# Patient Record
Sex: FEMALE | Race: WHITE | NOT HISPANIC OR LATINO | Employment: UNEMPLOYED
[De-identification: names, ages, dates, MRNs, and addresses within clinical notes are randomized per-mention and may not be internally consistent; named-entity substitution may affect disease eponyms.]

---

## 2017-06-21 ENCOUNTER — HOSPITAL (OUTPATIENT)
Dept: OTHER | Age: 37
End: 2017-06-21
Attending: INTERNAL MEDICINE

## 2017-06-28 ENCOUNTER — HOSPITAL (OUTPATIENT)
Dept: OTHER | Age: 37
End: 2017-06-28
Attending: INTERNAL MEDICINE

## 2017-07-13 ENCOUNTER — HOSPITAL (OUTPATIENT)
Dept: OTHER | Age: 37
End: 2017-07-13
Attending: SURGERY

## 2017-07-17 ENCOUNTER — HOSPITAL (OUTPATIENT)
Dept: OTHER | Age: 37
End: 2017-07-17
Attending: SURGERY

## 2017-07-23 ENCOUNTER — HOSPITAL (OUTPATIENT)
Dept: OTHER | Age: 37
End: 2017-07-23
Attending: EMERGENCY MEDICINE

## 2017-07-23 ENCOUNTER — DIAGNOSTIC TRANS (OUTPATIENT)
Dept: OTHER | Age: 37
End: 2017-07-23

## 2017-07-23 LAB
ANALYZER ANC (IANC): ABNORMAL
ANION GAP SERPL CALC-SCNC: 15 MMOL/L (ref 10–20)
BASOPHILS # BLD: 0 THOUSAND/MCL (ref 0–0.3)
BASOPHILS NFR BLD: 0 %
BUN SERPL-MCNC: 6 MG/DL (ref 6–20)
BUN/CREAT SERPL: 12 (ref 7–25)
CALCIUM SERPL-MCNC: 8.7 MG/DL (ref 8.4–10.2)
CHLORIDE: 104 MMOL/L (ref 98–107)
CO2 SERPL-SCNC: 24 MMOL/L (ref 21–32)
CREAT SERPL-MCNC: 0.5 MG/DL (ref 0.51–0.95)
DIFFERENTIAL METHOD BLD: ABNORMAL
EOSINOPHIL # BLD: 0 THOUSAND/MCL (ref 0.1–0.5)
EOSINOPHIL NFR BLD: 1 %
ERYTHROCYTE [DISTWIDTH] IN BLOOD: 15 % (ref 11–15)
ETHANOL SERPL-MCNC: 66 MG/DL
GLUCOSE SERPL-MCNC: 109 MG/DL (ref 65–99)
HEMATOCRIT: 38.5 % (ref 36–46.5)
HGB BLD-MCNC: 12.5 GM/DL (ref 12–15.5)
LYMPHOCYTES # BLD: 1.2 THOUSAND/MCL (ref 1–4.8)
LYMPHOCYTES NFR BLD: 28 %
MAGNESIUM SERPL-MCNC: 1.6 MG/DL (ref 1.7–2.4)
MCH RBC QN AUTO: 28 PG (ref 26–34)
MCHC RBC AUTO-ENTMCNC: 32.5 GM/DL (ref 32–36.5)
MCV RBC AUTO: 86.3 FL (ref 78–100)
MONOCYTES # BLD: 0.4 THOUSAND/MCL (ref 0.3–0.9)
MONOCYTES NFR BLD: 11 %
NEUTROPHILS # BLD: 2.5 THOUSAND/MCL (ref 1.8–7.7)
NEUTROPHILS NFR BLD: 60 %
NEUTS SEG NFR BLD: ABNORMAL %
PERCENT NRBC: ABNORMAL
PLATELET # BLD: 146 THOUSAND/MCL (ref 140–450)
POTASSIUM SERPL-SCNC: 3.9 MMOL/L (ref 3.4–5.1)
RBC # BLD: 4.46 MILLION/MCL (ref 4–5.2)
SODIUM SERPL-SCNC: 139 MMOL/L (ref 135–145)
TROPONIN I SERPL HS-MCNC: <0.02 NG/ML
TSH SERPL-ACNC: 0.6 MCUNIT/ML (ref 0.35–5)
WBC # BLD: 4.1 THOUSAND/MCL (ref 4.2–11)

## 2017-07-29 ENCOUNTER — HOSPITAL (OUTPATIENT)
Dept: OTHER | Age: 37
End: 2017-07-29
Attending: INTERNAL MEDICINE

## 2017-07-29 LAB
ALBUMIN SERPL-MCNC: 3.1 GM/DL (ref 3.6–5.1)
ALBUMIN/GLOB SERPL: 0.7 {RATIO} (ref 1–2.4)
ALP SERPL-CCNC: 79 UNIT/L (ref 45–117)
ALT SERPL-CCNC: 19 UNIT/L
ANALYZER ANC (IANC): ABNORMAL
ANION GAP SERPL CALC-SCNC: 19 MMOL/L (ref 10–20)
APAP SERPL-MCNC: <2 MCG/ML (ref 10–30)
AST SERPL-CCNC: 16 UNIT/L
BASOPHILS # BLD: 0 THOUSAND/MCL (ref 0–0.3)
BASOPHILS NFR BLD: 0 %
BILIRUB SERPL-MCNC: 0.5 MG/DL (ref 0.2–1)
BUN SERPL-MCNC: <2 MG/DL (ref 6–20)
BUN/CREAT SERPL: ABNORMAL (ref 7–25)
CALCIUM SERPL-MCNC: 8.7 MG/DL (ref 8.4–10.2)
CHLORIDE: 102 MMOL/L (ref 98–107)
CO2 SERPL-SCNC: 21 MMOL/L (ref 21–32)
CREAT SERPL-MCNC: 0.5 MG/DL (ref 0.51–0.95)
DIFFERENTIAL METHOD BLD: ABNORMAL
EOSINOPHIL # BLD: 0 THOUSAND/MCL (ref 0.1–0.5)
EOSINOPHIL NFR BLD: 0 %
ERYTHROCYTE [DISTWIDTH] IN BLOOD: 14.9 % (ref 11–15)
ETHANOL SERPL-MCNC: 167 MG/DL
GLOBULIN SER-MCNC: 4.3 GM/DL (ref 2–4)
GLUCOSE SERPL-MCNC: 115 MG/DL (ref 65–99)
HEMATOCRIT: 32.6 % (ref 36–46.5)
HGB BLD-MCNC: 10.6 GM/DL (ref 12–15.5)
LIPASE SERPL-CCNC: 196 UNIT/L (ref 73–393)
LYMPHOCYTES # BLD: 1.1 THOUSAND/MCL (ref 1–4.8)
LYMPHOCYTES NFR BLD: 13 %
MAGNESIUM SERPL-MCNC: 1.8 MG/DL (ref 1.7–2.4)
MCH RBC QN AUTO: 28.3 PG (ref 26–34)
MCHC RBC AUTO-ENTMCNC: 32.5 GM/DL (ref 32–36.5)
MCV RBC AUTO: 86.9 FL (ref 78–100)
MONOCYTES # BLD: 1.3 THOUSAND/MCL (ref 0.3–0.9)
MONOCYTES NFR BLD: 16 %
NEUTROPHILS # BLD: 6 THOUSAND/MCL (ref 1.8–7.7)
NEUTROPHILS NFR BLD: 71 %
NEUTS SEG NFR BLD: ABNORMAL %
PERCENT NRBC: ABNORMAL
PLATELET # BLD: 122 THOUSAND/MCL (ref 140–450)
POTASSIUM SERPL-SCNC: 3 MMOL/L (ref 3.4–5.1)
PROT SERPL-MCNC: 7.4 GM/DL (ref 6.4–8.2)
RBC # BLD: 3.75 MILLION/MCL (ref 4–5.2)
SALICYLATES SERPL-MCNC: 2.8 MG/DL
SODIUM SERPL-SCNC: 139 MMOL/L (ref 135–145)
WBC # BLD: 8.5 THOUSAND/MCL (ref 4.2–11)

## 2017-07-30 ENCOUNTER — IMAGING SERVICES (OUTPATIENT)
Dept: OTHER | Age: 37
End: 2017-07-30

## 2017-07-30 ENCOUNTER — DIAGNOSTIC TRANS (OUTPATIENT)
Dept: OTHER | Age: 37
End: 2017-07-30

## 2017-07-30 LAB
AMPHETAMINES UR QL SCN>500 NG/ML: NEGATIVE
ANION GAP SERPL CALC-SCNC: 10 MMOL/L (ref 10–20)
B-HCG UR QL: NEGATIVE
BARBITURATES UR QL SCN>200 NG/ML: NEGATIVE
BENZODIAZ UR QL SCN>200 NG/ML: NEGATIVE
BUN SERPL-MCNC: 2 MG/DL (ref 6–20)
BUN/CREAT SERPL: 4 (ref 7–25)
BZE UR QL SCN>150 NG/ML: NEGATIVE
CALCIUM SERPL-MCNC: 8.4 MG/DL (ref 8.4–10.2)
CANNABINOIDS UR QL SCN>50 NG/ML: NEGATIVE
CHLORIDE: 107 MMOL/L (ref 98–107)
CO2 SERPL-SCNC: 26 MMOL/L (ref 21–32)
CREAT SERPL-MCNC: 0.49 MG/DL (ref 0.51–0.95)
GLUCOSE BLDC GLUCOMTR-MCNC: 106 MG/DL (ref 65–99)
GLUCOSE SERPL-MCNC: 100 MG/DL (ref 65–99)
MAGNESIUM SERPL-MCNC: 1.8 MG/DL (ref 1.7–2.4)
OPIATES UR QL SCN>300 NG/ML: POSITIVE
PCP UR QL SCN>25 NG/ML: NEGATIVE
POTASSIUM SERPL-SCNC: 3.4 MMOL/L (ref 3.4–5.1)
SODIUM SERPL-SCNC: 140 MMOL/L (ref 135–145)

## 2017-07-31 ENCOUNTER — HOSPITAL (OUTPATIENT)
Dept: OTHER | Age: 37
End: 2017-07-31
Attending: PSYCHIATRY & NEUROLOGY

## 2017-07-31 ENCOUNTER — PRIOR ORIGINAL RECORDS (OUTPATIENT)
Dept: OTHER | Age: 37
End: 2017-07-31

## 2017-07-31 LAB
ALBUMIN SERPL-MCNC: 2.6 GM/DL (ref 3.6–5.1)
ALBUMIN/GLOB SERPL: 0.6 {RATIO} (ref 1–2.4)
ALP SERPL-CCNC: 65 UNIT/L (ref 45–117)
ALT SERPL-CCNC: 18 UNIT/L
ANALYZER ANC (IANC): ABNORMAL
ANION GAP SERPL CALC-SCNC: 16 MMOL/L (ref 10–20)
AST SERPL-CCNC: 13 UNIT/L
BASOPHILS # BLD: 0 THOUSAND/MCL (ref 0–0.3)
BASOPHILS NFR BLD: 0 %
BILIRUB SERPL-MCNC: 0.6 MG/DL (ref 0.2–1)
BUN SERPL-MCNC: <2 MG/DL (ref 6–20)
BUN/CREAT SERPL: ABNORMAL (ref 7–25)
CALCIUM SERPL-MCNC: 8.6 MG/DL (ref 8.4–10.2)
CHLORIDE: 104 MMOL/L (ref 98–107)
CO2 SERPL-SCNC: 23 MMOL/L (ref 21–32)
CREAT SERPL-MCNC: 0.45 MG/DL (ref 0.51–0.95)
DIFFERENTIAL METHOD BLD: ABNORMAL
EOSINOPHIL # BLD: 0 THOUSAND/MCL (ref 0.1–0.5)
EOSINOPHIL NFR BLD: 1 %
ERYTHROCYTE [DISTWIDTH] IN BLOOD: 15.2 % (ref 11–15)
GLOBULIN SER-MCNC: 4.1 GM/DL (ref 2–4)
GLUCOSE BLDC GLUCOMTR-MCNC: 87 MG/DL (ref 65–99)
GLUCOSE SERPL-MCNC: 82 MG/DL (ref 65–99)
HEMATOCRIT: 31.4 % (ref 36–46.5)
HGB BLD-MCNC: 10 GM/DL (ref 12–15.5)
LYMPHOCYTES # BLD: 0.8 THOUSAND/MCL (ref 1–4.8)
LYMPHOCYTES NFR BLD: 16 %
MAGNESIUM SERPL-MCNC: 1.9 MG/DL (ref 1.7–2.4)
MCH RBC QN AUTO: 28 PG (ref 26–34)
MCHC RBC AUTO-ENTMCNC: 31.8 GM/DL (ref 32–36.5)
MCV RBC AUTO: 88 FL (ref 78–100)
MONOCYTES # BLD: 0.6 THOUSAND/MCL (ref 0.3–0.9)
MONOCYTES NFR BLD: 12 %
NEUTROPHILS # BLD: 3.6 THOUSAND/MCL (ref 1.8–7.7)
NEUTROPHILS NFR BLD: 71 %
NEUTS SEG NFR BLD: ABNORMAL %
PERCENT NRBC: ABNORMAL
PHOSPHATE SERPL-MCNC: 2.5 MG/DL (ref 2.4–4.7)
PLATELET # BLD: 115 THOUSAND/MCL (ref 140–450)
POTASSIUM SERPL-SCNC: 4.2 MMOL/L (ref 3.4–5.1)
PROT SERPL-MCNC: 6.7 GM/DL (ref 6.4–8.2)
RBC # BLD: 3.57 MILLION/MCL (ref 4–5.2)
SODIUM SERPL-SCNC: 139 MMOL/L (ref 135–145)
T3 SERPL-MCNC: 0.75 NG/ML (ref 0.6–1.81)
T4 SERPL-MCNC: 9.5 MCG/DL (ref 4.7–13.3)
WBC # BLD: 5.1 THOUSAND/MCL (ref 4.2–11)

## 2017-08-01 LAB
ALBUMIN: 2.6 G/DL
ALKALINE PHOSPHATATE(ALK PHOS): 65 IU/L
ALT (SGPT): 18 U/L
AST (SGOT): 13 U/L
CALCIUM: 8.6 MG/DL
CHLORIDE: 104 MEQ/L
CHOLEST SERPL-MCNC: 165 MG/DL
CHOLEST/HDLC SERPL: 4.3 {RATIO}
CHOLESTEROL, TOTAL: 165 MG/DL
CREATININE, SERUM: 0.45 MG/DL
GLOBULIN: 4.1 G/DL
GLUCOSE: 82 MG/DL
GLUCOSE: 82 MG/DL
GLYCOHEMOGLOBIN: 5.2 % (ref 4.5–5.6)
HDL CHOLESTEROL: 38 MG/DL
HDLC SERPL-MCNC: 38 MG/DL
HEMATOCRIT: 31.4 %
HEMOGLOBIN: 10 G/DL
LDL CHOLESTEROL: 113 MG/DL
LDLC SERPL CALC-MCNC: 113 MG/DL
MAGNESIUM: 1.9 MG/DL
NONHDLC SERPL-MCNC: 127 MG/DL
PLATELETS: 115 K/UL
POTASSIUM, SERUM: 4.2 MEQ/L
RED BLOOD COUNT: 3.57 X 10-6/U
SGOT (AST): 13 IU/L
SGPT (ALT): 18 IU/L
SODIUM: 139 MEQ/L
TRIGLYCERIDE (TRIGP): 71 MG/DL
TRIGLYCERIDES: 71 MG/DL
WHITE BLOOD COUNT: 5.1 X 10-3/U

## 2017-08-03 ENCOUNTER — IMAGING SERVICES (OUTPATIENT)
Dept: OTHER | Age: 37
End: 2017-08-03

## 2017-08-07 ENCOUNTER — HOSPITAL (OUTPATIENT)
Dept: OTHER | Age: 37
End: 2017-08-07
Attending: SURGERY

## 2017-08-08 LAB
ANALYZER ANC (IANC): ABNORMAL
ANION GAP SERPL CALC-SCNC: 9 MMOL/L (ref 10–20)
BUN SERPL-MCNC: 4 MG/DL (ref 6–20)
BUN/CREAT SERPL: 6 (ref 7–25)
CALCIUM SERPL-MCNC: 8.2 MG/DL (ref 8.4–10.2)
CHLORIDE: 105 MMOL/L (ref 98–107)
CO2 SERPL-SCNC: 31 MMOL/L (ref 21–32)
CREAT SERPL-MCNC: 0.65 MG/DL (ref 0.51–0.95)
ERYTHROCYTE [DISTWIDTH] IN BLOOD: 15.5 % (ref 11–15)
GLUCOSE SERPL-MCNC: 114 MG/DL (ref 65–99)
HEMATOCRIT: 33.4 % (ref 36–46.5)
HGB BLD-MCNC: 10.3 GM/DL (ref 12–15.5)
MCH RBC QN AUTO: 27.7 PG (ref 26–34)
MCHC RBC AUTO-ENTMCNC: 30.8 GM/DL (ref 32–36.5)
MCV RBC AUTO: 89.8 FL (ref 78–100)
PLATELET # BLD: 289 THOUSAND/MCL (ref 140–450)
POTASSIUM SERPL-SCNC: 4.1 MMOL/L (ref 3.4–5.1)
RBC # BLD: 3.72 MILLION/MCL (ref 4–5.2)
SODIUM SERPL-SCNC: 141 MMOL/L (ref 135–145)
WBC # BLD: 6.1 THOUSAND/MCL (ref 4.2–11)

## 2017-08-09 LAB
ALBUMIN SERPL-MCNC: 3.3 GM/DL (ref 3.6–5.1)
ALP SERPL-CCNC: 85 UNIT/L (ref 45–117)
ALT SERPL-CCNC: 16 UNIT/L
ANALYZER ANC (IANC): ABNORMAL
ANION GAP SERPL CALC-SCNC: 17 MMOL/L (ref 10–20)
APAP SERPL-MCNC: <2 MCG/ML (ref 10–30)
APTT PPP: 27 SECONDS (ref 22–30)
APTT PPP: NORMAL S
AST SERPL-CCNC: 24 UNIT/L
BASOPHILS # BLD: 0 THOUSAND/MCL (ref 0–0.3)
BASOPHILS NFR BLD: 0 %
BILIRUB CONJ SERPL-MCNC: 0.1 MG/DL (ref 0–0.2)
BILIRUB SERPL-MCNC: 0.3 MG/DL (ref 0.2–1)
BUN SERPL-MCNC: 3 MG/DL (ref 6–20)
BUN/CREAT SERPL: 5 (ref 7–25)
CALCIUM SERPL-MCNC: 9 MG/DL (ref 8.4–10.2)
CHLORIDE: 106 MMOL/L (ref 98–107)
CO2 SERPL-SCNC: 25 MMOL/L (ref 21–32)
CREAT SERPL-MCNC: 0.61 MG/DL (ref 0.51–0.95)
DIFFERENTIAL METHOD BLD: ABNORMAL
EOSINOPHIL # BLD: 0.2 THOUSAND/MCL (ref 0.1–0.5)
EOSINOPHIL NFR BLD: 2 %
ERYTHROCYTE [DISTWIDTH] IN BLOOD: 14.8 % (ref 11–15)
ETHANOL SERPL-MCNC: 188 MG/DL
ETHANOL SERPL-MCNC: 98 MG/DL
GLUCOSE SERPL-MCNC: 123 MG/DL (ref 65–99)
HEMATOCRIT: 36.9 % (ref 36–46.5)
HGB BLD-MCNC: 11.4 GM/DL (ref 12–15.5)
INR PPP: 1
LIPASE SERPL-CCNC: 253 UNIT/L (ref 73–393)
LYMPHOCYTES # BLD: 2.9 THOUSAND/MCL (ref 1–4.8)
LYMPHOCYTES NFR BLD: 23 %
MAGNESIUM SERPL-MCNC: 2 MG/DL (ref 1.7–2.4)
MCH RBC QN AUTO: 27.7 PG (ref 26–34)
MCHC RBC AUTO-ENTMCNC: 30.9 GM/DL (ref 32–36.5)
MCV RBC AUTO: 89.6 FL (ref 78–100)
MONOCYTES # BLD: 0.9 THOUSAND/MCL (ref 0.3–0.9)
MONOCYTES NFR BLD: 7 %
NEUTROPHILS # BLD: 8.2 THOUSAND/MCL (ref 1.8–7.7)
NEUTROPHILS NFR BLD: 68 %
NEUTS SEG NFR BLD: ABNORMAL %
PERCENT NRBC: ABNORMAL
PLATELET # BLD: 356 THOUSAND/MCL (ref 140–450)
POTASSIUM SERPL-SCNC: 3.8 MMOL/L (ref 3.4–5.1)
PROT SERPL-MCNC: 7.6 GM/DL (ref 6.4–8.2)
PROTHROMBIN TIME: 10.6 SECONDS (ref 9.7–11.8)
PROTHROMBIN TIME: NORMAL
RBC # BLD: 4.12 MILLION/MCL (ref 4–5.2)
SALICYLATES SERPL-MCNC: 3.1 MG/DL
SODIUM SERPL-SCNC: 144 MMOL/L (ref 135–145)
WBC # BLD: 12.2 THOUSAND/MCL (ref 4.2–11)

## 2017-08-10 ENCOUNTER — PRIOR ORIGINAL RECORDS (OUTPATIENT)
Dept: HEALTH INFORMATION MANAGEMENT | Age: 37
End: 2017-08-10

## 2017-08-10 ENCOUNTER — HOSPITAL (OUTPATIENT)
Dept: OTHER | Age: 37
End: 2017-08-10
Attending: PSYCHIATRY & NEUROLOGY

## 2017-08-10 LAB
ALBUMIN SERPL-MCNC: 2.4 GM/DL (ref 3.6–5.1)
ALBUMIN/GLOB SERPL: 0.7 {RATIO} (ref 1–2.4)
ALP SERPL-CCNC: 64 UNIT/L (ref 45–117)
ALT SERPL-CCNC: 12 UNIT/L
ANALYZER ANC (IANC): ABNORMAL
ANION GAP SERPL CALC-SCNC: 11 MMOL/L (ref 10–20)
AST SERPL-CCNC: 16 UNIT/L
BASOPHILS # BLD: 0 THOUSAND/MCL (ref 0–0.3)
BASOPHILS NFR BLD: 1 %
BILIRUB SERPL-MCNC: 0.3 MG/DL (ref 0.2–1)
BUN SERPL-MCNC: 3 MG/DL (ref 6–20)
BUN/CREAT SERPL: 5 (ref 7–25)
CALCIUM SERPL-MCNC: 8 MG/DL (ref 8.4–10.2)
CHLORIDE: 106 MMOL/L (ref 98–107)
CO2 SERPL-SCNC: 28 MMOL/L (ref 21–32)
CREAT SERPL-MCNC: 0.56 MG/DL (ref 0.51–0.95)
DIFFERENTIAL METHOD BLD: ABNORMAL
EOSINOPHIL # BLD: 0.1 THOUSAND/MCL (ref 0.1–0.5)
EOSINOPHIL NFR BLD: 2 %
ERYTHROCYTE [DISTWIDTH] IN BLOOD: 15.1 % (ref 11–15)
GLOBULIN SER-MCNC: 3.4 GM/DL (ref 2–4)
GLUCOSE SERPL-MCNC: 104 MG/DL (ref 65–99)
HCG SERPL QL: NEGATIVE
HEMATOCRIT: 29.4 % (ref 36–46.5)
HGB BLD-MCNC: 9.5 GM/DL (ref 12–15.5)
LYMPHOCYTES # BLD: 1.5 THOUSAND/MCL (ref 1–4.8)
LYMPHOCYTES NFR BLD: 25 %
MCH RBC QN AUTO: 28.3 PG (ref 26–34)
MCHC RBC AUTO-ENTMCNC: 32.3 GM/DL (ref 32–36.5)
MCV RBC AUTO: 87.5 FL (ref 78–100)
MONOCYTES # BLD: 0.6 THOUSAND/MCL (ref 0.3–0.9)
MONOCYTES NFR BLD: 9 %
NEUTROPHILS # BLD: 3.9 THOUSAND/MCL (ref 1.8–7.7)
NEUTROPHILS NFR BLD: 63 %
NEUTS SEG NFR BLD: ABNORMAL %
PERCENT NRBC: ABNORMAL
PLATELET # BLD: 231 THOUSAND/MCL (ref 140–450)
POTASSIUM SERPL-SCNC: 4.1 MMOL/L (ref 3.4–5.1)
PROT SERPL-MCNC: 5.8 GM/DL (ref 6.4–8.2)
RBC # BLD: 3.36 MILLION/MCL (ref 4–5.2)
SODIUM SERPL-SCNC: 141 MMOL/L (ref 135–145)
T3 SERPL-MCNC: 0.93 NG/ML (ref 0.6–1.81)
T4 SERPL-MCNC: 8 MCG/DL (ref 4.7–13.3)
WBC # BLD: 6.1 THOUSAND/MCL (ref 4.2–11)

## 2017-08-31 LAB — CREAT SERPL-MCNC: 0.51 MG/DL (ref 0.51–0.95)

## 2017-09-01 ENCOUNTER — HOSPITAL (OUTPATIENT)
Dept: OTHER | Age: 37
End: 2017-09-01
Attending: INTERNAL MEDICINE

## 2017-09-01 ENCOUNTER — CHARTING TRANS (OUTPATIENT)
Dept: OTHER | Age: 37
End: 2017-09-01

## 2017-09-01 LAB
ANALYZER ANC (IANC): ABNORMAL
ANION GAP SERPL CALC-SCNC: 11 MMOL/L (ref 10–20)
BASOPHILS # BLD: 0 THOUSAND/MCL (ref 0–0.3)
BASOPHILS NFR BLD: 0 %
BUN SERPL-MCNC: 8 MG/DL (ref 6–20)
BUN/CREAT SERPL: 15 (ref 7–25)
CALCIUM SERPL-MCNC: 8.5 MG/DL (ref 8.4–10.2)
CHLORIDE: 102 MMOL/L (ref 98–107)
CO2 SERPL-SCNC: 28 MMOL/L (ref 21–32)
CREAT SERPL-MCNC: 0.55 MG/DL (ref 0.51–0.95)
DIFFERENTIAL METHOD BLD: ABNORMAL
EOSINOPHIL # BLD: 0.4 THOUSAND/MCL (ref 0.1–0.5)
EOSINOPHIL NFR BLD: 7 %
ERYTHROCYTE [DISTWIDTH] IN BLOOD: 15.4 % (ref 11–15)
GLUCOSE SERPL-MCNC: 110 MG/DL (ref 65–99)
HEMATOCRIT: 28.7 % (ref 36–46.5)
HGB BLD-MCNC: 8.7 GM/DL (ref 12–15.5)
LYMPHOCYTES # BLD: 1 THOUSAND/MCL (ref 1–4.8)
LYMPHOCYTES NFR BLD: 18 %
MCH RBC QN AUTO: 26.4 PG (ref 26–34)
MCHC RBC AUTO-ENTMCNC: 30.3 GM/DL (ref 32–36.5)
MCV RBC AUTO: 87 FL (ref 78–100)
MONOCYTES # BLD: 0.7 THOUSAND/MCL (ref 0.3–0.9)
MONOCYTES NFR BLD: 12 %
NEUTROPHILS # BLD: 3.6 THOUSAND/MCL (ref 1.8–7.7)
NEUTROPHILS NFR BLD: 63 %
NEUTS SEG NFR BLD: ABNORMAL %
PERCENT NRBC: ABNORMAL
PLATELET # BLD: 167 THOUSAND/MCL (ref 140–450)
POTASSIUM SERPL-SCNC: 3.6 MMOL/L (ref 3.4–5.1)
RBC # BLD: 3.3 MILLION/MCL (ref 4–5.2)
SODIUM SERPL-SCNC: 137 MMOL/L (ref 135–145)
WBC # BLD: 5.8 THOUSAND/MCL (ref 4.2–11)

## 2017-09-02 LAB
ANALYZER ANC (IANC): ABNORMAL
BASOPHILS # BLD: 0 THOUSAND/MCL (ref 0–0.3)
BASOPHILS NFR BLD: 1 %
DIFFERENTIAL METHOD BLD: ABNORMAL
EOSINOPHIL # BLD: 0.6 THOUSAND/MCL (ref 0.1–0.5)
EOSINOPHIL NFR BLD: 12 %
ERYTHROCYTE [DISTWIDTH] IN BLOOD: 15.3 % (ref 11–15)
FOLATE SERPL-MCNC: 9.1 NG/ML
HEMATOCRIT: 29.2 % (ref 36–46.5)
HGB BLD-MCNC: 9 GM/DL (ref 12–15.5)
IRON SATN MFR SERPL: 4 % (ref 15–45)
IRON SERPL-MCNC: 14 MCG/DL (ref 50–170)
LYMPHOCYTES # BLD: 1.4 THOUSAND/MCL (ref 1–4.8)
LYMPHOCYTES NFR BLD: 30 %
MCH RBC QN AUTO: 26.9 PG (ref 26–34)
MCHC RBC AUTO-ENTMCNC: 30.8 GM/DL (ref 32–36.5)
MCV RBC AUTO: 87.4 FL (ref 78–100)
MONOCYTES # BLD: 0.6 THOUSAND/MCL (ref 0.3–0.9)
MONOCYTES NFR BLD: 13 %
NEUTROPHILS # BLD: 2 THOUSAND/MCL (ref 1.8–7.7)
NEUTROPHILS NFR BLD: 44 %
NEUTS SEG NFR BLD: ABNORMAL %
PERCENT NRBC: ABNORMAL
PLATELET # BLD: 148 THOUSAND/MCL (ref 140–450)
RBC # BLD: 3.34 MILLION/MCL (ref 4–5.2)
TIBC SERPL-MCNC: 324 MCG/DL (ref 250–450)
VIT B12 SERPL-MCNC: 248 PG/ML (ref 211–911)
WBC # BLD: 4.6 THOUSAND/MCL (ref 4.2–11)

## 2017-09-28 ENCOUNTER — HOSPITAL (OUTPATIENT)
Dept: OTHER | Age: 37
End: 2017-09-28
Attending: INTERNAL MEDICINE

## 2017-10-04 ENCOUNTER — HOSPITAL (OUTPATIENT)
Dept: OTHER | Age: 37
End: 2017-10-04
Attending: SURGERY

## 2018-05-01 ENCOUNTER — DIAGNOSTIC TRANS (OUTPATIENT)
Dept: OTHER | Age: 38
End: 2018-05-01

## 2018-05-03 ENCOUNTER — HOSPITAL (OUTPATIENT)
Dept: OTHER | Age: 38
End: 2018-05-03
Attending: SPECIALIST

## 2018-05-19 ENCOUNTER — HOSPITAL (OUTPATIENT)
Dept: OTHER | Age: 38
End: 2018-05-19
Attending: EMERGENCY MEDICINE

## 2018-05-19 ENCOUNTER — DIAGNOSTIC TRANS (OUTPATIENT)
Dept: OTHER | Age: 38
End: 2018-05-19

## 2018-05-19 LAB
ANALYZER ANC (IANC): ABNORMAL
ANION GAP SERPL CALC-SCNC: 20 MMOL/L (ref 10–20)
BASOPHILS # BLD: 0 THOUSAND/MCL (ref 0–0.3)
BASOPHILS NFR BLD: 0 %
BUN SERPL-MCNC: 9 MG/DL (ref 6–20)
BUN/CREAT SERPL: 14 (ref 7–25)
CALCIUM SERPL-MCNC: 10 MG/DL (ref 8.4–10.2)
CHLORIDE: 102 MMOL/L (ref 98–107)
CO2 SERPL-SCNC: 22 MMOL/L (ref 21–32)
CREAT SERPL-MCNC: 0.63 MG/DL (ref 0.51–0.95)
D DIMER PPP FEU-MCNC: 0.63 MG/L FEU
DIFFERENTIAL METHOD BLD: ABNORMAL
EOSINOPHIL # BLD: 0 THOUSAND/MCL (ref 0.1–0.5)
EOSINOPHIL NFR BLD: 1 %
ERYTHROCYTE [DISTWIDTH] IN BLOOD: 13.9 % (ref 11–15)
GLUCOSE SERPL-MCNC: 134 MG/DL (ref 65–99)
HEMATOCRIT: 47.2 % (ref 36–46.5)
HGB BLD-MCNC: 16.8 GM/DL (ref 12–15.5)
LYMPHOCYTES # BLD: 1.8 THOUSAND/MCL (ref 1–4.8)
LYMPHOCYTES NFR BLD: 42 %
MAGNESIUM SERPL-MCNC: 1.7 MG/DL (ref 1.7–2.4)
MCH RBC QN AUTO: 35.1 PG (ref 26–34)
MCHC RBC AUTO-ENTMCNC: 35.6 GM/DL (ref 32–36.5)
MCV RBC AUTO: 98.5 FL (ref 78–100)
MONOCYTES # BLD: 0.4 THOUSAND/MCL (ref 0.3–0.9)
MONOCYTES NFR BLD: 10 %
NEUTROPHILS # BLD: 2 THOUSAND/MCL (ref 1.8–7.7)
NEUTROPHILS NFR BLD: 47 %
NEUTS SEG NFR BLD: ABNORMAL %
NRBC (NRBCRE): ABNORMAL
PLATELET # BLD: 250 THOUSAND/MCL (ref 140–450)
POTASSIUM SERPL-SCNC: 3.7 MMOL/L (ref 3.4–5.1)
RBC # BLD: 4.79 MILLION/MCL (ref 4–5.2)
SODIUM SERPL-SCNC: 140 MMOL/L (ref 135–145)
TROPONIN I SERPL HS-MCNC: <0.02 NG/ML
WBC # BLD: 4.2 THOUSAND/MCL (ref 4.2–11)

## 2018-05-29 ENCOUNTER — PRIOR ORIGINAL RECORDS (OUTPATIENT)
Dept: OTHER | Age: 38
End: 2018-05-29

## 2018-06-14 ENCOUNTER — MYAURORA ACCOUNT LINK (OUTPATIENT)
Dept: OTHER | Age: 38
End: 2018-06-14

## 2018-07-07 ENCOUNTER — HOSPITAL (OUTPATIENT)
Dept: OTHER | Age: 38
End: 2018-07-07
Attending: INTERNAL MEDICINE

## 2018-07-07 LAB
ALBUMIN SERPL-MCNC: 3.9 GM/DL (ref 3.6–5.1)
ALP SERPL-CCNC: 101 UNIT/L (ref 45–117)
ALT SERPL-CCNC: 34 UNIT/L
ANALYZER ANC (IANC): ABNORMAL
ANION GAP SERPL CALC-SCNC: 18 MMOL/L (ref 10–20)
AST SERPL-CCNC: 24 UNIT/L
BASOPHILS # BLD: 0 THOUSAND/MCL (ref 0–0.3)
BASOPHILS NFR BLD: 0 %
BILIRUB CONJ SERPL-MCNC: 0.1 MG/DL (ref 0–0.2)
BILIRUB SERPL-MCNC: 0.5 MG/DL (ref 0.2–1)
BUN SERPL-MCNC: 10 MG/DL (ref 6–20)
BUN/CREAT SERPL: 15 (ref 7–25)
CALCIUM SERPL-MCNC: 9 MG/DL (ref 8.4–10.2)
CHLORIDE: 105 MMOL/L (ref 98–107)
CO2 SERPL-SCNC: 24 MMOL/L (ref 21–32)
CREAT SERPL-MCNC: 0.67 MG/DL (ref 0.51–0.95)
DIFFERENTIAL METHOD BLD: ABNORMAL
EOSINOPHIL # BLD: 0.1 THOUSAND/MCL (ref 0.1–0.5)
EOSINOPHIL NFR BLD: 2 %
ERYTHROCYTE [DISTWIDTH] IN BLOOD: 14.4 % (ref 11–15)
ETHANOL SERPL-MCNC: 230 MG/DL
GLUCOSE SERPL-MCNC: 139 MG/DL (ref 65–99)
HEMATOCRIT: 45.2 % (ref 36–46.5)
HGB BLD-MCNC: 16.4 GM/DL (ref 12–15.5)
LIPASE SERPL-CCNC: 153 UNIT/L (ref 73–393)
LYMPHOCYTES # BLD: 2.1 THOUSAND/MCL (ref 1–4.8)
LYMPHOCYTES NFR BLD: 43 %
MCH RBC QN AUTO: 36.1 PG (ref 26–34)
MCHC RBC AUTO-ENTMCNC: 36.3 GM/DL (ref 32–36.5)
MCV RBC AUTO: 99.6 FL (ref 78–100)
MONOCYTES # BLD: 0.3 THOUSAND/MCL (ref 0.3–0.9)
MONOCYTES NFR BLD: 6 %
NEUTROPHILS # BLD: 2.4 THOUSAND/MCL (ref 1.8–7.7)
NEUTROPHILS NFR BLD: 49 %
NEUTS SEG NFR BLD: ABNORMAL %
NRBC (NRBCRE): ABNORMAL
PLATELET # BLD: 215 THOUSAND/MCL (ref 140–450)
POTASSIUM SERPL-SCNC: 4 MMOL/L (ref 3.4–5.1)
PROT SERPL-MCNC: 7.9 GM/DL (ref 6.4–8.2)
RBC # BLD: 4.54 MILLION/MCL (ref 4–5.2)
SODIUM SERPL-SCNC: 143 MMOL/L (ref 135–145)
WBC # BLD: 4.9 THOUSAND/MCL (ref 4.2–11)

## 2018-07-08 ENCOUNTER — DIAGNOSTIC TRANS (OUTPATIENT)
Dept: OTHER | Age: 38
End: 2018-07-08

## 2018-07-08 LAB
AMPHETAMINES UR QL SCN>500 NG/ML: NEGATIVE
ANION GAP SERPL CALC-SCNC: 11 MMOL/L (ref 10–20)
BARBITURATES UR QL SCN>200 NG/ML: NEGATIVE
BENZODIAZ UR QL SCN>200 NG/ML: NEGATIVE
BUN SERPL-MCNC: 12 MG/DL (ref 6–20)
BUN/CREAT SERPL: 20 (ref 7–25)
BZE UR QL SCN>150 NG/ML: NEGATIVE
CALCIUM SERPL-MCNC: 9.2 MG/DL (ref 8.4–10.2)
CANNABINOIDS UR QL SCN>50 NG/ML: NEGATIVE
CHLORIDE: 103 MMOL/L (ref 98–107)
CO2 SERPL-SCNC: 28 MMOL/L (ref 21–32)
CREAT SERPL-MCNC: 0.59 MG/DL (ref 0.51–0.95)
GLUCOSE SERPL-MCNC: 99 MG/DL (ref 65–99)
HCG SERPL QL: NEGATIVE
MAGNESIUM SERPL-MCNC: 1.7 MG/DL (ref 1.7–2.4)
OPIATES UR QL SCN>300 NG/ML: NEGATIVE
PCP UR QL SCN>25 NG/ML: NEGATIVE
POTASSIUM SERPL-SCNC: 4.1 MMOL/L (ref 3.4–5.1)
SODIUM SERPL-SCNC: 138 MMOL/L (ref 135–145)

## 2018-07-09 ENCOUNTER — CHARTING TRANS (OUTPATIENT)
Dept: OTHER | Age: 38
End: 2018-07-09

## 2018-07-09 LAB
CREAT SERPL-MCNC: 0.59 MG/DL (ref 0.51–0.95)
MAGNESIUM SERPL-MCNC: 2 MG/DL (ref 1.7–2.4)

## 2018-07-12 ENCOUNTER — HOSPITAL (OUTPATIENT)
Dept: OTHER | Age: 38
End: 2018-07-12
Attending: SURGERY

## 2018-07-17 ENCOUNTER — HOSPITAL (OUTPATIENT)
Dept: OTHER | Age: 38
End: 2018-07-17
Attending: INTERNAL MEDICINE

## 2018-07-20 ENCOUNTER — PRIOR ORIGINAL RECORDS (OUTPATIENT)
Dept: OTHER | Age: 38
End: 2018-07-20

## 2018-07-28 ENCOUNTER — HOSPITAL (OUTPATIENT)
Dept: OTHER | Age: 38
End: 2018-07-28
Attending: INTERNAL MEDICINE

## 2018-07-28 LAB
ALBUMIN SERPL-MCNC: 3.9 GM/DL (ref 3.6–5.1)
ALBUMIN/GLOB SERPL: 0.9 {RATIO} (ref 1–2.4)
ALP SERPL-CCNC: 119 UNIT/L (ref 45–117)
ALT SERPL-CCNC: 31 UNIT/L
ANALYZER ANC (IANC): ABNORMAL
ANION GAP SERPL CALC-SCNC: 18 MMOL/L (ref 10–20)
AST SERPL-CCNC: 24 UNIT/L
BASOPHILS # BLD: 0 THOUSAND/MCL (ref 0–0.3)
BASOPHILS NFR BLD: 0 %
BILIRUB SERPL-MCNC: 0.6 MG/DL (ref 0.2–1)
BUN SERPL-MCNC: 6 MG/DL (ref 6–20)
BUN/CREAT SERPL: 10 (ref 7–25)
CALCIUM SERPL-MCNC: 9.5 MG/DL (ref 8.4–10.2)
CHLORIDE: 102 MMOL/L (ref 98–107)
CO2 SERPL-SCNC: 26 MMOL/L (ref 21–32)
CREAT SERPL-MCNC: 0.59 MG/DL (ref 0.51–0.95)
DIFFERENTIAL METHOD BLD: ABNORMAL
EOSINOPHIL # BLD: 0 THOUSAND/MCL (ref 0.1–0.5)
EOSINOPHIL NFR BLD: 1 %
ERYTHROCYTE [DISTWIDTH] IN BLOOD: 14.2 % (ref 11–15)
ETHANOL SERPL-MCNC: 273 MG/DL
GLOBULIN SER-MCNC: 4.3 GM/DL (ref 2–4)
GLUCOSE SERPL-MCNC: 103 MG/DL (ref 65–99)
HEMATOCRIT: 46 % (ref 36–46.5)
HGB BLD-MCNC: 16.9 GM/DL (ref 12–15.5)
LIPASE SERPL-CCNC: 126 UNIT/L (ref 73–393)
LYMPHOCYTES # BLD: 1.9 THOUSAND/MCL (ref 1–4.8)
LYMPHOCYTES NFR BLD: 25 %
MAGNESIUM SERPL-MCNC: 2.1 MG/DL (ref 1.7–2.4)
MCH RBC QN AUTO: 36.7 PG (ref 26–34)
MCHC RBC AUTO-ENTMCNC: 36.7 GM/DL (ref 32–36.5)
MCV RBC AUTO: 100 FL (ref 78–100)
MONOCYTES # BLD: 0.6 THOUSAND/MCL (ref 0.3–0.9)
MONOCYTES NFR BLD: 8 %
NEUTROPHILS # BLD: 5 THOUSAND/MCL (ref 1.8–7.7)
NEUTROPHILS NFR BLD: 66 %
NEUTS SEG NFR BLD: ABNORMAL %
NRBC (NRBCRE): ABNORMAL
PLATELET # BLD: 200 THOUSAND/MCL (ref 140–450)
POTASSIUM SERPL-SCNC: 3.8 MMOL/L (ref 3.4–5.1)
PROT SERPL-MCNC: 8.2 GM/DL (ref 6.4–8.2)
RBC # BLD: 4.6 MILLION/MCL (ref 4–5.2)
SODIUM SERPL-SCNC: 142 MMOL/L (ref 135–145)
WBC # BLD: 7.6 THOUSAND/MCL (ref 4.2–11)

## 2018-07-29 LAB
AMPHETAMINES UR QL SCN>500 NG/ML: NEGATIVE
BARBITURATES UR QL SCN>200 NG/ML: NEGATIVE
BENZODIAZ UR QL SCN>200 NG/ML: NEGATIVE
BZE UR QL SCN>150 NG/ML: NEGATIVE
CANNABINOIDS UR QL SCN>50 NG/ML: NEGATIVE
OPIATES UR QL SCN>300 NG/ML: NEGATIVE
PCP UR QL SCN>25 NG/ML: NEGATIVE

## 2018-07-30 ENCOUNTER — HOSPITAL (OUTPATIENT)
Dept: OTHER | Age: 38
End: 2018-07-30
Attending: PSYCHIATRY & NEUROLOGY

## 2018-07-30 LAB
ALBUMIN SERPL-MCNC: 2.9 GM/DL (ref 3.6–5.1)
ALBUMIN/GLOB SERPL: 0.9 {RATIO} (ref 1–2.4)
ALP SERPL-CCNC: 88 UNIT/L (ref 45–117)
ALT SERPL-CCNC: 23 UNIT/L
ANALYZER ANC (IANC): ABNORMAL
ANION GAP SERPL CALC-SCNC: 13 MMOL/L (ref 10–20)
AST SERPL-CCNC: 21 UNIT/L
BASOPHILS # BLD: 0 THOUSAND/MCL (ref 0–0.3)
BASOPHILS NFR BLD: 0 %
BILIRUB SERPL-MCNC: 0.5 MG/DL (ref 0.2–1)
BUN SERPL-MCNC: 7 MG/DL (ref 6–20)
BUN/CREAT SERPL: 12 (ref 7–25)
CALCIUM SERPL-MCNC: 8.4 MG/DL (ref 8.4–10.2)
CHLORIDE: 103 MMOL/L (ref 98–107)
CHOLEST SERPL-MCNC: 192 MG/DL
CHOLEST/HDLC SERPL: 4.5 {RATIO}
CO2 SERPL-SCNC: 24 MMOL/L (ref 21–32)
CREAT SERPL-MCNC: 0.59 MG/DL (ref 0.51–0.95)
DIFFERENTIAL METHOD BLD: ABNORMAL
EOSINOPHIL # BLD: 0.1 THOUSAND/MCL (ref 0.1–0.5)
EOSINOPHIL NFR BLD: 3 %
ERYTHROCYTE [DISTWIDTH] IN BLOOD: 13.9 % (ref 11–15)
GLOBULIN SER-MCNC: 3.3 GM/DL (ref 2–4)
GLUCOSE SERPL-MCNC: 113 MG/DL (ref 65–99)
GLYCOHEMOGLOBIN: 4.9 % (ref 4.5–5.6)
HCG SERPL QL: NEGATIVE
HDLC SERPL-MCNC: 43 MG/DL
HEMATOCRIT: 37.4 % (ref 36–46.5)
HGB BLD-MCNC: 13.4 GM/DL (ref 12–15.5)
LDLC SERPL CALC-MCNC: 93 MG/DL
LYMPHOCYTES # BLD: 0.9 THOUSAND/MCL (ref 1–4.8)
LYMPHOCYTES NFR BLD: 30 %
MCH RBC QN AUTO: 35.4 PG (ref 26–34)
MCHC RBC AUTO-ENTMCNC: 35.8 GM/DL (ref 32–36.5)
MCV RBC AUTO: 98.9 FL (ref 78–100)
MONOCYTES # BLD: 0.3 THOUSAND/MCL (ref 0.3–0.9)
MONOCYTES NFR BLD: 10 %
NEUTROPHILS # BLD: 1.8 THOUSAND/MCL (ref 1.8–7.7)
NEUTROPHILS NFR BLD: 57 %
NEUTS SEG NFR BLD: ABNORMAL %
NONHDLC SERPL-MCNC: 149 MG/DL
NRBC (NRBCRE): ABNORMAL
PLATELET # BLD: 111 THOUSAND/MCL (ref 140–450)
POTASSIUM SERPL-SCNC: 3.7 MMOL/L (ref 3.4–5.1)
PROT SERPL-MCNC: 6.2 GM/DL (ref 6.4–8.2)
RBC # BLD: 3.78 MILLION/MCL (ref 4–5.2)
SODIUM SERPL-SCNC: 136 MMOL/L (ref 135–145)
T3 SERPL-MCNC: 0.86 NG/ML (ref 0.6–1.81)
T4 SERPL-MCNC: 8.7 MCG/DL (ref 4.7–13.3)
TRIGLYCERIDE (TRIGP): 280 MG/DL
TSH SERPL-ACNC: 1.64 MCUNIT/ML (ref 0.35–5)
WBC # BLD: 3.1 THOUSAND/MCL (ref 4.2–11)

## 2018-08-01 LAB
AMORPH SED URNS QL MICRO: ABNORMAL
APPEARANCE UR: CLEAR
BILIRUB UR QL: NEGATIVE
CAOX CRY URNS QL MICRO: ABNORMAL
COLOR UR: ABNORMAL
EPITH CASTS #/AREA URNS LPF: ABNORMAL /[LPF]
FATTY CASTS #/AREA URNS LPF: ABNORMAL /[LPF]
GLUCOSE UR-MCNC: NEGATIVE MG/DL
GRAN CASTS #/AREA URNS LPF: ABNORMAL /[LPF]
HGB UR QL: NEGATIVE
HYALINE CASTS #/AREA URNS LPF: ABNORMAL /[LPF]
KETONES UR-MCNC: NEGATIVE MG/DL
LEUKOCYTE ESTERASE UR QL STRIP: NEGATIVE
MICROSCOPIC (MT): ABNORMAL
MIXED CELL CASTS #/AREA URNS LPF: ABNORMAL /[LPF]
MUCOUS THREADS URNS QL MICRO: ABNORMAL
NITRITE UR QL: NEGATIVE
PH UR: 5 UNIT (ref 5–7)
PROT UR QL: NEGATIVE MG/DL
RBC CASTS #/AREA URNS LPF: ABNORMAL /[LPF]
RENAL EPI CELLS #/AREA URNS HPF: ABNORMAL /[HPF]
SP GR UR: 1 (ref 1–1.03)
SPECIMEN SOURCE: ABNORMAL
SPERM URNS QL MICRO: ABNORMAL
T VAGINALIS URNS QL MICRO: ABNORMAL
TRI-PHOS CRY URNS QL MICRO: ABNORMAL
URATE CRY URNS QL MICRO: ABNORMAL
URNS CMNT MICRO: ABNORMAL
UROBILINOGEN UR QL: 0.2 MG/DL (ref 0–1)
WAXY CASTS #/AREA URNS LPF: ABNORMAL /[LPF]
WBC CASTS #/AREA URNS LPF: ABNORMAL /[LPF]
YEAST HYPHAE URNS QL MICRO: ABNORMAL
YEAST URNS QL MICRO: ABNORMAL

## 2018-10-20 ENCOUNTER — DIAGNOSTIC TRANS (OUTPATIENT)
Dept: OTHER | Age: 38
End: 2018-10-20

## 2018-10-20 ENCOUNTER — HOSPITAL (OUTPATIENT)
Dept: OTHER | Age: 38
End: 2018-10-20
Attending: EMERGENCY MEDICINE

## 2018-10-20 LAB
ANALYZER ANC (IANC): ABNORMAL
ANION GAP SERPL CALC-SCNC: 17 MMOL/L (ref 10–20)
BASOPHILS # BLD: 0 THOUSAND/MCL (ref 0–0.3)
BASOPHILS NFR BLD: 0 %
BUN SERPL-MCNC: 5 MG/DL (ref 6–20)
BUN/CREAT SERPL: 9 (ref 7–25)
CALCIUM SERPL-MCNC: 8.6 MG/DL (ref 8.4–10.2)
CHLORIDE: 102 MMOL/L (ref 98–107)
CO2 SERPL-SCNC: 24 MMOL/L (ref 21–32)
CREAT SERPL-MCNC: 0.53 MG/DL (ref 0.51–0.95)
DIFFERENTIAL METHOD BLD: ABNORMAL
EOSINOPHIL # BLD: 0.1 THOUSAND/MCL (ref 0.1–0.5)
EOSINOPHIL NFR BLD: 2 %
ERYTHROCYTE [DISTWIDTH] IN BLOOD: 12.6 % (ref 11–15)
GLUCOSE SERPL-MCNC: 108 MG/DL (ref 65–99)
HEMATOCRIT: 45.5 % (ref 36–46.5)
HGB BLD-MCNC: 15.8 GM/DL (ref 12–15.5)
LYMPHOCYTES # BLD: 1.1 THOUSAND/MCL (ref 1–4.8)
LYMPHOCYTES NFR BLD: 23 %
MAGNESIUM SERPL-MCNC: 1.9 MG/DL (ref 1.7–2.4)
MCH RBC QN AUTO: 35.3 PG (ref 26–34)
MCHC RBC AUTO-ENTMCNC: 34.7 GM/DL (ref 32–36.5)
MCV RBC AUTO: 101.6 FL (ref 78–100)
MONOCYTES # BLD: 0.6 THOUSAND/MCL (ref 0.3–0.9)
MONOCYTES NFR BLD: 12 %
NEUTROPHILS # BLD: 3 THOUSAND/MCL (ref 1.8–7.7)
NEUTROPHILS NFR BLD: 63 %
NEUTS SEG NFR BLD: ABNORMAL %
NRBC (NRBCRE): ABNORMAL
PLATELET # BLD: 194 THOUSAND/MCL (ref 140–450)
POTASSIUM SERPL-SCNC: 4 MMOL/L (ref 3.4–5.1)
RBC # BLD: 4.48 MILLION/MCL (ref 4–5.2)
SODIUM SERPL-SCNC: 139 MMOL/L (ref 135–145)
TROPONIN I SERPL HS-MCNC: <0.02 NG/ML
WBC # BLD: 4.7 THOUSAND/MCL (ref 4.2–11)

## 2018-12-01 ENCOUNTER — HOSPITAL (OUTPATIENT)
Dept: OTHER | Age: 38
End: 2018-12-01
Attending: INTERNAL MEDICINE

## 2018-12-01 LAB
ALBUMIN SERPL-MCNC: 3.4 GM/DL (ref 3.6–5.1)
ALBUMIN/GLOB SERPL: 0.9 {RATIO} (ref 1–2.4)
ALP SERPL-CCNC: 88 UNIT/L (ref 45–117)
ALT SERPL-CCNC: 24 UNIT/L
ANALYZER ANC (IANC): ABNORMAL
ANION GAP SERPL CALC-SCNC: 15 MMOL/L (ref 10–20)
AST SERPL-CCNC: 17 UNIT/L
BASOPHILS # BLD: 0 THOUSAND/MCL (ref 0–0.3)
BASOPHILS NFR BLD: 0 %
BILIRUB SERPL-MCNC: 0.4 MG/DL (ref 0.2–1)
BUN SERPL-MCNC: 12 MG/DL (ref 6–20)
BUN/CREAT SERPL: 23 (ref 7–25)
CALCIUM SERPL-MCNC: 9.4 MG/DL (ref 8.4–10.2)
CHLORIDE: 103 MMOL/L (ref 98–107)
CO2 SERPL-SCNC: 26 MMOL/L (ref 21–32)
CREAT SERPL-MCNC: 0.52 MG/DL (ref 0.51–0.95)
DIFFERENTIAL METHOD BLD: ABNORMAL
EOSINOPHIL # BLD: 0.1 THOUSAND/MCL (ref 0.1–0.5)
EOSINOPHIL NFR BLD: 1 %
ERYTHROCYTE [DISTWIDTH] IN BLOOD: 12.7 % (ref 11–15)
GLOBULIN SER-MCNC: 3.6 GM/DL (ref 2–4)
GLUCOSE SERPL-MCNC: 124 MG/DL (ref 65–99)
HEMATOCRIT: 42.4 % (ref 36–46.5)
HGB BLD-MCNC: 14.8 GM/DL (ref 12–15.5)
IMM GRANULOCYTES # BLD AUTO: 0 THOUSAND/MCL (ref 0–0.2)
IMM GRANULOCYTES NFR BLD: 0 %
LYMPHOCYTES # BLD: 1.3 THOUSAND/MCL (ref 1–4.8)
LYMPHOCYTES NFR BLD: 19 %
MCH RBC QN AUTO: 35.3 PG (ref 26–34)
MCHC RBC AUTO-ENTMCNC: 34.9 GM/DL (ref 32–36.5)
MCV RBC AUTO: 101.2 FL (ref 78–100)
MONOCYTES # BLD: 0.7 THOUSAND/MCL (ref 0.3–0.9)
MONOCYTES NFR BLD: 10 %
NEUTROPHILS # BLD: 4.7 THOUSAND/MCL (ref 1.8–7.7)
NEUTROPHILS NFR BLD: 70 %
NEUTS SEG NFR BLD: ABNORMAL %
NRBC (NRBCRE): 0 /100 WBC
PLATELET # BLD: 170 THOUSAND/MCL (ref 140–450)
POTASSIUM SERPL-SCNC: 3.9 MMOL/L (ref 3.4–5.1)
PROT SERPL-MCNC: 7 GM/DL (ref 6.4–8.2)
RBC # BLD: 4.19 MILLION/MCL (ref 4–5.2)
SODIUM SERPL-SCNC: 140 MMOL/L (ref 135–145)
TSH SERPL-ACNC: 0.42 MCUNIT/ML (ref 0.35–5)
WBC # BLD: 6.8 THOUSAND/MCL (ref 4.2–11)

## 2018-12-02 LAB
AMORPH SED URNS QL MICRO: ABNORMAL
APPEARANCE UR: ABNORMAL
BACTERIA #/AREA URNS HPF: ABNORMAL /HPF
BILIRUB UR QL: NEGATIVE
CAOX CRY URNS QL MICRO: PRESENT
COLOR UR: ABNORMAL
EPITH CASTS #/AREA URNS LPF: ABNORMAL /[LPF]
FATTY CASTS #/AREA URNS LPF: ABNORMAL /[LPF]
GLUCOSE UR-MCNC: NEGATIVE MG/DL
GRAN CASTS #/AREA URNS LPF: ABNORMAL /[LPF]
HGB UR QL: NEGATIVE
HYALINE CASTS #/AREA URNS LPF: ABNORMAL /LPF (ref 0–5)
KETONES UR-MCNC: NEGATIVE MG/DL
LEUKOCYTE ESTERASE UR QL STRIP: ABNORMAL
MAGNESIUM SERPL-MCNC: 1.6 MG/DL (ref 1.7–2.4)
MIXED CELL CASTS #/AREA URNS LPF: ABNORMAL /[LPF]
MUCOUS THREADS URNS QL MICRO: PRESENT
NITRITE UR QL: NEGATIVE
PH UR: 5 UNIT (ref 5–7)
PROT UR QL: NEGATIVE MG/DL
RBC #/AREA URNS HPF: ABNORMAL /HPF (ref 0–3)
RBC CASTS #/AREA URNS LPF: ABNORMAL /[LPF]
RENAL EPI CELLS #/AREA URNS HPF: ABNORMAL /[HPF]
SP GR UR: 1.03 (ref 1–1.03)
SPECIMEN SOURCE: ABNORMAL
SPERM URNS QL MICRO: ABNORMAL
SQUAMOUS #/AREA URNS HPF: ABNORMAL /HPF (ref 0–5)
T VAGINALIS URNS QL MICRO: ABNORMAL
TRI-PHOS CRY URNS QL MICRO: ABNORMAL
URATE CRY URNS QL MICRO: ABNORMAL
URINE REFLEX: ABNORMAL
URNS CMNT MICRO: ABNORMAL
UROBILINOGEN UR QL: 0.2 MG/DL (ref 0–1)
WAXY CASTS #/AREA URNS LPF: ABNORMAL /[LPF]
WBC #/AREA URNS HPF: ABNORMAL /HPF (ref 0–5)
WBC CASTS #/AREA URNS LPF: ABNORMAL /[LPF]
YEAST HYPHAE URNS QL MICRO: ABNORMAL
YEAST URNS QL MICRO: ABNORMAL

## 2018-12-10 ENCOUNTER — HOSPITAL (OUTPATIENT)
Dept: OTHER | Age: 38
End: 2018-12-10
Attending: INTERNAL MEDICINE

## 2018-12-10 LAB
ALBUMIN SERPL-MCNC: 3.2 GM/DL (ref 3.6–5.1)
ALP SERPL-CCNC: 91 UNIT/L (ref 45–117)
ALT SERPL-CCNC: 22 UNIT/L
AMPHETAMINES UR QL SCN>500 NG/ML: NEGATIVE
ANALYZER ANC (IANC): ABNORMAL
ANION GAP SERPL CALC-SCNC: 20 MMOL/L (ref 10–20)
APAP SERPL-MCNC: <2 MCG/ML (ref 10–30)
AST SERPL-CCNC: 28 UNIT/L
BARBITURATES UR QL SCN>200 NG/ML: NEGATIVE
BASOPHILS # BLD: 0 THOUSAND/MCL (ref 0–0.3)
BASOPHILS NFR BLD: 1 %
BENZODIAZ UR QL SCN>200 NG/ML: NEGATIVE
BILIRUB CONJ SERPL-MCNC: <0.1 MG/DL (ref 0–0.2)
BILIRUB SERPL-MCNC: 0.4 MG/DL (ref 0.2–1)
BUN SERPL-MCNC: <2 MG/DL (ref 6–20)
BUN/CREAT SERPL: ABNORMAL (ref 7–25)
BZE UR QL SCN>150 NG/ML: NEGATIVE
CALCIUM SERPL-MCNC: 9.2 MG/DL (ref 8.4–10.2)
CANNABINOIDS UR QL SCN>50 NG/ML: NEGATIVE
CHLORIDE: 102 MMOL/L (ref 98–107)
CO2 SERPL-SCNC: 23 MMOL/L (ref 21–32)
CREAT SERPL-MCNC: 0.41 MG/DL (ref 0.51–0.95)
DIFFERENTIAL METHOD BLD: ABNORMAL
EOSINOPHIL # BLD: 0.1 THOUSAND/MCL (ref 0.1–0.5)
EOSINOPHIL NFR BLD: 2 %
ERYTHROCYTE [DISTWIDTH] IN BLOOD: 12.1 % (ref 11–15)
ETHANOL SERPL-MCNC: 206 MG/DL
GLUCOSE SERPL-MCNC: 110 MG/DL (ref 65–99)
HEMATOCRIT: 42 % (ref 36–46.5)
HGB BLD-MCNC: 14.6 GM/DL (ref 12–15.5)
IMM GRANULOCYTES # BLD AUTO: 0 THOUSAND/MCL (ref 0–0.2)
IMM GRANULOCYTES NFR BLD: 0 %
LIPASE SERPL-CCNC: 267 UNIT/L (ref 73–393)
LYMPHOCYTES # BLD: 0.8 THOUSAND/MCL (ref 1–4.8)
LYMPHOCYTES NFR BLD: 13 %
MCH RBC QN AUTO: 34.8 PG (ref 26–34)
MCHC RBC AUTO-ENTMCNC: 34.8 GM/DL (ref 32–36.5)
MCV RBC AUTO: 100.2 FL (ref 78–100)
MONOCYTES # BLD: 0.4 THOUSAND/MCL (ref 0.3–0.9)
MONOCYTES NFR BLD: 7 %
NEUTROPHILS # BLD: 4.3 THOUSAND/MCL (ref 1.8–7.7)
NEUTROPHILS NFR BLD: 77 %
NEUTS SEG NFR BLD: ABNORMAL %
NRBC (NRBCRE): 0 /100 WBC
OPIATES UR QL SCN>300 NG/ML: POSITIVE
PCP UR QL SCN>25 NG/ML: NEGATIVE
PLATELET # BLD: 165 THOUSAND/MCL (ref 140–450)
POTASSIUM SERPL-SCNC: 3.7 MMOL/L (ref 3.4–5.1)
PROT SERPL-MCNC: 7.7 GM/DL (ref 6.4–8.2)
RBC # BLD: 4.19 MILLION/MCL (ref 4–5.2)
SALICYLATES SERPL-MCNC: <2.8 MG/DL
SODIUM SERPL-SCNC: 141 MMOL/L (ref 135–145)
WBC # BLD: 5.7 THOUSAND/MCL (ref 4.2–11)

## 2018-12-11 ENCOUNTER — HOSPITAL (OUTPATIENT)
Dept: OTHER | Age: 38
End: 2018-12-11
Attending: PSYCHIATRY & NEUROLOGY

## 2018-12-11 LAB
ANION GAP SERPL CALC-SCNC: 13 MMOL/L (ref 10–20)
BUN SERPL-MCNC: 2 MG/DL (ref 6–20)
BUN/CREAT SERPL: 4 (ref 7–25)
CALCIUM SERPL-MCNC: 8.8 MG/DL (ref 8.4–10.2)
CHLORIDE: 98 MMOL/L (ref 98–107)
CO2 SERPL-SCNC: 27 MMOL/L (ref 21–32)
CREAT SERPL-MCNC: 0.51 MG/DL (ref 0.51–0.95)
GLUCOSE SERPL-MCNC: 221 MG/DL (ref 65–99)
HCG SERPL QL: NEGATIVE
MAGNESIUM SERPL-MCNC: 1.6 MG/DL (ref 1.7–2.4)
PHOSPHATE SERPL-MCNC: 2.6 MG/DL (ref 2.4–4.7)
POTASSIUM SERPL-SCNC: 3.4 MMOL/L (ref 3.4–5.1)
SODIUM SERPL-SCNC: 135 MMOL/L (ref 135–145)

## 2018-12-12 LAB
ALBUMIN SERPL-MCNC: 3 GM/DL (ref 3.6–5.1)
ALBUMIN/GLOB SERPL: 0.8 {RATIO} (ref 1–2.4)
ALP SERPL-CCNC: 78 UNIT/L (ref 45–117)
ALT SERPL-CCNC: 21 UNIT/L
ANION GAP SERPL CALC-SCNC: 12 MMOL/L (ref 10–20)
AST SERPL-CCNC: 17 UNIT/L
BILIRUB SERPL-MCNC: 0.4 MG/DL (ref 0.2–1)
BUN SERPL-MCNC: 2 MG/DL (ref 6–20)
BUN/CREAT SERPL: 4 (ref 7–25)
CALCIUM SERPL-MCNC: 9.2 MG/DL (ref 8.4–10.2)
CHLORIDE: 97 MMOL/L (ref 98–107)
CHOLEST SERPL-MCNC: 178 MG/DL
CHOLEST/HDLC SERPL: 5.1 {RATIO}
CO2 SERPL-SCNC: 29 MMOL/L (ref 21–32)
CREAT SERPL-MCNC: 0.52 MG/DL (ref 0.51–0.95)
GLOBULIN SER-MCNC: 3.9 GM/DL (ref 2–4)
GLUCOSE BLDC GLUCOMTR-MCNC: 81 MG/DL (ref 65–99)
GLUCOSE BLDC GLUCOMTR-MCNC: 92 MG/DL (ref 65–99)
GLUCOSE SERPL-MCNC: 117 MG/DL (ref 65–99)
GLYCOHEMOGLOBIN: 5 % (ref 4.5–5.6)
HDLC SERPL-MCNC: 35 MG/DL
LDLC SERPL CALC-MCNC: 113 MG/DL
LIPASE SERPL-CCNC: 479 UNIT/L (ref 73–393)
NONHDLC SERPL-MCNC: 143 MG/DL
POTASSIUM SERPL-SCNC: 3.7 MMOL/L (ref 3.4–5.1)
PROT SERPL-MCNC: 6.9 GM/DL (ref 6.4–8.2)
SODIUM SERPL-SCNC: 134 MMOL/L (ref 135–145)
T3 SERPL-MCNC: 1.12 NG/ML (ref 0.6–1.81)
T4 SERPL-MCNC: 9.5 MCG/DL (ref 4.7–13.3)
TRIGLYCERIDE (TRIGP): 150 MG/DL

## 2018-12-13 ENCOUNTER — HOSPITAL (OUTPATIENT)
Dept: OTHER | Age: 38
End: 2018-12-13
Attending: INTERNAL MEDICINE

## 2018-12-13 LAB
LIPASE SERPL-CCNC: 419 UNIT/L (ref 73–393)
MAGNESIUM SERPL-MCNC: 1.9 MG/DL (ref 1.7–2.4)

## 2018-12-14 ENCOUNTER — HOSPITAL (OUTPATIENT)
Dept: OTHER | Age: 38
End: 2018-12-14
Attending: INTERNAL MEDICINE

## 2018-12-14 LAB
ALBUMIN SERPL-MCNC: 3.1 GM/DL (ref 3.6–5.1)
ALBUMIN/GLOB SERPL: 0.9 {RATIO} (ref 1–2.4)
ALP SERPL-CCNC: 75 UNIT/L (ref 45–117)
ALT SERPL-CCNC: 22 UNIT/L
ANALYZER ANC (IANC): ABNORMAL
ANION GAP SERPL CALC-SCNC: 10 MMOL/L (ref 10–20)
AST SERPL-CCNC: 15 UNIT/L
BASOPHILS # BLD: 0 THOUSAND/MCL (ref 0–0.3)
BASOPHILS NFR BLD: 1 %
BILIRUB SERPL-MCNC: 0.4 MG/DL (ref 0.2–1)
BUN SERPL-MCNC: 5 MG/DL (ref 6–20)
BUN/CREAT SERPL: 9 (ref 7–25)
CALCIUM SERPL-MCNC: 8.9 MG/DL (ref 8.4–10.2)
CHLORIDE: 102 MMOL/L (ref 98–107)
CO2 SERPL-SCNC: 30 MMOL/L (ref 21–32)
CREAT SERPL-MCNC: 0.55 MG/DL (ref 0.51–0.95)
DIFFERENTIAL METHOD BLD: ABNORMAL
EOSINOPHIL # BLD: 0.1 THOUSAND/MCL (ref 0.1–0.5)
EOSINOPHIL NFR BLD: 3 %
ERYTHROCYTE [DISTWIDTH] IN BLOOD: 12.3 % (ref 11–15)
GLOBULIN SER-MCNC: 3.6 GM/DL (ref 2–4)
GLUCOSE SERPL-MCNC: 106 MG/DL (ref 65–99)
HEMATOCRIT: 41.8 % (ref 36–46.5)
HGB BLD-MCNC: 13.8 GM/DL (ref 12–15.5)
IMM GRANULOCYTES # BLD AUTO: 0 THOUSAND/MCL (ref 0–0.2)
IMM GRANULOCYTES NFR BLD: 1 %
LIPASE SERPL-CCNC: 326 UNIT/L (ref 73–393)
LYMPHOCYTES # BLD: 1.3 THOUSAND/MCL (ref 1–4.8)
LYMPHOCYTES NFR BLD: 29 %
MAGNESIUM SERPL-MCNC: 2.1 MG/DL (ref 1.7–2.4)
MCH RBC QN AUTO: 34.2 PG (ref 26–34)
MCHC RBC AUTO-ENTMCNC: 33 GM/DL (ref 32–36.5)
MCV RBC AUTO: 103.7 FL (ref 78–100)
MONOCYTES # BLD: 0.7 THOUSAND/MCL (ref 0.3–0.9)
MONOCYTES NFR BLD: 15 %
NEUTROPHILS # BLD: 2.4 THOUSAND/MCL (ref 1.8–7.7)
NEUTROPHILS NFR BLD: 51 %
NEUTS SEG NFR BLD: ABNORMAL %
NRBC (NRBCRE): 0 /100 WBC
PLATELET # BLD: 160 THOUSAND/MCL (ref 140–450)
POTASSIUM SERPL-SCNC: 4.1 MMOL/L (ref 3.4–5.1)
PROT SERPL-MCNC: 6.7 GM/DL (ref 6.4–8.2)
RBC # BLD: 4.03 MILLION/MCL (ref 4–5.2)
SODIUM SERPL-SCNC: 138 MMOL/L (ref 135–145)
WBC # BLD: 4.6 THOUSAND/MCL (ref 4.2–11)

## 2018-12-21 ENCOUNTER — OFFICE VISIT (OUTPATIENT)
Dept: AUDIOLOGY | Age: 38
End: 2018-12-21

## 2018-12-21 ENCOUNTER — OFFICE VISIT (OUTPATIENT)
Dept: OTOLARYNGOLOGY | Age: 38
End: 2018-12-21

## 2018-12-21 DIAGNOSIS — H91.90 HEARING LOSS, UNSPECIFIED HEARING LOSS TYPE, UNSPECIFIED LATERALITY: ICD-10-CM

## 2018-12-21 DIAGNOSIS — H61.21 IMPACTED CERUMEN OF RIGHT EAR: Primary | ICD-10-CM

## 2018-12-21 DIAGNOSIS — H93.11 SUBJECTIVE TINNITUS OF RIGHT EAR: Primary | ICD-10-CM

## 2018-12-21 PROCEDURE — 69210 REMOVE IMPACTED EAR WAX UNI: CPT | Performed by: OTOLARYNGOLOGY

## 2018-12-21 PROCEDURE — 92557 COMPREHENSIVE HEARING TEST: CPT | Performed by: AUDIOLOGIST

## 2018-12-21 PROCEDURE — 92567 TYMPANOMETRY: CPT | Performed by: AUDIOLOGIST

## 2018-12-21 PROCEDURE — 99203 OFFICE O/P NEW LOW 30 MIN: CPT | Performed by: OTOLARYNGOLOGY

## 2018-12-21 RX ORDER — VENLAFAXINE HYDROCHLORIDE 150 MG/1
CAPSULE, EXTENDED RELEASE ORAL
Status: ON HOLD | COMMUNITY
Start: 2017-07-04 | End: 2020-04-06

## 2018-12-21 RX ORDER — PANTOPRAZOLE SODIUM 40 MG/1
40 TABLET, DELAYED RELEASE ORAL DAILY
Status: ON HOLD | COMMUNITY
Start: 2017-07-11 | End: 2020-12-23 | Stop reason: SDUPTHER

## 2018-12-21 RX ORDER — ALPRAZOLAM 0.25 MG/1
TABLET ORAL
Status: ON HOLD | COMMUNITY
Start: 2011-05-24 | End: 2020-04-06

## 2018-12-21 RX ORDER — MONTELUKAST SODIUM 10 MG/1
10 TABLET ORAL DAILY
Status: ON HOLD | COMMUNITY
Start: 2017-07-11 | End: 2020-12-23 | Stop reason: SDUPTHER

## 2018-12-21 RX ORDER — ONDANSETRON 8 MG/1
8 TABLET, ORALLY DISINTEGRATING ORAL EVERY 8 HOURS PRN
COMMUNITY
Start: 2017-07-05 | End: 2020-04-07 | Stop reason: SDUPTHER

## 2018-12-21 RX ORDER — METOPROLOL SUCCINATE 25 MG/1
TABLET, EXTENDED RELEASE ORAL
COMMUNITY
Start: 2017-07-18 | End: 2020-04-02 | Stop reason: ALTCHOICE

## 2018-12-26 ENCOUNTER — HOSPITAL (OUTPATIENT)
Dept: OTHER | Age: 38
End: 2018-12-26
Attending: INTERNAL MEDICINE

## 2018-12-26 LAB
ALBUMIN SERPL-MCNC: 3.3 GM/DL (ref 3.6–5.1)
ALBUMIN/GLOB SERPL: 1 {RATIO} (ref 1–2.4)
ALP SERPL-CCNC: 91 UNIT/L (ref 45–117)
ALT SERPL-CCNC: 29 UNIT/L
AMORPH SED URNS QL MICRO: ABNORMAL
ANALYZER ANC (IANC): ABNORMAL
ANION GAP SERPL CALC-SCNC: 16 MMOL/L (ref 10–20)
APPEARANCE UR: ABNORMAL
AST SERPL-CCNC: 28 UNIT/L
BACTERIA #/AREA URNS HPF: ABNORMAL /HPF
BASOPHILS # BLD: 0 THOUSAND/MCL (ref 0–0.3)
BASOPHILS NFR BLD: 1 %
BILIRUB SERPL-MCNC: 0.3 MG/DL (ref 0.2–1)
BILIRUB UR QL: NEGATIVE
BUN SERPL-MCNC: 6 MG/DL (ref 6–20)
BUN/CREAT SERPL: 11 (ref 7–25)
CALCIUM SERPL-MCNC: 7.9 MG/DL (ref 8.4–10.2)
CAOX CRY URNS QL MICRO: ABNORMAL
CHLORIDE: 106 MMOL/L (ref 98–107)
CO2 SERPL-SCNC: 23 MMOL/L (ref 21–32)
COLOR UR: ABNORMAL
CREAT SERPL-MCNC: 0.53 MG/DL (ref 0.51–0.95)
DIFFERENTIAL METHOD BLD: ABNORMAL
EOSINOPHIL # BLD: 0.1 THOUSAND/MCL (ref 0.1–0.5)
EOSINOPHIL NFR BLD: 2 %
EPITH CASTS #/AREA URNS LPF: ABNORMAL /[LPF]
ERYTHROCYTE [DISTWIDTH] IN BLOOD: 12.3 % (ref 11–15)
ETHANOL SERPL-MCNC: 122 MG/DL
FATTY CASTS #/AREA URNS LPF: ABNORMAL /[LPF]
GLOBULIN SER-MCNC: 3.4 GM/DL (ref 2–4)
GLUCOSE SERPL-MCNC: 112 MG/DL (ref 65–99)
GLUCOSE UR-MCNC: NEGATIVE MG/DL
GRAN CASTS #/AREA URNS LPF: ABNORMAL /[LPF]
HCG SERPL QL: NEGATIVE
HEMATOCRIT: 38.7 % (ref 36–46.5)
HEMATOCRIT: 41.1 % (ref 36–46.5)
HGB BLD-MCNC: 13.2 GM/DL (ref 12–15.5)
HGB BLD-MCNC: 13.9 GM/DL (ref 12–15.5)
HGB UR QL: NEGATIVE
HYALINE CASTS #/AREA URNS LPF: ABNORMAL /LPF (ref 0–5)
IMM GRANULOCYTES # BLD AUTO: 0 THOUSAND/MCL (ref 0–0.2)
IMM GRANULOCYTES NFR BLD: 0 %
KETONES UR-MCNC: ABNORMAL MG/DL
LEUKOCYTE ESTERASE UR QL STRIP: NEGATIVE
LIPASE SERPL-CCNC: 273 UNIT/L (ref 73–393)
LYMPHOCYTES # BLD: 1.4 THOUSAND/MCL (ref 1–4.8)
LYMPHOCYTES NFR BLD: 27 %
MCH RBC QN AUTO: 34.1 PG (ref 26–34)
MCHC RBC AUTO-ENTMCNC: 33.8 GM/DL (ref 32–36.5)
MCV RBC AUTO: 100.7 FL (ref 78–100)
MIXED CELL CASTS #/AREA URNS LPF: ABNORMAL /[LPF]
MONOCYTES # BLD: 0.4 THOUSAND/MCL (ref 0.3–0.9)
MONOCYTES NFR BLD: 7 %
MUCOUS THREADS URNS QL MICRO: PRESENT
NEUTROPHILS # BLD: 3.4 THOUSAND/MCL (ref 1.8–7.7)
NEUTROPHILS NFR BLD: 63 %
NEUTS SEG NFR BLD: ABNORMAL %
NITRITE UR QL: NEGATIVE
NRBC (NRBCRE): 0 /100 WBC
PH UR: 5 UNIT (ref 5–7)
PLATELET # BLD: 175 THOUSAND/MCL (ref 140–450)
POTASSIUM SERPL-SCNC: 3.8 MMOL/L (ref 3.4–5.1)
PROT SERPL-MCNC: 6.7 GM/DL (ref 6.4–8.2)
PROT UR QL: NEGATIVE MG/DL
RBC # BLD: 4.08 MILLION/MCL (ref 4–5.2)
RBC #/AREA URNS HPF: ABNORMAL /HPF (ref 0–3)
RBC CASTS #/AREA URNS LPF: ABNORMAL /[LPF]
RENAL EPI CELLS #/AREA URNS HPF: ABNORMAL /[HPF]
SODIUM SERPL-SCNC: 141 MMOL/L (ref 135–145)
SP GR UR: 1.03 (ref 1–1.03)
SPECIMEN SOURCE: ABNORMAL
SPERM URNS QL MICRO: ABNORMAL
SQUAMOUS #/AREA URNS HPF: ABNORMAL /HPF (ref 0–5)
T VAGINALIS URNS QL MICRO: ABNORMAL
TRI-PHOS CRY URNS QL MICRO: ABNORMAL
URATE CRY URNS QL MICRO: ABNORMAL
URINE REFLEX: ABNORMAL
URNS CMNT MICRO: ABNORMAL
UROBILINOGEN UR QL: 0.2 MG/DL (ref 0–1)
WAXY CASTS #/AREA URNS LPF: ABNORMAL /[LPF]
WBC # BLD: 5.3 THOUSAND/MCL (ref 4.2–11)
WBC #/AREA URNS HPF: ABNORMAL /HPF (ref 0–5)
WBC CASTS #/AREA URNS LPF: ABNORMAL /[LPF]
YEAST HYPHAE URNS QL MICRO: ABNORMAL
YEAST URNS QL MICRO: ABNORMAL

## 2018-12-29 ENCOUNTER — HOSPITAL (OUTPATIENT)
Dept: OTHER | Age: 38
End: 2018-12-29
Attending: EMERGENCY MEDICINE

## 2018-12-29 ENCOUNTER — DIAGNOSTIC TRANS (OUTPATIENT)
Dept: OTHER | Age: 38
End: 2018-12-29

## 2018-12-29 LAB
ANALYZER ANC (IANC): ABNORMAL
ANION GAP SERPL CALC-SCNC: 16 MMOL/L (ref 10–20)
BASOPHILS # BLD: 0 THOUSAND/MCL (ref 0–0.3)
BASOPHILS NFR BLD: 1 %
BUN SERPL-MCNC: 7 MG/DL (ref 6–20)
BUN/CREAT SERPL: 13 (ref 7–25)
CALCIUM SERPL-MCNC: 9.1 MG/DL (ref 8.4–10.2)
CHLORIDE: 102 MMOL/L (ref 98–107)
CO2 SERPL-SCNC: 24 MMOL/L (ref 21–32)
CREAT SERPL-MCNC: 0.53 MG/DL (ref 0.51–0.95)
DIFFERENTIAL METHOD BLD: ABNORMAL
EOSINOPHIL # BLD: 0.1 THOUSAND/MCL (ref 0.1–0.5)
EOSINOPHIL NFR BLD: 1 %
ERYTHROCYTE [DISTWIDTH] IN BLOOD: 11.9 % (ref 11–15)
GLUCOSE SERPL-MCNC: 129 MG/DL (ref 65–99)
HEMATOCRIT: 44.1 % (ref 36–46.5)
HGB BLD-MCNC: 15.3 GM/DL (ref 12–15.5)
IMM GRANULOCYTES # BLD AUTO: 0 THOUSAND/MCL (ref 0–0.2)
IMM GRANULOCYTES NFR BLD: 0 %
LYMPHOCYTES # BLD: 1.1 THOUSAND/MCL (ref 1–4.8)
LYMPHOCYTES NFR BLD: 15 %
MCH RBC QN AUTO: 34.2 PG (ref 26–34)
MCHC RBC AUTO-ENTMCNC: 34.7 GM/DL (ref 32–36.5)
MCV RBC AUTO: 98.7 FL (ref 78–100)
MONOCYTES # BLD: 0.5 THOUSAND/MCL (ref 0.3–0.9)
MONOCYTES NFR BLD: 7 %
NEUTROPHILS # BLD: 5.6 THOUSAND/MCL (ref 1.8–7.7)
NEUTROPHILS NFR BLD: 76 %
NEUTS SEG NFR BLD: ABNORMAL %
NRBC (NRBCRE): 0 /100 WBC
PLATELET # BLD: 150 THOUSAND/MCL (ref 140–450)
POTASSIUM SERPL-SCNC: 3.6 MMOL/L (ref 3.4–5.1)
RBC # BLD: 4.47 MILLION/MCL (ref 4–5.2)
SODIUM SERPL-SCNC: 138 MMOL/L (ref 135–145)
WBC # BLD: 7.3 THOUSAND/MCL (ref 4.2–11)

## 2019-01-01 ENCOUNTER — EXTERNAL RECORD (OUTPATIENT)
Dept: HEALTH INFORMATION MANAGEMENT | Facility: OTHER | Age: 39
End: 2019-01-01

## 2019-01-03 ENCOUNTER — HOSPITAL ENCOUNTER (EMERGENCY)
Facility: HOSPITAL | Age: 39
Discharge: HOME | End: 2019-01-03
Attending: EMERGENCY MEDICINE
Payer: COMMERCIAL

## 2019-01-03 VITALS
TEMPERATURE: 98 F | DIASTOLIC BLOOD PRESSURE: 92 MMHG | HEIGHT: 65 IN | OXYGEN SATURATION: 96 % | BODY MASS INDEX: 23.32 KG/M2 | HEART RATE: 82 BPM | SYSTOLIC BLOOD PRESSURE: 111 MMHG | RESPIRATION RATE: 25 BRPM | WEIGHT: 140 LBS

## 2019-01-03 DIAGNOSIS — Z78.9 ALCOHOL INGESTION: ICD-10-CM

## 2019-01-03 DIAGNOSIS — F41.9 ANXIETY: ICD-10-CM

## 2019-01-03 DIAGNOSIS — I47.1 SVT (SUPRAVENTRICULAR TACHYCARDIA) (HCC): Primary | ICD-10-CM

## 2019-01-03 LAB
ALBUMIN SERPL-MCNC: 3.8 G/DL (ref 3.1–4.5)
ALBUMIN/GLOB SERPL: 1 {RATIO} (ref 1–2)
ALP LIVER SERPL-CCNC: 97 U/L (ref 37–98)
ALT SERPL-CCNC: 30 U/L (ref 14–54)
ANION GAP SERPL CALC-SCNC: 11 MMOL/L (ref 0–18)
AST SERPL-CCNC: 27 U/L (ref 15–41)
BASOPHILS # BLD AUTO: 0.03 X10(3) UL (ref 0–0.1)
BASOPHILS NFR BLD AUTO: 0.5 %
BILIRUB SERPL-MCNC: 0.3 MG/DL (ref 0.1–2)
BUN BLD-MCNC: 8 MG/DL (ref 8–20)
BUN/CREAT SERPL: 11.4 (ref 10–20)
CALCIUM BLD-MCNC: 8.8 MG/DL (ref 8.3–10.3)
CHLORIDE SERPL-SCNC: 106 MMOL/L (ref 101–111)
CO2 SERPL-SCNC: 23 MMOL/L (ref 22–32)
CREAT BLD-MCNC: 0.7 MG/DL (ref 0.55–1.02)
EOSINOPHIL # BLD AUTO: 0.08 X10(3) UL (ref 0–0.3)
EOSINOPHIL NFR BLD AUTO: 1.2 %
ERYTHROCYTE [DISTWIDTH] IN BLOOD BY AUTOMATED COUNT: 12.3 % (ref 11.5–16)
ETHYL ALCOHOL: 122 MG/DL (ref ?–3)
GLOBULIN PLAS-MCNC: 3.9 G/DL (ref 2.8–4.4)
GLUCOSE BLD-MCNC: 96 MG/DL (ref 70–99)
HCT VFR BLD AUTO: 46.3 % (ref 34–50)
HGB BLD-MCNC: 16.2 G/DL (ref 12–16)
IMMATURE GRANULOCYTE COUNT: 0.03 X10(3) UL (ref 0–1)
IMMATURE GRANULOCYTE RATIO %: 0.5 %
LIPASE: 202 U/L (ref 73–393)
LYMPHOCYTES # BLD AUTO: 2.55 X10(3) UL (ref 0.9–4)
LYMPHOCYTES NFR BLD AUTO: 38.5 %
M PROTEIN MFR SERPL ELPH: 7.7 G/DL (ref 6.4–8.2)
MCH RBC QN AUTO: 34.2 PG (ref 27–33.2)
MCHC RBC AUTO-ENTMCNC: 35 G/DL (ref 31–37)
MCV RBC AUTO: 97.9 FL (ref 81–100)
MONOCYTES # BLD AUTO: 0.48 X10(3) UL (ref 0.1–1)
MONOCYTES NFR BLD AUTO: 7.2 %
NEUTROPHIL ABS PRELIM: 3.46 X10 (3) UL (ref 1.3–6.7)
NEUTROPHILS # BLD AUTO: 3.46 X10(3) UL (ref 1.3–6.7)
NEUTROPHILS NFR BLD AUTO: 52.1 %
OSMOLALITY SERPL CALC.SUM OF ELEC: 288 MOSM/KG (ref 275–295)
PLATELET # BLD AUTO: 151 10(3)UL (ref 150–450)
POTASSIUM SERPL-SCNC: 3.8 MMOL/L (ref 3.6–5.1)
RBC # BLD AUTO: 4.73 X10(6)UL (ref 3.8–5.1)
RED CELL DISTRIBUTION WIDTH-SD: 44.6 FL (ref 35.1–46.3)
SODIUM SERPL-SCNC: 140 MMOL/L (ref 136–144)
WBC # BLD AUTO: 6.6 X10(3) UL (ref 4–13)

## 2019-01-03 PROCEDURE — 85025 COMPLETE CBC W/AUTO DIFF WBC: CPT | Performed by: EMERGENCY MEDICINE

## 2019-01-03 PROCEDURE — 80320 DRUG SCREEN QUANTALCOHOLS: CPT | Performed by: EMERGENCY MEDICINE

## 2019-01-03 PROCEDURE — 83690 ASSAY OF LIPASE: CPT | Performed by: EMERGENCY MEDICINE

## 2019-01-03 PROCEDURE — 96375 TX/PRO/DX INJ NEW DRUG ADDON: CPT

## 2019-01-03 PROCEDURE — 99284 EMERGENCY DEPT VISIT MOD MDM: CPT

## 2019-01-03 PROCEDURE — 96374 THER/PROPH/DIAG INJ IV PUSH: CPT

## 2019-01-03 PROCEDURE — 93005 ELECTROCARDIOGRAM TRACING: CPT

## 2019-01-03 PROCEDURE — 80053 COMPREHEN METABOLIC PANEL: CPT | Performed by: EMERGENCY MEDICINE

## 2019-01-03 PROCEDURE — 93010 ELECTROCARDIOGRAM REPORT: CPT

## 2019-01-03 RX ORDER — LORAZEPAM 2 MG/ML
INJECTION INTRAMUSCULAR
Status: COMPLETED
Start: 2019-01-03 | End: 2019-01-03

## 2019-01-03 RX ORDER — ADENOSINE 3 MG/ML
INJECTION, SOLUTION INTRAVENOUS
Status: DISCONTINUED
Start: 2019-01-03 | End: 2019-01-03

## 2019-01-03 RX ORDER — KETOROLAC TROMETHAMINE 30 MG/ML
30 INJECTION, SOLUTION INTRAMUSCULAR; INTRAVENOUS ONCE
Status: COMPLETED | OUTPATIENT
Start: 2019-01-03 | End: 2019-01-03

## 2019-01-03 RX ORDER — LORAZEPAM 2 MG/ML
0.5 INJECTION INTRAMUSCULAR ONCE
Status: COMPLETED | OUTPATIENT
Start: 2019-01-03 | End: 2019-01-03

## 2019-01-03 NOTE — ED INITIAL ASSESSMENT (HPI)
PT TO ER WITH [facility] SVT X THREE DAYS. PT STATES SHE WAS AT [address] AGO PT IS SCHEDULED FOR CARDIAC APPT. AT [facility].

## 2019-01-03 NOTE — ED PROVIDER NOTES
Patient Seen in: [facility] Emergency Department    History   Patient presents with:  Arrythmia/Palpitations (cardiovascular)    Stated Complaint: SVT RESOLVED    HPI    Bethlokipollo Nick is a 57-year-old female presenting to the emergency department for SVT.   P hyperventilating abdomen soft nontender subsequent cyanosis or edema    ED Course     Labs Reviewed   ETHYL ALCOHOL - Abnormal; Notable for the following components:       Result Value    Ethyl Alcohol 122 (*)     All other components within normal limits visit.     Follow-up:  Serjio Garcia  [address] [facility] [address] [id number] [phone]    In 1 week          Medications Prescribed:  Current Discharge Medication List

## 2019-01-04 LAB
ATRIAL RATE: 90 BPM
P AXIS: 52 DEGREES
P-R INTERVAL: 150 MS
Q-T INTERVAL: 372 MS
QRS DURATION: 72 MS
QTC CALCULATION (BEZET): 455 MS
R AXIS: 75 DEGREES
T AXIS: 65 DEGREES
VENTRICULAR RATE: 90 BPM

## 2019-01-05 ENCOUNTER — DIAGNOSTIC TRANS (OUTPATIENT)
Dept: OTHER | Age: 39
End: 2019-01-05

## 2019-01-05 ENCOUNTER — HOSPITAL (OUTPATIENT)
Dept: OTHER | Age: 39
End: 2019-01-05
Attending: EMERGENCY MEDICINE

## 2019-01-05 LAB
ALBUMIN SERPL-MCNC: 3.8 GM/DL (ref 3.6–5.1)
ALBUMIN/GLOB SERPL: 1 {RATIO} (ref 1–2.4)
ALP SERPL-CCNC: 94 UNIT/L (ref 45–117)
ALT SERPL-CCNC: 35 UNIT/L
AMPHETAMINES UR QL SCN>500 NG/ML: NEGATIVE
ANALYZER ANC (IANC): NORMAL
ANION GAP SERPL CALC-SCNC: 13 MMOL/L (ref 10–20)
APAP SERPL-MCNC: <2 MCG/ML (ref 10–30)
AST SERPL-CCNC: 28 UNIT/L
BARBITURATES UR QL SCN>200 NG/ML: NEGATIVE
BASOPHILS # BLD: 0 THOUSAND/MCL (ref 0–0.3)
BASOPHILS NFR BLD: 1 %
BENZODIAZ UR QL SCN>200 NG/ML: NEGATIVE
BILIRUB SERPL-MCNC: 0.3 MG/DL (ref 0.2–1)
BUN SERPL-MCNC: 6 MG/DL (ref 6–20)
BUN/CREAT SERPL: 11 (ref 7–25)
BZE UR QL SCN>150 NG/ML: NEGATIVE
CALCIUM SERPL-MCNC: 9.1 MG/DL (ref 8.4–10.2)
CANNABINOIDS UR QL SCN>50 NG/ML: NEGATIVE
CHLORIDE: 97 MMOL/L (ref 98–107)
CO2 SERPL-SCNC: 30 MMOL/L (ref 21–32)
CREAT SERPL-MCNC: 0.53 MG/DL (ref 0.51–0.95)
D DIMER PPP FEU-MCNC: 0.26 MG/L FEU
DIFFERENTIAL METHOD BLD: NORMAL
EOSINOPHIL # BLD: 0.1 THOUSAND/MCL (ref 0.1–0.5)
EOSINOPHIL NFR BLD: 1 %
ERYTHROCYTE [DISTWIDTH] IN BLOOD: 12.3 % (ref 11–15)
ETHANOL SERPL-MCNC: 49 MG/DL
GLOBULIN SER-MCNC: 3.8 GM/DL (ref 2–4)
GLUCOSE SERPL-MCNC: 91 MG/DL (ref 65–99)
HEMATOCRIT: 44.1 % (ref 36–46.5)
HGB BLD-MCNC: 15 GM/DL (ref 12–15.5)
IMM GRANULOCYTES # BLD AUTO: 0 THOUSAND/MCL (ref 0–0.2)
IMM GRANULOCYTES NFR BLD: 0 %
LIPASE SERPL-CCNC: 173 UNIT/L (ref 73–393)
LYMPHOCYTES # BLD: 1.3 THOUSAND/MCL (ref 1–4.8)
LYMPHOCYTES NFR BLD: 26 %
MCH RBC QN AUTO: 33.8 PG (ref 26–34)
MCHC RBC AUTO-ENTMCNC: 34 GM/DL (ref 32–36.5)
MCV RBC AUTO: 99.3 FL (ref 78–100)
MONOCYTES # BLD: 0.5 THOUSAND/MCL (ref 0.3–0.9)
MONOCYTES NFR BLD: 9 %
NEUTROPHILS # BLD: 3.1 THOUSAND/MCL (ref 1.8–7.7)
NEUTROPHILS NFR BLD: 63 %
NEUTS SEG NFR BLD: NORMAL %
NRBC (NRBCRE): 0 /100 WBC
OPIATES UR QL SCN>300 NG/ML: NEGATIVE
PCP UR QL SCN>25 NG/ML: NEGATIVE
PLATELET # BLD: 145 THOUSAND/MCL (ref 140–450)
POTASSIUM SERPL-SCNC: 3.4 MMOL/L (ref 3.4–5.1)
PROT SERPL-MCNC: 7.6 GM/DL (ref 6.4–8.2)
RBC # BLD: 4.44 MILLION/MCL (ref 4–5.2)
SALICYLATES SERPL-MCNC: 4 MG/DL
SODIUM SERPL-SCNC: 137 MMOL/L (ref 135–145)
TROPONIN I SERPL HS-MCNC: <0.02 NG/ML
WBC # BLD: 4.9 THOUSAND/MCL (ref 4.2–11)

## 2019-01-07 ENCOUNTER — HOSPITAL (OUTPATIENT)
Dept: OTHER | Age: 39
End: 2019-01-07

## 2019-01-07 ENCOUNTER — HOSPITAL (OUTPATIENT)
Dept: OTHER | Age: 39
End: 2019-01-07
Attending: OBSTETRICS & GYNECOLOGY

## 2019-01-07 LAB
ALBUMIN SERPL-MCNC: 3.7 GM/DL (ref 3.6–5.1)
ALBUMIN/GLOB SERPL: 0.9 {RATIO} (ref 1–2.4)
ALP SERPL-CCNC: 91 UNIT/L (ref 45–117)
ALT SERPL-CCNC: 28 UNIT/L
ANALYZER ANC (IANC): NORMAL
ANION GAP SERPL CALC-SCNC: 15 MMOL/L (ref 10–20)
APAP SERPL-MCNC: <2 MCG/ML (ref 10–30)
APTT PPP: 26 SECONDS (ref 22–32)
APTT PPP: NORMAL S
AST SERPL-CCNC: 27 UNIT/L
BASOPHILS # BLD: 0 THOUSAND/MCL (ref 0–0.3)
BASOPHILS NFR BLD: 0 %
BILIRUB SERPL-MCNC: 0.3 MG/DL (ref 0.2–1)
BUN SERPL-MCNC: 7 MG/DL (ref 6–20)
BUN/CREAT SERPL: 13 (ref 7–25)
CALCIUM SERPL-MCNC: 8.5 MG/DL (ref 8.4–10.2)
CHLORIDE: 102 MMOL/L (ref 98–107)
CO2 SERPL-SCNC: 27 MMOL/L (ref 21–32)
CREAT SERPL-MCNC: 0.54 MG/DL (ref 0.51–0.95)
DIFFERENTIAL METHOD BLD: NORMAL
EOSINOPHIL # BLD: 0.1 THOUSAND/MCL (ref 0.1–0.5)
EOSINOPHIL NFR BLD: 1 %
ERYTHROCYTE [DISTWIDTH] IN BLOOD: 12.5 % (ref 11–15)
ETHANOL SERPL-MCNC: 380 MG/DL
GLOBULIN SER-MCNC: 4 GM/DL (ref 2–4)
GLUCOSE SERPL-MCNC: 113 MG/DL (ref 65–99)
HEMATOCRIT: 43.3 % (ref 36–46.5)
HGB BLD-MCNC: 14.8 GM/DL (ref 12–15.5)
IMM GRANULOCYTES # BLD AUTO: 0 THOUSAND/MCL (ref 0–0.2)
IMM GRANULOCYTES NFR BLD: 1 %
INR PPP: 1
LIPASE SERPL-CCNC: 153 UNIT/L (ref 73–393)
LYMPHOCYTES # BLD: 1.5 THOUSAND/MCL (ref 1–4.8)
LYMPHOCYTES NFR BLD: 21 %
MAGNESIUM SERPL-MCNC: 2 MG/DL (ref 1.7–2.4)
MCH RBC QN AUTO: 34 PG (ref 26–34)
MCHC RBC AUTO-ENTMCNC: 34.2 GM/DL (ref 32–36.5)
MCV RBC AUTO: 99.5 FL (ref 78–100)
MONOCYTES # BLD: 0.4 THOUSAND/MCL (ref 0.3–0.9)
MONOCYTES NFR BLD: 6 %
NEUTROPHILS # BLD: 5.3 THOUSAND/MCL (ref 1.8–7.7)
NEUTROPHILS NFR BLD: 71 %
NEUTS SEG NFR BLD: NORMAL %
NRBC (NRBCRE): 0 /100 WBC
PLATELET # BLD: 162 THOUSAND/MCL (ref 140–450)
POTASSIUM SERPL-SCNC: 3.8 MMOL/L (ref 3.4–5.1)
PROT SERPL-MCNC: 7.7 GM/DL (ref 6.4–8.2)
PROTHROMBIN TIME: 10.4 SECONDS (ref 9.7–11.8)
PROTHROMBIN TIME: NORMAL
RBC # BLD: 4.35 MILLION/MCL (ref 4–5.2)
SALICYLATES SERPL-MCNC: 4.1 MG/DL
SODIUM SERPL-SCNC: 140 MMOL/L (ref 135–145)
WBC # BLD: 7.4 THOUSAND/MCL (ref 4.2–11)

## 2019-01-08 ENCOUNTER — HOSPITAL (OUTPATIENT)
Dept: OTHER | Age: 39
End: 2019-01-08
Attending: INTERNAL MEDICINE

## 2019-01-08 ENCOUNTER — PRIOR ORIGINAL RECORDS (OUTPATIENT)
Dept: OTHER | Age: 39
End: 2019-01-08

## 2019-01-08 LAB
CREAT SERPL-MCNC: 0.63 MG/DL (ref 0.51–0.95)
POTASSIUM SERPL-SCNC: 3.8 MMOL/L (ref 3.4–5.1)

## 2019-01-09 ENCOUNTER — HOSPITAL (OUTPATIENT)
Dept: OTHER | Age: 39
End: 2019-01-09
Attending: PSYCHIATRY & NEUROLOGY

## 2019-01-09 LAB
CREAT SERPL-MCNC: 0.54 MG/DL (ref 0.51–0.95)
MAGNESIUM SERPL-MCNC: 1.6 MG/DL (ref 1.7–2.4)
POTASSIUM SERPL-SCNC: 3.9 MMOL/L (ref 3.4–5.1)
T3 SERPL-MCNC: 0.86 NG/ML (ref 0.6–1.81)

## 2019-01-10 ENCOUNTER — PRIOR ORIGINAL RECORDS (OUTPATIENT)
Dept: OTHER | Age: 39
End: 2019-01-10

## 2019-01-10 ENCOUNTER — HOSPITAL (OUTPATIENT)
Dept: OTHER | Age: 39
End: 2019-01-10

## 2019-01-10 LAB
T4 SERPL-MCNC: 10.3 MCG/DL (ref 4.7–13.3)
TSH SERPL-ACNC: 1.59 MCUNIT/ML (ref 0.35–5)

## 2019-01-15 ENCOUNTER — LAB REQUISITION (OUTPATIENT)
Dept: ADMINISTRATIVE | Age: 39
End: 2019-01-15
Payer: COMMERCIAL

## 2019-01-15 DIAGNOSIS — F33.0 MAJOR DEPRESSIVE DISORDER, RECURRENT EPISODE, MILD (HCC): ICD-10-CM

## 2019-01-15 PROCEDURE — 81001 URINALYSIS AUTO W/SCOPE: CPT | Performed by: OTHER

## 2019-01-16 ENCOUNTER — PRIOR ORIGINAL RECORDS (OUTPATIENT)
Dept: OTHER | Age: 39
End: 2019-01-16

## 2019-01-16 LAB
ALBUMIN SERPL BCP-MCNC: 3.6 G/DL (ref 3.5–4.8)
ALBUMIN/GLOB SERPL: 1.3 {RATIO} (ref 1–2)
ALP SERPL-CCNC: 68 U/L (ref 32–100)
ALT SERPL-CCNC: 30 U/L (ref 14–54)
ANION GAP SERPL CALC-SCNC: 9 MMOL/L (ref 0–18)
AST SERPL-CCNC: 21 U/L (ref 15–41)
BASOPHILS # BLD: 0 K/UL (ref 0–0.2)
BASOPHILS NFR BLD: 1 %
BILIRUB SERPL-MCNC: 0.7 MG/DL (ref 0.3–1.2)
BILIRUB UR QL: NEGATIVE
BUN SERPL-MCNC: 11 MG/DL (ref 8–20)
BUN/CREAT SERPL: 16.2 (ref 10–20)
CALCIUM SERPL-MCNC: 9.3 MG/DL (ref 8.5–10.5)
CHLORIDE SERPL-SCNC: 102 MMOL/L (ref 95–110)
CO2 SERPL-SCNC: 28 MMOL/L (ref 22–32)
COLOR UR: YELLOW
CREAT SERPL-MCNC: 0.68 MG/DL (ref 0.5–1.5)
EOSINOPHIL # BLD: 0.1 K/UL (ref 0–0.7)
EOSINOPHIL NFR BLD: 1 %
ERYTHROCYTE [DISTWIDTH] IN BLOOD BY AUTOMATED COUNT: 13.1 % (ref 11–15)
GLOBULIN PLAS-MCNC: 2.8 G/DL (ref 2.5–3.7)
GLUCOSE SERPL-MCNC: 98 MG/DL (ref 70–99)
GLUCOSE UR-MCNC: NEGATIVE MG/DL
HCT VFR BLD AUTO: 38.3 % (ref 35–48)
HGB BLD-MCNC: 13.2 G/DL (ref 12–16)
HYALINE CASTS #/AREA URNS AUTO: 1 /LPF
KETONES UR-MCNC: NEGATIVE MG/DL
LYMPHOCYTES # BLD: 1.1 K/UL (ref 1–4)
LYMPHOCYTES NFR BLD: 26 %
MCH RBC QN AUTO: 34.9 PG (ref 27–32)
MCHC RBC AUTO-ENTMCNC: 34.4 G/DL (ref 32–37)
MCV RBC AUTO: 101.4 FL (ref 80–100)
MONOCYTES # BLD: 0.6 K/UL (ref 0–1)
MONOCYTES NFR BLD: 14 %
NEUTROPHILS # BLD AUTO: 2.4 K/UL (ref 1.8–7.7)
NEUTROPHILS NFR BLD: 58 %
NITRITE UR QL STRIP.AUTO: NEGATIVE
OSMOLALITY UR CALC.SUM OF ELEC: 287 MOSM/KG (ref 275–295)
PH UR: 5 [PH] (ref 5–8)
PLATELET # BLD AUTO: 142 K/UL (ref 140–400)
PMV BLD AUTO: 8.6 FL (ref 7.4–10.3)
POTASSIUM SERPL-SCNC: 4.7 MMOL/L (ref 3.3–5.1)
PROT SERPL-MCNC: 6.4 G/DL (ref 5.9–8.4)
PROT UR-MCNC: NEGATIVE MG/DL
RBC # BLD AUTO: 3.77 M/UL (ref 3.7–5.4)
RBC #/AREA URNS AUTO: 3 /HPF
SODIUM SERPL-SCNC: 139 MMOL/L (ref 136–144)
SP GR UR STRIP: 1.02 (ref 1–1.03)
TSH SERPL-ACNC: 0.38 UIU/ML (ref 0.45–5.33)
UROBILINOGEN UR STRIP-ACNC: <2
VIT C UR-MCNC: 40 MG/DL
WBC # BLD AUTO: 4.1 K/UL (ref 4–11)
WBC #/AREA URNS AUTO: 5 /HPF

## 2019-01-16 PROCEDURE — 80053 COMPREHEN METABOLIC PANEL: CPT | Performed by: OTHER

## 2019-01-16 PROCEDURE — 84443 ASSAY THYROID STIM HORMONE: CPT | Performed by: OTHER

## 2019-01-16 PROCEDURE — 85025 COMPLETE CBC W/AUTO DIFF WBC: CPT | Performed by: OTHER

## 2019-01-16 PROCEDURE — 36415 COLL VENOUS BLD VENIPUNCTURE: CPT | Performed by: OTHER

## 2019-01-23 ENCOUNTER — PRIOR ORIGINAL RECORDS (OUTPATIENT)
Dept: OTHER | Age: 39
End: 2019-01-23

## 2019-01-23 ENCOUNTER — MYAURORA ACCOUNT LINK (OUTPATIENT)
Dept: OTHER | Age: 39
End: 2019-01-23

## 2019-01-23 LAB
ALBUMIN: 3.6 G/DL
ALKALINE PHOSPHATATE(ALK PHOS): 0.7 IU/L
BUN: 11 MG/DL
CALCIUM: 9.3 MG/DL
CHLORIDE: 102 MEQ/L
CREATININE, SERUM: 0.54 MG/DL
CREATININE, SERUM: 0.68 MG/DL
GLOBULIN: 2.8 G/DL
GLUCOSE: 98 MG/DL
HEMATOCRIT: 38.3 %
HEMOGLOBIN: 13.2 G/DL
MAGNESIUM: 1.6 MG/DL
MCH: 34.9 PG
MCHC: 34.4 G/DL
MCV: 101.4 FL
PLATELETS: 142 K/UL
POTASSIUM, SERUM: 3.9 MEQ/L
POTASSIUM, SERUM: 4.7 MEQ/L
PROTEIN, TOTAL: 6.4 G/DL
RED BLOOD COUNT: 3.77 X 10-6/U
SGOT (AST): 21 IU/L
SGPT (ALT): 30 IU/L
SODIUM: 139 MEQ/L
T4(THYROXINE): 10.3 MG/DL
THYROID STIMULATING HORMONE: 0.38 MLU/L
THYROID STIMULATING HORMONE: 1.59 MLU/L
WHITE BLOOD COUNT: 4.1 X 10-3/U

## 2019-01-30 ENCOUNTER — PRIOR ORIGINAL RECORDS (OUTPATIENT)
Dept: OTHER | Age: 39
End: 2019-01-30

## 2019-01-31 ENCOUNTER — PRIOR ORIGINAL RECORDS (OUTPATIENT)
Dept: OTHER | Age: 39
End: 2019-01-31

## 2019-02-04 ENCOUNTER — HOSPITAL (OUTPATIENT)
Dept: OTHER | Age: 39
End: 2019-02-04
Attending: INTERNAL MEDICINE

## 2019-02-04 ENCOUNTER — HOSPITAL (OUTPATIENT)
Dept: OTHER | Age: 39
End: 2019-02-04

## 2019-02-04 ENCOUNTER — PRIOR ORIGINAL RECORDS (OUTPATIENT)
Dept: OTHER | Age: 39
End: 2019-02-04

## 2019-02-04 LAB
ANALYZER ANC (IANC): ABNORMAL
ANION GAP SERPL CALC-SCNC: 14 MMOL/L (ref 10–20)
B-HCG UR QL: NEGATIVE
BASOPHILS # BLD: 0 THOUSAND/MCL (ref 0–0.3)
BASOPHILS NFR BLD: 0 %
BUN SERPL-MCNC: 7 MG/DL (ref 6–20)
BUN/CREAT SERPL: 13 (ref 7–25)
CALCIUM SERPL-MCNC: 9 MG/DL (ref 8.4–10.2)
CHLORIDE: 100 MMOL/L (ref 98–107)
CO2 SERPL-SCNC: 27 MMOL/L (ref 21–32)
CREAT SERPL-MCNC: 0.52 MG/DL (ref 0.51–0.95)
DIFFERENTIAL METHOD BLD: ABNORMAL
EOSINOPHIL # BLD: 0.1 THOUSAND/MCL (ref 0.1–0.5)
EOSINOPHIL NFR BLD: 1 %
ERYTHROCYTE [DISTWIDTH] IN BLOOD: 12.1 % (ref 11–15)
GLUCOSE SERPL-MCNC: 110 MG/DL (ref 65–99)
HEMATOCRIT: 39.5 % (ref 36–46.5)
HGB BLD-MCNC: 13.5 GM/DL (ref 12–15.5)
IMM GRANULOCYTES # BLD AUTO: 0 THOUSAND/MCL (ref 0–0.2)
IMM GRANULOCYTES NFR BLD: 1 %
LYMPHOCYTES # BLD: 1 THOUSAND/MCL (ref 1–4.8)
LYMPHOCYTES NFR BLD: 19 %
MAGNESIUM SERPL-MCNC: 1.6 MG/DL (ref 1.7–2.4)
MCH RBC QN AUTO: 33.3 PG (ref 26–34)
MCHC RBC AUTO-ENTMCNC: 34.2 GM/DL (ref 32–36.5)
MCV RBC AUTO: 97.3 FL (ref 78–100)
MONOCYTES # BLD: 0.5 THOUSAND/MCL (ref 0.3–0.9)
MONOCYTES NFR BLD: 9 %
NEUTROPHILS # BLD: 3.4 THOUSAND/MCL (ref 1.8–7.7)
NEUTROPHILS NFR BLD: 70 %
NEUTS SEG NFR BLD: ABNORMAL %
NRBC (NRBCRE): 0 /100 WBC
PLATELET # BLD: 107 THOUSAND/MCL (ref 140–450)
POTASSIUM SERPL-SCNC: 4.2 MMOL/L (ref 3.4–5.1)
RBC # BLD: 4.06 MILLION/MCL (ref 4–5.2)
SODIUM SERPL-SCNC: 137 MMOL/L (ref 135–145)
WBC # BLD: 4.9 THOUSAND/MCL (ref 4.2–11)

## 2019-02-08 LAB
BUN: 7 MG/DL
CALCIUM: 9 MG/DL
CHLORIDE: 100 MEQ/L
CREATININE, SERUM: 0.52 MG/DL
GLUCOSE: 110 MG/DL
HEMATOCRIT: 39.5 %
HEMOGLOBIN: 13.5 G/DL
MAGNESIUM: 1.6 MG/DL
MCH: 33.3 PG
MCHC: 34.2 G/DL
MCV: 97.3 FL
PLATELETS: 107 K/UL
POTASSIUM, SERUM: 4.2 MEQ/L
RED BLOOD COUNT: 4.06 X 10-6/U
SODIUM: 137 MEQ/L
WHITE BLOOD COUNT: 4.9 X 10-3/U

## 2019-02-11 ENCOUNTER — MYAURORA ACCOUNT LINK (OUTPATIENT)
Dept: OTHER | Age: 39
End: 2019-02-11

## 2019-02-11 ENCOUNTER — PRIOR ORIGINAL RECORDS (OUTPATIENT)
Dept: OTHER | Age: 39
End: 2019-02-11

## 2019-02-28 VITALS
BODY MASS INDEX: 24.59 KG/M2 | WEIGHT: 153 LBS | DIASTOLIC BLOOD PRESSURE: 88 MMHG | RESPIRATION RATE: 16 BRPM | SYSTOLIC BLOOD PRESSURE: 108 MMHG | HEIGHT: 66 IN | HEART RATE: 60 BPM

## 2019-02-28 VITALS
RESPIRATION RATE: 16 BRPM | SYSTOLIC BLOOD PRESSURE: 128 MMHG | HEIGHT: 66 IN | WEIGHT: 160 LBS | DIASTOLIC BLOOD PRESSURE: 80 MMHG | HEART RATE: 84 BPM | BODY MASS INDEX: 25.71 KG/M2

## 2019-02-28 VITALS
BODY MASS INDEX: 24.27 KG/M2 | SYSTOLIC BLOOD PRESSURE: 110 MMHG | HEIGHT: 66 IN | HEART RATE: 70 BPM | DIASTOLIC BLOOD PRESSURE: 70 MMHG | WEIGHT: 151 LBS

## 2019-03-03 ENCOUNTER — DIAGNOSTIC TRANS (OUTPATIENT)
Dept: OTHER | Age: 39
End: 2019-03-03

## 2019-03-03 ENCOUNTER — HOSPITAL (OUTPATIENT)
Dept: OTHER | Age: 39
End: 2019-03-03
Attending: EMERGENCY MEDICINE

## 2019-03-04 LAB
ANALYZER ANC (IANC): ABNORMAL
ANION GAP SERPL CALC-SCNC: 16 MMOL/L (ref 10–20)
BASOPHILS # BLD: 0 THOUSAND/MCL (ref 0–0.3)
BASOPHILS NFR BLD: 0 %
BUN SERPL-MCNC: 11 MG/DL (ref 6–20)
BUN/CREAT SERPL: 19 (ref 7–25)
CALCIUM SERPL-MCNC: 9 MG/DL (ref 8.4–10.2)
CHLORIDE: 95 MMOL/L (ref 98–107)
CO2 SERPL-SCNC: 27 MMOL/L (ref 21–32)
CREAT SERPL-MCNC: 0.59 MG/DL (ref 0.51–0.95)
DIFFERENTIAL METHOD BLD: ABNORMAL
EOSINOPHIL # BLD: 0 THOUSAND/MCL (ref 0.1–0.5)
EOSINOPHIL NFR BLD: 0 %
ERYTHROCYTE [DISTWIDTH] IN BLOOD: 12.5 % (ref 11–15)
GLUCOSE SERPL-MCNC: 112 MG/DL (ref 65–99)
HEMATOCRIT: 41.6 % (ref 36–46.5)
HGB BLD-MCNC: 14.6 GM/DL (ref 12–15.5)
IMM GRANULOCYTES # BLD AUTO: 0 THOUSAND/MCL (ref 0–0.2)
IMM GRANULOCYTES NFR BLD: 0 %
LYMPHOCYTES # BLD: 1.4 THOUSAND/MCL (ref 1–4.8)
LYMPHOCYTES NFR BLD: 26 %
MCH RBC QN AUTO: 33.4 PG (ref 26–34)
MCHC RBC AUTO-ENTMCNC: 35.1 GM/DL (ref 32–36.5)
MCV RBC AUTO: 95.2 FL (ref 78–100)
MONOCYTES # BLD: 0.4 THOUSAND/MCL (ref 0.3–0.9)
MONOCYTES NFR BLD: 8 %
NEUTROPHILS # BLD: 3.5 THOUSAND/MCL (ref 1.8–7.7)
NEUTROPHILS NFR BLD: 66 %
NEUTS SEG NFR BLD: ABNORMAL %
NRBC (NRBCRE): 0 /100 WBC
PLATELET # BLD: 130 THOUSAND/MCL (ref 140–450)
POTASSIUM SERPL-SCNC: 3.2 MMOL/L (ref 3.4–5.1)
RBC # BLD: 4.37 MILLION/MCL (ref 4–5.2)
SODIUM SERPL-SCNC: 135 MMOL/L (ref 135–145)
TROPONIN I SERPL HS-MCNC: <0.02 NG/ML
WBC # BLD: 5.4 THOUSAND/MCL (ref 4.2–11)

## 2019-03-07 ENCOUNTER — TELEPHONE (OUTPATIENT)
Dept: CARDIOLOGY | Age: 39
End: 2019-03-07

## 2019-03-13 ENCOUNTER — HOSPITAL (OUTPATIENT)
Dept: OTHER | Age: 39
End: 2019-03-13

## 2019-03-14 ENCOUNTER — HOSPITAL (OUTPATIENT)
Dept: OTHER | Age: 39
End: 2019-03-14
Attending: SPECIALIST

## 2019-03-14 LAB
POTASSIUM SERPL-SCNC: 3.3 MMOL/L (ref 3.4–5.1)
POTASSIUM SERPL-SCNC: 3.3 MMOL/L (ref 3.4–5.1)

## 2019-03-18 ENCOUNTER — HOSPITAL (OUTPATIENT)
Dept: OTHER | Age: 39
End: 2019-03-18

## 2019-04-01 ENCOUNTER — HOSPITAL (OUTPATIENT)
Dept: OTHER | Age: 39
End: 2019-04-01
Attending: EMERGENCY MEDICINE

## 2019-04-01 LAB
ANALYZER ANC (IANC): ABNORMAL
ANION GAP SERPL CALC-SCNC: 25 MMOL/L (ref 10–20)
BASOPHILS # BLD: 0.1 THOUSAND/MCL (ref 0–0.3)
BASOPHILS NFR BLD: 1 %
BUN SERPL-MCNC: 9 MG/DL (ref 6–20)
BUN/CREAT SERPL: 17 (ref 7–25)
CALCIUM SERPL-MCNC: 8.9 MG/DL (ref 8.4–10.2)
CHLORIDE: 96 MMOL/L (ref 98–107)
CO2 SERPL-SCNC: 23 MMOL/L (ref 21–32)
CREAT SERPL-MCNC: 0.52 MG/DL (ref 0.51–0.95)
DIFFERENTIAL METHOD BLD: ABNORMAL
EOSINOPHIL # BLD: 0 THOUSAND/MCL (ref 0.1–0.5)
EOSINOPHIL NFR BLD: 0 %
ERYTHROCYTE [DISTWIDTH] IN BLOOD: 13.4 % (ref 11–15)
ETHANOL SERPL-MCNC: 237 MG/DL
GLUCOSE SERPL-MCNC: 119 MG/DL (ref 65–99)
HEMATOCRIT: 43.3 % (ref 36–46.5)
HGB BLD-MCNC: 14.8 GM/DL (ref 12–15.5)
IMM GRANULOCYTES # BLD AUTO: 0.1 THOUSAND/MCL (ref 0–0.2)
IMM GRANULOCYTES NFR BLD: 1 %
LYMPHOCYTES # BLD: 1.6 THOUSAND/MCL (ref 1–4.8)
LYMPHOCYTES NFR BLD: 18 %
MCH RBC QN AUTO: 33.1 PG (ref 26–34)
MCHC RBC AUTO-ENTMCNC: 34.2 GM/DL (ref 32–36.5)
MCV RBC AUTO: 96.9 FL (ref 78–100)
MONOCYTES # BLD: 0.4 THOUSAND/MCL (ref 0.3–0.9)
MONOCYTES NFR BLD: 5 %
NEUTROPHILS # BLD: 6.6 THOUSAND/MCL (ref 1.8–7.7)
NEUTROPHILS NFR BLD: 75 %
NEUTS SEG NFR BLD: ABNORMAL %
NRBC (NRBCRE): 0 /100 WBC
PLATELET # BLD: 302 THOUSAND/MCL (ref 140–450)
POTASSIUM SERPL-SCNC: 4.3 MMOL/L (ref 3.4–5.1)
RBC # BLD: 4.47 MILLION/MCL (ref 4–5.2)
SODIUM SERPL-SCNC: 140 MMOL/L (ref 135–145)
TROPONIN I SERPL HS-MCNC: 0.03 NG/ML
WBC # BLD: 8.7 THOUSAND/MCL (ref 4.2–11)

## 2019-05-13 ENCOUNTER — HOSPITAL (OUTPATIENT)
Dept: OTHER | Age: 39
End: 2019-05-13
Attending: INTERNAL MEDICINE

## 2019-05-13 ENCOUNTER — DIAGNOSTIC TRANS (OUTPATIENT)
Dept: OTHER | Age: 39
End: 2019-05-13

## 2019-05-13 LAB
ALBUMIN SERPL-MCNC: 4.8 GM/DL (ref 3.6–5.1)
ALBUMIN/GLOB SERPL: 1.1 {RATIO} (ref 1–2.4)
ALP SERPL-CCNC: 140 UNIT/L (ref 45–117)
ALT SERPL-CCNC: 31 UNIT/L
AMPHETAMINES UR QL SCN>500 NG/ML: NEGATIVE
ANALYZER ANC (IANC): ABNORMAL
ANION GAP SERPL CALC-SCNC: 27 MMOL/L (ref 10–20)
AST SERPL-CCNC: 35 UNIT/L
BARBITURATES UR QL SCN>200 NG/ML: NEGATIVE
BASOPHILS # BLD: 0.1 THOUSAND/MCL (ref 0–0.3)
BASOPHILS NFR BLD: 1 %
BENZODIAZ UR QL SCN>200 NG/ML: NEGATIVE
BILIRUB SERPL-MCNC: 0.9 MG/DL (ref 0.2–1)
BUN SERPL-MCNC: 8 MG/DL (ref 6–20)
BUN/CREAT SERPL: 13 (ref 7–25)
BZE UR QL SCN>150 NG/ML: NEGATIVE
CALCIUM SERPL-MCNC: 9.3 MG/DL (ref 8.4–10.2)
CANNABINOIDS UR QL SCN>50 NG/ML: NEGATIVE
CHLORIDE: 99 MMOL/L (ref 98–107)
CO2 SERPL-SCNC: 14 MMOL/L (ref 21–32)
CREAT SERPL-MCNC: 0.6 MG/DL (ref 0.51–0.95)
DIFFERENTIAL METHOD BLD: ABNORMAL
EOSINOPHIL # BLD: 0 THOUSAND/MCL (ref 0.1–0.5)
EOSINOPHIL NFR BLD: 0 %
ERYTHROCYTE [DISTWIDTH] IN BLOOD: 13.6 % (ref 11–15)
ETHANOL SERPL-MCNC: 246 MG/DL
GLOBULIN SER-MCNC: 4.5 GM/DL (ref 2–4)
GLUCOSE SERPL-MCNC: 110 MG/DL (ref 65–99)
HCG SERPL QL: NEGATIVE
HEMATOCRIT: 47.2 % (ref 36–46.5)
HGB BLD-MCNC: 16.9 GM/DL (ref 12–15.5)
IMM GRANULOCYTES # BLD AUTO: 0 THOUSAND/MCL (ref 0–0.2)
IMM GRANULOCYTES NFR BLD: 0 %
INR PPP: 1
LYMPHOCYTES # BLD: 1.6 THOUSAND/MCL (ref 1–4.8)
LYMPHOCYTES NFR BLD: 19 %
MAGNESIUM SERPL-MCNC: 2.2 MG/DL (ref 1.7–2.4)
MCH RBC QN AUTO: 32.7 PG (ref 26–34)
MCHC RBC AUTO-ENTMCNC: 35.8 GM/DL (ref 32–36.5)
MCV RBC AUTO: 91.3 FL (ref 78–100)
MONOCYTES # BLD: 0.6 THOUSAND/MCL (ref 0.3–0.9)
MONOCYTES NFR BLD: 7 %
NEUTROPHILS # BLD: 6.4 THOUSAND/MCL (ref 1.8–7.7)
NEUTROPHILS NFR BLD: 73 %
NEUTS SEG NFR BLD: ABNORMAL %
NRBC (NRBCRE): 0 /100 WBC
OPIATES UR QL SCN>300 NG/ML: NEGATIVE
PCP UR QL SCN>25 NG/ML: NEGATIVE
PLATELET # BLD: 259 THOUSAND/MCL (ref 140–450)
POTASSIUM SERPL-SCNC: 4 MMOL/L (ref 3.4–5.1)
PROT SERPL-MCNC: 9.3 GM/DL (ref 6.4–8.2)
PROTHROMBIN TIME: 10.5 SECONDS (ref 9.7–11.8)
PROTHROMBIN TIME: NORMAL
RBC # BLD: 5.17 MILLION/MCL (ref 4–5.2)
SODIUM SERPL-SCNC: 136 MMOL/L (ref 135–145)
WBC # BLD: 8.7 THOUSAND/MCL (ref 4.2–11)

## 2019-05-14 LAB
ALBUMIN SERPL-MCNC: 3.3 GM/DL (ref 3.6–5.1)
ALBUMIN SERPL-MCNC: 3.4 GM/DL (ref 3.6–5.1)
ALBUMIN/GLOB SERPL: 1 {RATIO} (ref 1–2.4)
ALBUMIN/GLOB SERPL: 1.1 {RATIO} (ref 1–2.4)
ALP SERPL-CCNC: 91 UNIT/L (ref 45–117)
ALP SERPL-CCNC: 92 UNIT/L (ref 45–117)
ALT SERPL-CCNC: 20 UNIT/L
ALT SERPL-CCNC: 22 UNIT/L
AMYLASE SERPL-CCNC: 27 UNIT/L (ref 25–115)
AMYLASE SERPL-CCNC: 29 UNIT/L (ref 25–115)
ANALYZER ANC (IANC): ABNORMAL
ANALYZER ANC (IANC): ABNORMAL
ANION GAP SERPL CALC-SCNC: 10 MMOL/L (ref 10–20)
ANION GAP SERPL CALC-SCNC: 10 MMOL/L (ref 10–20)
AST SERPL-CCNC: 24 UNIT/L
AST SERPL-CCNC: 30 UNIT/L
BASOPHILS # BLD: 0 THOUSAND/MCL (ref 0–0.3)
BASOPHILS # BLD: 0 THOUSAND/MCL (ref 0–0.3)
BASOPHILS NFR BLD: 0 %
BASOPHILS NFR BLD: 1 %
BILIRUB SERPL-MCNC: 1.1 MG/DL (ref 0.2–1)
BILIRUB SERPL-MCNC: 1.3 MG/DL (ref 0.2–1)
BUN SERPL-MCNC: 8 MG/DL (ref 6–20)
BUN SERPL-MCNC: 8 MG/DL (ref 6–20)
BUN/CREAT SERPL: 13 (ref 7–25)
BUN/CREAT SERPL: 13 (ref 7–25)
CALCIUM SERPL-MCNC: 8.3 MG/DL (ref 8.4–10.2)
CALCIUM SERPL-MCNC: 8.4 MG/DL (ref 8.4–10.2)
CHLORIDE: 104 MMOL/L (ref 98–107)
CHLORIDE: 105 MMOL/L (ref 98–107)
CHOLEST SERPL-MCNC: 184 MG/DL
CHOLEST SERPL-MCNC: 192 MG/DL
CHOLEST/HDLC SERPL: 3.7 {RATIO}
CHOLEST/HDLC SERPL: 3.9 {RATIO}
CO2 SERPL-SCNC: 23 MMOL/L (ref 21–32)
CO2 SERPL-SCNC: 26 MMOL/L (ref 21–32)
CREAT SERPL-MCNC: 0.6 MG/DL (ref 0.51–0.95)
CREAT SERPL-MCNC: 0.61 MG/DL (ref 0.51–0.95)
DIFFERENTIAL METHOD BLD: ABNORMAL
DIFFERENTIAL METHOD BLD: ABNORMAL
EOSINOPHIL # BLD: 0 THOUSAND/MCL (ref 0.1–0.5)
EOSINOPHIL # BLD: 0 THOUSAND/MCL (ref 0.1–0.5)
EOSINOPHIL NFR BLD: 1 %
EOSINOPHIL NFR BLD: 1 %
ERYTHROCYTE [DISTWIDTH] IN BLOOD: 13.6 % (ref 11–15)
ERYTHROCYTE [DISTWIDTH] IN BLOOD: 13.7 % (ref 11–15)
GLOBULIN SER-MCNC: 3.2 GM/DL (ref 2–4)
GLOBULIN SER-MCNC: 3.2 GM/DL (ref 2–4)
GLUCOSE SERPL-MCNC: 105 MG/DL (ref 65–99)
GLUCOSE SERPL-MCNC: 107 MG/DL (ref 65–99)
HCG POINT OF CARE (5HGRST): NEGATIVE
HDLC SERPL-MCNC: 49 MG/DL
HDLC SERPL-MCNC: 50 MG/DL
HEMATOCRIT: 36.3 % (ref 36–46.5)
HEMATOCRIT: 36.9 % (ref 36–46.5)
HGB BLD-MCNC: 12.8 GM/DL (ref 12–15.5)
HGB BLD-MCNC: 12.8 GM/DL (ref 12–15.5)
IMM GRANULOCYTES # BLD AUTO: 0 THOUSAND/MCL (ref 0–0.2)
IMM GRANULOCYTES # BLD AUTO: 0 THOUSAND/MCL (ref 0–0.2)
IMM GRANULOCYTES NFR BLD: 0 %
IMM GRANULOCYTES NFR BLD: 0 %
INR PPP: 1
INR PPP: 1
LDLC SERPL CALC-MCNC: 74 MG/DL
LDLC SERPL CALC-MCNC: 82 MG/DL
LIPASE SERPL-CCNC: 103 UNIT/L (ref 73–393)
LIPASE SERPL-CCNC: 109 UNIT/L (ref 73–393)
LYMPHOCYTES # BLD: 1.6 THOUSAND/MCL (ref 1–4.8)
LYMPHOCYTES # BLD: 1.6 THOUSAND/MCL (ref 1–4.8)
LYMPHOCYTES NFR BLD: 30 %
LYMPHOCYTES NFR BLD: 33 %
MCH RBC QN AUTO: 32.7 PG (ref 26–34)
MCH RBC QN AUTO: 33.2 PG (ref 26–34)
MCHC RBC AUTO-ENTMCNC: 34.7 GM/DL (ref 32–36.5)
MCHC RBC AUTO-ENTMCNC: 35.3 GM/DL (ref 32–36.5)
MCV RBC AUTO: 94 FL (ref 78–100)
MCV RBC AUTO: 94.4 FL (ref 78–100)
MONOCYTES # BLD: 0.6 THOUSAND/MCL (ref 0.3–0.9)
MONOCYTES # BLD: 0.8 THOUSAND/MCL (ref 0.3–0.9)
MONOCYTES NFR BLD: 13 %
MONOCYTES NFR BLD: 14 %
NEUTROPHILS # BLD: 2.6 THOUSAND/MCL (ref 1.8–7.7)
NEUTROPHILS # BLD: 2.9 THOUSAND/MCL (ref 1.8–7.7)
NEUTROPHILS NFR BLD: 53 %
NEUTROPHILS NFR BLD: 54 %
NEUTS SEG NFR BLD: ABNORMAL %
NEUTS SEG NFR BLD: ABNORMAL %
NONHDLC SERPL-MCNC: 134 MG/DL
NONHDLC SERPL-MCNC: 143 MG/DL
NRBC (NRBCRE): 0 /100 WBC
NRBC (NRBCRE): 0 /100 WBC
PLATELET # BLD: 131 THOUSAND/MCL (ref 140–450)
PLATELET # BLD: 141 THOUSAND/MCL (ref 140–450)
POTASSIUM SERPL-SCNC: 3.7 MMOL/L (ref 3.4–5.1)
POTASSIUM SERPL-SCNC: 3.8 MMOL/L (ref 3.4–5.1)
PROT SERPL-MCNC: 6.5 GM/DL (ref 6.4–8.2)
PROT SERPL-MCNC: 6.6 GM/DL (ref 6.4–8.2)
PROTHROMBIN TIME: 10.6 SECONDS (ref 9.7–11.8)
PROTHROMBIN TIME: 10.7 SECONDS (ref 9.7–11.8)
PROTHROMBIN TIME: NORMAL
PROTHROMBIN TIME: NORMAL
RBC # BLD: 3.86 MILLION/MCL (ref 4–5.2)
RBC # BLD: 3.91 MILLION/MCL (ref 4–5.2)
SODIUM SERPL-SCNC: 134 MMOL/L (ref 135–145)
SODIUM SERPL-SCNC: 136 MMOL/L (ref 135–145)
TRIGLYCERIDE (TRIGP): 299 MG/DL
TRIGLYCERIDE (TRIGP): 305 MG/DL
WBC # BLD: 4.8 THOUSAND/MCL (ref 4.2–11)
WBC # BLD: 5.3 THOUSAND/MCL (ref 4.2–11)

## 2019-05-15 ENCOUNTER — HOSPITAL (OUTPATIENT)
Dept: OTHER | Age: 39
End: 2019-05-15
Attending: INTERNAL MEDICINE

## 2019-05-15 ENCOUNTER — DOCUMENTATION (OUTPATIENT)
Dept: CARDIOLOGY | Age: 39
End: 2019-05-15

## 2019-05-15 LAB
ALBUMIN SERPL-MCNC: 3.7 GM/DL (ref 3.6–5.1)
ALBUMIN/GLOB SERPL: 0.9 {RATIO} (ref 1–2.4)
ALP SERPL-CCNC: 99 UNIT/L (ref 45–117)
ALT SERPL-CCNC: 25 UNIT/L
ANALYZER ANC (IANC): ABNORMAL
ANION GAP SERPL CALC-SCNC: 11 MMOL/L (ref 10–20)
AST SERPL-CCNC: 33 UNIT/L
BASOPHILS # BLD: 0 THOUSAND/MCL (ref 0–0.3)
BASOPHILS NFR BLD: 1 %
BILIRUB SERPL-MCNC: 0.3 MG/DL (ref 0.2–1)
BUN SERPL-MCNC: 4 MG/DL (ref 6–20)
BUN/CREAT SERPL: 7 (ref 7–25)
CALCIUM SERPL-MCNC: 8.7 MG/DL (ref 8.4–10.2)
CHLORIDE: 109 MMOL/L (ref 98–107)
CO2 SERPL-SCNC: 26 MMOL/L (ref 21–32)
CREAT SERPL-MCNC: 0.58 MG/DL (ref 0.51–0.95)
DIFFERENTIAL METHOD BLD: ABNORMAL
EOSINOPHIL # BLD: 0 THOUSAND/MCL (ref 0.1–0.5)
EOSINOPHIL NFR BLD: 0 %
ERYTHROCYTE [DISTWIDTH] IN BLOOD: 13.7 % (ref 11–15)
ETHANOL SERPL-MCNC: 382 MG/DL
GLOBULIN SER-MCNC: 3.9 GM/DL (ref 2–4)
GLUCOSE SERPL-MCNC: 101 MG/DL (ref 65–99)
HCG SERPL QL: NEGATIVE
HEMATOCRIT: 41.9 % (ref 36–46.5)
HGB BLD-MCNC: 14.4 GM/DL (ref 12–15.5)
IMM GRANULOCYTES # BLD AUTO: 0 THOUSAND/MCL (ref 0–0.2)
IMM GRANULOCYTES NFR BLD: 0 %
LIPASE SERPL-CCNC: 157 UNIT/L (ref 73–393)
LYMPHOCYTES # BLD: 1.7 THOUSAND/MCL (ref 1–4.8)
LYMPHOCYTES NFR BLD: 44 %
MAGNESIUM SERPL-MCNC: 2 MG/DL (ref 1.7–2.4)
MCH RBC QN AUTO: 32.3 PG (ref 26–34)
MCHC RBC AUTO-ENTMCNC: 34.4 GM/DL (ref 32–36.5)
MCV RBC AUTO: 93.9 FL (ref 78–100)
MONOCYTES # BLD: 0.3 THOUSAND/MCL (ref 0.3–0.9)
MONOCYTES NFR BLD: 8 %
NEUTROPHILS # BLD: 1.8 THOUSAND/MCL (ref 1.8–7.7)
NEUTROPHILS NFR BLD: 47 %
NEUTS SEG NFR BLD: ABNORMAL %
NRBC (NRBCRE): 0 /100 WBC
PLATELET # BLD: 160 THOUSAND/MCL (ref 140–450)
POTASSIUM SERPL-SCNC: 3.7 MMOL/L (ref 3.4–5.1)
PROT SERPL-MCNC: 7.6 GM/DL (ref 6.4–8.2)
RBC # BLD: 4.46 MILLION/MCL (ref 4–5.2)
SODIUM SERPL-SCNC: 142 MMOL/L (ref 135–145)
WBC # BLD: 3.8 THOUSAND/MCL (ref 4.2–11)

## 2019-05-16 ENCOUNTER — HOSPITAL (OUTPATIENT)
Dept: OTHER | Age: 39
End: 2019-05-16

## 2019-05-16 LAB
ALBUMIN SERPL-MCNC: 3.2 GM/DL (ref 3.6–5.1)
ALBUMIN/GLOB SERPL: 1 {RATIO} (ref 1–2.4)
ALP SERPL-CCNC: 84 UNIT/L (ref 45–117)
ALT SERPL-CCNC: 25 UNIT/L
ANALYZER ANC (IANC): ABNORMAL
ANION GAP SERPL CALC-SCNC: 9 MMOL/L (ref 10–20)
AST SERPL-CCNC: 29 UNIT/L
BASOPHILS # BLD: 0 THOUSAND/MCL (ref 0–0.3)
BASOPHILS NFR BLD: 1 %
BILIRUB SERPL-MCNC: 0.3 MG/DL (ref 0.2–1)
BUN SERPL-MCNC: 4 MG/DL (ref 6–20)
BUN/CREAT SERPL: 6 (ref 7–25)
CALCIUM SERPL-MCNC: 8.6 MG/DL (ref 8.4–10.2)
CHLORIDE: 105 MMOL/L (ref 98–107)
CO2 SERPL-SCNC: 26 MMOL/L (ref 21–32)
CREAT SERPL-MCNC: 0.65 MG/DL (ref 0.51–0.95)
DIFFERENTIAL METHOD BLD: ABNORMAL
EOSINOPHIL # BLD: 0 THOUSAND/MCL (ref 0.1–0.5)
EOSINOPHIL NFR BLD: 1 %
ERYTHROCYTE [DISTWIDTH] IN BLOOD: 13.9 % (ref 11–15)
GLOBULIN SER-MCNC: 3.2 GM/DL (ref 2–4)
GLUCOSE SERPL-MCNC: 106 MG/DL (ref 65–99)
HEMATOCRIT: 36.8 % (ref 36–46.5)
HGB BLD-MCNC: 12.6 GM/DL (ref 12–15.5)
IMM GRANULOCYTES # BLD AUTO: 0 THOUSAND/MCL (ref 0–0.2)
IMM GRANULOCYTES NFR BLD: 0 %
LYMPHOCYTES # BLD: 1.5 THOUSAND/MCL (ref 1–4.8)
LYMPHOCYTES NFR BLD: 35 %
MAGNESIUM SERPL-MCNC: 1.3 MG/DL (ref 1.7–2.4)
MCH RBC QN AUTO: 33.1 PG (ref 26–34)
MCHC RBC AUTO-ENTMCNC: 34.2 GM/DL (ref 32–36.5)
MCV RBC AUTO: 96.6 FL (ref 78–100)
MONOCYTES # BLD: 0.3 THOUSAND/MCL (ref 0.3–0.9)
MONOCYTES NFR BLD: 7 %
NEUTROPHILS # BLD: 2.4 THOUSAND/MCL (ref 1.8–7.7)
NEUTROPHILS NFR BLD: 56 %
NEUTS SEG NFR BLD: ABNORMAL %
NRBC (NRBCRE): 0 /100 WBC
PLATELET # BLD: 119 THOUSAND/MCL (ref 140–450)
POTASSIUM SERPL-SCNC: 3.2 MMOL/L (ref 3.4–5.1)
PROT SERPL-MCNC: 6.4 GM/DL (ref 6.4–8.2)
RBC # BLD: 3.81 MILLION/MCL (ref 4–5.2)
SODIUM SERPL-SCNC: 137 MMOL/L (ref 135–145)
WBC # BLD: 4.2 THOUSAND/MCL (ref 4.2–11)

## 2019-05-17 LAB
CREAT SERPL-MCNC: 0.64 MG/DL (ref 0.51–0.95)
MAGNESIUM SERPL-MCNC: 1.9 MG/DL (ref 1.7–2.4)
POTASSIUM SERPL-SCNC: 3.8 MMOL/L (ref 3.4–5.1)

## 2019-05-18 ENCOUNTER — HOSPITAL (OUTPATIENT)
Dept: OTHER | Age: 39
End: 2019-05-18
Attending: EMERGENCY MEDICINE

## 2019-05-19 LAB
ANALYZER ANC (IANC): ABNORMAL
ANION GAP SERPL CALC-SCNC: 12 MMOL/L (ref 10–20)
BASOPHILS # BLD: 0 THOUSAND/MCL (ref 0–0.3)
BASOPHILS NFR BLD: 0 %
BUN SERPL-MCNC: 9 MG/DL (ref 6–20)
BUN/CREAT SERPL: 9 (ref 7–25)
CALCIUM SERPL-MCNC: 8.8 MG/DL (ref 8.4–10.2)
CHLORIDE: 109 MMOL/L (ref 98–107)
CO2 SERPL-SCNC: 26 MMOL/L (ref 21–32)
CREAT SERPL-MCNC: 0.95 MG/DL (ref 0.51–0.95)
DIFFERENTIAL METHOD BLD: ABNORMAL
EOSINOPHIL # BLD: 0 THOUSAND/MCL (ref 0.1–0.5)
EOSINOPHIL NFR BLD: 1 %
ERYTHROCYTE [DISTWIDTH] IN BLOOD: 14.2 % (ref 11–15)
ETHANOL SERPL-MCNC: 314 MG/DL
GLUCOSE SERPL-MCNC: 111 MG/DL (ref 65–99)
HEMATOCRIT: 41.6 % (ref 36–46.5)
HGB BLD-MCNC: 14.2 GM/DL (ref 12–15.5)
IMM GRANULOCYTES # BLD AUTO: 0 THOUSAND/MCL (ref 0–0.2)
IMM GRANULOCYTES NFR BLD: 1 %
LYMPHOCYTES # BLD: 2.2 THOUSAND/MCL (ref 1–4.8)
LYMPHOCYTES NFR BLD: 40 %
MAGNESIUM SERPL-MCNC: 2.1 MG/DL (ref 1.7–2.4)
MCH RBC QN AUTO: 32.6 PG (ref 26–34)
MCHC RBC AUTO-ENTMCNC: 34.1 GM/DL (ref 32–36.5)
MCV RBC AUTO: 95.4 FL (ref 78–100)
MONOCYTES # BLD: 0.4 THOUSAND/MCL (ref 0.3–0.9)
MONOCYTES NFR BLD: 6 %
NEUTROPHILS # BLD: 3 THOUSAND/MCL (ref 1.8–7.7)
NEUTROPHILS NFR BLD: 52 %
NEUTS SEG NFR BLD: ABNORMAL %
NRBC (NRBCRE): 0 /100 WBC
PLATELET # BLD: 167 THOUSAND/MCL (ref 140–450)
POTASSIUM SERPL-SCNC: 4.3 MMOL/L (ref 3.4–5.1)
RBC # BLD: 4.36 MILLION/MCL (ref 4–5.2)
SODIUM SERPL-SCNC: 143 MMOL/L (ref 135–145)
WBC # BLD: 5.6 THOUSAND/MCL (ref 4.2–11)

## 2019-05-24 ENCOUNTER — EXTERNAL RECORD (OUTPATIENT)
Dept: OTHER | Age: 39
End: 2019-05-24

## 2019-05-29 ENCOUNTER — TELEPHONE (OUTPATIENT)
Dept: CARDIOLOGY | Age: 39
End: 2019-05-29

## 2019-06-20 ENCOUNTER — TELEPHONE (OUTPATIENT)
Dept: CARDIOLOGY | Age: 39
End: 2019-06-20

## 2019-07-03 ENCOUNTER — TELEPHONE (OUTPATIENT)
Dept: CARDIOLOGY | Age: 39
End: 2019-07-03

## 2019-08-03 ENCOUNTER — HOSPITAL (OUTPATIENT)
Dept: OTHER | Age: 39
End: 2019-08-03

## 2019-08-03 LAB
ALBUMIN SERPL-MCNC: 3.8 G/DL (ref 3.6–5.1)
ALP SERPL-CCNC: 112 UNITS/L (ref 45–117)
ALT SERPL-CCNC: 18 UNITS/L
ANALYZER ANC (IANC): ABNORMAL
ANION GAP SERPL CALC-SCNC: 17 MMOL/L (ref 10–20)
APTT PPP: 24 SEC (ref 22–32)
APTT PPP: NORMAL S
APTT PPP: NORMAL S
AST SERPL-CCNC: 26 UNITS/L
BASOPHILS # BLD: 0 K/MCL (ref 0–0.3)
BASOPHILS NFR BLD: 0 %
BILIRUB CONJ SERPL-MCNC: 0.1 MG/DL (ref 0–0.2)
BILIRUB SERPL-MCNC: 0.5 MG/DL (ref 0.2–1)
BUN SERPL-MCNC: 8 MG/DL (ref 6–20)
BUN/CREAT SERPL: 16 (ref 7–25)
CALCIUM SERPL-MCNC: 8.8 MG/DL (ref 8.4–10.2)
CHLORIDE SERPL-SCNC: 102 MMOL/L (ref 98–107)
CO2 SERPL-SCNC: 21 MMOL/L (ref 21–32)
CREAT SERPL-MCNC: 0.5 MG/DL (ref 0.51–0.95)
D DIMER PPP FEU-MCNC: 0.59 MG/L (FEU)
D DIMER PPP FEU-MCNC: ABNORMAL
DIFFERENTIAL METHOD BLD: ABNORMAL
EOSINOPHIL # BLD: 0 K/MCL (ref 0.1–0.5)
EOSINOPHIL NFR BLD: 0 %
ERYTHROCYTE [DISTWIDTH] IN BLOOD: 13.7 % (ref 11–15)
GLUCOSE SERPL-MCNC: 219 MG/DL (ref 65–99)
HCG POINT OF CARE (5HGRST): NEGATIVE
HCT VFR BLD CALC: 41.5 % (ref 36–46.5)
HGB BLD-MCNC: 14.7 G/DL (ref 12–15.5)
IMM GRANULOCYTES # BLD AUTO: 0 K/MCL (ref 0–0.2)
IMM GRANULOCYTES NFR BLD: 1 %
INR PPP: 1
INR PPP: NORMAL
LIPASE SERPL-CCNC: 173 UNITS/L (ref 73–393)
LYMPHOCYTES # BLD: 1 K/MCL (ref 1–4.8)
LYMPHOCYTES NFR BLD: 16 %
MAGNESIUM SERPL-MCNC: 1.7 MG/DL (ref 1.7–2.4)
MCH RBC QN AUTO: 33.3 PG (ref 26–34)
MCHC RBC AUTO-ENTMCNC: 35.4 G/DL (ref 32–36.5)
MCV RBC AUTO: 93.9 FL (ref 78–100)
MONOCYTES # BLD: 0.6 K/MCL (ref 0.3–0.9)
MONOCYTES NFR BLD: 10 %
NEUTROPHILS # BLD: 4.5 K/MCL (ref 1.8–7.7)
NEUTROPHILS NFR BLD: 73 %
NEUTS SEG NFR BLD: ABNORMAL %
NRBC (NRBCRE): 0 /100 WBC
PLATELET # BLD: 220 K/MCL (ref 140–450)
POTASSIUM SERPL-SCNC: 3.6 MMOL/L (ref 3.4–5.1)
PROT SERPL-MCNC: 8 G/DL (ref 6.4–8.2)
PROTHROMBIN TIME (PRT2): NORMAL
PROTHROMBIN TIME: 10 SEC (ref 9.7–11.8)
RBC # BLD: 4.42 MIL/MCL (ref 4–5.2)
SODIUM SERPL-SCNC: 136 MMOL/L (ref 135–145)
TROPONIN I SERPL HS-MCNC: <0.02 NG/ML
WBC # BLD: 6.1 K/MCL (ref 4.2–11)

## 2019-08-03 PROCEDURE — 99215 OFFICE O/P EST HI 40 MIN: CPT | Performed by: INTERNAL MEDICINE

## 2019-08-04 ENCOUNTER — HOSPITAL (OUTPATIENT)
Dept: OTHER | Age: 39
End: 2019-08-04

## 2019-08-04 ENCOUNTER — DIAGNOSTIC TRANS (OUTPATIENT)
Dept: OTHER | Age: 39
End: 2019-08-04

## 2019-08-04 LAB
ALBUMIN SERPL-MCNC: 3.2 G/DL (ref 3.6–5.1)
ALBUMIN/GLOB SERPL: 0.9 {RATIO} (ref 1–2.4)
ALP SERPL-CCNC: 98 UNITS/L (ref 45–117)
ALT SERPL-CCNC: 16 UNITS/L
AMYLASE SERPL-CCNC: 32 UNITS/L (ref 25–115)
ANALYZER ANC (IANC): ABNORMAL
ANION GAP SERPL CALC-SCNC: 10 MMOL/L (ref 10–20)
AST SERPL-CCNC: 21 UNITS/L
BASOPHILS # BLD: 0 K/MCL (ref 0–0.3)
BASOPHILS NFR BLD: 1 %
BILIRUB SERPL-MCNC: 1.1 MG/DL (ref 0.2–1)
BUN SERPL-MCNC: 5 MG/DL (ref 6–20)
BUN/CREAT SERPL: 11 (ref 7–25)
CALCIUM SERPL-MCNC: 8.1 MG/DL (ref 8.4–10.2)
CHLORIDE SERPL-SCNC: 102 MMOL/L (ref 98–107)
CO2 SERPL-SCNC: 26 MMOL/L (ref 21–32)
CREAT SERPL-MCNC: 0.45 MG/DL (ref 0.51–0.95)
DIFFERENTIAL METHOD BLD: ABNORMAL
EOSINOPHIL # BLD: 0 K/MCL (ref 0.1–0.5)
EOSINOPHIL NFR BLD: 1 %
ERYTHROCYTE [DISTWIDTH] IN BLOOD: 13.6 % (ref 11–15)
GLOBULIN SER-MCNC: 3.5 G/DL (ref 2–4)
GLUCOSE SERPL-MCNC: 95 MG/DL (ref 65–99)
HCT VFR BLD CALC: 38.9 % (ref 36–46.5)
HGB BLD-MCNC: 13.6 G/DL (ref 12–15.5)
IMM GRANULOCYTES # BLD AUTO: 0 K/MCL (ref 0–0.2)
IMM GRANULOCYTES NFR BLD: 0 %
LIPASE SERPL-CCNC: 78 UNITS/L (ref 73–393)
LYMPHOCYTES # BLD: 1.1 K/MCL (ref 1–4.8)
LYMPHOCYTES NFR BLD: 23 %
MCH RBC QN AUTO: 33.2 PG (ref 26–34)
MCHC RBC AUTO-ENTMCNC: 35 G/DL (ref 32–36.5)
MCV RBC AUTO: 94.9 FL (ref 78–100)
MONOCYTES # BLD: 0.7 K/MCL (ref 0.3–0.9)
MONOCYTES NFR BLD: 14 %
NEUTROPHILS # BLD: 3 K/MCL (ref 1.8–7.7)
NEUTROPHILS NFR BLD: 61 %
NEUTS SEG NFR BLD: ABNORMAL %
NRBC (NRBCRE): 0 /100 WBC
PLATELET # BLD: 165 K/MCL (ref 140–450)
POTASSIUM SERPL-SCNC: 3.2 MMOL/L (ref 3.4–5.1)
PROT SERPL-MCNC: 6.7 G/DL (ref 6.4–8.2)
RBC # BLD: 4.1 MIL/MCL (ref 4–5.2)
SODIUM SERPL-SCNC: 135 MMOL/L (ref 135–145)
TROPONIN I SERPL HS-MCNC: <0.02 NG/ML
WBC # BLD: 4.9 K/MCL (ref 4.2–11)

## 2019-08-04 PROCEDURE — 99214 OFFICE O/P EST MOD 30 MIN: CPT | Performed by: INTERNAL MEDICINE

## 2019-08-04 PROCEDURE — 93306 TTE W/DOPPLER COMPLETE: CPT | Performed by: INTERNAL MEDICINE

## 2019-08-05 ENCOUNTER — TELEPHONE (OUTPATIENT)
Dept: CARDIOLOGY | Age: 39
End: 2019-08-05

## 2019-08-05 ENCOUNTER — DIAGNOSTIC TRANS (OUTPATIENT)
Dept: OTHER | Age: 39
End: 2019-08-05

## 2019-08-05 ENCOUNTER — HOSPITAL (OUTPATIENT)
Dept: OTHER | Age: 39
End: 2019-08-05

## 2019-08-07 ENCOUNTER — TELEPHONE (OUTPATIENT)
Dept: CARDIOLOGY | Age: 39
End: 2019-08-07

## 2019-08-07 DIAGNOSIS — I47.10 SVT (SUPRAVENTRICULAR TACHYCARDIA): Primary | ICD-10-CM

## 2019-08-09 ENCOUNTER — TELEPHONE (OUTPATIENT)
Dept: CARDIOLOGY | Age: 39
End: 2019-08-09

## 2019-08-13 ENCOUNTER — TELEPHONE (OUTPATIENT)
Dept: CARDIOLOGY | Age: 39
End: 2019-08-13

## 2019-08-19 ENCOUNTER — DOCUMENTATION (OUTPATIENT)
Dept: CARDIOLOGY | Age: 39
End: 2019-08-19

## 2019-08-21 ENCOUNTER — TELEPHONE (OUTPATIENT)
Dept: CARDIOLOGY | Age: 39
End: 2019-08-21

## 2019-09-11 ENCOUNTER — HOSPITAL (OUTPATIENT)
Dept: OTHER | Age: 39
End: 2019-09-11
Attending: INTERNAL MEDICINE

## 2019-09-13 ENCOUNTER — DIAGNOSTIC TRANS (OUTPATIENT)
Dept: OTHER | Age: 39
End: 2019-09-13

## 2019-09-13 ENCOUNTER — HOSPITAL (OUTPATIENT)
Dept: OTHER | Age: 39
End: 2019-09-13

## 2019-09-13 LAB
ALBUMIN SERPL-MCNC: 4.1 G/DL (ref 3.6–5.1)
ALP SERPL-CCNC: 119 UNITS/L (ref 45–117)
ALT SERPL-CCNC: 32 UNITS/L
AMPHETAMINES UR QL SCN>500 NG/ML: NEGATIVE
AMPHETAMINES UR QL SCN>500 NG/ML: NORMAL
ANALYZER ANC (IANC): ABNORMAL
ANION GAP SERPL CALC-SCNC: 15 MMOL/L (ref 10–20)
ANION GAP SERPL CALC-SCNC: 22 MMOL/L (ref 10–20)
AST SERPL-CCNC: 53 UNITS/L
BARBITURATES UR QL SCN>200 NG/ML: NEGATIVE
BARBITURATES UR QL SCN>200 NG/ML: NORMAL
BASOPHILS # BLD: 0.1 K/MCL (ref 0–0.3)
BASOPHILS NFR BLD: 1 %
BENZODIAZ UR QL SCN>200 NG/ML: NEGATIVE
BENZODIAZ UR QL SCN>200 NG/ML: NORMAL
BILIRUB CONJ SERPL-MCNC: 0.2 MG/DL (ref 0–0.2)
BILIRUB SERPL-MCNC: 0.6 MG/DL (ref 0.2–1)
BUN SERPL-MCNC: 7 MG/DL (ref 6–20)
BUN SERPL-MCNC: 8 MG/DL (ref 6–20)
BUN/CREAT SERPL: 15 (ref 7–25)
BUN/CREAT SERPL: 19 (ref 7–25)
BZE UR QL SCN>150 NG/ML: NEGATIVE
BZE UR QL SCN>150 NG/ML: NORMAL
CALCIUM SERPL-MCNC: 8.5 MG/DL (ref 8.4–10.2)
CALCIUM SERPL-MCNC: 9.1 MG/DL (ref 8.4–10.2)
CANNABINOIDS UR QL SCN>50 NG/ML: NEGATIVE
CANNABINOIDS UR QL SCN>50 NG/ML: NORMAL
CHLORIDE SERPL-SCNC: 100 MMOL/L (ref 98–107)
CHLORIDE SERPL-SCNC: 98 MMOL/L (ref 98–107)
CO2 SERPL-SCNC: 16 MMOL/L (ref 21–32)
CO2 SERPL-SCNC: 24 MMOL/L (ref 21–32)
CREAT SERPL-MCNC: 0.41 MG/DL (ref 0.51–0.95)
CREAT SERPL-MCNC: 0.46 MG/DL (ref 0.51–0.95)
DIFFERENTIAL METHOD BLD: ABNORMAL
EOSINOPHIL # BLD: 0 K/MCL (ref 0.1–0.5)
EOSINOPHIL NFR BLD: 0 %
ERYTHROCYTE [DISTWIDTH] IN BLOOD: 13.1 % (ref 11–15)
ETHANOL SERPL-MCNC: 246 MG/DL
GLUCOSE SERPL-MCNC: 104 MG/DL (ref 65–99)
GLUCOSE SERPL-MCNC: 120 MG/DL (ref 65–99)
HCG POINT OF CARE (5HGRST): NEGATIVE
HCG SERPL QL: NEGATIVE
HCT VFR BLD CALC: 45.3 % (ref 36–46.5)
HGB BLD-MCNC: 16 G/DL (ref 12–15.5)
IMM GRANULOCYTES # BLD AUTO: 0 K/MCL (ref 0–0.2)
IMM GRANULOCYTES NFR BLD: 1 %
LIPASE SERPL-CCNC: 128 UNITS/L (ref 73–393)
LYMPHOCYTES # BLD: 1.7 K/MCL (ref 1–4.8)
LYMPHOCYTES NFR BLD: 28 %
MAGNESIUM SERPL-MCNC: 1.8 MG/DL (ref 1.7–2.4)
MCH RBC QN AUTO: 33.1 PG (ref 26–34)
MCHC RBC AUTO-ENTMCNC: 35.3 G/DL (ref 32–36.5)
MCV RBC AUTO: 93.8 FL (ref 78–100)
MONOCYTES # BLD: 0.7 K/MCL (ref 0.3–0.9)
MONOCYTES NFR BLD: 11 %
NEUTROPHILS # BLD: 3.7 K/MCL (ref 1.8–7.7)
NEUTROPHILS NFR BLD: 59 %
NEUTS SEG NFR BLD: ABNORMAL %
NRBC (NRBCRE): 0 /100 WBC
OPIATES UR QL SCN>300 NG/ML: NEGATIVE
OPIATES UR QL SCN>300 NG/ML: NORMAL
PCP UR QL SCN>25 NG/ML: NEGATIVE
PCP UR QL SCN>25 NG/ML: NORMAL
PCP UR QL SCN>25 NG/ML: NORMAL
PLATELET # BLD: 280 K/MCL (ref 140–450)
POTASSIUM SERPL-SCNC: 3.5 MMOL/L (ref 3.4–5.1)
POTASSIUM SERPL-SCNC: 3.7 MMOL/L (ref 3.4–5.1)
PROT SERPL-MCNC: 8.5 G/DL (ref 6.4–8.2)
RBC # BLD: 4.83 MIL/MCL (ref 4–5.2)
SODIUM SERPL-SCNC: 133 MMOL/L (ref 135–145)
SODIUM SERPL-SCNC: 135 MMOL/L (ref 135–145)
TROPONIN I SERPL HS-MCNC: <0.02 NG/ML
WBC # BLD: 6.2 K/MCL (ref 4.2–11)

## 2019-09-13 PROCEDURE — 90792 PSYCH DIAG EVAL W/MED SRVCS: CPT | Performed by: PSYCHIATRY & NEUROLOGY

## 2019-09-16 ENCOUNTER — HOSPITAL (OUTPATIENT)
Dept: OTHER | Age: 39
End: 2019-09-16
Attending: INTERNAL MEDICINE

## 2019-09-22 ENCOUNTER — HOSPITAL (OUTPATIENT)
Dept: OTHER | Age: 39
End: 2019-09-22

## 2019-09-22 LAB
ALBUMIN SERPL-MCNC: 4.3 G/DL (ref 3.6–5.1)
ALP SERPL-CCNC: 132 UNITS/L (ref 45–117)
ALT SERPL-CCNC: 50 UNITS/L
ANALYZER ANC (IANC): ABNORMAL
ANION GAP SERPL CALC-SCNC: 12 MMOL/L (ref 10–20)
ANION GAP SERPL CALC-SCNC: 28 MMOL/L (ref 10–20)
AST SERPL-CCNC: 91 UNITS/L
BASOPHILS # BLD: 0.1 K/MCL (ref 0–0.3)
BASOPHILS NFR BLD: 1 %
BILIRUB CONJ SERPL-MCNC: 0.2 MG/DL (ref 0–0.2)
BILIRUB SERPL-MCNC: 1 MG/DL (ref 0.2–1)
BUN SERPL-MCNC: 11 MG/DL (ref 6–20)
BUN SERPL-MCNC: 7 MG/DL (ref 6–20)
BUN/CREAT SERPL: 16 (ref 7–25)
BUN/CREAT SERPL: 16 (ref 7–25)
CALCIUM SERPL-MCNC: 8.3 MG/DL (ref 8.4–10.2)
CALCIUM SERPL-MCNC: 8.5 MG/DL (ref 8.4–10.2)
CHLORIDE SERPL-SCNC: 101 MMOL/L (ref 98–107)
CHLORIDE SERPL-SCNC: 99 MMOL/L (ref 98–107)
CO2 SERPL-SCNC: 13 MMOL/L (ref 21–32)
CO2 SERPL-SCNC: 26 MMOL/L (ref 21–32)
CREAT SERPL-MCNC: 0.45 MG/DL (ref 0.51–0.95)
CREAT SERPL-MCNC: 0.68 MG/DL (ref 0.51–0.95)
DIFFERENTIAL METHOD BLD: ABNORMAL
EOSINOPHIL # BLD: 0 K/MCL (ref 0.1–0.5)
EOSINOPHIL NFR BLD: 0 %
ERYTHROCYTE [DISTWIDTH] IN BLOOD: 13.1 % (ref 11–15)
ETHANOL SERPL-MCNC: 383 MG/DL
GLUCOSE SERPL-MCNC: 108 MG/DL (ref 65–99)
GLUCOSE SERPL-MCNC: 110 MG/DL (ref 65–99)
HCG SERPL QL: NEGATIVE
HCT VFR BLD CALC: 47.8 % (ref 36–46.5)
HGB BLD-MCNC: 17 G/DL (ref 12–15.5)
IMM GRANULOCYTES # BLD AUTO: 0 K/MCL (ref 0–0.2)
IMM GRANULOCYTES NFR BLD: 1 %
LIPASE SERPL-CCNC: 201 UNITS/L (ref 73–393)
LYMPHOCYTES # BLD: 3 K/MCL (ref 1–4.8)
LYMPHOCYTES NFR BLD: 46 %
MAGNESIUM SERPL-MCNC: 1.9 MG/DL (ref 1.7–2.4)
MCH RBC QN AUTO: 32.9 PG (ref 26–34)
MCHC RBC AUTO-ENTMCNC: 35.6 G/DL (ref 32–36.5)
MCV RBC AUTO: 92.5 FL (ref 78–100)
MONOCYTES # BLD: 0.8 K/MCL (ref 0.3–0.9)
MONOCYTES NFR BLD: 13 %
NEUTROPHILS # BLD: 2.6 K/MCL (ref 1.8–7.7)
NEUTROPHILS NFR BLD: 39 %
NEUTS SEG NFR BLD: ABNORMAL %
NRBC (NRBCRE): 0 /100 WBC
PLATELET # BLD: 194 K/MCL (ref 140–450)
POTASSIUM SERPL-SCNC: 3.5 MMOL/L (ref 3.4–5.1)
POTASSIUM SERPL-SCNC: 3.9 MMOL/L (ref 3.4–5.1)
PROT SERPL-MCNC: 8.8 G/DL (ref 6.4–8.2)
RBC # BLD: 5.17 MIL/MCL (ref 4–5.2)
SODIUM SERPL-SCNC: 135 MMOL/L (ref 135–145)
SODIUM SERPL-SCNC: 136 MMOL/L (ref 135–145)
TROPONIN I SERPL HS-MCNC: <0.02 NG/ML
WBC # BLD: 6.5 K/MCL (ref 4.2–11)

## 2019-09-23 LAB
ANION GAP SERPL CALC-SCNC: 12 MMOL/L (ref 10–20)
BUN SERPL-MCNC: 5 MG/DL (ref 6–20)
BUN/CREAT SERPL: 13 (ref 7–25)
CALCIUM SERPL-MCNC: 8.2 MG/DL (ref 8.4–10.2)
CHLORIDE SERPL-SCNC: 103 MMOL/L (ref 98–107)
CO2 SERPL-SCNC: 25 MMOL/L (ref 21–32)
CREAT SERPL-MCNC: 0.4 MG/DL (ref 0.51–0.95)
GLUCOSE SERPL-MCNC: 111 MG/DL (ref 65–99)
POTASSIUM SERPL-SCNC: 3.6 MMOL/L (ref 3.4–5.1)
SODIUM SERPL-SCNC: 136 MMOL/L (ref 135–145)

## 2019-09-25 ENCOUNTER — HOSPITAL (OUTPATIENT)
Dept: OTHER | Age: 39
End: 2019-09-25

## 2019-09-25 ENCOUNTER — DIAGNOSTIC TRANS (OUTPATIENT)
Dept: OTHER | Age: 39
End: 2019-09-25

## 2019-09-25 LAB
ALBUMIN SERPL-MCNC: 3.8 G/DL (ref 3.6–5.1)
ALBUMIN/GLOB SERPL: 1 {RATIO} (ref 1–2.4)
ALP SERPL-CCNC: 99 UNITS/L (ref 45–117)
ALT SERPL-CCNC: 90 UNITS/L
AMORPH SED URNS QL MICRO: ABNORMAL
AMPHETAMINES UR QL SCN>500 NG/ML: ABNORMAL
AMPHETAMINES UR QL SCN>500 NG/ML: NEGATIVE
ANALYZER ANC (IANC): ABNORMAL
ANION GAP SERPL CALC-SCNC: 15 MMOL/L (ref 10–20)
APAP SERPL-MCNC: <2 MCG/ML (ref 10–30)
APPEARANCE UR: ABNORMAL
AST SERPL-CCNC: 195 UNITS/L
BACTERIA #/AREA URNS HPF: ABNORMAL /HPF
BARBITURATES UR QL SCN>200 NG/ML: ABNORMAL
BARBITURATES UR QL SCN>200 NG/ML: NEGATIVE
BASOPHILS # BLD: 0 K/MCL (ref 0–0.3)
BASOPHILS NFR BLD: 1 %
BENZODIAZ UR QL SCN>200 NG/ML: ABNORMAL
BENZODIAZ UR QL SCN>200 NG/ML: POSITIVE
BILIRUB SERPL-MCNC: 0.6 MG/DL (ref 0.2–1)
BILIRUB UR QL: NEGATIVE
BUN SERPL-MCNC: 3 MG/DL (ref 6–20)
BUN/CREAT SERPL: 6 (ref 7–25)
BZE UR QL SCN>150 NG/ML: ABNORMAL
BZE UR QL SCN>150 NG/ML: NEGATIVE
CALCIUM SERPL-MCNC: 8.5 MG/DL (ref 8.4–10.2)
CANNABINOIDS UR QL SCN>50 NG/ML: ABNORMAL
CANNABINOIDS UR QL SCN>50 NG/ML: NEGATIVE
CAOX CRY URNS QL MICRO: ABNORMAL
CHLORIDE SERPL-SCNC: 102 MMOL/L (ref 98–107)
CO2 SERPL-SCNC: 27 MMOL/L (ref 21–32)
COLOR UR: YELLOW
CREAT SERPL-MCNC: 0.46 MG/DL (ref 0.51–0.95)
D DIMER PPP FEU-MCNC: 0.53 MG/L (FEU)
D DIMER PPP FEU-MCNC: NORMAL
DIFFERENTIAL METHOD BLD: ABNORMAL
EOSINOPHIL # BLD: 0 K/MCL (ref 0.1–0.5)
EOSINOPHIL NFR BLD: 0 %
EPITH CASTS #/AREA URNS LPF: ABNORMAL /[LPF]
ERYTHROCYTE [DISTWIDTH] IN BLOOD: 13.5 % (ref 11–15)
ETHANOL SERPL-MCNC: 379 MG/DL
FATTY CASTS #/AREA URNS LPF: ABNORMAL /[LPF]
GLOBULIN SER-MCNC: 3.8 G/DL (ref 2–4)
GLUCOSE BLDC GLUCOMTR-MCNC: 135 MG/DL (ref 70–99)
GLUCOSE SERPL-MCNC: 114 MG/DL (ref 65–99)
GLUCOSE UR-MCNC: NEGATIVE MG/DL
GRAN CASTS #/AREA URNS LPF: ABNORMAL /[LPF]
HCT VFR BLD CALC: 42.4 % (ref 36–46.5)
HGB BLD-MCNC: 14.6 G/DL (ref 12–15.5)
HGB UR QL: ABNORMAL
HYALINE CASTS #/AREA URNS LPF: ABNORMAL /LPF (ref 0–5)
IMM GRANULOCYTES # BLD AUTO: 0 K/MCL (ref 0–0.2)
IMM GRANULOCYTES NFR BLD: 0 %
KETONES UR-MCNC: ABNORMAL MG/DL
LEUKOCYTE ESTERASE UR QL STRIP: NEGATIVE
LIPASE SERPL-CCNC: 178 UNITS/L (ref 73–393)
LYMPHOCYTES # BLD: 1.9 K/MCL (ref 1–4.8)
LYMPHOCYTES NFR BLD: 46 %
MAGNESIUM SERPL-MCNC: 1.4 MG/DL (ref 1.7–2.4)
MCH RBC QN AUTO: 32.7 PG (ref 26–34)
MCHC RBC AUTO-ENTMCNC: 34.4 G/DL (ref 32–36.5)
MCV RBC AUTO: 94.9 FL (ref 78–100)
MIXED CELL CASTS #/AREA URNS LPF: ABNORMAL /[LPF]
MONOCYTES # BLD: 0.4 K/MCL (ref 0.3–0.9)
MONOCYTES NFR BLD: 9 %
MRSA DNA SPEC QL NAA+PROBE: NORMAL
MRSA DNA SPEC QL NAA+PROBE: NORMAL
MRSA DNA SPEC QL NAA+PROBE: NOT DETECTED
MUCOUS THREADS URNS QL MICRO: PRESENT
NEUTROPHILS # BLD: 1.9 K/MCL (ref 1.8–7.7)
NEUTROPHILS NFR BLD: 44 %
NEUTS SEG NFR BLD: ABNORMAL %
NITRITE UR QL: NEGATIVE
NRBC (NRBCRE): 0 /100 WBC
OPIATES UR QL SCN>300 NG/ML: ABNORMAL
OPIATES UR QL SCN>300 NG/ML: POSITIVE
PCP UR QL SCN>25 NG/ML: ABNORMAL
PCP UR QL SCN>25 NG/ML: ABNORMAL
PCP UR QL SCN>25 NG/ML: NEGATIVE
PH UR: 5 UNITS (ref 5–7)
PHOSPHATE SERPL-MCNC: 2 MG/DL (ref 2.4–4.7)
PLATELET # BLD: 109 K/MCL (ref 140–450)
POTASSIUM SERPL-SCNC: 3.8 MMOL/L (ref 3.4–5.1)
PROT SERPL-MCNC: 7.6 G/DL (ref 6.4–8.2)
PROT UR QL: NEGATIVE MG/DL
RBC # BLD: 4.47 MIL/MCL (ref 4–5.2)
RBC #/AREA URNS HPF: ABNORMAL /HPF (ref 0–2)
RBC CASTS #/AREA URNS LPF: ABNORMAL /[LPF]
RENAL EPI CELLS #/AREA URNS HPF: ABNORMAL /[HPF]
SALICYLATES SERPL-MCNC: <2.8 MG/DL
SODIUM SERPL-SCNC: 140 MMOL/L (ref 135–145)
SP GR UR: 1.02 (ref 1–1.03)
SPECIMEN SOURCE: ABNORMAL
SPECIMEN SOURCE: NORMAL
SPERM URNS QL MICRO: ABNORMAL
SQUAMOUS #/AREA URNS HPF: ABNORMAL /HPF (ref 0–5)
T VAGINALIS URNS QL MICRO: ABNORMAL
TRI-PHOS CRY URNS QL MICRO: ABNORMAL
TROPONIN I SERPL HS-MCNC: <0.02 NG/ML
URATE CRY URNS QL MICRO: ABNORMAL
URINE REFLEX: ABNORMAL
URNS CMNT MICRO: ABNORMAL
UROBILINOGEN UR QL: 0.2 MG/DL (ref 0–1)
WAXY CASTS #/AREA URNS LPF: ABNORMAL /[LPF]
WBC # BLD: 4.2 K/MCL (ref 4.2–11)
WBC #/AREA URNS HPF: ABNORMAL /HPF (ref 0–5)
WBC CASTS #/AREA URNS LPF: ABNORMAL /[LPF]
YEAST HYPHAE URNS QL MICRO: ABNORMAL
YEAST URNS QL MICRO: ABNORMAL

## 2019-09-25 PROCEDURE — 99232 SBSQ HOSP IP/OBS MODERATE 35: CPT | Performed by: INTERNAL MEDICINE

## 2019-09-26 LAB
ANALYZER ANC (IANC): ABNORMAL
ANALYZER ANC (IANC): ABNORMAL
ANION GAP SERPL CALC-SCNC: 10 MMOL/L (ref 10–20)
ANION GAP SERPL CALC-SCNC: 11 MMOL/L (ref 10–20)
BUN SERPL-MCNC: 4 MG/DL (ref 6–20)
BUN SERPL-MCNC: 4 MG/DL (ref 6–20)
BUN/CREAT SERPL: 8 (ref 7–25)
BUN/CREAT SERPL: 9 (ref 7–25)
CALCIUM SERPL-MCNC: 7.8 MG/DL (ref 8.4–10.2)
CALCIUM SERPL-MCNC: 7.8 MG/DL (ref 8.4–10.2)
CHLORIDE SERPL-SCNC: 102 MMOL/L (ref 98–107)
CHLORIDE SERPL-SCNC: 103 MMOL/L (ref 98–107)
CO2 SERPL-SCNC: 27 MMOL/L (ref 21–32)
CO2 SERPL-SCNC: 28 MMOL/L (ref 21–32)
CREAT SERPL-MCNC: 0.45 MG/DL (ref 0.51–0.95)
CREAT SERPL-MCNC: 0.49 MG/DL (ref 0.51–0.95)
ERYTHROCYTE [DISTWIDTH] IN BLOOD: 13.2 % (ref 11–15)
ERYTHROCYTE [DISTWIDTH] IN BLOOD: 13.2 % (ref 11–15)
GLUCOSE BLDC GLUCOMTR-MCNC: 104 MG/DL (ref 70–99)
GLUCOSE BLDC GLUCOMTR-MCNC: ABNORMAL MG/DL
GLUCOSE BLDC GLUCOMTR-MCNC: ABNORMAL MG/DL
GLUCOSE SERPL-MCNC: 106 MG/DL (ref 65–99)
GLUCOSE SERPL-MCNC: 98 MG/DL (ref 65–99)
HCT VFR BLD CALC: 35.3 % (ref 36–46.5)
HCT VFR BLD CALC: 35.8 % (ref 36–46.5)
HGB BLD-MCNC: 11.8 G/DL (ref 12–15.5)
HGB BLD-MCNC: 12.1 G/DL (ref 12–15.5)
INR PPP: 1.1
INR PPP: NORMAL
MAGNESIUM SERPL-MCNC: 1.6 MG/DL (ref 1.7–2.4)
MAGNESIUM SERPL-MCNC: 1.7 MG/DL (ref 1.7–2.4)
MCH RBC QN AUTO: 32.6 PG (ref 26–34)
MCH RBC QN AUTO: 33.1 PG (ref 26–34)
MCHC RBC AUTO-ENTMCNC: 33.4 G/DL (ref 32–36.5)
MCHC RBC AUTO-ENTMCNC: 33.8 G/DL (ref 32–36.5)
MCV RBC AUTO: 97.5 FL (ref 78–100)
MCV RBC AUTO: 97.8 FL (ref 78–100)
NRBC (NRBCRE): 0 /100 WBC
NRBC (NRBCRE): 0 /100 WBC
PHOSPHATE SERPL-MCNC: 2.3 MG/DL (ref 2.4–4.7)
PHOSPHATE SERPL-MCNC: 2.5 MG/DL (ref 2.4–4.7)
PLATELET # BLD: 49 K/MCL (ref 140–450)
PLATELET # BLD: 53 K/MCL (ref 140–450)
PLATELET # BLD: ABNORMAL 10*3/UL
PLATELET # BLD: ABNORMAL 10*3/UL
POTASSIUM SERPL-SCNC: 3.4 MMOL/L (ref 3.4–5.1)
POTASSIUM SERPL-SCNC: 3.6 MMOL/L (ref 3.4–5.1)
PROTHROMBIN TIME (PRT2): NORMAL
PROTHROMBIN TIME: 11.2 SEC (ref 9.7–11.8)
RBC # BLD: 3.62 MIL/MCL (ref 4–5.2)
RBC # BLD: 3.66 MIL/MCL (ref 4–5.2)
SODIUM SERPL-SCNC: 137 MMOL/L (ref 135–145)
SODIUM SERPL-SCNC: 137 MMOL/L (ref 135–145)
WBC # BLD: 3 K/MCL (ref 4.2–11)
WBC # BLD: 3.1 K/MCL (ref 4.2–11)

## 2019-09-26 PROCEDURE — 90792 PSYCH DIAG EVAL W/MED SRVCS: CPT | Performed by: PSYCHIATRY & NEUROLOGY

## 2019-09-26 PROCEDURE — 99232 SBSQ HOSP IP/OBS MODERATE 35: CPT | Performed by: INTERNAL MEDICINE

## 2019-09-26 PROCEDURE — 99255 IP/OBS CONSLTJ NEW/EST HI 80: CPT | Performed by: INTERNAL MEDICINE

## 2019-09-27 LAB
ALBUMIN SERPL-MCNC: 3.2 G/DL (ref 3.6–5.1)
ALBUMIN/GLOB SERPL: 0.8 {RATIO} (ref 1–2.4)
ALP SERPL-CCNC: 122 UNITS/L (ref 45–117)
ALT SERPL-CCNC: 61 UNITS/L
AMMONIA PLAS-SCNC: 19 MCMOL/L
ANALYZER ANC (IANC): ABNORMAL
ANION GAP SERPL CALC-SCNC: 14 MMOL/L (ref 10–20)
AST SERPL-CCNC: 55 UNITS/L
BASOPHILS # BLD: 0 K/MCL (ref 0–0.3)
BASOPHILS NFR BLD: 0 %
BILIRUB SERPL-MCNC: 1 MG/DL (ref 0.2–1)
BUN SERPL-MCNC: 3 MG/DL (ref 6–20)
BUN/CREAT SERPL: 6 (ref 7–25)
CALCIUM SERPL-MCNC: 8.7 MG/DL (ref 8.4–10.2)
CHLORIDE SERPL-SCNC: 102 MMOL/L (ref 98–107)
CO2 SERPL-SCNC: 24 MMOL/L (ref 21–32)
CREAT SERPL-MCNC: 0.49 MG/DL (ref 0.51–0.95)
DIFFERENTIAL METHOD BLD: ABNORMAL
EOSINOPHIL # BLD: 0 K/MCL (ref 0.1–0.5)
EOSINOPHIL NFR BLD: 0 %
ERYTHROCYTE [DISTWIDTH] IN BLOOD: 12.3 % (ref 11–15)
FOLATE SERPL-MCNC: 9 NG/ML
GLOBULIN SER-MCNC: 4.1 G/DL (ref 2–4)
GLUCOSE SERPL-MCNC: 111 MG/DL (ref 65–99)
HCT VFR BLD CALC: 38.4 % (ref 36–46.5)
HGB BLD-MCNC: 13.4 G/DL (ref 12–15.5)
IMM GRANULOCYTES # BLD AUTO: 0 K/MCL (ref 0–0.2)
IMM GRANULOCYTES NFR BLD: 0 %
LIPASE SERPL-CCNC: 81 UNITS/L (ref 73–393)
LYMPHOCYTES # BLD: 0.3 K/MCL (ref 1–4.8)
LYMPHOCYTES NFR BLD: 8 %
MAGNESIUM SERPL-MCNC: 2 MG/DL (ref 1.7–2.4)
MCH RBC QN AUTO: 33.3 PG (ref 26–34)
MCHC RBC AUTO-ENTMCNC: 34.9 G/DL (ref 32–36.5)
MCV RBC AUTO: 95.5 FL (ref 78–100)
MONOCYTES # BLD: 0.2 K/MCL (ref 0.3–0.9)
MONOCYTES NFR BLD: 5 %
NEUTROPHILS # BLD: 3.3 K/MCL (ref 1.8–7.7)
NEUTROPHILS NFR BLD: 87 %
NEUTS SEG NFR BLD: ABNORMAL %
NRBC (NRBCRE): 0 /100 WBC
PHOSPHATE SERPL-MCNC: 2.4 MG/DL (ref 2.4–4.7)
PLATELET # BLD: 62 K/MCL (ref 140–450)
POTASSIUM SERPL-SCNC: 4.3 MMOL/L (ref 3.4–5.1)
PROT SERPL-MCNC: 7.3 G/DL (ref 6.4–8.2)
RBC # BLD: 4.02 MIL/MCL (ref 4–5.2)
SODIUM SERPL-SCNC: 136 MMOL/L (ref 135–145)
VIT B12 SERPL-MCNC: 708 PG/ML (ref 211–911)
WBC # BLD: 3.8 K/MCL (ref 4.2–11)

## 2019-09-27 PROCEDURE — 99232 SBSQ HOSP IP/OBS MODERATE 35: CPT | Performed by: INTERNAL MEDICINE

## 2019-09-27 PROCEDURE — 99233 SBSQ HOSP IP/OBS HIGH 50: CPT | Performed by: PSYCHIATRY & NEUROLOGY

## 2019-09-28 LAB
ALBUMIN SERPL-MCNC: 2.9 G/DL (ref 3.6–5.1)
ALBUMIN/GLOB SERPL: 1 {RATIO} (ref 1–2.4)
ALP SERPL-CCNC: 85 UNITS/L (ref 45–117)
ALT SERPL-CCNC: 39 UNITS/L
ANALYZER ANC (IANC): ABNORMAL
ANION GAP SERPL CALC-SCNC: 11 MMOL/L (ref 10–20)
AST SERPL-CCNC: 34 UNITS/L
BASOPHILS # BLD: 0 K/MCL (ref 0–0.3)
BASOPHILS NFR BLD: 0 %
BILIRUB SERPL-MCNC: 0.6 MG/DL (ref 0.2–1)
BUN SERPL-MCNC: 5 MG/DL (ref 6–20)
BUN/CREAT SERPL: 9 (ref 7–25)
CALCIUM SERPL-MCNC: 7.9 MG/DL (ref 8.4–10.2)
CANCER AG15-3 SERPL-ACNC: 13 UNITS/ML (ref 0–32)
CANCER AG15-3 SERPL-ACNC: NORMAL
CANCER AG19-9 SERPL-ACNC: 46 UNITS/ML (ref 0–35)
CANCER AG19-9 SERPL-ACNC: ABNORMAL
CANCER AG27-29 SERPL-ACNC: 18.2 UNITS/ML (ref 0–40)
CANCER AG27-29 SERPL-ACNC: NORMAL
CEA SERPL-MCNC: 0.8 NG/ML (ref 0–5)
CEA SERPL-MCNC: NORMAL NG/ML
CHLORIDE SERPL-SCNC: 105 MMOL/L (ref 98–107)
CO2 SERPL-SCNC: 26 MMOL/L (ref 21–32)
CREAT SERPL-MCNC: 0.53 MG/DL (ref 0.51–0.95)
DIFFERENTIAL METHOD BLD: ABNORMAL
EOSINOPHIL # BLD: 0 K/MCL (ref 0.1–0.5)
EOSINOPHIL NFR BLD: 1 %
ERYTHROCYTE [DISTWIDTH] IN BLOOD: 12.8 % (ref 11–15)
GLOBULIN SER-MCNC: 2.9 G/DL (ref 2–4)
GLUCOSE SERPL-MCNC: 83 MG/DL (ref 65–99)
HCT VFR BLD CALC: 32.5 % (ref 36–46.5)
HGB BLD-MCNC: 10.8 G/DL (ref 12–15.5)
IMM GRANULOCYTES # BLD AUTO: 0 K/MCL (ref 0–0.2)
IMM GRANULOCYTES NFR BLD: 0 %
LYMPHOCYTES # BLD: 1 K/MCL (ref 1–4.8)
LYMPHOCYTES NFR BLD: 30 %
MAGNESIUM SERPL-MCNC: 1.6 MG/DL (ref 1.7–2.4)
MCH RBC QN AUTO: 32.6 PG (ref 26–34)
MCHC RBC AUTO-ENTMCNC: 33.2 G/DL (ref 32–36.5)
MCV RBC AUTO: 98.2 FL (ref 78–100)
MONOCYTES # BLD: 0.3 K/MCL (ref 0.3–0.9)
MONOCYTES NFR BLD: 9 %
NEUTROPHILS # BLD: 2.1 K/MCL (ref 1.8–7.7)
NEUTROPHILS NFR BLD: 60 %
NEUTS SEG NFR BLD: ABNORMAL %
NRBC (NRBCRE): 0 /100 WBC
PHOSPHATE SERPL-MCNC: 2.8 MG/DL (ref 2.4–4.7)
PLATELET # BLD: 50 K/MCL (ref 140–450)
POTASSIUM SERPL-SCNC: 3.4 MMOL/L (ref 3.4–5.1)
PROT SERPL-MCNC: 5.8 G/DL (ref 6.4–8.2)
RBC # BLD: 3.31 MIL/MCL (ref 4–5.2)
SODIUM SERPL-SCNC: 139 MMOL/L (ref 135–145)
WBC # BLD: 3.5 K/MCL (ref 4.2–11)

## 2019-09-28 PROCEDURE — 99232 SBSQ HOSP IP/OBS MODERATE 35: CPT | Performed by: PSYCHIATRY & NEUROLOGY

## 2019-09-28 PROCEDURE — 99232 SBSQ HOSP IP/OBS MODERATE 35: CPT | Performed by: INTERNAL MEDICINE

## 2019-09-29 LAB
ALBUMIN SERPL-MCNC: 3.2 G/DL (ref 3.6–5.1)
ALBUMIN/GLOB SERPL: 1 {RATIO} (ref 1–2.4)
ALP SERPL-CCNC: 90 UNITS/L (ref 45–117)
ALT SERPL-CCNC: 35 UNITS/L
AMYLASE SERPL-CCNC: 25 UNITS/L (ref 25–115)
ANALYZER ANC (IANC): ABNORMAL
ANION GAP SERPL CALC-SCNC: 11 MMOL/L (ref 10–20)
AST SERPL-CCNC: 19 UNITS/L
BASOPHILS # BLD: 0 K/MCL (ref 0–0.3)
BASOPHILS NFR BLD: 1 %
BILIRUB SERPL-MCNC: 0.5 MG/DL (ref 0.2–1)
BUN SERPL-MCNC: 4 MG/DL (ref 6–20)
BUN/CREAT SERPL: 8 (ref 7–25)
CALCIUM SERPL-MCNC: 9.1 MG/DL (ref 8.4–10.2)
CANCER AG19-9 SERPL-ACNC: 48 UNITS/ML (ref 0–35)
CANCER AG19-9 SERPL-ACNC: ABNORMAL
CHLORIDE SERPL-SCNC: 103 MMOL/L (ref 98–107)
CO2 SERPL-SCNC: 25 MMOL/L (ref 21–32)
CREAT SERPL-MCNC: 0.48 MG/DL (ref 0.51–0.95)
DIFFERENTIAL METHOD BLD: ABNORMAL
EOSINOPHIL # BLD: 0 K/MCL (ref 0.1–0.5)
EOSINOPHIL NFR BLD: 1 %
ERYTHROCYTE [DISTWIDTH] IN BLOOD: 12.9 % (ref 11–15)
GLOBULIN SER-MCNC: 3.3 G/DL (ref 2–4)
GLUCOSE SERPL-MCNC: 109 MG/DL (ref 65–99)
HCT VFR BLD CALC: 37.2 % (ref 36–46.5)
HGB BLD-MCNC: 12.8 G/DL (ref 12–15.5)
IMM GRANULOCYTES # BLD AUTO: 0 K/MCL (ref 0–0.2)
IMM GRANULOCYTES NFR BLD: 1 %
LIPASE SERPL-CCNC: 123 UNITS/L (ref 73–393)
LYMPHOCYTES # BLD: 1 K/MCL (ref 1–4.8)
LYMPHOCYTES NFR BLD: 26 %
MAGNESIUM SERPL-MCNC: 1.8 MG/DL (ref 1.7–2.4)
MCH RBC QN AUTO: 32.8 PG (ref 26–34)
MCHC RBC AUTO-ENTMCNC: 34.4 G/DL (ref 32–36.5)
MCV RBC AUTO: 95.4 FL (ref 78–100)
MONOCYTES # BLD: 0.4 K/MCL (ref 0.3–0.9)
MONOCYTES NFR BLD: 11 %
NEUTROPHILS # BLD: 2.4 K/MCL (ref 1.8–7.7)
NEUTROPHILS NFR BLD: 60 %
NEUTS SEG NFR BLD: ABNORMAL %
NRBC (NRBCRE): 0 /100 WBC
PLATELET # BLD: 98 K/MCL (ref 140–450)
POTASSIUM SERPL-SCNC: 3.7 MMOL/L (ref 3.4–5.1)
PROT SERPL-MCNC: 6.5 G/DL (ref 6.4–8.2)
RBC # BLD: 3.9 MIL/MCL (ref 4–5.2)
SODIUM SERPL-SCNC: 135 MMOL/L (ref 135–145)
WBC # BLD: 3.9 K/MCL (ref 4.2–11)

## 2019-09-29 PROCEDURE — 99232 SBSQ HOSP IP/OBS MODERATE 35: CPT | Performed by: PSYCHIATRY & NEUROLOGY

## 2019-09-29 PROCEDURE — 99232 SBSQ HOSP IP/OBS MODERATE 35: CPT | Performed by: INTERNAL MEDICINE

## 2019-09-30 LAB
CREAT SERPL-MCNC: 0.44 MG/DL (ref 0.51–0.95)
MAGNESIUM SERPL-MCNC: 1.6 MG/DL (ref 1.7–2.4)
POTASSIUM SERPL-SCNC: 4.2 MMOL/L (ref 3.4–5.1)

## 2019-09-30 PROCEDURE — 99232 SBSQ HOSP IP/OBS MODERATE 35: CPT | Performed by: PSYCHIATRY & NEUROLOGY

## 2019-10-04 ENCOUNTER — HOSPITAL (OUTPATIENT)
Dept: OTHER | Age: 39
End: 2019-10-04
Attending: INTERNAL MEDICINE

## 2019-11-09 ENCOUNTER — HOSPITAL (OUTPATIENT)
Dept: OTHER | Age: 39
End: 2019-11-09

## 2019-11-09 LAB
ALBUMIN SERPL-MCNC: 4 G/DL (ref 3.6–5.1)
ALBUMIN/GLOB SERPL: 1 {RATIO} (ref 1–2.4)
ALP SERPL-CCNC: 104 UNITS/L (ref 45–117)
ALT SERPL-CCNC: 18 UNITS/L
AMORPH SED URNS QL MICRO: ABNORMAL
AMPHETAMINES UR QL SCN>500 NG/ML: ABNORMAL
AMPHETAMINES UR QL SCN>500 NG/ML: NEGATIVE
ANALYZER ANC (IANC): ABNORMAL
ANION GAP SERPL CALC-SCNC: 14 MMOL/L (ref 10–20)
APPEARANCE UR: ABNORMAL
AST SERPL-CCNC: 18 UNITS/L
BACTERIA #/AREA URNS HPF: ABNORMAL /HPF
BARBITURATES UR QL SCN>200 NG/ML: ABNORMAL
BARBITURATES UR QL SCN>200 NG/ML: NEGATIVE
BASOPHILS # BLD: 0 K/MCL (ref 0–0.3)
BASOPHILS NFR BLD: 1 %
BENZODIAZ UR QL SCN>200 NG/ML: ABNORMAL
BENZODIAZ UR QL SCN>200 NG/ML: POSITIVE
BILIRUB SERPL-MCNC: 0.3 MG/DL (ref 0.2–1)
BILIRUB UR QL: NEGATIVE
BUN SERPL-MCNC: 9 MG/DL (ref 6–20)
BUN/CREAT SERPL: 16 (ref 7–25)
BZE UR QL SCN>150 NG/ML: ABNORMAL
BZE UR QL SCN>150 NG/ML: NEGATIVE
CALCIUM SERPL-MCNC: 8.5 MG/DL (ref 8.4–10.2)
CANNABINOIDS UR QL SCN>50 NG/ML: ABNORMAL
CANNABINOIDS UR QL SCN>50 NG/ML: POSITIVE
CAOX CRY URNS QL MICRO: ABNORMAL
CHLORIDE SERPL-SCNC: 104 MMOL/L (ref 98–107)
CO2 SERPL-SCNC: 24 MMOL/L (ref 21–32)
COLOR UR: YELLOW
CREAT SERPL-MCNC: 0.56 MG/DL (ref 0.51–0.95)
DIFFERENTIAL METHOD BLD: ABNORMAL
EOSINOPHIL # BLD: 0 K/MCL (ref 0.1–0.5)
EOSINOPHIL NFR BLD: 1 %
EPITH CASTS #/AREA URNS LPF: ABNORMAL /[LPF]
ERYTHROCYTE [DISTWIDTH] IN BLOOD: 13.1 % (ref 11–15)
ETHANOL SERPL-MCNC: 258 MG/DL
FATTY CASTS #/AREA URNS LPF: ABNORMAL /[LPF]
GLOBULIN SER-MCNC: 4.1 G/DL (ref 2–4)
GLUCOSE SERPL-MCNC: 142 MG/DL (ref 65–99)
GLUCOSE UR-MCNC: NEGATIVE MG/DL
GRAN CASTS #/AREA URNS LPF: ABNORMAL /[LPF]
HCG POINT OF CARE (5HGRST): NEGATIVE
HCT VFR BLD CALC: 44.8 % (ref 36–46.5)
HGB BLD-MCNC: 15.3 G/DL (ref 12–15.5)
HGB UR QL: NEGATIVE
HYALINE CASTS #/AREA URNS LPF: ABNORMAL /LPF (ref 0–5)
IMM GRANULOCYTES # BLD AUTO: 0 K/MCL (ref 0–0.2)
IMM GRANULOCYTES NFR BLD: 1 %
KETONES UR-MCNC: NEGATIVE MG/DL
LEUKOCYTE ESTERASE UR QL STRIP: NEGATIVE
LIPASE SERPL-CCNC: 96 UNITS/L (ref 73–393)
LYMPHOCYTES # BLD: 2.1 K/MCL (ref 1–4.8)
LYMPHOCYTES NFR BLD: 36 %
MCH RBC QN AUTO: 32.4 PG (ref 26–34)
MCHC RBC AUTO-ENTMCNC: 34.2 G/DL (ref 32–36.5)
MCV RBC AUTO: 94.9 FL (ref 78–100)
MIXED CELL CASTS #/AREA URNS LPF: ABNORMAL /[LPF]
MONOCYTES # BLD: 0.4 K/MCL (ref 0.3–0.9)
MONOCYTES NFR BLD: 8 %
MUCOUS THREADS URNS QL MICRO: PRESENT
NEUTROPHILS # BLD: 3.2 K/MCL (ref 1.8–7.7)
NEUTROPHILS NFR BLD: 53 %
NEUTS SEG NFR BLD: ABNORMAL %
NITRITE UR QL: NEGATIVE
NRBC (NRBCRE): 0 /100 WBC
OPIATES UR QL SCN>300 NG/ML: ABNORMAL
OPIATES UR QL SCN>300 NG/ML: NEGATIVE
PCP UR QL SCN>25 NG/ML: ABNORMAL
PCP UR QL SCN>25 NG/ML: ABNORMAL
PCP UR QL SCN>25 NG/ML: NEGATIVE
PH UR: 5 UNITS (ref 5–7)
PLATELET # BLD: 266 K/MCL (ref 140–450)
POTASSIUM SERPL-SCNC: 3.8 MMOL/L (ref 3.4–5.1)
POTASSIUM SERPL-SCNC: ABNORMAL MMOL/L
PROT SERPL-MCNC: 8.1 G/DL (ref 6.4–8.2)
PROT UR QL: NEGATIVE MG/DL
RBC # BLD: 4.72 MIL/MCL (ref 4–5.2)
RBC #/AREA URNS HPF: ABNORMAL /HPF (ref 0–2)
RBC CASTS #/AREA URNS LPF: ABNORMAL /[LPF]
RENAL EPI CELLS #/AREA URNS HPF: ABNORMAL /[HPF]
SODIUM SERPL-SCNC: 138 MMOL/L (ref 135–145)
SP GR UR: 1.02 (ref 1–1.03)
SPECIMEN SOURCE: ABNORMAL
SPERM URNS QL MICRO: ABNORMAL
SQUAMOUS #/AREA URNS HPF: ABNORMAL /HPF (ref 0–5)
T VAGINALIS URNS QL MICRO: ABNORMAL
TRI-PHOS CRY URNS QL MICRO: ABNORMAL
URATE CRY URNS QL MICRO: ABNORMAL
URINE REFLEX: ABNORMAL
URNS CMNT MICRO: ABNORMAL
UROBILINOGEN UR QL: 0.2 MG/DL (ref 0–1)
WAXY CASTS #/AREA URNS LPF: ABNORMAL /[LPF]
WBC # BLD: 5.9 K/MCL (ref 4.2–11)
WBC #/AREA URNS HPF: ABNORMAL /HPF (ref 0–5)
WBC CASTS #/AREA URNS LPF: ABNORMAL /[LPF]
YEAST HYPHAE URNS QL MICRO: ABNORMAL
YEAST URNS QL MICRO: ABNORMAL

## 2019-11-16 ENCOUNTER — HOSPITAL (OUTPATIENT)
Dept: OTHER | Age: 39
End: 2019-11-16

## 2019-11-16 LAB
ALBUMIN SERPL-MCNC: 3.7 G/DL (ref 3.6–5.1)
ALBUMIN/GLOB SERPL: 0.9 {RATIO} (ref 1–2.4)
ALP SERPL-CCNC: 94 UNITS/L (ref 45–117)
ALT SERPL-CCNC: 20 UNITS/L
AMPHETAMINES UR QL SCN>500 NG/ML: ABNORMAL
AMPHETAMINES UR QL SCN>500 NG/ML: POSITIVE
ANALYZER ANC (IANC): ABNORMAL
ANION GAP SERPL CALC-SCNC: 12 MMOL/L (ref 10–20)
AST SERPL-CCNC: 17 UNITS/L
BARBITURATES UR QL SCN>200 NG/ML: ABNORMAL
BARBITURATES UR QL SCN>200 NG/ML: NEGATIVE
BASOPHILS # BLD: 0 K/MCL (ref 0–0.3)
BASOPHILS NFR BLD: 1 %
BENZODIAZ UR QL SCN>200 NG/ML: ABNORMAL
BENZODIAZ UR QL SCN>200 NG/ML: POSITIVE
BILIRUB SERPL-MCNC: 0.4 MG/DL (ref 0.2–1)
BUN SERPL-MCNC: 8 MG/DL (ref 6–20)
BUN/CREAT SERPL: 13 (ref 7–25)
BZE UR QL SCN>150 NG/ML: ABNORMAL
BZE UR QL SCN>150 NG/ML: NEGATIVE
CALCIUM SERPL-MCNC: 8.5 MG/DL (ref 8.4–10.2)
CANNABINOIDS UR QL SCN>50 NG/ML: ABNORMAL
CANNABINOIDS UR QL SCN>50 NG/ML: POSITIVE
CHLORIDE SERPL-SCNC: 102 MMOL/L (ref 98–107)
CO2 SERPL-SCNC: 26 MMOL/L (ref 21–32)
CREAT SERPL-MCNC: 0.62 MG/DL (ref 0.51–0.95)
DIFFERENTIAL METHOD BLD: ABNORMAL
EOSINOPHIL # BLD: 0 K/MCL (ref 0.1–0.5)
EOSINOPHIL NFR BLD: 1 %
ERYTHROCYTE [DISTWIDTH] IN BLOOD: 13.5 % (ref 11–15)
ETHANOL SERPL-MCNC: 254 MG/DL
GLOBULIN SER-MCNC: 4 G/DL (ref 2–4)
GLUCOSE SERPL-MCNC: 96 MG/DL (ref 65–99)
HCG POINT OF CARE (5HGRST): NEGATIVE
HCT VFR BLD CALC: 42.2 % (ref 36–46.5)
HGB BLD-MCNC: 14.2 G/DL (ref 12–15.5)
IMM GRANULOCYTES # BLD AUTO: 0 K/MCL (ref 0–0.2)
IMM GRANULOCYTES NFR BLD: 0 %
LIPASE SERPL-CCNC: 72 UNITS/L (ref 73–393)
LYMPHOCYTES # BLD: 2.5 K/MCL (ref 1–4.8)
LYMPHOCYTES NFR BLD: 39 %
MAGNESIUM SERPL-MCNC: 2 MG/DL (ref 1.7–2.4)
MCH RBC QN AUTO: 32.5 PG (ref 26–34)
MCHC RBC AUTO-ENTMCNC: 33.6 G/DL (ref 32–36.5)
MCV RBC AUTO: 96.6 FL (ref 78–100)
MONOCYTES # BLD: 0.6 K/MCL (ref 0.3–0.9)
MONOCYTES NFR BLD: 9 %
NEUTROPHILS # BLD: 3.1 K/MCL (ref 1.8–7.7)
NEUTROPHILS NFR BLD: 50 %
NEUTS SEG NFR BLD: ABNORMAL %
NRBC (NRBCRE): 0 /100 WBC
OPIATES UR QL SCN>300 NG/ML: ABNORMAL
OPIATES UR QL SCN>300 NG/ML: NEGATIVE
PCP UR QL SCN>25 NG/ML: ABNORMAL
PCP UR QL SCN>25 NG/ML: ABNORMAL
PCP UR QL SCN>25 NG/ML: NEGATIVE
PLATELET # BLD: 217 K/MCL (ref 140–450)
POTASSIUM SERPL-SCNC: 3.7 MMOL/L (ref 3.4–5.1)
PROT SERPL-MCNC: 7.7 G/DL (ref 6.4–8.2)
RBC # BLD: 4.37 MIL/MCL (ref 4–5.2)
SODIUM SERPL-SCNC: 136 MMOL/L (ref 135–145)
WBC # BLD: 6.3 K/MCL (ref 4.2–11)

## 2019-11-16 PROCEDURE — 90792 PSYCH DIAG EVAL W/MED SRVCS: CPT | Performed by: PSYCHIATRY & NEUROLOGY

## 2019-11-17 LAB
CREAT SERPL-MCNC: 0.52 MG/DL (ref 0.51–0.95)
POTASSIUM SERPL-SCNC: 3.6 MMOL/L (ref 3.4–5.1)

## 2019-11-17 PROCEDURE — 99233 SBSQ HOSP IP/OBS HIGH 50: CPT | Performed by: PSYCHIATRY & NEUROLOGY

## 2019-11-18 ENCOUNTER — HOSPITAL (OUTPATIENT)
Dept: OTHER | Age: 39
End: 2019-11-18
Attending: PSYCHIATRY & NEUROLOGY

## 2019-11-18 PROCEDURE — 99233 SBSQ HOSP IP/OBS HIGH 50: CPT | Performed by: PSYCHIATRY & NEUROLOGY

## 2019-11-19 LAB
CHOLEST SERPL-MCNC: 206 MG/DL
CHOLEST SERPL-MCNC: ABNORMAL MG/DL
CHOLEST/HDLC SERPL: 5.6 {RATIO}
HBA1C MFR BLD: 10.0           24 %
HBA1C MFR BLD: 5.3 % (ref 4.5–5.6)
HBA1C MFR BLD: 6.0            126 %
HBA1C MFR BLD: 6.5            14 %
HBA1C MFR BLD: 7.0            154 %
HBA1C MFR BLD: 7.5            169 %
HBA1C MFR BLD: 8.0            183 %
HBA1C MFR BLD: 8.5            197 %
HBA1C MFR BLD: 9.0            212 %
HBA1C MFR BLD: 9.5            226 %
HBA1C MFR BLD: NORMAL %
HDLC SERPL-MCNC: 37 MG/DL
HDLC SERPL-MCNC: ABNORMAL MG/DL
LDLC SERPL CALC-MCNC: 109 MG/DL
LDLC SERPL CALC-MCNC: ABNORMAL MG/DL
NONHDLC SERPL-MCNC: 169 MG/DL
NONHDLC SERPL-MCNC: ABNORMAL MG/DL
T3 SERPL-MCNC: 0.95 NG/ML (ref 0.6–1.81)
T4 SERPL-MCNC: 7.2 MCG/DL (ref 4.7–13.3)
TRIGL SERPL-MCNC: 299 MG/DL
TRIGL SERPL-MCNC: ABNORMAL MG/DL

## 2019-11-19 PROCEDURE — 99222 1ST HOSP IP/OBS MODERATE 55: CPT | Performed by: PSYCHIATRY & NEUROLOGY

## 2019-11-20 PROCEDURE — 99233 SBSQ HOSP IP/OBS HIGH 50: CPT | Performed by: PSYCHIATRY & NEUROLOGY

## 2019-11-21 PROCEDURE — 99233 SBSQ HOSP IP/OBS HIGH 50: CPT | Performed by: PSYCHIATRY & NEUROLOGY

## 2019-11-22 PROCEDURE — 99238 HOSP IP/OBS DSCHRG MGMT 30/<: CPT | Performed by: PSYCHIATRY & NEUROLOGY

## 2019-12-15 ENCOUNTER — DIAGNOSTIC TRANS (OUTPATIENT)
Dept: OTHER | Age: 39
End: 2019-12-15

## 2019-12-15 ENCOUNTER — HOSPITAL (OUTPATIENT)
Dept: OTHER | Age: 39
End: 2019-12-15
Attending: EMERGENCY MEDICINE

## 2019-12-15 LAB
ALBUMIN SERPL-MCNC: 4.2 G/DL (ref 3.6–5.1)
ALP SERPL-CCNC: 95 UNITS/L (ref 45–117)
ALT SERPL-CCNC: 27 UNITS/L
AMPHETAMINES UR QL SCN>500 NG/ML: NEGATIVE
AMPHETAMINES UR QL SCN>500 NG/ML: NORMAL
ANALYZER ANC (IANC): ABNORMAL
ANION GAP SERPL CALC-SCNC: 22 MMOL/L (ref 10–20)
APPEARANCE UR: CLEAR
AST SERPL-CCNC: 33 UNITS/L
AST SERPL-CCNC: ABNORMAL U/L
BARBITURATES UR QL SCN>200 NG/ML: NEGATIVE
BARBITURATES UR QL SCN>200 NG/ML: NORMAL
BASOPHILS # BLD: 0 K/MCL (ref 0–0.3)
BASOPHILS NFR BLD: 1 %
BENZODIAZ UR QL SCN>200 NG/ML: NEGATIVE
BENZODIAZ UR QL SCN>200 NG/ML: NORMAL
BILIRUB CONJ SERPL-MCNC: 0.1 MG/DL (ref 0–0.2)
BILIRUB SERPL-MCNC: 0.4 MG/DL (ref 0.2–1)
BILIRUB UR QL STRIP: NEGATIVE
BUN SERPL-MCNC: 12 MG/DL (ref 6–20)
BUN/CREAT SERPL: 22 (ref 7–25)
BZE UR QL SCN>150 NG/ML: NEGATIVE
BZE UR QL SCN>150 NG/ML: NORMAL
CALCIUM SERPL-MCNC: 8.3 MG/DL (ref 8.4–10.2)
CANNABINOIDS UR QL SCN>50 NG/ML: NEGATIVE
CANNABINOIDS UR QL SCN>50 NG/ML: NORMAL
CHLORIDE SERPL-SCNC: 103 MMOL/L (ref 98–107)
CO2 SERPL-SCNC: 15 MMOL/L (ref 21–32)
COLOR UR: YELLOW
CREAT SERPL-MCNC: 0.54 MG/DL (ref 0.51–0.95)
DIFFERENTIAL METHOD BLD: ABNORMAL
EOSINOPHIL # BLD: 0 K/MCL (ref 0.1–0.5)
EOSINOPHIL NFR BLD: 0 %
ERYTHROCYTE [DISTWIDTH] IN BLOOD: 13.2 % (ref 11–15)
ETHANOL SERPL-MCNC: 292 MG/DL
GLUCOSE SERPL-MCNC: 75 MG/DL (ref 65–99)
GLUCOSE UR STRIP-MCNC: NEGATIVE MG/DL
HCG POINT OF CARE (5HGRST): NEGATIVE
HCT VFR BLD CALC: 46 % (ref 36–46.5)
HEMOCCULT STL QL: ABNORMAL
HGB BLD-MCNC: 15.9 G/DL (ref 12–15.5)
IMM GRANULOCYTES # BLD AUTO: 0 K/MCL (ref 0–0.2)
IMM GRANULOCYTES NFR BLD: 0 %
KETONES UR STRIP-MCNC: 80 MG/DL
LEUKOCYTE ESTERASE UR QL STRIP: NEGATIVE
LIPASE SERPL-CCNC: 80 UNITS/L (ref 73–393)
LYMPHOCYTES # BLD: 1.5 K/MCL (ref 1–4.8)
LYMPHOCYTES NFR BLD: 23 %
MAGNESIUM SERPL-MCNC: 2.1 MG/DL (ref 1.7–2.4)
MAGNESIUM SERPL-MCNC: NORMAL MG/DL
MCH RBC QN AUTO: 32.3 PG (ref 26–34)
MCHC RBC AUTO-ENTMCNC: 34.6 G/DL (ref 32–36.5)
MCV RBC AUTO: 93.3 FL (ref 78–100)
MONOCYTES # BLD: 0.4 K/MCL (ref 0.3–0.9)
MONOCYTES NFR BLD: 7 %
NEUTROPHILS # BLD: 4.6 K/MCL (ref 1.8–7.7)
NEUTROPHILS NFR BLD: 69 %
NEUTS SEG NFR BLD: ABNORMAL %
NITRITE UR QL STRIP: NEGATIVE
NRBC (NRBCRE): 0 /100 WBC
OPIATES UR QL SCN>300 NG/ML: NEGATIVE
OPIATES UR QL SCN>300 NG/ML: NORMAL
PCP UR QL SCN>25 NG/ML: NEGATIVE
PCP UR QL SCN>25 NG/ML: NORMAL
PCP UR QL SCN>25 NG/ML: NORMAL
PH UR STRIP: 5 UNITS (ref 5–7)
PLATELET # BLD: 249 K/MCL (ref 140–450)
POTASSIUM SERPL-SCNC: 3.8 MMOL/L (ref 3.4–5.1)
POTASSIUM SERPL-SCNC: ABNORMAL MMOL/L
PROT SERPL-MCNC: 8.4 G/DL (ref 6.4–8.2)
PROT UR STRIP-MCNC: 100 MG/DL
RBC # BLD: 4.93 MIL/MCL (ref 4–5.2)
SODIUM SERPL-SCNC: 136 MMOL/L (ref 135–145)
SP GR UR STRIP: >1.03 (ref 1–1.03)
UROBILINOGEN UR STRIP-MCNC: 0.2 MG/DL (ref 0–1)
WBC # BLD: 6.6 K/MCL (ref 4.2–11)

## 2020-02-23 ENCOUNTER — HOSPITAL ENCOUNTER (OUTPATIENT)
Age: 40
Setting detail: OBSERVATION
Discharge: LEFT BEFORE DISCHARGE | End: 2020-02-24
Attending: EMERGENCY MEDICINE | Admitting: HOSPITALIST

## 2020-02-23 DIAGNOSIS — I47.10 SVT (SUPRAVENTRICULAR TACHYCARDIA): ICD-10-CM

## 2020-02-23 DIAGNOSIS — F10.920 ACUTE ALCOHOLIC INTOXICATION WITHOUT COMPLICATION (CMD): Primary | ICD-10-CM

## 2020-02-23 DIAGNOSIS — E87.6 HYPOKALEMIA: ICD-10-CM

## 2020-02-23 LAB
ANION GAP SERPL CALC-SCNC: 17 MMOL/L (ref 10–20)
APAP SERPL-MCNC: <2 MCG/ML (ref 10–30)
BASOPHILS # BLD AUTO: 0 K/MCL (ref 0–0.3)
BASOPHILS NFR BLD AUTO: 0 %
BUN SERPL-MCNC: 7 MG/DL (ref 6–20)
BUN/CREAT SERPL: 12 (ref 7–25)
CALCIUM SERPL-MCNC: 8.1 MG/DL (ref 8.4–10.2)
CHLORIDE SERPL-SCNC: 103 MMOL/L (ref 98–107)
CO2 SERPL-SCNC: 21 MMOL/L (ref 21–32)
CREAT SERPL-MCNC: 0.59 MG/DL (ref 0.51–0.95)
DIFFERENTIAL METHOD BLD: ABNORMAL
EOSINOPHIL # BLD AUTO: 0 K/MCL (ref 0.1–0.5)
EOSINOPHIL NFR SPEC: 1 %
ERYTHROCYTE [DISTWIDTH] IN BLOOD: 13.9 % (ref 11–15)
ETHANOL SERPL-MCNC: 324 MG/DL
GLUCOSE SERPL-MCNC: 78 MG/DL (ref 65–99)
HCT VFR BLD CALC: 44.9 % (ref 36–46.5)
HGB BLD-MCNC: 14.8 G/DL (ref 12–15.5)
IMM GRANULOCYTES # BLD AUTO: 0 K/MCL (ref 0–0.2)
IMM GRANULOCYTES NFR BLD: 0 %
LYMPHOCYTES # BLD MANUAL: 2.3 K/MCL (ref 1–4.8)
LYMPHOCYTES NFR BLD MANUAL: 33 %
MCH RBC QN AUTO: 31.6 PG (ref 26–34)
MCHC RBC AUTO-ENTMCNC: 33 G/DL (ref 32–36.5)
MCV RBC AUTO: 95.7 FL (ref 78–100)
MONOCYTES # BLD MANUAL: 0.6 K/MCL (ref 0.3–0.9)
MONOCYTES NFR BLD MANUAL: 9 %
NEUTROPHILS # BLD: 4 K/MCL (ref 1.8–7.7)
NEUTROPHILS NFR BLD AUTO: 57 %
NRBC BLD MANUAL-RTO: 0 /100 WBC
PLATELET # BLD: 243 K/MCL (ref 140–450)
POTASSIUM SERPL-SCNC: 3.1 MMOL/L (ref 3.4–5.1)
RBC # BLD: 4.69 MIL/MCL (ref 4–5.2)
SALICYLATES SERPL-MCNC: 4.4 MG/DL
SODIUM SERPL-SCNC: 138 MMOL/L (ref 135–145)
WBC # BLD: 7 K/MCL (ref 4.2–11)

## 2020-02-23 PROCEDURE — 80329 ANALGESICS NON-OPIOID 1 OR 2: CPT

## 2020-02-23 PROCEDURE — 93005 ELECTROCARDIOGRAM TRACING: CPT | Performed by: EMERGENCY MEDICINE

## 2020-02-23 PROCEDURE — 10002803 HB RX 637: Performed by: EMERGENCY MEDICINE

## 2020-02-23 PROCEDURE — 85025 COMPLETE CBC W/AUTO DIFF WBC: CPT

## 2020-02-23 PROCEDURE — 80320 DRUG SCREEN QUANTALCOHOLS: CPT

## 2020-02-23 PROCEDURE — 96372 THER/PROPH/DIAG INJ SC/IM: CPT

## 2020-02-23 PROCEDURE — 80048 BASIC METABOLIC PNL TOTAL CA: CPT

## 2020-02-23 PROCEDURE — 10003568 RESTRAINTS VIOLENT OR SELF-DESTRUCTIVE ADULT (AGE 18 AND OLDER): Performed by: EMERGENCY MEDICINE

## 2020-02-23 PROCEDURE — 10002801 HB RX 250 W/O HCPCS: Performed by: EMERGENCY MEDICINE

## 2020-02-23 PROCEDURE — 99285 EMERGENCY DEPT VISIT HI MDM: CPT

## 2020-02-23 PROCEDURE — G0378 HOSPITAL OBSERVATION PER HR: HCPCS

## 2020-02-23 RX ORDER — LORAZEPAM 2 MG/ML
2 INJECTION INTRAMUSCULAR ONCE
Status: DISCONTINUED | OUTPATIENT
Start: 2020-02-23 | End: 2020-02-24 | Stop reason: HOSPADM

## 2020-02-23 RX ORDER — SODIUM CHLORIDE 9 MG/ML
INJECTION, SOLUTION INTRAVENOUS CONTINUOUS
Status: CANCELLED | OUTPATIENT
Start: 2020-02-23

## 2020-02-23 RX ORDER — POTASSIUM CHLORIDE 20 MEQ/1
40 TABLET, EXTENDED RELEASE ORAL ONCE
Status: COMPLETED | OUTPATIENT
Start: 2020-02-23 | End: 2020-02-23

## 2020-02-23 RX ORDER — 0.9 % SODIUM CHLORIDE 0.9 %
2 VIAL (ML) INJECTION EVERY 12 HOURS SCHEDULED
Status: CANCELLED | OUTPATIENT
Start: 2020-02-24

## 2020-02-23 RX ORDER — ZIPRASIDONE MESYLATE 20 MG/ML
20 INJECTION, POWDER, LYOPHILIZED, FOR SOLUTION INTRAMUSCULAR ONCE
Status: COMPLETED | OUTPATIENT
Start: 2020-02-23 | End: 2020-02-23

## 2020-02-23 RX ADMIN — POTASSIUM CHLORIDE 40 MEQ: 1500 TABLET, EXTENDED RELEASE ORAL at 22:38

## 2020-02-23 RX ADMIN — METOPROLOL TARTRATE 25 MG: 25 TABLET ORAL at 21:04

## 2020-02-23 RX ADMIN — ZIPRASIDONE MESYLATE 20 MG: 20 INJECTION, POWDER, LYOPHILIZED, FOR SOLUTION INTRAMUSCULAR at 19:43

## 2020-02-23 ASSESSMENT — PAIN SCALES - GENERAL: PAINLEVEL_OUTOF10: 3

## 2020-02-24 VITALS
TEMPERATURE: 98.4 F | DIASTOLIC BLOOD PRESSURE: 77 MMHG | OXYGEN SATURATION: 100 % | SYSTOLIC BLOOD PRESSURE: 103 MMHG | HEART RATE: 87 BPM | BODY MASS INDEX: 23.02 KG/M2 | HEIGHT: 67 IN | RESPIRATION RATE: 16 BRPM | WEIGHT: 146.69 LBS

## 2020-02-24 LAB
ATRIAL RATE (BPM): 126
ATRIAL RATE (BPM): 316
P AXIS (DEGREES): 66
PR-INTERVAL (MSEC): 154
QRS-INTERVAL (MSEC): 68
QRS-INTERVAL (MSEC): 70
QT-INTERVAL (MSEC): 308
QT-INTERVAL (MSEC): 324
QTC: 469
QTC: 499
R AXIS (DEGREES): 90
R AXIS (DEGREES): 91
REPORT TEXT: NORMAL
REPORT TEXT: NORMAL
T AXIS (DEGREES): -49
T AXIS (DEGREES): 17
VENTRICULAR RATE EKG/MIN (BPM): 126
VENTRICULAR RATE EKG/MIN (BPM): 158

## 2020-02-24 PROCEDURE — G0378 HOSPITAL OBSERVATION PER HR: HCPCS

## 2020-03-07 ENCOUNTER — APPOINTMENT (OUTPATIENT)
Dept: CT IMAGING | Facility: HOSPITAL | Age: 40
DRG: 438 | End: 2020-03-07
Attending: EMERGENCY MEDICINE
Payer: MEDICAID

## 2020-03-07 ENCOUNTER — HOSPITAL ENCOUNTER (INPATIENT)
Facility: HOSPITAL | Age: 40
LOS: 2 days | Discharge: HOME | DRG: 438 | End: 2020-03-09
Attending: EMERGENCY MEDICINE | Admitting: HOSPITALIST
Payer: MEDICAID

## 2020-03-07 DIAGNOSIS — K86.2 PANCREAS CYST: ICD-10-CM

## 2020-03-07 DIAGNOSIS — J18.9 COMMUNITY ACQUIRED PNEUMONIA, UNSPECIFIED LATERALITY: Primary | ICD-10-CM

## 2020-03-07 DIAGNOSIS — F10.929 ALCOHOLIC INTOXICATION WITH COMPLICATION (HCC): ICD-10-CM

## 2020-03-07 DIAGNOSIS — K80.71: ICD-10-CM

## 2020-03-07 LAB
ALBUMIN SERPL-MCNC: 3.5 G/DL (ref 3.4–5)
ALBUMIN/GLOB SERPL: 0.8 {RATIO} (ref 1–2)
ALP LIVER SERPL-CCNC: 97 U/L (ref 37–98)
ALT SERPL-CCNC: 32 U/L (ref 13–56)
ANION GAP SERPL CALC-SCNC: 9 MMOL/L (ref 0–18)
APAP SERPL-MCNC: <2 UG/ML (ref 10–30)
AST SERPL-CCNC: 61 U/L (ref 15–37)
B-HCG SERPL-ACNC: <1 MIU/ML
BARBITURATES UR QL SCN: NEGATIVE
BASOPHILS # BLD AUTO: 0.04 X10(3) UL (ref 0–0.2)
BASOPHILS NFR BLD AUTO: 0.8 %
BENZODIAZ UR QL SCN: NEGATIVE
BILIRUB SERPL-MCNC: 0.5 MG/DL (ref 0.1–2)
BILIRUB UR QL STRIP.AUTO: NEGATIVE
BUN BLD-MCNC: 5 MG/DL (ref 7–18)
BUN/CREAT SERPL: 7.5 (ref 10–20)
CALCIUM BLD-MCNC: 8.7 MG/DL (ref 8.5–10.1)
CANCER AG19-9 SERPL-ACNC: 40.6 U/ML (ref ?–37)
CANNABINOIDS UR QL SCN: NEGATIVE
CHLORIDE SERPL-SCNC: 107 MMOL/L (ref 98–112)
CLARITY UR REFRACT.AUTO: CLEAR
CO2 SERPL-SCNC: 24 MMOL/L (ref 21–32)
COCAINE UR QL: NEGATIVE
COLOR UR AUTO: YELLOW
CREAT BLD-MCNC: 0.67 MG/DL (ref 0.55–1.02)
CREAT UR-SCNC: 64.1 MG/DL
DEPRECATED RDW RBC AUTO: 49.4 FL (ref 35.1–46.3)
EOSINOPHIL # BLD AUTO: 0.03 X10(3) UL (ref 0–0.7)
EOSINOPHIL NFR BLD AUTO: 0.6 %
ERYTHROCYTE [DISTWIDTH] IN BLOOD BY AUTOMATED COUNT: 14 % (ref 11–15)
ETHANOL SERPL-MCNC: 394 MG/DL (ref ?–3)
ETHANOL UR-MCNC: POSITIVE MG/DL
GLOBULIN PLAS-MCNC: 4.5 G/DL (ref 2.8–4.4)
GLUCOSE BLD-MCNC: 116 MG/DL (ref 70–99)
GLUCOSE UR STRIP.AUTO-MCNC: NEGATIVE MG/DL
HCT VFR BLD AUTO: 45.9 % (ref 35–48)
HCV AB SERPL QL IA: NONREACTIVE
HGB BLD-MCNC: 15.4 G/DL (ref 12–16)
IMM GRANULOCYTES # BLD AUTO: 0.01 X10(3) UL (ref 0–1)
IMM GRANULOCYTES NFR BLD: 0.2 %
KETONES UR STRIP.AUTO-MCNC: NEGATIVE MG/DL
LEUKOCYTE ESTERASE UR QL STRIP.AUTO: NEGATIVE
LIPASE SERPL-CCNC: 193 U/L (ref 73–393)
LYMPHOCYTES # BLD AUTO: 2.19 X10(3) UL (ref 1–4)
LYMPHOCYTES NFR BLD AUTO: 42.9 %
M PROTEIN MFR SERPL ELPH: 8 G/DL (ref 6.4–8.2)
MCH RBC QN AUTO: 32.2 PG (ref 26–34)
MCHC RBC AUTO-ENTMCNC: 33.6 G/DL (ref 31–37)
MCV RBC AUTO: 95.8 FL (ref 80–100)
MONOCYTES # BLD AUTO: 0.51 X10(3) UL (ref 0.1–1)
MONOCYTES NFR BLD AUTO: 10 %
NEUTROPHILS # BLD AUTO: 2.33 X10 (3) UL (ref 1.5–7.7)
NEUTROPHILS # BLD AUTO: 2.33 X10(3) UL (ref 1.5–7.7)
NEUTROPHILS NFR BLD AUTO: 45.5 %
NITRITE UR QL STRIP.AUTO: NEGATIVE
OPIATES UR QL SCN: NEGATIVE
OSMOLALITY SERPL CALC.SUM OF ELEC: 288 MOSM/KG (ref 275–295)
PCP UR QL SCN: NEGATIVE
PH UR STRIP.AUTO: 6 [PH] (ref 4.5–8)
PLATELET # BLD AUTO: 249 10(3)UL (ref 150–450)
POTASSIUM SERPL-SCNC: 3.5 MMOL/L (ref 3.5–5.1)
PROT UR STRIP.AUTO-MCNC: NEGATIVE MG/DL
RBC # BLD AUTO: 4.79 X10(6)UL (ref 3.8–5.3)
RBC UR QL AUTO: NEGATIVE
SALICYLATES SERPL-MCNC: 3.3 MG/DL (ref 2.8–20)
SODIUM SERPL-SCNC: 140 MMOL/L (ref 136–145)
SP GR UR STRIP.AUTO: >1.06 (ref 1–1.03)
UROBILINOGEN UR STRIP.AUTO-MCNC: <2 MG/DL
WBC # BLD AUTO: 5.1 X10(3) UL (ref 4–11)

## 2020-03-07 PROCEDURE — 74177 CT ABD & PELVIS W/CONTRAST: CPT | Performed by: EMERGENCY MEDICINE

## 2020-03-07 PROCEDURE — 99223 1ST HOSP IP/OBS HIGH 75: CPT | Performed by: HOSPITALIST

## 2020-03-07 RX ORDER — ENOXAPARIN SODIUM 100 MG/ML
40 INJECTION SUBCUTANEOUS DAILY
Status: DISCONTINUED | OUTPATIENT
Start: 2020-03-07 | End: 2020-03-09

## 2020-03-07 RX ORDER — SODIUM CHLORIDE, SODIUM LACTATE, POTASSIUM CHLORIDE, CALCIUM CHLORIDE 600; 310; 30; 20 MG/100ML; MG/100ML; MG/100ML; MG/100ML
INJECTION, SOLUTION INTRAVENOUS CONTINUOUS
Status: DISCONTINUED | OUTPATIENT
Start: 2020-03-07 | End: 2020-03-09

## 2020-03-07 RX ORDER — TRAMADOL HYDROCHLORIDE 50 MG/1
50 TABLET ORAL EVERY 6 HOURS PRN
COMMUNITY
End: 2020-03-07 | Stop reason: CLARIF

## 2020-03-07 RX ORDER — LORAZEPAM 2 MG/ML
1 INJECTION INTRAMUSCULAR ONCE
Status: COMPLETED | OUTPATIENT
Start: 2020-03-07 | End: 2020-03-07

## 2020-03-07 RX ORDER — METOPROLOL SUCCINATE 50 MG/1
50 TABLET, EXTENDED RELEASE ORAL 2 TIMES DAILY
COMMUNITY
End: 2020-08-10

## 2020-03-07 RX ORDER — HALOPERIDOL 5 MG/ML
5 INJECTION INTRAMUSCULAR ONCE
Status: COMPLETED | OUTPATIENT
Start: 2020-03-07 | End: 2020-03-07

## 2020-03-07 RX ORDER — PANTOPRAZOLE SODIUM 40 MG/1
40 TABLET, DELAYED RELEASE ORAL
Status: ON HOLD | COMMUNITY
End: 2020-03-09

## 2020-03-07 RX ORDER — ALPRAZOLAM 0.25 MG/1
0.25 TABLET ORAL 3 TIMES DAILY PRN
COMMUNITY

## 2020-03-07 RX ORDER — TRAZODONE HYDROCHLORIDE 100 MG/1
200 TABLET ORAL NIGHTLY
COMMUNITY

## 2020-03-07 RX ORDER — KETOROLAC TROMETHAMINE 30 MG/ML
15 INJECTION, SOLUTION INTRAMUSCULAR; INTRAVENOUS EVERY 6 HOURS PRN
Status: ACTIVE | OUTPATIENT
Start: 2020-03-07 | End: 2020-03-09

## 2020-03-07 RX ORDER — ACETAMINOPHEN 325 MG/1
650 TABLET ORAL EVERY 6 HOURS PRN
Status: DISCONTINUED | OUTPATIENT
Start: 2020-03-07 | End: 2020-03-09

## 2020-03-07 RX ORDER — BUSPIRONE HYDROCHLORIDE 10 MG/1
20 TABLET ORAL 2 TIMES DAILY
COMMUNITY

## 2020-03-07 RX ORDER — DEXTROAMPHETAMINE SACCHARATE, AMPHETAMINE ASPARTATE MONOHYDRATE, DEXTROAMPHETAMINE SULFATE AND AMPHETAMINE SULFATE 5; 5; 5; 5 MG/1; MG/1; MG/1; MG/1
30 CAPSULE, EXTENDED RELEASE ORAL EVERY MORNING
COMMUNITY
End: 2020-08-10

## 2020-03-07 RX ORDER — LORAZEPAM 2 MG/ML
0.5 INJECTION INTRAMUSCULAR EVERY 4 HOURS PRN
Status: DISCONTINUED | OUTPATIENT
Start: 2020-03-07 | End: 2020-03-09

## 2020-03-07 RX ORDER — GABAPENTIN 300 MG/1
300 CAPSULE ORAL 3 TIMES DAILY
COMMUNITY

## 2020-03-07 RX ORDER — MONTELUKAST SODIUM 10 MG/1
10 TABLET ORAL DAILY
COMMUNITY

## 2020-03-07 RX ORDER — KETOROLAC TROMETHAMINE 30 MG/ML
30 INJECTION, SOLUTION INTRAMUSCULAR; INTRAVENOUS EVERY 6 HOURS PRN
Status: DISPENSED | OUTPATIENT
Start: 2020-03-07 | End: 2020-03-09

## 2020-03-07 RX ORDER — ONDANSETRON 2 MG/ML
4 INJECTION INTRAMUSCULAR; INTRAVENOUS EVERY 6 HOURS PRN
Status: DISCONTINUED | OUTPATIENT
Start: 2020-03-07 | End: 2020-03-09

## 2020-03-07 RX ORDER — METOCLOPRAMIDE HYDROCHLORIDE 5 MG/ML
10 INJECTION INTRAMUSCULAR; INTRAVENOUS EVERY 8 HOURS PRN
Status: DISCONTINUED | OUTPATIENT
Start: 2020-03-07 | End: 2020-03-09

## 2020-03-07 RX ORDER — SERTRALINE HYDROCHLORIDE 100 MG/1
200 TABLET, FILM COATED ORAL DAILY
COMMUNITY

## 2020-03-07 NOTE — ED NOTES
Pt belongings removed, bagged, and sealed in smartsafe bag [id number]. Items in bag are as follows.     1 pair of socks  1 pair of pants  1 pair of underwear  1 shirt  1 headband  Black bag    Cell phone    [address] cared  Id   Restriction of rights

## 2020-03-07 NOTE — ED INITIAL ASSESSMENT (HPI)
Patient presents uncooperative hyperventilating her pupil;s are dilated  Her brother is at the side we used an ammonia inhaler which then she became combative  Her brother is at the bedside he said that she is depressed and on antidepressant. . she is reluc

## 2020-03-07 NOTE — ED NOTES
PT BACK FROM CT, SIDE RAILS UP, CALL LIGHT WITH IN REACH, BROTHER AT BEDSIDE, BACK ON MONITOR, 1:1 MAINTAINED DURING TRANSPORT AND CT SCAN.

## 2020-03-07 NOTE — H&P (VIEW-ONLY)
Gastroenterology Initial Consultation    Daniel Varghese Patient Status:  Emergency    1980 MRN [id number]   Location [address] [facility] Attending Lili Emmanuel MD   Hosp Day # 0 PCP Jagdish Kumari       Reason for Consultation systems is negative  General: Denies fatigue, chills/fever, night sweats, weight loss, loss of appetite, weight gain, sleep disturbance. Neurological: Denies frequent headaches, recent passing out, recent dizziness, convulsions/seizures.   Cardiovascular: indicated. Lymphatic: No cervical or supraclavicular nodes palpated. Skin: No rashes, induration, nodules or tightening of examined abdominal skin      Data:  Relevant labs, imaging and medications reviewed.      Lab Results   Component Value Date    WB

## 2020-03-07 NOTE — ED PROVIDER NOTES
Patient Seen in: [facility] Emergency Department      History   Patient presents with:  Eval-D    Stated Complaint: EVAL P    HPI    She is a 24-year-old woman with history of alcoholism coming for abdominal pain.   She is a poor historian history ob is not in acute distress. Appearance: She is well-developed. She is not toxic-appearing. HENT:      Head: Normocephalic and atraumatic. Eyes:      General: No scleral icterus.      Conjunctiva/sclera: Conjunctivae normal.   Neck:      Musculoskeleta Narrative: The following orders were created for panel order CBC WITH DIFFERENTIAL WITH PLATELET.   Procedure                               Abnormality         Status                     ---------                               -----------         ------ RAINBOW DRAW BLUE   RAINBOW DRAW LAVENDER   RAINBOW DRAW LIGHT GREEN   RAINBOW DRAW GOLD     CT ABDOMEN+PELVIS(CONTRAST ONLY)(CPT=74177)   Final Result    PROCEDURE:  CT ABDOMEN+PELVIS (CONTRAST ONLY) (CPT=74177)         COMPARISON:  None.          Deric Gallegos adenopathy. BOWEL/MESENTERY:  No visible mass, obstruction, or bowel wall thickening. ABDOMINAL WALL:  No mass or hernia. URINARY BLADDER:  No visible focal wall thickening, lesion, or calculus. PELVIC NODES:  No adenopathy.       PE cardiac monitor which wants very closely. No evidence of cardiac monitoring. She remained in normal sinus rhythm. Give supplemental oxygen when needed. Work-up here was obtained. Alcohol level significantly elevated near 394.   Patient had a CT scan ab

## 2020-03-07 NOTE — ED NOTES
Attempted to straight cath patient she became very aggressive attempting to get out of bed  Insists on walking to the bathroom.  Redirected patient back into the bed  She is calm I do not have a specimen 800cc IV fluids infused antibiotic infusing

## 2020-03-07 NOTE — H&P
[facility]  History and Physical     Ala Six Patient Status:  Emergency    1980 MRN [id number]   Location [address] [facility] Attending Ángel Samayoa MD   Hosp Day # 0 PCP Uziel Hebert     Chief Complaint: abd pa TAKE 1 TABLET BY MOUTH THREE TIMES DAILY, Disp: 20 Tab, Rfl: 0      Physical Exam:    /72   Pulse 83   Temp 98 °F (36.7 °C)   Resp 22   Ht 167.6 cm (5' 6\")   Wt 160 lb (72.6 kg)   LMP 03/07/2020   SpO2 98%   BMI 25.82 kg/m²   General: No acute distr

## 2020-03-07 NOTE — CONSULTS
Gastroenterology Initial Consultation    Britney Solo Patient Status:  Emergency    1980 MRN [id number]   Location [address] [facility] Attending Jorge Christian MD   Hosp Day # 0 PCP Leigh Ann Becerra       Reason for Consultation systems is negative  General: Denies fatigue, chills/fever, night sweats, weight loss, loss of appetite, weight gain, sleep disturbance. Neurological: Denies frequent headaches, recent passing out, recent dizziness, convulsions/seizures.   Cardiovascular: indicated. Lymphatic: No cervical or supraclavicular nodes palpated. Skin: No rashes, induration, nodules or tightening of examined abdominal skin      Data:  Relevant labs, imaging and medications reviewed.      Lab Results   Component Value Date    WB

## 2020-03-08 ENCOUNTER — ANESTHESIA EVENT (OUTPATIENT)
Dept: ENDOSCOPY | Facility: HOSPITAL | Age: 40
DRG: 438 | End: 2020-03-08
Payer: MEDICAID

## 2020-03-08 LAB
C DIFF TOX B STL QL: NEGATIVE
POTASSIUM SERPL-SCNC: 4.3 MMOL/L (ref 3.5–5.1)

## 2020-03-08 PROCEDURE — 99232 SBSQ HOSP IP/OBS MODERATE 35: CPT | Performed by: HOSPITALIST

## 2020-03-08 RX ORDER — FOLIC ACID 1 MG/1
1 TABLET ORAL DAILY
Status: DISCONTINUED | OUTPATIENT
Start: 2020-03-08 | End: 2020-03-09

## 2020-03-08 RX ORDER — LORAZEPAM 1 MG/1
2 TABLET ORAL
Status: DISCONTINUED | OUTPATIENT
Start: 2020-03-08 | End: 2020-03-09

## 2020-03-08 RX ORDER — TRAMADOL HYDROCHLORIDE 50 MG/1
50 TABLET ORAL EVERY 6 HOURS PRN
Status: DISCONTINUED | OUTPATIENT
Start: 2020-03-08 | End: 2020-03-09

## 2020-03-08 RX ORDER — METOPROLOL SUCCINATE 50 MG/1
50 TABLET, EXTENDED RELEASE ORAL
Status: DISCONTINUED | OUTPATIENT
Start: 2020-03-09 | End: 2020-03-09

## 2020-03-08 RX ORDER — MELATONIN
100 DAILY
Status: DISCONTINUED | OUTPATIENT
Start: 2020-03-09 | End: 2020-03-09

## 2020-03-08 RX ORDER — MONTELUKAST SODIUM 10 MG/1
10 TABLET ORAL NIGHTLY
Status: DISCONTINUED | OUTPATIENT
Start: 2020-03-08 | End: 2020-03-09

## 2020-03-08 RX ORDER — GABAPENTIN 300 MG/1
300 CAPSULE ORAL DAILY
Status: DISCONTINUED | OUTPATIENT
Start: 2020-03-08 | End: 2020-03-09

## 2020-03-08 RX ORDER — LORAZEPAM 2 MG/ML
2 INJECTION INTRAMUSCULAR
Status: DISCONTINUED | OUTPATIENT
Start: 2020-03-08 | End: 2020-03-09

## 2020-03-08 RX ORDER — LORAZEPAM 1 MG/1
1 TABLET ORAL
Status: DISCONTINUED | OUTPATIENT
Start: 2020-03-08 | End: 2020-03-09

## 2020-03-08 RX ORDER — PANTOPRAZOLE SODIUM 40 MG/1
40 TABLET, DELAYED RELEASE ORAL
Status: DISCONTINUED | OUTPATIENT
Start: 2020-03-09 | End: 2020-03-09

## 2020-03-08 RX ORDER — BUSPIRONE HYDROCHLORIDE 5 MG/1
20 TABLET ORAL 2 TIMES DAILY
Status: DISCONTINUED | OUTPATIENT
Start: 2020-03-08 | End: 2020-03-09

## 2020-03-08 RX ORDER — LORAZEPAM 2 MG/ML
1 INJECTION INTRAMUSCULAR
Status: DISCONTINUED | OUTPATIENT
Start: 2020-03-08 | End: 2020-03-09

## 2020-03-08 RX ORDER — MULTIPLE VITAMINS W/ MINERALS TAB 9MG-400MCG
1 TAB ORAL DAILY
Status: DISCONTINUED | OUTPATIENT
Start: 2020-03-08 | End: 2020-03-09

## 2020-03-08 NOTE — PROGRESS NOTES
Gastroenterology Follow-Up Note      Lelia Patelion Patient Status:  Inpatient    1980 MRN [id number]   [facility] 3NE-A Attending Chrystal Oppenheim, MD   Hosp Day # 1 PCP MICAH Meehan     Chief Complaint/Reason for Follow Up: 42 y

## 2020-03-08 NOTE — PROGRESS NOTES
[facility]  Progress Note     Britney Solo Patient Status:  Inpatient    1980 MRN [id number]   [facility] 3NE-A Attending Sharonda Mccullough MD   Hosp Day # 1 PCP Leigh Ann Becerra     Chief Complaint: abd pain   S:  Shivering.  No thrombosis  3. Eventual EUS with possible FNA tomorrow   3. ETOH abuse- monitor for w/d and start CIWA  4.  Anxiety- Ativan      Quality:  · DVT Prophylaxis: lovenox   · CODE status: full  · Muro: no  Will the patient be referred to TCC on discharge?: no

## 2020-03-08 NOTE — PLAN OF CARE
Assumed care of patient at [facility]. Patient A&Ox4, fatigued & sleeping much of day. On RA. SB/NSR at rest, ST with activity on telemetry. CIWA's initiated at 1230--anxiety & nausea noted. PRN medication per MAR. C/O R abdominal pain--meds per MAR.   PNA --

## 2020-03-08 NOTE — CM/SW NOTE
MSW acknowledged order for [address] evaluation. The patient is a 42year old female admitted for ETOH. She will not be appropriate for [address] as she is not homebound. Psych liaison will follow up for ETOH resources.     Zeno Goodpasture, LCSW

## 2020-03-08 NOTE — PLAN OF CARE
Pt. A&O x4 but drowsy. On RA, tele shows NSR to ST later in the shift. VSS. Up with standby. Pt. C/o 8/10 pain in abdomen, Toradol given to good effect, brought pain down to 2/10 but only lasted around 3 hours.  One episode of nausea reported, gave Zofran t

## 2020-03-08 NOTE — PLAN OF CARE
NURSING ADMISSION NOTE      Patient admitted via Cart  Oriented to room. Safety precautions initiated. Bed in low position. Call light in reach. Admission navigator complete. Assumed care of patient upon admission around 1700.   Patient oriented

## 2020-03-08 NOTE — ANESTHESIA PREPROCEDURE EVALUATION
PRE-OP EVALUATION    Patient Name: Lelia Narvaez    Pre-op Diagnosis: Gallbladder & bile duct stone with obstruction [K80.71]    Procedure(s):  ENDOSCOPIC ULTRASOUND (EUS) / FINE NEEDLE ASPIRATION    Surgeon(s) and Role:     * DO Eboni Jimenez mg, Intravenous, Q6H PRN    Or  ketorolac tromethamine (TORADOL) 30 MG/ML injection 30 mg, 30 mg, Intravenous, Q6H PRN  LORazepam (ATIVAN) injection 0.5 mg, 0.5 mg, Intravenous, Q4H PRN  CefTRIAXone Sodium (ROCEPHIN) 1 g in sodium chloride 0.9% 100 mL MBP/ Breast CA sp surgery    Chronic alcoholic, presented to the hospital intoxicated, and combative       Past Surgical History:   Procedure Laterality Date   • BREAST SURGERY       Social History    Tobacco Use      Smoking status: Current

## 2020-03-09 ENCOUNTER — ANESTHESIA (OUTPATIENT)
Dept: ENDOSCOPY | Facility: HOSPITAL | Age: 40
DRG: 438 | End: 2020-03-09
Payer: MEDICAID

## 2020-03-09 VITALS
WEIGHT: 160 LBS | HEART RATE: 75 BPM | DIASTOLIC BLOOD PRESSURE: 92 MMHG | OXYGEN SATURATION: 100 % | BODY MASS INDEX: 25.71 KG/M2 | HEIGHT: 66 IN | RESPIRATION RATE: 17 BRPM | SYSTOLIC BLOOD PRESSURE: 137 MMHG | TEMPERATURE: 98 F

## 2020-03-09 LAB — HCG URINE QUALITATIVE: NEGATIVE

## 2020-03-09 PROCEDURE — 0DB68ZX EXCISION OF STOMACH, VIA NATURAL OR ARTIFICIAL OPENING ENDOSCOPIC, DIAGNOSTIC: ICD-10-PCS | Performed by: INTERNAL MEDICINE

## 2020-03-09 PROCEDURE — 99239 HOSP IP/OBS DSCHRG MGMT >30: CPT | Performed by: HOSPITALIST

## 2020-03-09 PROCEDURE — 0F9G8ZX DRAINAGE OF PANCREAS, VIA NATURAL OR ARTIFICIAL OPENING ENDOSCOPIC, DIAGNOSTIC: ICD-10-PCS | Performed by: INTERNAL MEDICINE

## 2020-03-09 RX ORDER — LIDOCAINE HYDROCHLORIDE 10 MG/ML
INJECTION, SOLUTION EPIDURAL; INFILTRATION; INTRACAUDAL; PERINEURAL AS NEEDED
Status: DISCONTINUED | OUTPATIENT
Start: 2020-03-09 | End: 2020-03-09 | Stop reason: SURG

## 2020-03-09 RX ORDER — MORPHINE SULFATE 4 MG/ML
2 INJECTION, SOLUTION INTRAMUSCULAR; INTRAVENOUS ONCE
Status: COMPLETED | OUTPATIENT
Start: 2020-03-09 | End: 2020-03-09

## 2020-03-09 RX ORDER — LEVOFLOXACIN 5 MG/ML
INJECTION, SOLUTION INTRAVENOUS AS NEEDED
Status: DISCONTINUED | OUTPATIENT
Start: 2020-03-09 | End: 2020-03-09 | Stop reason: SURG

## 2020-03-09 RX ORDER — NALOXONE HYDROCHLORIDE 0.4 MG/ML
80 INJECTION, SOLUTION INTRAMUSCULAR; INTRAVENOUS; SUBCUTANEOUS AS NEEDED
Status: CANCELLED | OUTPATIENT
Start: 2020-03-09 | End: 2020-03-09

## 2020-03-09 RX ORDER — LEVOFLOXACIN 5 MG/ML
INJECTION, SOLUTION INTRAVENOUS
Status: DISCONTINUED
Start: 2020-03-09 | End: 2020-03-09

## 2020-03-09 RX ORDER — ONDANSETRON 2 MG/ML
INJECTION INTRAMUSCULAR; INTRAVENOUS
Status: DISCONTINUED
Start: 2020-03-09 | End: 2020-03-09

## 2020-03-09 RX ORDER — LEVOFLOXACIN 500 MG/1
500 TABLET, FILM COATED ORAL DAILY
Qty: 10 TABLET | Refills: 0 | Status: SHIPPED | OUTPATIENT
Start: 2020-03-09 | End: 2020-03-19

## 2020-03-09 RX ORDER — SODIUM CHLORIDE, SODIUM LACTATE, POTASSIUM CHLORIDE, CALCIUM CHLORIDE 600; 310; 30; 20 MG/100ML; MG/100ML; MG/100ML; MG/100ML
INJECTION, SOLUTION INTRAVENOUS CONTINUOUS
Status: CANCELLED | OUTPATIENT
Start: 2020-03-09

## 2020-03-09 RX ORDER — MORPHINE SULFATE 4 MG/ML
INJECTION, SOLUTION INTRAMUSCULAR; INTRAVENOUS
Status: DISCONTINUED
Start: 2020-03-09 | End: 2020-03-09

## 2020-03-09 RX ORDER — PANTOPRAZOLE SODIUM 40 MG/1
40 TABLET, DELAYED RELEASE ORAL
Qty: 60 TABLET | Refills: 0 | Status: ON HOLD | OUTPATIENT
Start: 2020-03-09 | End: 2020-04-28

## 2020-03-09 RX ORDER — PANTOPRAZOLE SODIUM 40 MG/1
40 TABLET, DELAYED RELEASE ORAL
Status: DISCONTINUED | OUTPATIENT
Start: 2020-03-09 | End: 2020-03-09

## 2020-03-09 RX ADMIN — LIDOCAINE HYDROCHLORIDE 100 MG: 10 INJECTION, SOLUTION EPIDURAL; INFILTRATION; INTRACAUDAL; PERINEURAL at 10:21:00

## 2020-03-09 RX ADMIN — LEVOFLOXACIN 500 MG: 5 INJECTION, SOLUTION INTRAVENOUS at 10:44:00

## 2020-03-09 NOTE — BH LEVEL OF CARE ASSESSMENT
Level of Care Assessment Note    General Questions  Why are you here?: Patient states he came into [facility] to detox from etoh. Precipitating Events: Pt placed a CIWA protocol.   History of Present Illness: 43 y/o female came into [facility] to Toward Animals: No  History or Allegations of Inappropriate Physical Contact: No  Have you ever damaged/destroyed property or thought about it?: No    Access to Means  Has access to means to attempt suicide or harm others or property: No  Access to Firearm OP Psychiatrist  Current OP Psychiatrist: Dr. Bonny Castleman  Dates of Treatment: 3 years now  Date Last Seen: 1 month ago  Reason: medication management  Effectiveness: helpful           Prior Other Inpatient  Name: (P) [facility]  Dates of Treatment Abuse:  Yes, past(family and others.)  Neglect: Denies  Does anyone say or do something to you that makes you feel unsafe?: No  Have You Ever Been Harmed by a Partner/Caregiver?: No  Health Concerns r/t Abuse: No  Possible Abuse Reportable to[de-identified] Not appropri for this. Pt admits to stressors being the death of her mother 01/19 and a recent breakup with her boyfriend. She has had 1 suicide attempt by cutting her wrist the begining of last year and went inpt. She denies any current suicidal thoughts.    The pt

## 2020-03-09 NOTE — PAYOR COMM NOTE
--------------  ADMISSION REVIEW     Payor: Yony Ortega #:  [id number]  Authorization Number: [id number]    Admit date: 3/7/20  Admit time: [address]       Admitting Physician: Chelsea Sauceda MD  Attending Physician:  Chelsea Sauceda MD  [facility]    Positive for stated complaint: EVAL P  Other systems are as noted in HPI. Constitutional and vital signs reviewed. All other systems reviewed and negative except as noted above.     Physical Exam     ED Triage Vitals [03/07/20 0932]   BP Ankita Espinoza All other components within normal limits   ACETAMINOPHEN (TYLENOL), S - Abnormal; Notable for the following components:    Acetaminophen <2.0 (*)     All other components within normal limits   ETHYL ALCOHOL - Abnormal; Notable for the following compo SUBUNIT (QUANT PREGNANCY TEST) - Normal   SALICYLATE, SERUM - Normal   CBC WITH DIFFERENTIAL WITH PLATELET    Narrative: The following orders were created for panel order CBC WITH DIFFERENTIAL WITH PLATELET.   Procedure                               Abn Proximal to the mass there are adjacent     calcifications measuring 2 and 2.5     mm these may represent ductal calcifications. The differential would     include pancreatic cyst, pseudocyst, cystic neoplasm.   The remainder of     the pancreas is unremar back to the examination room and examined immediately due to a high probability of imminent or life-threatening deterioration in the patient's condition. The patient was then placed on a cardiac monitor and pulse oximetry was applied.   Patient had an IV e Signed by Lili Emmanuel MD on 3/7/2020  1:37 PM            H&P - H&P Note      H&P signed by Jose Carlos Fiore MD at 3/7/2020  2:31 PM     Author:  Jose Carlos Fiore MD Service:  [facility] Author Type:  Physician    Filed:  3/7/2020  2:31 PM Date of Serv does not use drugs.   Family History:   Family History   Problem Relation Age of Onset   • Heart Disorder Father    • Diabetes Father    • Hypertension Mother    • Cancer Mother         breast     Allergies: No Known Allergies  Medications:  No current faci level >300   4.  Anxiety- ativan     Quality:  · DVT Prophylaxis: lovenox   · CODE status: full  · Muro: no  Plan of care discussed with patient     Maribel Morrell MD          Electronically signed by Kayy Nguyen MD on 3/7/2020  2:31 PM         MEDICAT 1528 Given 10 mg Intravenous Millie Adler, JAMAAL      Montelukast Sodium (SINGULAIR) tab 10 mg     Date Action Dose Route User    3/8/2020 2028 Given 10 mg Oral Sabrina Muro, RN      morphINE sulfate (PF) 4 MG/ML injection 2 mg     Date Action Dose Route Us pancreas tail. Two adjacent calcification 2 and 2.5 mm. Fatty liver. No changes of acute pancreatitis. Normal lipase. Mild elevation in AST. No fevers and chills.        Physical Exam:    Blood pressure 108/72, pulse 83, temperature 98 °F (36.7 °C), resp. [18-29] 23  BP: ()/(60-88) 138/86  Physical Exam:    General: No acute distress. Respiratory: Good inspiratory effort. Clear to auscultation bilaterally. No rhonchi. Cardiovascular: S1, S2. Regular rhythm, regular rate.   No murmurs, rubs or gallop for chronic pancreatitis and pancreas lesion.      HPI/Subjective:     Patient complaining of severe epigastric pain. Non-radiating. Aching. She is shaking. No fevers.  Not much nausea.      PMed/Soc/Family:  No change since initial consult note 3/7/20    sounds. Musculoskeletal: Moves all extremities. Tremulous. Extremities: No edema.   Diagnostic Data:    Labs:      Recent Labs   Lab 03/07/20  0925   WBC 5.1   HGB 15.4   MCV 95.8   .0           Recent Labs   Lab 03/07/20  0925 03/08/20  0704   GL OP NOTE  Hosp Day # 2 PCP MICAH VILLALOBOS      PREOPERATIVE DIAGNOSIS/INDICATION: Upper abdominal pain, history of etoh induced chronic pancreatitis, pancreas tail lesion on CT  POSTOPERTATIVE DIAGNOSIS:  PROCEDURE PERFORMED: EGD WITH BIOPSY/EUS WITH FNA  D to follow.

## 2020-03-09 NOTE — DIETARY NOTE
[address] [facility]     Admitting diagnosis:  Pancreas cyst [A85.5]  Alcoholic intoxication with complication ([facility] [address] [H10.387]  Community acquired pneumonia, unspecified laterality [J18.9]    Ht: 167.6 cm (5' 6\")  Wt: 72.

## 2020-03-09 NOTE — PLAN OF CARE
A&Ox4, anxious. CIWAs Q2H- scoring 2-7. Ativan given PRN. Mild tremors noted to BUE. On room air, lungs clear. Denies CORI/SOB. States she is having a productive cough. Abdomen soft and tender, BS active.  C/o abdominal pain 8/10- relief noted with PRN Trama report anxiety at manageable levels  Description  INTERVENTIONS:  - Administer medication as ordered  - Teach and rehearse alternative coping skills  - Provide emotional support with 1:1 interaction with staff  Outcome: Progressing     Problem: DRUG ABUSE/

## 2020-03-09 NOTE — INTERVAL H&P NOTE
Pre-op Diagnosis: Gallbladder & bile duct stone with obstruction [K80.71]    The above referenced H&P was reviewed by Diana Mcginnis DO on 3/9/2020, the patient was examined and no significant changes have occurred in the patient's condition since the H&

## 2020-03-09 NOTE — ANESTHESIA POSTPROCEDURE EVALUATION
[address] [facility] Patient Status:  Inpatient   Age/Gender 42year old female MRN [id number]   Location 118 [facility]. Attending Do Hanna MD   [facility] Day # 2 PCP Polly Nieto       Anesthesia Post-op Note    Procedure(s):  E

## 2020-03-09 NOTE — OPERATIVE REPORT
Upper Endoscopic Ultrasound    Lois Noel Patient Status:  Inpatient    1980 MRN [id number]   [facility] ENDOSCOPY Attending Tyree Walker MD   Hosp Day # 2 PCP MICAH Lim     PREOPERATIVE DIAGNOSIS/INDICATION: Up normal. The esophagogastric junction was 40 cm from the incisors. The squamocolumnar junction was 40 cm from the incisors and regular. Stomach: The gastric mucosa was erythematous with multiple non-bleeding hemorrhagic erosions.  Random gastric biopsies

## 2020-03-09 NOTE — PROGRESS NOTES
[facility]  Progress Note     Daniela Barrientos Patient Status:  Inpatient    1980 MRN [id number]   [facility] 3NE-A Attending Benito Dickens MD   Hosp Day # 2 PCP Maureen Greenberg     Chief Complaint: abd pain   S: No fevers.  No mg Oral Daily   • enoxaparin  40 mg Subcutaneous Daily   • cefTRIAXone  1 g Intravenous Q24H   • azithromycin  500 mg Intravenous Q24H     ASSESSMENT / PLAN:   1. B/L PNA-IV antibiotics and follow-up culture  2.  Abd pain with h/p ETOH abuse-probable chroni

## 2020-03-10 NOTE — PLAN OF CARE
Assumed pt care @ 0730. Alert & oriented x 4. CIWAS. Seizure precautions in place. No seizure activity noted. VSS. C/O RUQ pain. PRN interventions given with some relief. On room air. SR on telemetry. Tolerating soft diet. Voiding well.  Up with standby as appropriate and evaluate response  - Consider cultural and social influences on pain and pain management  - Manage/alleviate anxiety  - Utilize distraction and/or relaxation techniques  - Monitor for opioid side effects  - Notify MD/LIP if interventions un

## 2020-03-10 NOTE — DISCHARGE SUMMARY
[facility]PITAL[facility]  DISCHARGE SUMMARY     Daniela Barrientos Patient Status:  Inpatient    1980 MRN [id number]   [facility] 3NE-A Attending Benito Dickens MD   Hosp Day # 2 PCP Maureen Greenberg     Date of Admission: 3/7/2020  Date of [address] [facility] lesion of the pancrea. GI consulted. CIWA started but no significant withdrawal signs. Patient seen by GI and recommended EUS. EUS performed with results below. PPI placed BID and started on abx.   Due to PNA pt given Levaquin to cover lungs and necrotic to take this      Take 1 tablet (40 mg total) by mouth 2 (two) times daily before meals.    Quantity:  60 tablet  Refills:  0        CONTINUE taking these medications      Instructions Prescription details   ALPRAZolam 0.25 MG Tabs  Commonly known as:  Abhi Baptiste DISCHARGE INSTRUCTIONS: See electronic chart    Kassi Lomas MD 3/9/2020    Time spent:  > 30 minutes

## 2020-03-10 NOTE — PROGRESS NOTES
NURSING DISCHARGE NOTE    Discharged Home via Ambulatory. Accompanied by Family member  Belongings Taken by patient/family. Patient discharged home and belongings sent with patient. Brother picked up patient via car.  Paper copy of prescription for

## 2020-03-11 ENCOUNTER — HOSPITAL ENCOUNTER (EMERGENCY)
Facility: HOSPITAL | Age: 40
Discharge: HOME | End: 2020-03-11
Attending: EMERGENCY MEDICINE
Payer: MEDICAID

## 2020-03-11 ENCOUNTER — APPOINTMENT (OUTPATIENT)
Dept: CT IMAGING | Facility: HOSPITAL | Age: 40
End: 2020-03-11
Attending: EMERGENCY MEDICINE
Payer: MEDICAID

## 2020-03-11 VITALS
RESPIRATION RATE: 18 BRPM | BODY MASS INDEX: 24.11 KG/M2 | DIASTOLIC BLOOD PRESSURE: 83 MMHG | HEART RATE: 79 BPM | OXYGEN SATURATION: 98 % | TEMPERATURE: 98 F | SYSTOLIC BLOOD PRESSURE: 126 MMHG | WEIGHT: 150 LBS | HEIGHT: 66 IN

## 2020-03-11 DIAGNOSIS — G89.29 CHRONIC ABDOMINAL PAIN: ICD-10-CM

## 2020-03-11 DIAGNOSIS — N94.6 DYSMENORRHEA: Primary | ICD-10-CM

## 2020-03-11 DIAGNOSIS — R10.9 CHRONIC ABDOMINAL PAIN: ICD-10-CM

## 2020-03-11 LAB
ALBUMIN SERPL-MCNC: 3.5 G/DL (ref 3.4–5)
ALBUMIN/GLOB SERPL: 0.8 {RATIO} (ref 1–2)
ALP LIVER SERPL-CCNC: 97 U/L (ref 37–98)
ALT SERPL-CCNC: 33 U/L (ref 13–56)
ANION GAP SERPL CALC-SCNC: 12 MMOL/L (ref 0–18)
AST SERPL-CCNC: 47 U/L (ref 15–37)
BASOPHILS # BLD AUTO: 0.02 X10(3) UL (ref 0–0.2)
BASOPHILS NFR BLD AUTO: 0.4 %
BILIRUB SERPL-MCNC: 0.5 MG/DL (ref 0.1–2)
BUN BLD-MCNC: 6 MG/DL (ref 7–18)
BUN/CREAT SERPL: 8.5 (ref 10–20)
CALCIUM BLD-MCNC: 8.6 MG/DL (ref 8.5–10.1)
CHLORIDE SERPL-SCNC: 103 MMOL/L (ref 98–112)
CO2 SERPL-SCNC: 19 MMOL/L (ref 21–32)
CREAT BLD-MCNC: 0.71 MG/DL (ref 0.55–1.02)
DEPRECATED RDW RBC AUTO: 47.8 FL (ref 35.1–46.3)
EOSINOPHIL # BLD AUTO: 0.01 X10(3) UL (ref 0–0.7)
EOSINOPHIL NFR BLD AUTO: 0.2 %
ERYTHROCYTE [DISTWIDTH] IN BLOOD BY AUTOMATED COUNT: 14 % (ref 11–15)
ETHANOL SERPL-MCNC: 136 MG/DL (ref ?–3)
GLOBULIN PLAS-MCNC: 4.2 G/DL (ref 2.8–4.4)
GLUCOSE BLD-MCNC: 174 MG/DL (ref 70–99)
HCG SERPL QL: NEGATIVE
HCT VFR BLD AUTO: 43.4 % (ref 35–48)
HGB BLD-MCNC: 15 G/DL (ref 12–16)
IMM GRANULOCYTES # BLD AUTO: 0.03 X10(3) UL (ref 0–1)
IMM GRANULOCYTES NFR BLD: 0.6 %
LIPASE SERPL-CCNC: 157 U/L (ref 73–393)
LYMPHOCYTES # BLD AUTO: 0.81 X10(3) UL (ref 1–4)
LYMPHOCYTES NFR BLD AUTO: 16.4 %
M PROTEIN MFR SERPL ELPH: 7.7 G/DL (ref 6.4–8.2)
MCH RBC QN AUTO: 32.5 PG (ref 26–34)
MCHC RBC AUTO-ENTMCNC: 34.6 G/DL (ref 31–37)
MCV RBC AUTO: 94.1 FL (ref 80–100)
MONOCYTES # BLD AUTO: 0.33 X10(3) UL (ref 0.1–1)
MONOCYTES NFR BLD AUTO: 6.7 %
NEUTROPHILS # BLD AUTO: 3.75 X10 (3) UL (ref 1.5–7.7)
NEUTROPHILS # BLD AUTO: 3.75 X10(3) UL (ref 1.5–7.7)
NEUTROPHILS NFR BLD AUTO: 75.7 %
OSMOLALITY SERPL CALC.SUM OF ELEC: 280 MOSM/KG (ref 275–295)
PLATELET # BLD AUTO: 145 10(3)UL (ref 150–450)
POTASSIUM SERPL-SCNC: 3.4 MMOL/L (ref 3.5–5.1)
RBC # BLD AUTO: 4.61 X10(6)UL (ref 3.8–5.3)
SODIUM SERPL-SCNC: 134 MMOL/L (ref 136–145)
WBC # BLD AUTO: 5 X10(3) UL (ref 4–11)

## 2020-03-11 PROCEDURE — 93010 ELECTROCARDIOGRAM REPORT: CPT | Performed by: EMERGENCY MEDICINE

## 2020-03-11 PROCEDURE — 96375 TX/PRO/DX INJ NEW DRUG ADDON: CPT | Performed by: EMERGENCY MEDICINE

## 2020-03-11 PROCEDURE — 93005 ELECTROCARDIOGRAM TRACING: CPT

## 2020-03-11 PROCEDURE — 83690 ASSAY OF LIPASE: CPT | Performed by: PHYSICIAN ASSISTANT

## 2020-03-11 PROCEDURE — 84703 CHORIONIC GONADOTROPIN ASSAY: CPT | Performed by: EMERGENCY MEDICINE

## 2020-03-11 PROCEDURE — 85025 COMPLETE CBC W/AUTO DIFF WBC: CPT | Performed by: PHYSICIAN ASSISTANT

## 2020-03-11 PROCEDURE — 99285 EMERGENCY DEPT VISIT HI MDM: CPT | Performed by: EMERGENCY MEDICINE

## 2020-03-11 PROCEDURE — 99284 EMERGENCY DEPT VISIT MOD MDM: CPT | Performed by: EMERGENCY MEDICINE

## 2020-03-11 PROCEDURE — 74177 CT ABD & PELVIS W/CONTRAST: CPT | Performed by: EMERGENCY MEDICINE

## 2020-03-11 PROCEDURE — 80053 COMPREHEN METABOLIC PANEL: CPT | Performed by: PHYSICIAN ASSISTANT

## 2020-03-11 PROCEDURE — 80320 DRUG SCREEN QUANTALCOHOLS: CPT | Performed by: PHYSICIAN ASSISTANT

## 2020-03-11 PROCEDURE — 96374 THER/PROPH/DIAG INJ IV PUSH: CPT | Performed by: EMERGENCY MEDICINE

## 2020-03-11 RX ORDER — MORPHINE SULFATE 4 MG/ML
4 INJECTION, SOLUTION INTRAMUSCULAR; INTRAVENOUS ONCE
Status: COMPLETED | OUTPATIENT
Start: 2020-03-11 | End: 2020-03-11

## 2020-03-11 RX ORDER — TRAMADOL HYDROCHLORIDE 50 MG/1
50 TABLET ORAL EVERY 6 HOURS PRN
Qty: 10 TABLET | Refills: 0 | Status: SHIPPED | OUTPATIENT
Start: 2020-03-11 | End: 2020-03-18

## 2020-03-11 RX ORDER — LORAZEPAM 2 MG/ML
1 INJECTION INTRAMUSCULAR ONCE
Status: COMPLETED | OUTPATIENT
Start: 2020-03-11 | End: 2020-03-11

## 2020-03-11 RX ORDER — ONDANSETRON 2 MG/ML
4 INJECTION INTRAMUSCULAR; INTRAVENOUS ONCE
Status: COMPLETED | OUTPATIENT
Start: 2020-03-11 | End: 2020-03-11

## 2020-03-11 RX ORDER — ONDANSETRON 2 MG/ML
INJECTION INTRAMUSCULAR; INTRAVENOUS
Status: DISCONTINUED
Start: 2020-03-11 | End: 2020-03-12

## 2020-03-11 RX ORDER — KETOROLAC TROMETHAMINE 30 MG/ML
15 INJECTION, SOLUTION INTRAMUSCULAR; INTRAVENOUS ONCE
Status: COMPLETED | OUTPATIENT
Start: 2020-03-11 | End: 2020-03-11

## 2020-03-11 NOTE — ED INITIAL ASSESSMENT (HPI)
Pt presented to ED with multiple complaints stating she has abdominal pain and shortness of breath that she drank a bottle and a half of Vodka pt states has a hx of etoh abuse. Pt also states is having irregular periods related to chemo.

## 2020-03-11 NOTE — PAYOR COMM NOTE
--------------  DISCHARGE REVIEW    Payor: Yony Ortega #:  [id number]  Authorization Number: [id number]    Admit date: 3/7/20  Admit time:  [address]  Discharge Date: 3/9/2020  8:45 PM     Admitting Physician: Chelsea Sauceda MD  Attending Physician:  Ayesha night.  She complained of epigastric pain this morning it was sharp and constant. She has had a history of pancreatitis. She is currently drowsy after giving IV Ativan in the emergency department.   This was given due to her hyperventilation with agitatio levofloxacin 500 MG Tabs  Commonly known as:  LEVAQUIN      Take 1 tablet (500 mg total) by mouth daily for 10 days.    Stop taking on:  March 19, 2020  Quantity:  10 tablet  Refills:  0     pancrelipase (Lip-Prot-Amyl) 66919-98372 units Cpep  Commonly kn Phone:  [phone]   · pancrelipase (Lip-Prot-Amyl) 87380-23460 units Cpep  · Pantoprazole Sodium 40 MG Tbec     Please  your prescriptions at the location directed by your doctor or nurse    Bring a paper prescription for each of these medication

## 2020-03-12 LAB
ATRIAL RATE: 82 BPM
P AXIS: 51 DEGREES
P-R INTERVAL: 136 MS
Q-T INTERVAL: 386 MS
QRS DURATION: 70 MS
QTC CALCULATION (BEZET): 450 MS
R AXIS: 82 DEGREES
T AXIS: 77 DEGREES
VENTRICULAR RATE: 82 BPM

## 2020-03-12 NOTE — ED NOTES
Assumed care of pt, up to bathroom with steady gait, pt requesting to change her tampon due to period. . Reports feeling better after zofran.  Awaiting CT

## 2020-03-12 NOTE — ED PROVIDER NOTES
Patient Seen in: [facility] Emergency Department      History   Patient presents with:  Dyspnea CORI SOB    Stated Complaint: states she was dianosed with pneumonia 2 days ago CORI now, \"not feeling well\".      HPI    Fer Saunders is a 51-year-old female w signs reviewed. All other systems reviewed and negative except as noted above.     Physical Exam     ED Triage Vitals [03/11/20 1731]   /73   Pulse 111   Resp 22   Temp 98 °F (36.7 °C)   Temp src Temporal   SpO2 96 %   O2 Device None (Room air) DIFFERENTIAL WITH PLATELET.   Procedure                               Abnormality         Status                     ---------                               -----------         ------                     CBC W/ DIFFERENTIAL[[id number]]          Abnormal ductal calcifications. The differential would include pancreatic cyst, pseudocyst, cystic neoplasm. The remainder of the pancreas is unremarkable. SPLEEN:  No enlargement or focal lesion. ADRENALS:  No mass or enlargement.   KIDNEYS:  No mass, obstructio scanning was performed from the dome of the diaphragm to the pubic symphysis with non-ionic intravenous contrast material. Post contrast coronal MPR imaging was performed. Dose reduction techniques were used.  Dose information is transmitted to the ACR (Am disease. OTHER:  Negative.    CONCLUSION:  There is a cystic mass in the pancreatic tail as previously described on the examination of 3/7/2020 measuring 2.3 x 2.2 x 2.8 cm associated with splenic vein occlusion and venous collaterals associated with the g diagnosis)  Chronic abdominal pain    Disposition:  Discharge  3/11/2020 10:07 pm    Follow-up:  Claudio Arreola DO  [address] [id number]    Schedule an appointment as soon as possible for a visit      Khloe Odonnell  [facility]

## 2020-03-25 ENCOUNTER — APPOINTMENT (OUTPATIENT)
Dept: CT IMAGING | Age: 40
End: 2020-03-25
Attending: EMERGENCY MEDICINE

## 2020-03-25 ENCOUNTER — HOSPITAL ENCOUNTER (EMERGENCY)
Age: 40
Discharge: HOME | End: 2020-03-25
Attending: EMERGENCY MEDICINE

## 2020-03-25 VITALS
RESPIRATION RATE: 16 BRPM | TEMPERATURE: 97.7 F | BODY MASS INDEX: 22.94 KG/M2 | SYSTOLIC BLOOD PRESSURE: 118 MMHG | WEIGHT: 146.16 LBS | DIASTOLIC BLOOD PRESSURE: 81 MMHG | OXYGEN SATURATION: 100 % | HEART RATE: 84 BPM

## 2020-03-25 DIAGNOSIS — I48.0 PAROXYSMAL ATRIAL FIBRILLATION (CMD): Primary | ICD-10-CM

## 2020-03-25 DIAGNOSIS — E87.29 ALCOHOLIC KETOACIDOSIS: ICD-10-CM

## 2020-03-25 LAB
ALBUMIN SERPL-MCNC: 4.3 G/DL (ref 3.6–5.1)
ALP SERPL-CCNC: 107 UNITS/L (ref 45–117)
ALT SERPL-CCNC: 41 UNITS/L
ANION GAP SERPL CALC-SCNC: 28 MMOL/L (ref 10–20)
AST SERPL-CCNC: 55 UNITS/L
ATRIAL RATE (BPM): 129
ATRIAL RATE (BPM): 81
B-OH-BUTYR SERPL-SCNC: 3.7 MMOL/L (ref 0–0.3)
BASOPHILS # BLD: 0.1 K/MCL (ref 0–0.3)
BASOPHILS NFR BLD: 1 %
BILIRUB CONJ SERPL-MCNC: 0.3 MG/DL (ref 0–0.2)
BILIRUB SERPL-MCNC: 1 MG/DL (ref 0.2–1)
BUN SERPL-MCNC: 6 MG/DL (ref 6–20)
BUN/CREAT SERPL: 12 (ref 7–25)
CALCIUM SERPL-MCNC: 10 MG/DL (ref 8.4–10.2)
CHLORIDE SERPL-SCNC: 99 MMOL/L (ref 98–107)
CO2 SERPL-SCNC: 11 MMOL/L (ref 21–32)
CREAT SERPL-MCNC: 0.49 MG/DL (ref 0.51–0.95)
DIFFERENTIAL METHOD BLD: ABNORMAL
EOSINOPHIL # BLD: 0 K/MCL (ref 0.1–0.5)
EOSINOPHIL NFR BLD: 0 %
ERYTHROCYTE [DISTWIDTH] IN BLOOD: 13.6 % (ref 11–15)
ETHANOL SERPL-MCNC: 37 MG/DL
GLUCOSE SERPL-MCNC: 91 MG/DL (ref 65–99)
HCT VFR BLD CALC: 47.3 % (ref 36–46.5)
HGB BLD-MCNC: 16.8 G/DL (ref 12–15.5)
IMM GRANULOCYTES # BLD AUTO: 0 K/MCL (ref 0–0.2)
IMM GRANULOCYTES NFR BLD: 0 %
LYMPHOCYTES # BLD: 1.8 K/MCL (ref 1–4.8)
LYMPHOCYTES NFR BLD: 27 %
MCH RBC QN AUTO: 31.7 PG (ref 26–34)
MCHC RBC AUTO-ENTMCNC: 35.5 G/DL (ref 32–36.5)
MCV RBC AUTO: 89.2 FL (ref 78–100)
MONOCYTES # BLD: 0.9 K/MCL (ref 0.3–0.9)
MONOCYTES NFR BLD: 14 %
NEUTROPHILS # BLD: 3.9 K/MCL (ref 1.8–7.7)
NEUTROPHILS NFR BLD: 58 %
NRBC BLD MANUAL-RTO: 0 /100 WBC
P AXIS (DEGREES): 68
PLATELET # BLD: 202 K/MCL (ref 140–450)
POTASSIUM SERPL-SCNC: 3.2 MMOL/L (ref 3.4–5.1)
PR-INTERVAL (MSEC): 122
PROT SERPL-MCNC: 8.7 G/DL (ref 6.4–8.2)
QRS-INTERVAL (MSEC): 74
QRS-INTERVAL (MSEC): 76
QT-INTERVAL (MSEC): 356
QT-INTERVAL (MSEC): 418
QTC: 485
QTC: 532
R AXIS (DEGREES): 82
R AXIS (DEGREES): 83
RBC # BLD: 5.3 MIL/MCL (ref 4–5.2)
REPORT TEXT: NORMAL
REPORT TEXT: NORMAL
SODIUM SERPL-SCNC: 135 MMOL/L (ref 135–145)
T AXIS (DEGREES): 29
T AXIS (DEGREES): 68
TROPONIN I SERPL HS-MCNC: <0.02 NG/ML
VENTRICULAR RATE EKG/MIN (BPM): 134
VENTRICULAR RATE EKG/MIN (BPM): 81
WBC # BLD: 6.7 K/MCL (ref 4.2–11)

## 2020-03-25 PROCEDURE — 80076 HEPATIC FUNCTION PANEL: CPT

## 2020-03-25 PROCEDURE — 80320 DRUG SCREEN QUANTALCOHOLS: CPT

## 2020-03-25 PROCEDURE — 10002807 HB RX 258: Performed by: EMERGENCY MEDICINE

## 2020-03-25 PROCEDURE — 93005 ELECTROCARDIOGRAM TRACING: CPT | Performed by: EMERGENCY MEDICINE

## 2020-03-25 PROCEDURE — 99285 EMERGENCY DEPT VISIT HI MDM: CPT

## 2020-03-25 PROCEDURE — 10004281 HB COUNTER-STAFF TIME PER 15 MIN

## 2020-03-25 PROCEDURE — 10002803 HB RX 637: Performed by: EMERGENCY MEDICINE

## 2020-03-25 PROCEDURE — 80048 BASIC METABOLIC PNL TOTAL CA: CPT

## 2020-03-25 PROCEDURE — 82010 KETONE BODYS QUAN: CPT

## 2020-03-25 PROCEDURE — 84484 ASSAY OF TROPONIN QUANT: CPT

## 2020-03-25 PROCEDURE — 96365 THER/PROPH/DIAG IV INF INIT: CPT

## 2020-03-25 PROCEDURE — 96375 TX/PRO/DX INJ NEW DRUG ADDON: CPT

## 2020-03-25 PROCEDURE — 85025 COMPLETE CBC W/AUTO DIFF WBC: CPT

## 2020-03-25 PROCEDURE — 10002800 HB RX 250 W HCPCS: Performed by: EMERGENCY MEDICINE

## 2020-03-25 PROCEDURE — 83735 ASSAY OF MAGNESIUM: CPT

## 2020-03-25 PROCEDURE — 10002805 HB CONTRAST AGENT: Performed by: EMERGENCY MEDICINE

## 2020-03-25 PROCEDURE — 71275 CT ANGIOGRAPHY CHEST: CPT

## 2020-03-25 RX ORDER — CHLORDIAZEPOXIDE HYDROCHLORIDE 25 MG/1
CAPSULE, GELATIN COATED ORAL
Qty: 17 CAPSULE | Refills: 0 | Status: ON HOLD | OUTPATIENT
Start: 2020-03-25 | End: 2020-04-06

## 2020-03-25 RX ORDER — LORAZEPAM 0.5 MG/1
0.5 TABLET ORAL EVERY 6 HOURS PRN
Status: ON HOLD | COMMUNITY
End: 2020-04-07 | Stop reason: HOSPADM

## 2020-03-25 RX ORDER — DEXTROSE AND SODIUM CHLORIDE 5; .9 G/100ML; G/100ML
1000 INJECTION, SOLUTION INTRAVENOUS
Status: COMPLETED | OUTPATIENT
Start: 2020-03-25 | End: 2020-03-25

## 2020-03-25 RX ORDER — BUPROPION HYDROCHLORIDE 100 MG/1
100 TABLET ORAL 2 TIMES DAILY
Status: ON HOLD | COMMUNITY
End: 2020-04-06

## 2020-03-25 RX ORDER — PROPOFOL 10 MG/ML
30 INJECTION, EMULSION INTRAVENOUS ONCE
Status: DISCONTINUED | OUTPATIENT
Start: 2020-03-25 | End: 2020-03-25 | Stop reason: HOSPADM

## 2020-03-25 RX ORDER — POTASSIUM CHLORIDE 20 MEQ/1
40 TABLET, EXTENDED RELEASE ORAL ONCE
Status: COMPLETED | OUTPATIENT
Start: 2020-03-25 | End: 2020-03-25

## 2020-03-25 RX ORDER — SERTRALINE HYDROCHLORIDE 100 MG/1
100 TABLET, FILM COATED ORAL DAILY
Status: ON HOLD | COMMUNITY
End: 2020-04-07

## 2020-03-25 RX ORDER — MAGNESIUM SULFATE 4 G/50ML
4 INJECTION INTRAVENOUS ONCE
Status: COMPLETED | OUTPATIENT
Start: 2020-03-25 | End: 2020-03-25

## 2020-03-25 RX ORDER — LORAZEPAM 2 MG/ML
2 INJECTION INTRAMUSCULAR ONCE
Status: COMPLETED | OUTPATIENT
Start: 2020-03-25 | End: 2020-03-25

## 2020-03-25 RX ORDER — LORAZEPAM 2 MG/ML
INJECTION INTRAMUSCULAR
Status: DISCONTINUED
Start: 2020-03-25 | End: 2020-03-25 | Stop reason: HOSPADM

## 2020-03-25 RX ORDER — KETAMINE HCL IN NACL, ISO-OSM 100MG/10ML
50 SYRINGE (ML) INJECTION ONCE
Status: DISCONTINUED | OUTPATIENT
Start: 2020-03-25 | End: 2020-03-25 | Stop reason: HOSPADM

## 2020-03-25 RX ADMIN — IOHEXOL 75 ML: 350 INJECTION, SOLUTION INTRAVENOUS at 03:15

## 2020-03-25 RX ADMIN — DEXTROSE AND SODIUM CHLORIDE 1000 ML: 5; .9 INJECTION, SOLUTION INTRAVENOUS at 04:02

## 2020-03-25 RX ADMIN — SODIUM CHLORIDE, POTASSIUM CHLORIDE, SODIUM LACTATE AND CALCIUM CHLORIDE 1000 ML: 600; 310; 30; 20 INJECTION, SOLUTION INTRAVENOUS at 01:51

## 2020-03-25 RX ADMIN — POTASSIUM CHLORIDE 40 MEQ: 20 TABLET, EXTENDED RELEASE ORAL at 03:25

## 2020-03-25 RX ADMIN — LORAZEPAM 2 MG: 2 INJECTION INTRAMUSCULAR; INTRAVENOUS at 01:37

## 2020-03-25 RX ADMIN — MAGNESIUM SULFATE HEPTAHYDRATE 4 G: 80 INJECTION, SOLUTION INTRAVENOUS at 01:38

## 2020-03-25 RX ADMIN — SODIUM CHLORIDE, POTASSIUM CHLORIDE, SODIUM LACTATE AND CALCIUM CHLORIDE 1000 ML: 600; 310; 30; 20 INJECTION, SOLUTION INTRAVENOUS at 01:52

## 2020-03-25 ASSESSMENT — PAIN SCALES - GENERAL
PAINLEVEL_OUTOF10: 4
PAINLEVEL_OUTOF10: 5

## 2020-03-26 LAB — MAGNESIUM SERPL-MCNC: NORMAL MG/DL (ref 1.7–2.4)

## 2020-03-27 ENCOUNTER — TELEPHONE (OUTPATIENT)
Dept: CARDIOLOGY | Age: 40
End: 2020-03-27

## 2020-04-02 ENCOUNTER — OFFICE VISIT (OUTPATIENT)
Dept: CARDIOLOGY | Age: 40
End: 2020-04-02

## 2020-04-02 VITALS — HEART RATE: 68 BPM | DIASTOLIC BLOOD PRESSURE: 89 MMHG | SYSTOLIC BLOOD PRESSURE: 125 MMHG

## 2020-04-02 DIAGNOSIS — I47.10 SVT (SUPRAVENTRICULAR TACHYCARDIA): Primary | ICD-10-CM

## 2020-04-02 DIAGNOSIS — F10.10 ALCOHOL ABUSE: ICD-10-CM

## 2020-04-02 DIAGNOSIS — I48.0 PAROXYSMAL ATRIAL FIBRILLATION (CMD): ICD-10-CM

## 2020-04-02 PROCEDURE — 99214 OFFICE O/P EST MOD 30 MIN: CPT | Performed by: INTERNAL MEDICINE

## 2020-04-02 RX ORDER — METOPROLOL SUCCINATE 50 MG/1
50 TABLET, EXTENDED RELEASE ORAL 2 TIMES DAILY
Qty: 60 TABLET | Refills: 5 | Status: ON HOLD | OUTPATIENT
Start: 2020-04-02 | End: 2020-12-23 | Stop reason: SDUPTHER

## 2020-04-06 ENCOUNTER — APPOINTMENT (OUTPATIENT)
Dept: GENERAL RADIOLOGY | Age: 40
End: 2020-04-06

## 2020-04-06 ENCOUNTER — HOSPITAL ENCOUNTER (OUTPATIENT)
Age: 40
Setting detail: OBSERVATION
Discharge: HOME | End: 2020-04-07
Attending: EMERGENCY MEDICINE | Admitting: INTERNAL MEDICINE

## 2020-04-06 DIAGNOSIS — F32.A ANXIETY AND DEPRESSION: ICD-10-CM

## 2020-04-06 DIAGNOSIS — R07.9 CHEST PAIN, UNSPECIFIED TYPE: Primary | ICD-10-CM

## 2020-04-06 DIAGNOSIS — F10.929 ACUTE ALCOHOLIC INTOXICATION WITH COMPLICATION (CMD): Primary | ICD-10-CM

## 2020-04-06 DIAGNOSIS — F41.9 ANXIETY AND DEPRESSION: ICD-10-CM

## 2020-04-06 DIAGNOSIS — F41.0 PANIC ATTACK: ICD-10-CM

## 2020-04-06 PROBLEM — R94.31 QT PROLONGATION: Status: ACTIVE | Noted: 2020-04-06

## 2020-04-06 PROBLEM — I47.10 SVT (SUPRAVENTRICULAR TACHYCARDIA): Status: ACTIVE | Noted: 2018-05-29

## 2020-04-06 PROBLEM — D72.819 LEUKOPENIA: Status: ACTIVE | Noted: 2020-04-06

## 2020-04-06 PROBLEM — F17.200 TOBACCO USE DISORDER: Status: ACTIVE | Noted: 2020-04-06

## 2020-04-06 PROBLEM — K21.9 GASTROESOPHAGEAL REFLUX DISEASE WITHOUT ESOPHAGITIS: Status: ACTIVE | Noted: 2020-04-06

## 2020-04-06 LAB
ANION GAP SERPL CALC-SCNC: 25 MMOL/L (ref 10–20)
ATRIAL RATE (BPM): 87
BASOPHILS # BLD: 0.1 K/MCL (ref 0–0.3)
BASOPHILS NFR BLD: 1 %
BUN SERPL-MCNC: 10 MG/DL (ref 6–20)
BUN/CREAT SERPL: 21 (ref 7–25)
CALCIUM SERPL-MCNC: 8.9 MG/DL (ref 8.4–10.2)
CHLORIDE SERPL-SCNC: 100 MMOL/L (ref 98–107)
CO2 SERPL-SCNC: 15 MMOL/L (ref 21–32)
CREAT SERPL-MCNC: 0.48 MG/DL (ref 0.51–0.95)
DIFFERENTIAL METHOD BLD: ABNORMAL
EOSINOPHIL # BLD: 0 K/MCL (ref 0.1–0.5)
EOSINOPHIL NFR BLD: 0 %
ERYTHROCYTE [DISTWIDTH] IN BLOOD: 13.3 % (ref 11–15)
ETHANOL SERPL-MCNC: 293 MG/DL
GLUCOSE SERPL-MCNC: 88 MG/DL (ref 65–99)
HCT VFR BLD CALC: 46.4 % (ref 36–46.5)
HGB BLD-MCNC: 16.2 G/DL (ref 12–15.5)
IMM GRANULOCYTES # BLD AUTO: 0 K/MCL (ref 0–0.2)
IMM GRANULOCYTES NFR BLD: 0 %
LYMPHOCYTES # BLD: 1.5 K/MCL (ref 1–4.8)
LYMPHOCYTES NFR BLD: 38 %
MCH RBC QN AUTO: 32.5 PG (ref 26–34)
MCHC RBC AUTO-ENTMCNC: 34.9 G/DL (ref 32–36.5)
MCV RBC AUTO: 93 FL (ref 78–100)
MONOCYTES # BLD: 0.4 K/MCL (ref 0.3–0.9)
MONOCYTES NFR BLD: 10 %
NEUTROPHILS # BLD: 2 K/MCL (ref 1.8–7.7)
NEUTROPHILS NFR BLD: 51 %
NRBC BLD MANUAL-RTO: 0 /100 WBC
P AXIS (DEGREES): 50
PLATELET # BLD: 249 K/MCL (ref 140–450)
POTASSIUM SERPL-SCNC: 3.6 MMOL/L (ref 3.4–5.1)
PR-INTERVAL (MSEC): 136
QRS-INTERVAL (MSEC): 70
QT-INTERVAL (MSEC): 398
QTC: 479
R AXIS (DEGREES): 92
RBC # BLD: 4.99 MIL/MCL (ref 4–5.2)
REPORT TEXT: NORMAL
SODIUM SERPL-SCNC: 136 MMOL/L (ref 135–145)
T AXIS (DEGREES): 69
TROPONIN I SERPL HS-MCNC: <0.02 NG/ML
VENTRICULAR RATE EKG/MIN (BPM): 87
WBC # BLD: 3.9 K/MCL (ref 4.2–11)

## 2020-04-06 PROCEDURE — 96372 THER/PROPH/DIAG INJ SC/IM: CPT

## 2020-04-06 PROCEDURE — 10002803 HB RX 637: Performed by: EMERGENCY MEDICINE

## 2020-04-06 PROCEDURE — 84484 ASSAY OF TROPONIN QUANT: CPT

## 2020-04-06 PROCEDURE — 93005 ELECTROCARDIOGRAM TRACING: CPT | Performed by: PHYSICIAN ASSISTANT

## 2020-04-06 PROCEDURE — 10002803 HB RX 637: Performed by: INTERNAL MEDICINE

## 2020-04-06 PROCEDURE — 71046 X-RAY EXAM CHEST 2 VIEWS: CPT

## 2020-04-06 PROCEDURE — 10002807 HB RX 258: Performed by: INTERNAL MEDICINE

## 2020-04-06 PROCEDURE — 85025 COMPLETE CBC W/AUTO DIFF WBC: CPT

## 2020-04-06 PROCEDURE — 10002800 HB RX 250 W HCPCS: Performed by: INTERNAL MEDICINE

## 2020-04-06 PROCEDURE — 96361 HYDRATE IV INFUSION ADD-ON: CPT

## 2020-04-06 PROCEDURE — 10002801 HB RX 250 W/O HCPCS

## 2020-04-06 PROCEDURE — 99220 INITIAL OBSERVATION CARE,LEVL III: CPT | Performed by: INTERNAL MEDICINE

## 2020-04-06 PROCEDURE — 96374 THER/PROPH/DIAG INJ IV PUSH: CPT

## 2020-04-06 PROCEDURE — G0378 HOSPITAL OBSERVATION PER HR: HCPCS

## 2020-04-06 PROCEDURE — 10002800 HB RX 250 W HCPCS: Performed by: EMERGENCY MEDICINE

## 2020-04-06 PROCEDURE — 80320 DRUG SCREEN QUANTALCOHOLS: CPT

## 2020-04-06 PROCEDURE — 80048 BASIC METABOLIC PNL TOTAL CA: CPT

## 2020-04-06 PROCEDURE — 99285 EMERGENCY DEPT VISIT HI MDM: CPT

## 2020-04-06 PROCEDURE — 10002801 HB RX 250 W/O HCPCS: Performed by: INTERNAL MEDICINE

## 2020-04-06 PROCEDURE — 99406 BEHAV CHNG SMOKING 3-10 MIN: CPT | Performed by: INTERNAL MEDICINE

## 2020-04-06 RX ORDER — ZIPRASIDONE MESYLATE 20 MG/ML
INJECTION, POWDER, LYOPHILIZED, FOR SOLUTION INTRAMUSCULAR
Status: COMPLETED
Start: 2020-04-06 | End: 2020-04-06

## 2020-04-06 RX ORDER — LORAZEPAM 2 MG/ML
3 INJECTION INTRAMUSCULAR
Status: DISCONTINUED | OUTPATIENT
Start: 2020-04-06 | End: 2020-04-07 | Stop reason: HOSPADM

## 2020-04-06 RX ORDER — AMOXICILLIN 250 MG
2 CAPSULE ORAL DAILY PRN
Status: DISCONTINUED | OUTPATIENT
Start: 2020-04-06 | End: 2020-04-07 | Stop reason: HOSPADM

## 2020-04-06 RX ORDER — BUPROPION HYDROCHLORIDE 100 MG/1
100 TABLET, EXTENDED RELEASE ORAL EVERY 12 HOURS SCHEDULED
Status: DISCONTINUED | OUTPATIENT
Start: 2020-04-06 | End: 2020-04-07 | Stop reason: HOSPADM

## 2020-04-06 RX ORDER — POTASSIUM CHLORIDE 1.5 G/1.58G
40 POWDER, FOR SOLUTION ORAL EVERY 4 HOURS PRN
Status: DISCONTINUED | OUTPATIENT
Start: 2020-04-06 | End: 2020-04-07 | Stop reason: HOSPADM

## 2020-04-06 RX ORDER — VENLAFAXINE HYDROCHLORIDE 150 MG/1
150 CAPSULE, EXTENDED RELEASE ORAL
Status: DISCONTINUED | OUTPATIENT
Start: 2020-04-06 | End: 2020-04-07 | Stop reason: HOSPADM

## 2020-04-06 RX ORDER — DEXTROSE AND SODIUM CHLORIDE 5; .9 G/100ML; G/100ML
INJECTION, SOLUTION INTRAVENOUS CONTINUOUS
Status: DISCONTINUED | OUTPATIENT
Start: 2020-04-06 | End: 2020-04-07 | Stop reason: SDUPTHER

## 2020-04-06 RX ORDER — LORAZEPAM 2 MG/ML
4 INJECTION INTRAMUSCULAR
Status: DISCONTINUED | OUTPATIENT
Start: 2020-04-06 | End: 2020-04-07 | Stop reason: HOSPADM

## 2020-04-06 RX ORDER — MULTIVITAMIN,THER AND MINERALS
1 TABLET ORAL DAILY
Status: DISCONTINUED | OUTPATIENT
Start: 2020-04-06 | End: 2020-04-07 | Stop reason: HOSPADM

## 2020-04-06 RX ORDER — POTASSIUM CHLORIDE 20 MEQ/1
40 TABLET, EXTENDED RELEASE ORAL EVERY 4 HOURS PRN
Status: DISCONTINUED | OUTPATIENT
Start: 2020-04-06 | End: 2020-04-07 | Stop reason: HOSPADM

## 2020-04-06 RX ORDER — METOPROLOL SUCCINATE 50 MG/1
50 TABLET, EXTENDED RELEASE ORAL 2 TIMES DAILY
Status: DISCONTINUED | OUTPATIENT
Start: 2020-04-06 | End: 2020-04-07 | Stop reason: HOSPADM

## 2020-04-06 RX ORDER — THIAMINE HYDROCHLORIDE 100 MG/ML
100 INJECTION, SOLUTION INTRAMUSCULAR; INTRAVENOUS ONCE
Status: COMPLETED | OUTPATIENT
Start: 2020-04-06 | End: 2020-04-06

## 2020-04-06 RX ORDER — LORAZEPAM 2 MG/ML
2 INJECTION INTRAMUSCULAR
Status: DISCONTINUED | OUTPATIENT
Start: 2020-04-06 | End: 2020-04-07 | Stop reason: HOSPADM

## 2020-04-06 RX ORDER — LORAZEPAM 2 MG/ML
2 INJECTION INTRAMUSCULAR ONCE
Status: COMPLETED | OUTPATIENT
Start: 2020-04-06 | End: 2020-04-06

## 2020-04-06 RX ORDER — MAGNESIUM SULFATE 1 G/100ML
1 INJECTION INTRAVENOUS DAILY PRN
Status: DISCONTINUED | OUTPATIENT
Start: 2020-04-06 | End: 2020-04-07 | Stop reason: HOSPADM

## 2020-04-06 RX ORDER — PANTOPRAZOLE SODIUM 40 MG/1
40 TABLET, DELAYED RELEASE ORAL
Status: DISCONTINUED | OUTPATIENT
Start: 2020-04-07 | End: 2020-04-07 | Stop reason: HOSPADM

## 2020-04-06 RX ORDER — LANOLIN ALCOHOL/MO/W.PET/CERES
100 CREAM (GRAM) TOPICAL DAILY
Status: DISCONTINUED | OUTPATIENT
Start: 2020-04-07 | End: 2020-04-07 | Stop reason: HOSPADM

## 2020-04-06 RX ORDER — FOLIC ACID 1 MG/1
1 TABLET ORAL DAILY
Status: DISCONTINUED | OUTPATIENT
Start: 2020-04-07 | End: 2020-04-07 | Stop reason: HOSPADM

## 2020-04-06 RX ORDER — GABAPENTIN 300 MG/1
300 CAPSULE ORAL EVERY 8 HOURS SCHEDULED
Status: DISCONTINUED | OUTPATIENT
Start: 2020-04-06 | End: 2020-04-07 | Stop reason: HOSPADM

## 2020-04-06 RX ORDER — POTASSIUM CHLORIDE 1.5 G/1.58G
20 POWDER, FOR SOLUTION ORAL EVERY 4 HOURS PRN
Status: DISCONTINUED | OUTPATIENT
Start: 2020-04-06 | End: 2020-04-07 | Stop reason: HOSPADM

## 2020-04-06 RX ORDER — ACETAMINOPHEN 325 MG/1
650 TABLET ORAL EVERY 4 HOURS PRN
Status: DISCONTINUED | OUTPATIENT
Start: 2020-04-06 | End: 2020-04-07 | Stop reason: HOSPADM

## 2020-04-06 RX ORDER — WATER 10 ML/10ML
INJECTION INTRAMUSCULAR; INTRAVENOUS; SUBCUTANEOUS
Status: DISPENSED
Start: 2020-04-06 | End: 2020-04-07

## 2020-04-06 RX ORDER — SERTRALINE HYDROCHLORIDE 100 MG/1
100 TABLET, FILM COATED ORAL DAILY
Status: DISCONTINUED | OUTPATIENT
Start: 2020-04-06 | End: 2020-04-07

## 2020-04-06 RX ORDER — MONTELUKAST SODIUM 10 MG/1
10 TABLET ORAL EVERY EVENING
Status: DISCONTINUED | OUTPATIENT
Start: 2020-04-06 | End: 2020-04-07 | Stop reason: HOSPADM

## 2020-04-06 RX ORDER — FOLIC ACID 1 MG/1
1 TABLET ORAL ONCE
Status: COMPLETED | OUTPATIENT
Start: 2020-04-06 | End: 2020-04-06

## 2020-04-06 RX ORDER — POTASSIUM CHLORIDE 20 MEQ/1
20 TABLET, EXTENDED RELEASE ORAL EVERY 4 HOURS PRN
Status: DISCONTINUED | OUTPATIENT
Start: 2020-04-06 | End: 2020-04-07 | Stop reason: HOSPADM

## 2020-04-06 RX ORDER — ENOXAPARIN SODIUM 100 MG/ML
40 INJECTION SUBCUTANEOUS DAILY
Status: DISCONTINUED | OUTPATIENT
Start: 2020-04-06 | End: 2020-04-07 | Stop reason: HOSPADM

## 2020-04-06 RX ORDER — POTASSIUM CHLORIDE 14.9 MG/ML
20 INJECTION INTRAVENOUS EVERY 4 HOURS PRN
Status: DISCONTINUED | OUTPATIENT
Start: 2020-04-06 | End: 2020-04-07 | Stop reason: HOSPADM

## 2020-04-06 RX ORDER — LORAZEPAM 2 MG/ML
INJECTION INTRAMUSCULAR
Status: DISPENSED
Start: 2020-04-06 | End: 2020-04-07

## 2020-04-06 RX ORDER — ZIPRASIDONE MESYLATE 20 MG/ML
20 INJECTION, POWDER, LYOPHILIZED, FOR SOLUTION INTRAMUSCULAR EVERY 4 HOURS PRN
Status: DISCONTINUED | OUTPATIENT
Start: 2020-04-06 | End: 2020-04-07 | Stop reason: HOSPADM

## 2020-04-06 RX ORDER — NICOTINE 21 MG/24HR
1 PATCH, TRANSDERMAL 24 HOURS TRANSDERMAL DAILY
Status: DISCONTINUED | OUTPATIENT
Start: 2020-04-07 | End: 2020-04-07 | Stop reason: HOSPADM

## 2020-04-06 RX ORDER — SODIUM CHLORIDE 9 MG/ML
INJECTION, SOLUTION INTRAVENOUS CONTINUOUS
Status: DISCONTINUED | OUTPATIENT
Start: 2020-04-06 | End: 2020-04-07 | Stop reason: HOSPADM

## 2020-04-06 RX ADMIN — THIAMINE HYDROCHLORIDE 100 MG: 100 INJECTION, SOLUTION INTRAMUSCULAR; INTRAVENOUS at 16:13

## 2020-04-06 RX ADMIN — ZIPRASIDONE MESYLATE 20 MG: 20 INJECTION, POWDER, LYOPHILIZED, FOR SOLUTION INTRAMUSCULAR at 14:10

## 2020-04-06 RX ADMIN — MONTELUKAST SODIUM 10 MG: 10 TABLET, FILM COATED ORAL at 23:08

## 2020-04-06 RX ADMIN — LORAZEPAM 2 MG: 2 INJECTION INTRAMUSCULAR; INTRAVENOUS at 14:38

## 2020-04-06 RX ADMIN — POTASSIUM CHLORIDE 20 MEQ: 1500 TABLET, EXTENDED RELEASE ORAL at 21:35

## 2020-04-06 RX ADMIN — GABAPENTIN 300 MG: 300 CAPSULE ORAL at 20:31

## 2020-04-06 RX ADMIN — SODIUM CHLORIDE: 0.9 INJECTION, SOLUTION INTRAVENOUS at 20:30

## 2020-04-06 RX ADMIN — FOLIC ACID 1 MG: 1 TABLET ORAL at 23:07

## 2020-04-06 RX ADMIN — ENOXAPARIN SODIUM 40 MG: 40 INJECTION SUBCUTANEOUS at 20:31

## 2020-04-06 RX ADMIN — Medication 1 TABLET: at 20:31

## 2020-04-06 RX ADMIN — SERTRALINE HYDROCHLORIDE 100 MG: 100 TABLET, FILM COATED ORAL at 23:07

## 2020-04-06 ASSESSMENT — LIFESTYLE VARIABLES
PAROXYSMAL SWEATS: NO SWEAT VISIBLE
HEADACHE, FULLNESS IN HEAD: NOT PRESENT
AGITATION: NORMAL ACTIVITY
PAROXYSMAL SWEATS: BARELY PERCEPTIBLE SWEATING, PALMS MOIST
ALCOHOL_USE_STATUS: UNHEALTHY DRINKING IDENTIFIED. AUDIT C: 3 OR MORE FOR WOMEN AND 4 OR MORE FOR MEN.
AUDIT-C TOTAL SCORE: 11
HEADACHE, FULLNESS IN HEAD: NOT PRESENT
HOW OFTEN DO YOU HAVE 6 OR MORE DRINKS ON ONE OCCASION: WEEKLY
ANXIETY: NO ANXIETY, AT EASE
AUDITORY DISTURBANCES: NOT PRESENT
AGITATION: NORMAL ACTIVITY
TACTILE DISTURBANCES: NOT PRESENT
AUDITORY DISTURBANCES: NOT PRESENT
TREMOR: NOT VISIBLE, BUT CAN BE FELT FINGERTIP TO FINGERTIP
TREMOR: NO TREMOR
ANXIETY: MILDLY ANXIOUS
TACTILE DISTURBANCES: NOT PRESENT
TREMOR: NO TREMOR
PAROXYSMAL SWEATS: NO SWEAT VISIBLE
TACTILE DISTURBANCES: NOT PRESENT
NAUSEA AND VOMITING: NO NAUSEA AND NO VOMITING
HOW MANY STANDARD DRINKS CONTAINING ALCOHOL DO YOU HAVE ON A TYPICAL DAY: 10 OR MORE
TACTILE DISTURBANCES: NOT PRESENT
ANXIETY: NO ANXIETY, AT EASE
ANXIETY: NO ANXIETY, AT EASE
HEADACHE, FULLNESS IN HEAD: NOT PRESENT
ANXIETY: NO ANXIETY, AT EASE
HEADACHE, FULLNESS IN HEAD: NOT PRESENT
LAST DRINK YOU HAD: 48 HOURS OR LESS
TREMOR: NOT VISIBLE, BUT CAN BE FELT FINGERTIP TO FINGERTIP
HEADACHE, FULLNESS IN HEAD: NOT PRESENT
HOW OFTEN DO YOU HAVE A DRINK CONTAINING ALCOHOL: 4 OR MORE TIMES PER WEEK
AGITATION: NORMAL ACTIVITY
VISUAL DISTURBANCES: NOT PRESENT
VISUAL DISTURBANCES: NOT PRESENT
PAROXYSMAL SWEATS: BARELY PERCEPTIBLE SWEATING, PALMS MOIST
NAUSEA AND VOMITING: NO NAUSEA AND NO VOMITING
TACTILE DISTURBANCES: NOT PRESENT
AGITATION: NORMAL ACTIVITY
AUDITORY DISTURBANCES: NOT PRESENT
NAUSEA AND VOMITING: MILD NAUSEA WITH NO VOMITING
AUDITORY DISTURBANCES: NOT PRESENT
VISUAL DISTURBANCES: NOT PRESENT
PAROXYSMAL SWEATS: BARELY PERCEPTIBLE SWEATING, PALMS MOIST
AGITATION: NORMAL ACTIVITY
VISUAL DISTURBANCES: NOT PRESENT
TREMOR: NOT VISIBLE, BUT CAN BE FELT FINGERTIP TO FINGERTIP
NAUSEA AND VOMITING: NO NAUSEA AND NO VOMITING
NAUSEA AND VOMITING: NO NAUSEA AND NO VOMITING
VISUAL DISTURBANCES: NOT PRESENT
AUDITORY DISTURBANCES: NOT PRESENT

## 2020-04-06 ASSESSMENT — ACTIVITIES OF DAILY LIVING (ADL)
ADL_SHORT_OF_BREATH: NO
CHRONIC_PAIN_PRESENT: NO
ADL_BEFORE_ADMISSION: INDEPENDENT
ADL_SCORE: 12
RECENT_DECLINE_ADL: NO

## 2020-04-06 ASSESSMENT — ENCOUNTER SYMPTOMS: SHORTNESS OF BREATH: 1

## 2020-04-06 ASSESSMENT — COGNITIVE AND FUNCTIONAL STATUS - GENERAL
BECAUSE OF A PHYSICAL, MENTAL, OR EMOTIONAL CONDITION, DO YOU HAVE SERIOUS DIFFICULTY CONCENTRATING, REMEMBERING OR MAKING DECISIONS: NO
DO YOU HAVE SERIOUS DIFFICULTY WALKING OR CLIMBING STAIRS: NO
ARE YOU DEAF OR DO YOU HAVE SERIOUS DIFFICULTY  HEARING: NO
ARE YOU BLIND OR DO YOU HAVE SERIOUS DIFFICULTY SEEING, EVEN WHEN WEARING GLASSES: NO
BECAUSE OF A PHYSICAL, MENTAL, OR EMOTIONAL CONDITION, DO YOU HAVE DIFFICULTY DOING ERRANDS ALONE: NO
DO YOU HAVE DIFFICULTY DRESSING OR BATHING: NO

## 2020-04-06 ASSESSMENT — PAIN SCALES - GENERAL
PAINLEVEL_OUTOF10: 3
PAINLEVEL_OUTOF10: 3

## 2020-04-07 VITALS
HEART RATE: 83 BPM | OXYGEN SATURATION: 98 % | HEIGHT: 68 IN | DIASTOLIC BLOOD PRESSURE: 89 MMHG | SYSTOLIC BLOOD PRESSURE: 137 MMHG | TEMPERATURE: 97.3 F | BODY MASS INDEX: 22.12 KG/M2 | RESPIRATION RATE: 16 BRPM | WEIGHT: 145.94 LBS

## 2020-04-07 LAB
ALBUMIN SERPL-MCNC: 3.1 G/DL (ref 3.6–5.1)
ALBUMIN/GLOB SERPL: 0.8 {RATIO} (ref 1–2.4)
ALP SERPL-CCNC: 75 UNITS/L (ref 45–117)
ALT SERPL-CCNC: 37 UNITS/L
AMPHETAMINES UR QL SCN>500 NG/ML: NEGATIVE
ANION GAP SERPL CALC-SCNC: 12 MMOL/L (ref 10–20)
AST SERPL-CCNC: 41 UNITS/L
ATRIAL RATE (BPM): 87
B-HCG UR QL: NEGATIVE
BARBITURATES UR QL SCN>200 NG/ML: NEGATIVE
BENZODIAZ UR QL SCN>200 NG/ML: POSITIVE
BILIRUB SERPL-MCNC: 1.1 MG/DL (ref 0.2–1)
BUN SERPL-MCNC: 12 MG/DL (ref 6–20)
BUN/CREAT SERPL: 25 (ref 7–25)
BZE UR QL SCN>150 NG/ML: NEGATIVE
CALCIUM SERPL-MCNC: 8.5 MG/DL (ref 8.4–10.2)
CANNABINOIDS UR QL SCN>50 NG/ML: NEGATIVE
CHLORIDE SERPL-SCNC: 102 MMOL/L (ref 98–107)
CO2 SERPL-SCNC: 24 MMOL/L (ref 21–32)
CREAT SERPL-MCNC: 0.49 MG/DL (ref 0.51–0.95)
ERYTHROCYTE [DISTWIDTH] IN BLOOD: 13.3 % (ref 11–15)
GLOBULIN SER-MCNC: 3.7 G/DL (ref 2–4)
GLUCOSE SERPL-MCNC: 171 MG/DL (ref 65–99)
HCT VFR BLD CALC: 40.8 % (ref 36–46.5)
HGB BLD-MCNC: 13.9 G/DL (ref 12–15.5)
MAGNESIUM SERPL-MCNC: 1.5 MG/DL (ref 1.7–2.4)
MCH RBC QN AUTO: 31.9 PG (ref 26–34)
MCHC RBC AUTO-ENTMCNC: 34.1 G/DL (ref 32–36.5)
MCV RBC AUTO: 93.6 FL (ref 78–100)
NRBC BLD MANUAL-RTO: 0 /100 WBC
OPIATES UR QL SCN>300 NG/ML: NEGATIVE
P AXIS (DEGREES): 50
PCP UR QL SCN>25 NG/ML: NEGATIVE
PLATELET # BLD: 185 K/MCL (ref 140–450)
POTASSIUM SERPL-SCNC: 3.5 MMOL/L (ref 3.4–5.1)
PR-INTERVAL (MSEC): 136
PROT SERPL-MCNC: 6.8 G/DL (ref 6.4–8.2)
QRS-INTERVAL (MSEC): 70
QT-INTERVAL (MSEC): 398
QTC: 479
R AXIS (DEGREES): 92
RBC # BLD: 4.36 MIL/MCL (ref 4–5.2)
REPORT TEXT: NORMAL
SODIUM SERPL-SCNC: 134 MMOL/L (ref 135–145)
T AXIS (DEGREES): 69
VENTRICULAR RATE EKG/MIN (BPM): 87
WBC # BLD: 4.7 K/MCL (ref 4.2–11)

## 2020-04-07 PROCEDURE — 96361 HYDRATE IV INFUSION ADD-ON: CPT

## 2020-04-07 PROCEDURE — 83735 ASSAY OF MAGNESIUM: CPT

## 2020-04-07 PROCEDURE — 10002807 HB RX 258: Performed by: INTERNAL MEDICINE

## 2020-04-07 PROCEDURE — 96376 TX/PRO/DX INJ SAME DRUG ADON: CPT

## 2020-04-07 PROCEDURE — 80053 COMPREHEN METABOLIC PANEL: CPT

## 2020-04-07 PROCEDURE — G0378 HOSPITAL OBSERVATION PER HR: HCPCS

## 2020-04-07 PROCEDURE — 10002803 HB RX 637: Performed by: INTERNAL MEDICINE

## 2020-04-07 PROCEDURE — 90792 PSYCH DIAG EVAL W/MED SRVCS: CPT | Performed by: PSYCHIATRY & NEUROLOGY

## 2020-04-07 PROCEDURE — 96372 THER/PROPH/DIAG INJ SC/IM: CPT

## 2020-04-07 PROCEDURE — 36415 COLL VENOUS BLD VENIPUNCTURE: CPT

## 2020-04-07 PROCEDURE — 80307 DRUG TEST PRSMV CHEM ANLYZR: CPT

## 2020-04-07 PROCEDURE — 81025 URINE PREGNANCY TEST: CPT

## 2020-04-07 PROCEDURE — 99213 OFFICE O/P EST LOW 20 MIN: CPT | Performed by: INTERNAL MEDICINE

## 2020-04-07 PROCEDURE — 96375 TX/PRO/DX INJ NEW DRUG ADDON: CPT

## 2020-04-07 PROCEDURE — X1094 NO CHARGE VISIT: HCPCS | Performed by: INTERNAL MEDICINE

## 2020-04-07 PROCEDURE — 10002800 HB RX 250 W HCPCS: Performed by: INTERNAL MEDICINE

## 2020-04-07 PROCEDURE — 85027 COMPLETE CBC AUTOMATED: CPT

## 2020-04-07 RX ORDER — SERTRALINE HYDROCHLORIDE 100 MG/1
200 TABLET, FILM COATED ORAL DAILY
Status: ON HOLD | COMMUNITY
Start: 2020-04-08 | End: 2020-12-17 | Stop reason: SDUPTHER

## 2020-04-07 RX ORDER — ONDANSETRON 8 MG/1
8 TABLET, ORALLY DISINTEGRATING ORAL EVERY 8 HOURS PRN
Qty: 10 TABLET | Refills: 0 | Status: ON HOLD | OUTPATIENT
Start: 2020-04-07 | End: 2020-12-17 | Stop reason: HOSPADM

## 2020-04-07 RX ORDER — SERTRALINE HYDROCHLORIDE 100 MG/1
200 TABLET, FILM COATED ORAL DAILY
Status: DISCONTINUED | OUTPATIENT
Start: 2020-04-07 | End: 2020-04-07 | Stop reason: HOSPADM

## 2020-04-07 RX ORDER — BUPROPION HYDROCHLORIDE 100 MG/1
100 TABLET, EXTENDED RELEASE ORAL EVERY 12 HOURS SCHEDULED
Status: ON HOLD | COMMUNITY
Start: 2020-04-07 | End: 2020-12-16 | Stop reason: ALTCHOICE

## 2020-04-07 RX ADMIN — LORAZEPAM 2 MG: 2 INJECTION, SOLUTION INTRAMUSCULAR; INTRAVENOUS at 14:02

## 2020-04-07 RX ADMIN — ENOXAPARIN SODIUM 40 MG: 40 INJECTION SUBCUTANEOUS at 08:08

## 2020-04-07 RX ADMIN — GABAPENTIN 300 MG: 300 CAPSULE ORAL at 05:51

## 2020-04-07 RX ADMIN — SODIUM CHLORIDE: 0.9 INJECTION, SOLUTION INTRAVENOUS at 14:37

## 2020-04-07 RX ADMIN — Medication 100 MG: at 08:06

## 2020-04-07 RX ADMIN — METOPROLOL SUCCINATE 50 MG: 50 TABLET, EXTENDED RELEASE ORAL at 08:05

## 2020-04-07 RX ADMIN — SODIUM CHLORIDE: 0.9 INJECTION, SOLUTION INTRAVENOUS at 04:50

## 2020-04-07 RX ADMIN — GABAPENTIN 300 MG: 300 CAPSULE ORAL at 14:02

## 2020-04-07 RX ADMIN — PANTOPRAZOLE SODIUM 40 MG: 40 TABLET, DELAYED RELEASE ORAL at 05:51

## 2020-04-07 RX ADMIN — LORAZEPAM 2 MG: 2 INJECTION, SOLUTION INTRAMUSCULAR; INTRAVENOUS at 11:35

## 2020-04-07 RX ADMIN — LORAZEPAM 2 MG: 2 INJECTION, SOLUTION INTRAMUSCULAR; INTRAVENOUS at 08:09

## 2020-04-07 RX ADMIN — FOLIC ACID 1 MG: 1 TABLET ORAL at 08:06

## 2020-04-07 RX ADMIN — SERTRALINE HYDROCHLORIDE 200 MG: 100 TABLET, FILM COATED ORAL at 14:02

## 2020-04-07 RX ADMIN — POTASSIUM CHLORIDE 20 MEQ: 1500 TABLET, EXTENDED RELEASE ORAL at 08:09

## 2020-04-07 RX ADMIN — Medication 1 TABLET: at 08:06

## 2020-04-07 ASSESSMENT — COGNITIVE AND FUNCTIONAL STATUS - GENERAL
BECAUSE OF A PHYSICAL, MENTAL, OR EMOTIONAL CONDITION, DO YOU HAVE SERIOUS DIFFICULTY CONCENTRATING, REMEMBERING OR MAKING DECISIONS: NO
DO YOU HAVE DIFFICULTY DRESSING OR BATHING: NO
DO YOU HAVE SERIOUS DIFFICULTY WALKING OR CLIMBING STAIRS: NO

## 2020-04-07 ASSESSMENT — LIFESTYLE VARIABLES
AGITATION: SOMEWHAT MORE THAN NORMAL ACTIVITY
AUDITORY DISTURBANCES: NOT PRESENT
AGITATION: NORMAL ACTIVITY
AGITATION: SOMEWHAT MORE THAN NORMAL ACTIVITY
PAROXYSMAL SWEATS: BARELY PERCEPTIBLE SWEATING, PALMS MOIST
TREMOR: NO TREMOR
AGITATION: NORMAL ACTIVITY
VISUAL DISTURBANCES: NOT PRESENT
PAROXYSMAL SWEATS: NO SWEAT VISIBLE
AUDITORY DISTURBANCES: NOT PRESENT
ANXIETY: NO ANXIETY, AT EASE
NAUSEA AND VOMITING: NO NAUSEA AND NO VOMITING
TREMOR: NO TREMOR
VISUAL DISTURBANCES: NOT PRESENT
HEADACHE, FULLNESS IN HEAD: VERY MILD
AGITATION: NORMAL ACTIVITY
HEADACHE, FULLNESS IN HEAD: VERY MILD
HEADACHE, FULLNESS IN HEAD: VERY MILD
ANXIETY: 3
ANXIETY: MILDLY ANXIOUS
PAROXYSMAL SWEATS: BARELY PERCEPTIBLE SWEATING, PALMS MOIST
HEADACHE, FULLNESS IN HEAD: NOT PRESENT
AUDITORY DISTURBANCES: NOT PRESENT
NAUSEA AND VOMITING: NO NAUSEA AND NO VOMITING
TACTILE DISTURBANCES: NOT PRESENT
NAUSEA AND VOMITING: NO NAUSEA AND NO VOMITING
AGITATION: NORMAL ACTIVITY
TACTILE DISTURBANCES: PRESENT, BUT MINIMAL
VISUAL DISTURBANCES: NOT PRESENT
TREMOR: NOT VISIBLE, BUT CAN BE FELT FINGERTIP TO FINGERTIP
NAUSEA AND VOMITING: MILD NAUSEA WITH NO VOMITING
VISUAL DISTURBANCES: NOT PRESENT
TREMOR: NOT VISIBLE, BUT CAN BE FELT FINGERTIP TO FINGERTIP
AUDITORY DISTURBANCES: NOT PRESENT
PAROXYSMAL SWEATS: NO SWEAT VISIBLE
ANXIETY: 3
ANXIETY: MILDLY ANXIOUS
ANXIETY: MILDLY ANXIOUS
TACTILE DISTURBANCES: NOT PRESENT
TREMOR: NOT VISIBLE, BUT CAN BE FELT FINGERTIP TO FINGERTIP
ANXIETY: MILDLY ANXIOUS
NAUSEA AND VOMITING: 2
PAROXYSMAL SWEATS: NO SWEAT VISIBLE
HEADACHE, FULLNESS IN HEAD: NOT PRESENT
VISUAL DISTURBANCES: NOT PRESENT
NAUSEA AND VOMITING: MILD NAUSEA WITH NO VOMITING
AGITATION: NORMAL ACTIVITY
TACTILE DISTURBANCES: NOT PRESENT
VISUAL DISTURBANCES: NOT PRESENT
VISUAL DISTURBANCES: NOT PRESENT
AUDITORY DISTURBANCES: NOT PRESENT
HEADACHE, FULLNESS IN HEAD: NOT PRESENT
HEADACHE, FULLNESS IN HEAD: VERY MILD
TREMOR: NOT VISIBLE, BUT CAN BE FELT FINGERTIP TO FINGERTIP
TREMOR: NOT VISIBLE, BUT CAN BE FELT FINGERTIP TO FINGERTIP
TACTILE DISTURBANCES: NOT PRESENT
ANXIETY: NO ANXIETY, AT EASE
ANXIETY: NO ANXIETY, AT EASE
HEADACHE, FULLNESS IN HEAD: NOT PRESENT
AUDITORY DISTURBANCES: NOT PRESENT
TACTILE DISTURBANCES: NOT PRESENT
TREMOR: NOT VISIBLE, BUT CAN BE FELT FINGERTIP TO FINGERTIP
TREMOR: NOT VISIBLE, BUT CAN BE FELT FINGERTIP TO FINGERTIP
TACTILE DISTURBANCES: NOT PRESENT
VISUAL DISTURBANCES: NOT PRESENT
PAROXYSMAL SWEATS: NO SWEAT VISIBLE
AGITATION: NORMAL ACTIVITY
VISUAL DISTURBANCES: NOT PRESENT
NAUSEA AND VOMITING: MILD NAUSEA WITH NO VOMITING
AUDITORY DISTURBANCES: NOT PRESENT
PAROXYSMAL SWEATS: 2
AUDITORY DISTURBANCES: NOT PRESENT
PAROXYSMAL SWEATS: NO SWEAT VISIBLE
NAUSEA AND VOMITING: NO NAUSEA AND NO VOMITING
HEADACHE, FULLNESS IN HEAD: MILD
PAROXYSMAL SWEATS: NO SWEAT VISIBLE
TACTILE DISTURBANCES: NOT PRESENT
NAUSEA AND VOMITING: NO NAUSEA AND NO VOMITING
HEADACHE, FULLNESS IN HEAD: NOT PRESENT
TACTILE DISTURBANCES: NOT PRESENT
AGITATION: NORMAL ACTIVITY
NAUSEA AND VOMITING: 2
TACTILE DISTURBANCES: NOT PRESENT
ANXIETY: MILDLY ANXIOUS
PAROXYSMAL SWEATS: NO SWEAT VISIBLE
AUDITORY DISTURBANCES: NOT PRESENT
VISUAL DISTURBANCES: NOT PRESENT
AUDITORY DISTURBANCES: NOT PRESENT
AGITATION: 3
TREMOR: NOT VISIBLE, BUT CAN BE FELT FINGERTIP TO FINGERTIP

## 2020-04-27 ENCOUNTER — HOSPITAL ENCOUNTER (OUTPATIENT)
Facility: HOSPITAL | Age: 40
Setting detail: OBSERVATION
Discharge: HOME | DRG: 439 | End: 2020-04-28
Attending: EMERGENCY MEDICINE | Admitting: HOSPITALIST
Payer: MEDICAID

## 2020-04-27 ENCOUNTER — APPOINTMENT (OUTPATIENT)
Dept: GENERAL RADIOLOGY | Facility: HOSPITAL | Age: 40
DRG: 439 | End: 2020-04-27
Attending: INTERNAL MEDICINE
Payer: MEDICAID

## 2020-04-27 DIAGNOSIS — K85.90 ACUTE PANCREATITIS, UNSPECIFIED COMPLICATION STATUS, UNSPECIFIED PANCREATITIS TYPE: Primary | ICD-10-CM

## 2020-04-27 PROCEDURE — 99223 1ST HOSP IP/OBS HIGH 75: CPT | Performed by: INTERNAL MEDICINE

## 2020-04-27 PROCEDURE — 74019 RADEX ABDOMEN 2 VIEWS: CPT | Performed by: INTERNAL MEDICINE

## 2020-04-27 RX ORDER — DEXTROAMPHETAMINE SACCHARATE, AMPHETAMINE ASPARTATE, DEXTROAMPHETAMINE SULFATE AND AMPHETAMINE SULFATE 2.5; 2.5; 2.5; 2.5 MG/1; MG/1; MG/1; MG/1
10 TABLET ORAL
Status: DISCONTINUED | OUTPATIENT
Start: 2020-04-28 | End: 2020-04-28

## 2020-04-27 RX ORDER — DIPHENHYDRAMINE HYDROCHLORIDE 50 MG/ML
12.5 INJECTION INTRAMUSCULAR; INTRAVENOUS ONCE
Status: COMPLETED | OUTPATIENT
Start: 2020-04-27 | End: 2020-04-27

## 2020-04-27 RX ORDER — MORPHINE SULFATE 4 MG/ML
4 INJECTION, SOLUTION INTRAMUSCULAR; INTRAVENOUS EVERY 2 HOUR PRN
Status: DISCONTINUED | OUTPATIENT
Start: 2020-04-27 | End: 2020-04-28

## 2020-04-27 RX ORDER — ONDANSETRON 2 MG/ML
4 INJECTION INTRAMUSCULAR; INTRAVENOUS EVERY 6 HOURS PRN
Status: DISCONTINUED | OUTPATIENT
Start: 2020-04-27 | End: 2020-04-28

## 2020-04-27 RX ORDER — SODIUM CHLORIDE 9 MG/ML
INJECTION, SOLUTION INTRAVENOUS CONTINUOUS
Status: DISCONTINUED | OUTPATIENT
Start: 2020-04-27 | End: 2020-04-28

## 2020-04-27 RX ORDER — HYDROMORPHONE HYDROCHLORIDE 1 MG/ML
1 INJECTION, SOLUTION INTRAMUSCULAR; INTRAVENOUS; SUBCUTANEOUS ONCE
Status: COMPLETED | OUTPATIENT
Start: 2020-04-27 | End: 2020-04-27

## 2020-04-27 RX ORDER — ONDANSETRON 2 MG/ML
4 INJECTION INTRAMUSCULAR; INTRAVENOUS ONCE
Status: COMPLETED | OUTPATIENT
Start: 2020-04-27 | End: 2020-04-27

## 2020-04-27 RX ORDER — METOCLOPRAMIDE HYDROCHLORIDE 5 MG/ML
10 INJECTION INTRAMUSCULAR; INTRAVENOUS EVERY 8 HOURS PRN
Status: DISCONTINUED | OUTPATIENT
Start: 2020-04-27 | End: 2020-04-28

## 2020-04-27 RX ORDER — DIPHENHYDRAMINE HCL 25 MG
25 CAPSULE ORAL EVERY 8 HOURS PRN
Status: DISCONTINUED | OUTPATIENT
Start: 2020-04-27 | End: 2020-04-28

## 2020-04-27 RX ORDER — SODIUM CHLORIDE 9 MG/ML
INJECTION, SOLUTION INTRAVENOUS CONTINUOUS
Status: ACTIVE | OUTPATIENT
Start: 2020-04-27 | End: 2020-04-27

## 2020-04-27 RX ORDER — MORPHINE SULFATE 4 MG/ML
1 INJECTION, SOLUTION INTRAMUSCULAR; INTRAVENOUS EVERY 2 HOUR PRN
Status: DISCONTINUED | OUTPATIENT
Start: 2020-04-27 | End: 2020-04-28

## 2020-04-27 RX ORDER — ENOXAPARIN SODIUM 100 MG/ML
40 INJECTION SUBCUTANEOUS DAILY
Status: DISCONTINUED | OUTPATIENT
Start: 2020-04-27 | End: 2020-04-28

## 2020-04-27 RX ORDER — BUSPIRONE HYDROCHLORIDE 5 MG/1
20 TABLET ORAL 2 TIMES DAILY
Status: DISCONTINUED | OUTPATIENT
Start: 2020-04-27 | End: 2020-04-28

## 2020-04-27 RX ORDER — METOPROLOL SUCCINATE 50 MG/1
50 TABLET, EXTENDED RELEASE ORAL
Status: DISCONTINUED | OUTPATIENT
Start: 2020-04-27 | End: 2020-04-28

## 2020-04-27 RX ORDER — PANTOPRAZOLE SODIUM 40 MG/1
40 TABLET, DELAYED RELEASE ORAL
Status: DISCONTINUED | OUTPATIENT
Start: 2020-04-27 | End: 2020-04-28

## 2020-04-27 RX ORDER — HYDROMORPHONE HYDROCHLORIDE 1 MG/ML
0.5 INJECTION, SOLUTION INTRAMUSCULAR; INTRAVENOUS; SUBCUTANEOUS EVERY 30 MIN PRN
Status: ACTIVE | OUTPATIENT
Start: 2020-04-27 | End: 2020-04-27

## 2020-04-27 RX ORDER — TRAZODONE HYDROCHLORIDE 100 MG/1
200 TABLET ORAL NIGHTLY PRN
Status: DISCONTINUED | OUTPATIENT
Start: 2020-04-27 | End: 2020-04-28

## 2020-04-27 RX ORDER — ONDANSETRON 2 MG/ML
4 INJECTION INTRAMUSCULAR; INTRAVENOUS EVERY 4 HOURS PRN
Status: DISCONTINUED | OUTPATIENT
Start: 2020-04-27 | End: 2020-04-28

## 2020-04-27 RX ORDER — GABAPENTIN 300 MG/1
300 CAPSULE ORAL DAILY
Status: DISCONTINUED | OUTPATIENT
Start: 2020-04-27 | End: 2020-04-28

## 2020-04-27 RX ORDER — MONTELUKAST SODIUM 10 MG/1
10 TABLET ORAL DAILY
Status: DISCONTINUED | OUTPATIENT
Start: 2020-04-27 | End: 2020-04-28

## 2020-04-27 RX ORDER — ALPRAZOLAM 0.25 MG/1
0.25 TABLET ORAL 3 TIMES DAILY PRN
Status: DISCONTINUED | OUTPATIENT
Start: 2020-04-27 | End: 2020-04-28

## 2020-04-27 RX ORDER — MORPHINE SULFATE 4 MG/ML
2 INJECTION, SOLUTION INTRAMUSCULAR; INTRAVENOUS EVERY 2 HOUR PRN
Status: DISCONTINUED | OUTPATIENT
Start: 2020-04-27 | End: 2020-04-28

## 2020-04-27 NOTE — DIETARY NOTE
[address] [facility]     Admitting diagnosis:  Acute pancreatitis, unspecified complication status, unspecified pancreatitis type [K85.90]    Ht: 167.6 cm (5' 6\")  Wt: 68.9 kg (152 lb).  This is 117 % of IBW  Body mass ind

## 2020-04-27 NOTE — ED INITIAL ASSESSMENT (HPI)
A/O x 4. Patient presents with generalized abdominal pain, diarrhea, and nausea since 530pm.  Patient reports a history of pancreatitis. Patient states that pain is similar. Denies any urinary issues.

## 2020-04-27 NOTE — PLAN OF CARE
Patient A&O x 4. Room air. No tele. Right Limb Precaution d/t history of mastectomy with lymph node removal. NPO d/t abd pain. RUQ pain primarily with diffuse pain throughout abdomen per patient. PRN morphine for pain, itchiness per patient.  PRN Benadryl o (undernourished)  Description  INTERVENTIONS:  - Monitor percentage of each meal consumed  - Identify factors contributing to decreased intake, treat as appropriate  - Assist with meals as needed  - Monitor I&O, WT and lab values  - Obtain nutritional cons

## 2020-04-27 NOTE — CM/SW NOTE
Order noted for PHQ-4  MSW attempted to reach pt by phone, no answer. MSW put Mental Health resource on dc instructions. MSW will continue to follow.

## 2020-04-27 NOTE — PROGRESS NOTES
NURSING ADMISSION NOTE      Patient admitted via Cart  Oriented to room. Safety precautions initiated. Bed in low position. Call light in reach. Received patient from ED, A&Ox4. Here for Pancreatitis. Admission navigator completed.  Admission orde

## 2020-04-27 NOTE — CONSULTS
[facility]                       Gastroenterology [address] [facility] Gastroenterology    Lelia Narvaez Patient Status:  Inpatient    1980 MRN [id number]   [facility] 3NE-A Attending Shaggy Amezcua MD   [facility] Day PRN  ondansetron HCl (ZOFRAN) injection 4 mg, 4 mg, Intravenous, Q4H PRN  0.9% NaCl infusion, , Intravenous, Continuous  Enoxaparin Sodium (LOVENOX) 40 MG/0.4ML injection 40 mg, 40 mg, Subcutaneous, Daily  morphINE sulfate (PF) 4 MG/ML injection 1 mg, 1 mg urinary tract infections, hematuria, dysuria, or nephrolithiasis           Psychiatric: The patient reports no history of depression, anxiety, suicidal ideation, or homicidal ideation           Oncologic: The patient reports on history of prior solid tumor 25.0     Recent Labs   Lab 04/27/20  0410   RBC 4.18   HGB 13.6   HCT 39.7   MCV 95.0   MCH 32.5   MCHC 34.3   RDW 13.5   NEPRELIM 3.16   WBC 4.5   .0       Recent Labs   Lab 04/27/20  0410   ALT 26   AST 24       Imaging: PREOPERATIVE DIAGNOSIS/IND esophagus was normal. The esophagogastric junction was 40 cm from the incisors. The squamocolumnar junction was 40 cm from the incisors and regular.      Stomach: The gastric mucosa was erythematous with multiple non-bleeding hemorrhagic erosions.  Random g gastrointestinal epithelial fragments.     nal Diagnosis:   Stomach, biopsy:  -Gastric antral and fundic type mucosa with no significant pathologic change.  -No evidence of Helicobacter pylori organisms.                    Impression:   46 y/o female with a

## 2020-04-27 NOTE — H&P
[facility]  History and Physical     Brant Bottoms Patient Status:  Emergency    1980 MRN [id number]   Location [address] [facility] Attending Maritza Hernandez, [address] [facility] Day # 0 PCP Vincent Sneed     Chief Complaint: abd pain Montelukast Sodium 10 MG Oral Tab, Take 10 mg by mouth nightly., Disp: , Rfl:   Sertraline HCl 100 MG Oral Tab, Take 200 mg by mouth daily. , Disp: , Rfl:   busPIRone HCl 10 MG Oral Tab, Take 20 mg by mouth 2 (two) times daily. , Disp: , Rfl:   traZODone H mg/dL). No results for input(s): PTP, INR in the last 168 hours. No results for input(s): TROP, CK in the last 168 hours. Imaging: Imaging data reviewed in Epic. ASSESSMENT / PLAN:     1. Acute pancreatitis  1. NPO   2. IVF  3.  Pain control,

## 2020-04-27 NOTE — ED PROVIDER NOTES
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 HEENT:  no scleral icterus. Mucous membranes are slightly dry, oropharynx is clear, uvula midline.    HEART: Regular rate and rhythm, BLUE   RAINBOW DRAW LAVENDER   RAINBOW DRAW LIGHT GREEN   RAINBOW DRAW GOLD   URINE CULTURE, ROUTINE                  MDM     Patient IV established, blood work obtained. Patient given IV fluid hydration, pain medication and antiemetic.   Lab work performe

## 2020-04-28 VITALS
RESPIRATION RATE: 16 BRPM | OXYGEN SATURATION: 94 % | HEART RATE: 74 BPM | HEIGHT: 66 IN | WEIGHT: 152 LBS | BODY MASS INDEX: 24.43 KG/M2 | SYSTOLIC BLOOD PRESSURE: 126 MMHG | TEMPERATURE: 98 F | DIASTOLIC BLOOD PRESSURE: 70 MMHG

## 2020-04-28 PROCEDURE — 99239 HOSP IP/OBS DSCHRG MGMT >30: CPT | Performed by: HOSPITALIST

## 2020-04-28 RX ORDER — TRAMADOL HYDROCHLORIDE 50 MG/1
100 TABLET ORAL EVERY 6 HOURS PRN
Status: DISCONTINUED | OUTPATIENT
Start: 2020-04-28 | End: 2020-04-28

## 2020-04-28 RX ORDER — PANTOPRAZOLE SODIUM 40 MG/1
40 TABLET, DELAYED RELEASE ORAL
Status: ON HOLD | COMMUNITY
End: 2020-04-28

## 2020-04-28 RX ORDER — TRAMADOL HYDROCHLORIDE 50 MG/1
TABLET ORAL EVERY 4 HOURS PRN
Qty: 24 TABLET | Refills: 0 | Status: SHIPPED | OUTPATIENT
Start: 2020-04-28

## 2020-04-28 RX ORDER — ONDANSETRON 8 MG/1
8 TABLET, ORALLY DISINTEGRATING ORAL EVERY 8 HOURS PRN
Status: ON HOLD | COMMUNITY
Start: 2020-04-07 | End: 2020-04-28

## 2020-04-28 RX ORDER — ONDANSETRON 4 MG/1
4 TABLET, ORALLY DISINTEGRATING ORAL EVERY 4 HOURS PRN
Qty: 30 TABLET | Refills: 0 | Status: SHIPPED | OUTPATIENT
Start: 2020-04-28

## 2020-04-28 RX ORDER — PANTOPRAZOLE SODIUM 40 MG/1
40 TABLET, DELAYED RELEASE ORAL
Qty: 60 TABLET | Refills: 0 | Status: SHIPPED | OUTPATIENT
Start: 2020-04-28 | End: 2021-11-09 | Stop reason: CLARIF

## 2020-04-28 NOTE — PLAN OF CARE
Patient A&O x 4. Room air. No tele. Up ambulating the halls this am. Extreme pain this AM in RUQ. Tolerating diet with no N/V this AM. PRN morphine for pain. Continuous IVF. All needs met at this time, will continue to monitor.      Problem: Patient/Family nutritional consult as needed  - Optimize oral hygiene and moisture  - Encourage food from home; allow for food preferences  - Enhance eating environment  Outcome: Progressing  Goal: Achieves appropriate nutritional intake (bariatric)  Description  Levie Medicus

## 2020-04-28 NOTE — PROGRESS NOTES
[facility]  Progress Note     Daniela Barrientos Patient Status:  Inpatient    1980 MRN [id number]   [facility] 3NE-A Attending Librado Eisenberg MD   Hosp Day # 1 PCP Maureen Greenberg     Chief Complaint: Abdominal pain    S: Patient fe enoxaparin  40 mg Subcutaneous Daily   • Pantoprazole Sodium  40 mg Oral BID AC   • busPIRone HCl  20 mg Oral BID   • amphetamine-dextroamphetamine  10 mg Oral [email or web address]   • gabapentin  300 mg Oral Daily   • Metoprolol Succinate ER  50 mg Oral 2x Daily

## 2020-04-28 NOTE — CM/SW NOTE
12:50  Order noted for PHQ-4  MSW attempted to reach pt by phone, no answer. MSW put Mental Health resource on dc instructions. MSW will continue to follow.

## 2020-04-28 NOTE — PLAN OF CARE
Pt is aox4. States pain is 8-9/10. Medicated for pain with 4mg morphine every 2 hours. Also medicated for nausea with zofan. No emesis.  Pt med rec was not completed and she states she was not getting her regular home meds which help her manage her anxiet profile  Outcome: Progressing  Goal: Maintains adequate nutritional intake (undernourished)  Description  INTERVENTIONS:  - Monitor percentage of each meal consumed  - Identify factors contributing to decreased intake, treat as appropriate  - Assist with m

## 2020-04-28 NOTE — PROGRESS NOTES
NURSING DISCHARGE NOTE    Discharged Home via Ambulatory. Accompanied by RN  Belongings Taken by patient/family. IV removed, medications returned from pharmacy. Educated on discharge plan. Pt verbalized understanding.  Paperwork and prescriptions gi

## 2020-04-29 NOTE — PAYOR COMM NOTE
REQUESTING 1 INPT DAY    DISCHARGE REVIEW    Payor: TIA  Subscriber #:  [id number]  Authorization Number: [id number]    Admit date: 4/27/20  Admit time:  [facility]  Discharge Date: 4/28/2020  3:07 PM     Admitting Physician: Iliana Hoang MD  Attending Phys BUN     CREATININE    0.52      CREATININE     CALCIUM    8.0      CALCIUM     BUN/CREAT Ratio    5.8      BUN/CREAT Ratio     eGFR NON-AFR. AMERICAN    122      eGFR NON-AFR.  AMERICAN     eGFR AFRICAN AMERICAN    140      eGFR      ANION G

## 2020-05-09 NOTE — DISCHARGE SUMMARY
[facility] HOSPITALIST  DISCHARGE SUMMARY     Desmond Alarcon Patient Status:  Observation    1980 MRN [id number]   [facility] 3NE-A Attending No att. providers found   Hosp Day # 0 PCP MICAH Benitez     Date of Admission: 2020  Date o every 4 (four) hours as needed for Nausea. Quantity:  30 tablet  Refills:  0     Pantoprazole Sodium 40 MG Tbec  Commonly known as:  PROTONIX  What changed:  when to take this      Take 1 tablet (40 mg total) by mouth 2 (two) times daily before meals. reviewed: n/a    Follow-up appointment:   Hiro Regalado  [address] [address] [phone]    In 1 week  If symptoms worsen, call for prescription refills, if needed.      Appointments for Next 30 Days 5/9/2020 - 6/8/2020    N

## 2020-08-10 ENCOUNTER — APPOINTMENT (OUTPATIENT)
Dept: GENERAL RADIOLOGY | Facility: HOSPITAL | Age: 40
End: 2020-08-10
Attending: EMERGENCY MEDICINE
Payer: MEDICAID

## 2020-08-10 ENCOUNTER — APPOINTMENT (OUTPATIENT)
Dept: CT IMAGING | Facility: HOSPITAL | Age: 40
End: 2020-08-10
Attending: EMERGENCY MEDICINE
Payer: MEDICAID

## 2020-08-10 PROBLEM — K85.20 ALCOHOL-INDUCED ACUTE PANCREATITIS WITHOUT INFECTION OR NECROSIS: Status: ACTIVE | Noted: 2020-08-10

## 2020-08-10 PROBLEM — K85.20 ALCOHOL-INDUCED ACUTE PANCREATITIS WITHOUT INFECTION OR NECROSIS (HCC): Status: ACTIVE | Noted: 2020-08-10

## 2020-08-10 PROBLEM — R73.9 HYPERGLYCEMIA: Status: ACTIVE | Noted: 2020-08-10

## 2020-08-10 PROBLEM — E87.6 HYPOKALEMIA: Status: ACTIVE | Noted: 2020-08-10

## 2020-08-10 PROBLEM — D69.6 THROMBOCYTOPENIA (HCC): Status: ACTIVE | Noted: 2020-08-10

## 2020-08-10 PROBLEM — D69.6 THROMBOCYTOPENIA: Status: ACTIVE | Noted: 2020-08-10

## 2020-08-10 PROCEDURE — 74177 CT ABD & PELVIS W/CONTRAST: CPT | Performed by: EMERGENCY MEDICINE

## 2020-08-10 PROCEDURE — 71045 X-RAY EXAM CHEST 1 VIEW: CPT | Performed by: EMERGENCY MEDICINE

## 2020-08-10 NOTE — ED INITIAL ASSESSMENT (HPI)
Pt presents to ed ambulatory with steady gait c/o chest tightness, n/v and ruq abdominal pain at a 10/10 that started 2 days ago. Pt states hx of pancreatitis. Pt is a&ox4, moves all extremities well, resps easy.

## 2020-08-10 NOTE — CONSULTS
[facility]  Report of Consultation    Lois Noel Patient Status:  Observation    1980 MRN [id number]   [facility] 4NW-A Attending Radha Schmitz MD   Date of Consult 8/10/2020 PCP [address]     Reason for Consultation:  Ab (TEMOVATE) 0.05 % external solution, , Topical, Nightly PRN  •  Clobetasol Propionate (TEMOVATE) 0.05 % cream, , Topical, TID PRN  •  diphenhydrAMINE (BENADRYL) cap/tab 25 mg, 25 mg, Oral, Q6H PRN  •  gabapentin (NEURONTIN) cap 300 mg, 300 mg, Oral, Nightl rate  Lymphatic: No adenopathy noted in the cervical  Skin: Normal color, texture, surface.   Ear, nose, mouth and throat: Normal lips  Respiratory: Normal auscultation and effort  Abdomen: Normal liver, spleen, soft, epigastric tenderness , BS present  Mus on chronic etoh induced calcific pancreatitis  Etoh abuse  Chronic tobacco use.     Plan:  Pain control  Trial of clears and advance slowly as tolerated to soft  Substance abuse counseling  Smoking cessation    Princess Paredes  8/10/2020  3:45 PM

## 2020-08-10 NOTE — PLAN OF CARE
RECD ALERT ,AWAKE, ORIENTED. NO DISTRESS. SEE MAR. DR MEHTA  AWARE OF CONSULT. CALL LIGHT W/IN REACH. NPO FOR NOW EXCEPT ICE CHIPS.  DENIES N/V TO CALL FOR ALL NEEDS

## 2020-08-10 NOTE — H&P
[facility]  History and Physical     Jessica Ryan Patient Status:  Observation    1980 MRN [id number]   [facility] 4NW-A Attending Sophie Figueroa MD   Hosp Day # 0 PCP [place]     Chief Complaint: abd pain    History morning., Disp: , Rfl:   Metoprolol Succinate  MG Oral Tablet 24 Hr, Take 50 mg by mouth 2 (two) times daily. , Disp: , Rfl:   calcipotriene 0.005 % External Cream, Apply topically 2 (two) times daily.  To affected area, Monday-Friday , Disp: , Rfl: 200 mg by mouth daily. , Disp: , Rfl:   busPIRone HCl 10 MG Oral Tab, Take 20 mg by mouth 2 (two) times daily. , Disp: , Rfl:   traZODone HCl 100 MG Oral Tab, Take 200 mg by mouth nightly., Disp: , Rfl:         Review of Systems:   A comprehensive 14 point r gi  2. NPO  3. Pain control  4. IVF  2. Splenic vein occlusion  1. Has EGD/EUD in march during last stay for pancreatitis  3. Hyponatremia  4. hypokalemia  5. Thrombocytopenia 2/2 etoh  6. etoh use disorder  1. Denies alcoholism  2.  Recommended she abstain

## 2020-08-10 NOTE — ED NOTES
Going to room 415, reported to [facility]. Transport paged.  Pt aware of her admission and verbalizing understanding

## 2020-08-10 NOTE — ED PROVIDER NOTES
Patient Seen in: [facility] Emergency Department      History   Patient presents with:  Chest Pain Angina  Abdomen/Flank Pain    Stated Complaint: chest tightness, abd pain    HPI    Patient presents with abdominal pain and chest pain.   The patient noted in HPI. Constitutional and vital signs reviewed. All other systems reviewed and negative except as noted above.     Physical Exam     ED Triage Vitals [08/10/20 0654]   BP (!) 117/98   Pulse 87   Resp 20   Temp 97.7 °F (36.5 °C)   Temp src Tempo Abnormality         Status                     ---------                               -----------         ------                     CBC W/ DIFFERENTIAL[[id number]]          Abnormal            Final result                 Please view results for these oj Post contrast coronal MPR imaging was performed. Dose reduction techniques were used. Dose information is transmitted to the [facility]) NRDR ([address]) which includes the Dose Index Registry.   PATIENT STATE pseudocyst.  Again noted is occlusion of the splenic vein with moderate-sized collaterals in the gastrosplenic ligament and perigastric region. 2. The remainder of the study is unremarkable.     Dictated by (CST): Austin Mccormack MD on 8/10/2020 at 9:11

## 2020-08-11 NOTE — PROGRESS NOTES
Went into patient room to start iv she states screw this I am getting out of here she grabbed her bag and walked down hallway gait steady. Dr Deirdre Tobar informed.

## 2020-08-11 NOTE — PLAN OF CARE
Pt alert and oriented x4. VSS, afebrile. Pt c/o abd pain, prn pain medication given per MAR. Denies nausea or SOB. SpO2 within normal limits on room air. IVF infusing per MAR. Tolerated full liquid diet well. Fall precautions in place.  Call light within re PLANNING  Goal: Discharge to home or other facility with appropriate resources  Description  INTERVENTIONS:  - Identify barriers to discharge w/pt and caregiver  - Include patient/family/discharge partner in discharge planning  - Arrange for needed dischar

## 2020-08-11 NOTE — PROGRESS NOTES
Pt seen and examined. Was called to see the pt this AM states she was agitated, screaming, cursing, threatening to leave AMA.    She had pushed to advance her diet last night as she was doing well o/n but her pain significantly worsened after trial of soft

## 2020-08-11 NOTE — PROGRESS NOTES
[facility]  Progress Note    Daniel Kwong Patient Status:  Observation    1980 MRN [id number]   [facility] 4NW-A Attending No att. providers found   Date 2020 PCP MICAH VILLALOBOS     Subjective:  Daniel Kwong is a(n) 47 y complication status, unspecified pancreatitis type     Hypokalemia     Thrombocytopenia (HCC)     Hyperglycemia     Alcohol-induced acute pancreatitis without infection or necrosis     SVT (supraventricular tachycardia) (HCC)      Impression:  Acute on chr

## 2020-08-11 NOTE — PROGRESS NOTES
Patient requested pain meds this am states that she needs them increased states that she cant feel the pain med rush also states that she has nausea she was given zofran talked with patient about her diet and that she ordered apple pie for breakfast and th

## 2020-08-11 NOTE — PROGRESS NOTES
Call placed to Dr Yee Jiménez to give condition report patient states that she needs a least dilaudid 2 mg iv to control her pain every 2 hours. Explained to patient that she is npo at this time .

## 2020-08-11 NOTE — PLAN OF CARE
This writer informed by charge nurse pt would like to talk to someone. Entered pt's room she was fully dressed grabbed her purse and said \"I'm not waiting anymore\". Pt left unit AMA pt refused to talk to writer. Pt walked to elevator.

## 2020-08-12 NOTE — DISCHARGE SUMMARY
[facility] [place] HOSPITALIST  DISCHARGE SUMMARY     Ala Six Patient Status:  Observation    1980 MRN [id number]   [facility] 4NW-A Attending No att. providers found   Hosp Day # 0 PCP MICAH Burns     Date of Admission: 8/10/2020  Date o not being delivered quickly enough. Upon admission, her biggest concern seemed to be her pain regimen. Upon her diet advancing, pain worsened. Upset that she was not getting enough dilaudid and left AMA. Please see note from day of dc for further details. hydrocortisone 2.5 % Crea      Apply topically 2 (two) times daily as needed. To face   Refills:  0     loratadine 10 MG Tabs  Commonly known as:  CLARITIN      Take 10 mg by mouth daily as needed for Allergies.    Refills:  0     Metoprolol Succinate ER rebound or guarding. Neurologic: No focal neurological deficits. Musculoskeletal: Moves all extremities. Extremities: No edema.   -----------------------------------------------------------------------------------------------  PATIENT DISCHARGE INSTRUCT

## 2020-12-08 ENCOUNTER — HOSPITAL ENCOUNTER (EMERGENCY)
Facility: HOSPITAL | Age: 40
Discharge: HOME | End: 2020-12-08
Attending: EMERGENCY MEDICINE
Payer: MEDICAID

## 2020-12-08 ENCOUNTER — APPOINTMENT (OUTPATIENT)
Dept: GENERAL RADIOLOGY | Facility: HOSPITAL | Age: 40
End: 2020-12-08
Attending: EMERGENCY MEDICINE
Payer: MEDICAID

## 2020-12-08 VITALS
SYSTOLIC BLOOD PRESSURE: 117 MMHG | WEIGHT: 155 LBS | OXYGEN SATURATION: 97 % | HEART RATE: 99 BPM | HEIGHT: 66 IN | RESPIRATION RATE: 20 BRPM | DIASTOLIC BLOOD PRESSURE: 90 MMHG | TEMPERATURE: 98 F | BODY MASS INDEX: 24.91 KG/M2

## 2020-12-08 DIAGNOSIS — I47.1 SVT (SUPRAVENTRICULAR TACHYCARDIA) (HCC): Primary | ICD-10-CM

## 2020-12-08 DIAGNOSIS — F10.230 ALCOHOL WITHDRAWAL SYNDROME WITHOUT COMPLICATION (HCC): ICD-10-CM

## 2020-12-08 PROCEDURE — 85025 COMPLETE CBC W/AUTO DIFF WBC: CPT | Performed by: EMERGENCY MEDICINE

## 2020-12-08 PROCEDURE — 71045 X-RAY EXAM CHEST 1 VIEW: CPT | Performed by: EMERGENCY MEDICINE

## 2020-12-08 PROCEDURE — 99285 EMERGENCY DEPT VISIT HI MDM: CPT

## 2020-12-08 PROCEDURE — 96375 TX/PRO/DX INJ NEW DRUG ADDON: CPT

## 2020-12-08 PROCEDURE — 83735 ASSAY OF MAGNESIUM: CPT | Performed by: EMERGENCY MEDICINE

## 2020-12-08 PROCEDURE — 96361 HYDRATE IV INFUSION ADD-ON: CPT

## 2020-12-08 PROCEDURE — 81025 URINE PREGNANCY TEST: CPT

## 2020-12-08 PROCEDURE — 96365 THER/PROPH/DIAG IV INF INIT: CPT

## 2020-12-08 PROCEDURE — 80320 DRUG SCREEN QUANTALCOHOLS: CPT | Performed by: EMERGENCY MEDICINE

## 2020-12-08 PROCEDURE — 84443 ASSAY THYROID STIM HORMONE: CPT | Performed by: EMERGENCY MEDICINE

## 2020-12-08 PROCEDURE — 80053 COMPREHEN METABOLIC PANEL: CPT | Performed by: EMERGENCY MEDICINE

## 2020-12-08 PROCEDURE — 93010 ELECTROCARDIOGRAM REPORT: CPT

## 2020-12-08 PROCEDURE — 72110 X-RAY EXAM L-2 SPINE 4/>VWS: CPT | Performed by: EMERGENCY MEDICINE

## 2020-12-08 PROCEDURE — 93005 ELECTROCARDIOGRAM TRACING: CPT

## 2020-12-08 PROCEDURE — 84484 ASSAY OF TROPONIN QUANT: CPT | Performed by: EMERGENCY MEDICINE

## 2020-12-08 PROCEDURE — 85379 FIBRIN DEGRADATION QUANT: CPT | Performed by: EMERGENCY MEDICINE

## 2020-12-08 RX ORDER — LORAZEPAM 2 MG/ML
2 INJECTION INTRAMUSCULAR
Status: DISCONTINUED | OUTPATIENT
Start: 2020-12-08 | End: 2020-12-08

## 2020-12-08 RX ORDER — LORAZEPAM 2 MG/ML
2 INJECTION INTRAMUSCULAR ONCE
Status: COMPLETED | OUTPATIENT
Start: 2020-12-08 | End: 2020-12-08

## 2020-12-08 RX ORDER — CHLORDIAZEPOXIDE HYDROCHLORIDE 25 MG/1
25 CAPSULE, GELATIN COATED ORAL ONCE
Status: COMPLETED | OUTPATIENT
Start: 2020-12-08 | End: 2020-12-08

## 2020-12-08 RX ORDER — ADENOSINE 3 MG/ML
INJECTION, SOLUTION INTRAVENOUS
Status: COMPLETED
Start: 2020-12-08 | End: 2020-12-08

## 2020-12-08 RX ORDER — ONDANSETRON 4 MG/1
4 TABLET, ORALLY DISINTEGRATING ORAL EVERY 4 HOURS PRN
Qty: 10 TABLET | Refills: 0 | Status: SHIPPED | OUTPATIENT
Start: 2020-12-08 | End: 2020-12-15

## 2020-12-08 RX ORDER — TRAMADOL HYDROCHLORIDE 50 MG/1
TABLET ORAL EVERY 6 HOURS PRN
Qty: 10 TABLET | Refills: 0 | Status: SHIPPED | OUTPATIENT
Start: 2020-12-08 | End: 2020-12-15

## 2020-12-09 NOTE — ED PROVIDER NOTES
Patient Seen in: [facility] Emergency Department      History   Patient presents with:  Arrythmia/Palpitations    Stated Complaint: svt, converted    HPI    46-year-old with past medical history of breast cancer status postmastectomy, SVT, anxiety p Review of Systems    Positive for stated complaint: svt, converted  Other systems are as noted in HPI. Constitutional and vital signs reviewed. All other systems reviewed and negative except as noted above.     Physical Exam     ED Triage Vitals components:    Ethyl Alcohol 48 (*)     All other components within normal limits   TROPONIN I - Normal   MAGNESIUM - Normal   D-DIMER - Normal   TSH W REFLEX TO FREE T4 - Normal   CBC WITH DIFFERENTIAL WITH PLATELET    Narrative:      The following orders Date: 12/8/2020  PROCEDURE:  XR LUMBAR SPINE (MIN 4 VIEWS) (CPT=72110)  TECHNIQUE:  AP, lateral, oblique, and coned down L5-S1 views were obtained. COMPARISON:  None.   INDICATIONS:  svt, converted  PATIENT STATED HISTORY: (As transcribed by Technologist) Will discharge patient at her request.  She agrees to outpatient follow-up and return sooner for new or worsening symptoms.                Disposition and Plan     Clinical Impression:  SVT (supraventricular tachycardia) (HCC)  (primary encounter diagnosis

## 2020-12-15 ENCOUNTER — HOSPITAL ENCOUNTER (INPATIENT)
Age: 40
LOS: 2 days | Discharge: PSYCHIATRIC OR SUBSTANCE USE FACILITY | DRG: 817 | End: 2020-12-17
Attending: EMERGENCY MEDICINE | Admitting: INTERNAL MEDICINE

## 2020-12-15 ENCOUNTER — APPOINTMENT (OUTPATIENT)
Dept: CT IMAGING | Age: 40
DRG: 817 | End: 2020-12-15
Attending: EMERGENCY MEDICINE

## 2020-12-15 DIAGNOSIS — F10.920 ALCOHOLIC INTOXICATION WITHOUT COMPLICATION (CMD): Primary | ICD-10-CM

## 2020-12-15 DIAGNOSIS — R45.851 SUICIDAL IDEATION: ICD-10-CM

## 2020-12-15 DIAGNOSIS — T50.902A INTENTIONAL DRUG OVERDOSE, INITIAL ENCOUNTER (CMD): ICD-10-CM

## 2020-12-15 LAB
ALBUMIN SERPL-MCNC: 3.7 G/DL (ref 3.6–5.1)
ALBUMIN/GLOB SERPL: 0.9 {RATIO} (ref 1–2.4)
ALP SERPL-CCNC: 79 UNITS/L (ref 45–117)
ALT SERPL-CCNC: 12 UNITS/L
ANION GAP SERPL CALC-SCNC: 13 MMOL/L (ref 10–20)
APAP SERPL-MCNC: <2 MCG/ML (ref 10–30)
APPEARANCE UR: CLEAR
APTT PPP: 25 SEC (ref 22–30)
AST SERPL-CCNC: 13 UNITS/L
ATRIAL RATE (BPM): 75
BASOPHILS # BLD: 0 K/MCL (ref 0–0.3)
BASOPHILS NFR BLD: 1 %
BILIRUB SERPL-MCNC: 0.4 MG/DL (ref 0.2–1)
BILIRUB UR QL STRIP: NEGATIVE
BUN SERPL-MCNC: 10 MG/DL (ref 6–20)
BUN/CREAT SERPL: 17 (ref 7–25)
CALCIUM SERPL-MCNC: 8.5 MG/DL (ref 8.4–10.2)
CHLORIDE SERPL-SCNC: 105 MMOL/L (ref 98–107)
CO2 SERPL-SCNC: 25 MMOL/L (ref 21–32)
COLOR UR: YELLOW
CREAT SERPL-MCNC: 0.58 MG/DL (ref 0.51–0.95)
CRP SERPL-MCNC: <0.3 MG/DL
DEPRECATED RDW RBC: 46.7 FL (ref 39–50)
EOSINOPHIL # BLD: 0 K/MCL (ref 0–0.5)
EOSINOPHIL NFR BLD: 1 %
ERYTHROCYTE [DISTWIDTH] IN BLOOD: 14 % (ref 11–15)
ERYTHROCYTE [SEDIMENTATION RATE] IN BLOOD BY WESTERGREN METHOD: 21 MM/HR (ref 0–20)
ETHANOL SERPL-MCNC: 350 MG/DL
FASTING DURATION TIME PATIENT: ABNORMAL H
GFR SERPLBLD BASED ON 1.73 SQ M-ARVRAT: >90 ML/MIN/1.73M2
GLOBULIN SER-MCNC: 4.1 G/DL (ref 2–4)
GLUCOSE SERPL-MCNC: 95 MG/DL (ref 65–99)
GLUCOSE UR STRIP-MCNC: NEGATIVE MG/DL
HCT VFR BLD CALC: 42.1 % (ref 36–46.5)
HGB BLD-MCNC: 14.3 G/DL (ref 12–15.5)
HGB UR QL STRIP: NEGATIVE
IMM GRANULOCYTES # BLD AUTO: 0 K/MCL (ref 0–0.2)
IMM GRANULOCYTES # BLD: 0 %
INR PPP: 1 SEC
KETONES UR STRIP-MCNC: NEGATIVE MG/DL
LEUKOCYTE ESTERASE UR QL STRIP: NEGATIVE
LYMPHOCYTES # BLD: 2.2 K/MCL (ref 1–4.8)
LYMPHOCYTES NFR BLD: 42 %
MAGNESIUM SERPL-MCNC: 2.1 MG/DL (ref 1.7–2.4)
MCH RBC QN AUTO: 30.9 PG (ref 26–34)
MCHC RBC AUTO-ENTMCNC: 34 G/DL (ref 32–36.5)
MCV RBC AUTO: 90.9 FL (ref 78–100)
MONOCYTES # BLD: 0.4 K/MCL (ref 0.3–0.9)
MONOCYTES NFR BLD: 7 %
NEUTROPHILS # BLD: 2.6 K/MCL (ref 1.8–7.7)
NEUTROPHILS NFR BLD: 49 %
NITRITE UR QL STRIP: NEGATIVE
NRBC BLD MANUAL-RTO: 0 /100 WBC
P AXIS (DEGREES): 63
PH UR STRIP: 5 [PH] (ref 5–7)
PLATELET # BLD AUTO: 220 K/MCL (ref 140–450)
POTASSIUM SERPL-SCNC: 3.5 MMOL/L (ref 3.4–5.1)
PR-INTERVAL (MSEC): 154
PROCALCITONIN SERPL IA-MCNC: <0.05 NG/ML
PROT SERPL-MCNC: 7.8 G/DL (ref 6.4–8.2)
PROT UR STRIP-MCNC: NEGATIVE MG/DL
PROTHROMBIN TIME: 10.4 SEC (ref 9.7–11.8)
QRS-INTERVAL (MSEC): 72
QT-INTERVAL (MSEC): 408
QTC: 456
R AXIS (DEGREES): 96
RAINBOW EXTRA TUBES HOLD SPECIMEN: NORMAL
RBC # BLD: 4.63 MIL/MCL (ref 4–5.2)
REPORT TEXT: NORMAL
SALICYLATES SERPL-MCNC: 3.3 MG/DL
SARS-COV-2 RNA RESP QL NAA+PROBE: NOT DETECTED
SERVICE CMNT-IMP: NORMAL
SERVICE CMNT-IMP: NORMAL
SODIUM SERPL-SCNC: 139 MMOL/L (ref 135–145)
SP GR UR STRIP: 1.02 (ref 1–1.03)
T AXIS (DEGREES): 79
UROBILINOGEN UR STRIP-MCNC: 0.2 MG/DL
VENTRICULAR RATE EKG/MIN (BPM): 75
WBC # BLD: 5.2 K/MCL (ref 4.2–11)

## 2020-12-15 PROCEDURE — C9803 HOPD COVID-19 SPEC COLLECT: HCPCS

## 2020-12-15 PROCEDURE — 99285 EMERGENCY DEPT VISIT HI MDM: CPT

## 2020-12-15 PROCEDURE — 10002803 HB RX 637: Performed by: NURSE PRACTITIONER

## 2020-12-15 PROCEDURE — 85652 RBC SED RATE AUTOMATED: CPT | Performed by: EMERGENCY MEDICINE

## 2020-12-15 PROCEDURE — 85730 THROMBOPLASTIN TIME PARTIAL: CPT | Performed by: EMERGENCY MEDICINE

## 2020-12-15 PROCEDURE — 96374 THER/PROPH/DIAG INJ IV PUSH: CPT

## 2020-12-15 PROCEDURE — 80320 DRUG SCREEN QUANTALCOHOLS: CPT | Performed by: EMERGENCY MEDICINE

## 2020-12-15 PROCEDURE — 96376 TX/PRO/DX INJ SAME DRUG ADON: CPT

## 2020-12-15 PROCEDURE — 81003 URINALYSIS AUTO W/O SCOPE: CPT | Performed by: EMERGENCY MEDICINE

## 2020-12-15 PROCEDURE — 85610 PROTHROMBIN TIME: CPT | Performed by: EMERGENCY MEDICINE

## 2020-12-15 PROCEDURE — 86140 C-REACTIVE PROTEIN: CPT | Performed by: EMERGENCY MEDICINE

## 2020-12-15 PROCEDURE — 10002803 HB RX 637: Performed by: INTERNAL MEDICINE

## 2020-12-15 PROCEDURE — 10004651 HB RX, NO CHARGE ITEM: Performed by: INTERNAL MEDICINE

## 2020-12-15 PROCEDURE — 85025 COMPLETE CBC W/AUTO DIFF WBC: CPT | Performed by: EMERGENCY MEDICINE

## 2020-12-15 PROCEDURE — 74176 CT ABD & PELVIS W/O CONTRAST: CPT

## 2020-12-15 PROCEDURE — 10002800 HB RX 250 W HCPCS: Performed by: EMERGENCY MEDICINE

## 2020-12-15 PROCEDURE — 93005 ELECTROCARDIOGRAM TRACING: CPT | Performed by: EMERGENCY MEDICINE

## 2020-12-15 PROCEDURE — 84145 PROCALCITONIN (PCT): CPT | Performed by: EMERGENCY MEDICINE

## 2020-12-15 PROCEDURE — 13003398 CT LUMBAR SPINE 2D REFORMATTED

## 2020-12-15 PROCEDURE — 80053 COMPREHEN METABOLIC PANEL: CPT | Performed by: EMERGENCY MEDICINE

## 2020-12-15 PROCEDURE — 80329 ANALGESICS NON-OPIOID 1 OR 2: CPT | Performed by: EMERGENCY MEDICINE

## 2020-12-15 PROCEDURE — 10000002 HB ROOM CHARGE MED SURG

## 2020-12-15 PROCEDURE — 83735 ASSAY OF MAGNESIUM: CPT | Performed by: EMERGENCY MEDICINE

## 2020-12-15 PROCEDURE — U0003 INFECTIOUS AGENT DETECTION BY NUCLEIC ACID (DNA OR RNA); SEVERE ACUTE RESPIRATORY SYNDROME CORONAVIRUS 2 (SARS-COV-2) (CORONAVIRUS DISEASE [COVID-19]), AMPLIFIED PROBE TECHNIQUE, MAKING USE OF HIGH THROUGHPUT TECHNOLOGIES AS DESCRIBED BY CMS-2020-01-R: HCPCS | Performed by: EMERGENCY MEDICINE

## 2020-12-15 RX ORDER — ALBUTEROL SULFATE 90 UG/1
2 AEROSOL, METERED RESPIRATORY (INHALATION) EVERY 4 HOURS PRN
Status: ON HOLD | COMMUNITY
Start: 2020-12-08 | End: 2023-01-24

## 2020-12-15 RX ORDER — LORAZEPAM 2 MG/ML
2 INJECTION INTRAMUSCULAR ONCE
Status: COMPLETED | OUTPATIENT
Start: 2020-12-15 | End: 2020-12-15

## 2020-12-15 RX ORDER — TRAZODONE HYDROCHLORIDE 50 MG/1
50 TABLET ORAL NIGHTLY
Status: DISCONTINUED | OUTPATIENT
Start: 2020-12-15 | End: 2020-12-17 | Stop reason: HOSPADM

## 2020-12-15 RX ORDER — DEXTROAMPHETAMINE SACCHARATE, AMPHETAMINE ASPARTATE MONOHYDRATE, DEXTROAMPHETAMINE SULFATE AND AMPHETAMINE SULFATE 3.75; 3.75; 3.75; 3.75 MG/1; MG/1; MG/1; MG/1
30 CAPSULE, EXTENDED RELEASE ORAL EVERY MORNING
Status: DISCONTINUED | OUTPATIENT
Start: 2020-12-16 | End: 2020-12-17 | Stop reason: HOSPADM

## 2020-12-15 RX ORDER — METOPROLOL SUCCINATE 50 MG/1
50 TABLET, EXTENDED RELEASE ORAL 2 TIMES DAILY
Status: DISCONTINUED | OUTPATIENT
Start: 2020-12-15 | End: 2020-12-17 | Stop reason: HOSPADM

## 2020-12-15 RX ORDER — ONDANSETRON 2 MG/ML
4 INJECTION INTRAMUSCULAR; INTRAVENOUS EVERY 12 HOURS PRN
Status: DISCONTINUED | OUTPATIENT
Start: 2020-12-15 | End: 2020-12-17 | Stop reason: HOSPADM

## 2020-12-15 RX ORDER — LORAZEPAM 2 MG/ML
4 INJECTION INTRAMUSCULAR
Status: DISCONTINUED | OUTPATIENT
Start: 2020-12-15 | End: 2020-12-17 | Stop reason: HOSPADM

## 2020-12-15 RX ORDER — ALBUTEROL SULFATE 2.5 MG/3ML
2.5 SOLUTION RESPIRATORY (INHALATION)
Status: DISCONTINUED | OUTPATIENT
Start: 2020-12-15 | End: 2020-12-17 | Stop reason: HOSPADM

## 2020-12-15 RX ORDER — SERTRALINE HYDROCHLORIDE 100 MG/1
200 TABLET, FILM COATED ORAL DAILY
Status: DISCONTINUED | OUTPATIENT
Start: 2020-12-16 | End: 2020-12-17 | Stop reason: HOSPADM

## 2020-12-15 RX ORDER — TRAZODONE HYDROCHLORIDE 100 MG/1
200 TABLET ORAL NIGHTLY
Status: ON HOLD | COMMUNITY
Start: 2020-11-16 | End: 2020-12-17 | Stop reason: HOSPADM

## 2020-12-15 RX ORDER — ALBUTEROL SULFATE 90 UG/1
2 AEROSOL, METERED RESPIRATORY (INHALATION)
Status: DISCONTINUED | OUTPATIENT
Start: 2020-12-15 | End: 2020-12-15

## 2020-12-15 RX ORDER — TRAMADOL HYDROCHLORIDE 50 MG/1
50 TABLET ORAL EVERY 6 HOURS PRN
Status: ON HOLD | COMMUNITY
Start: 2020-12-09 | End: 2020-12-23 | Stop reason: SDUPTHER

## 2020-12-15 RX ORDER — MONTELUKAST SODIUM 10 MG/1
10 TABLET ORAL DAILY
Status: DISCONTINUED | OUTPATIENT
Start: 2020-12-16 | End: 2020-12-17 | Stop reason: HOSPADM

## 2020-12-15 RX ORDER — LIDOCAINE 4 G/G
1 PATCH TOPICAL DAILY
Status: DISCONTINUED | OUTPATIENT
Start: 2020-12-15 | End: 2020-12-17 | Stop reason: HOSPADM

## 2020-12-15 RX ORDER — ONDANSETRON 4 MG/1
4 TABLET, ORALLY DISINTEGRATING ORAL EVERY 12 HOURS PRN
Status: DISCONTINUED | OUTPATIENT
Start: 2020-12-15 | End: 2020-12-17 | Stop reason: HOSPADM

## 2020-12-15 RX ORDER — CLOBETASOL PROPIONATE 0.5 MG/G
1 CREAM TOPICAL 2 TIMES DAILY
Status: ON HOLD | COMMUNITY
End: 2023-01-24

## 2020-12-15 RX ORDER — BUPROPION HYDROCHLORIDE 100 MG/1
100 TABLET, EXTENDED RELEASE ORAL EVERY 12 HOURS SCHEDULED
Status: DISCONTINUED | OUTPATIENT
Start: 2020-12-15 | End: 2020-12-17 | Stop reason: HOSPADM

## 2020-12-15 RX ORDER — LORAZEPAM 2 MG/ML
2 INJECTION INTRAMUSCULAR
Status: DISCONTINUED | OUTPATIENT
Start: 2020-12-15 | End: 2020-12-17 | Stop reason: HOSPADM

## 2020-12-15 RX ORDER — LORAZEPAM 0.5 MG/1
2 TABLET ORAL
Status: DISCONTINUED | OUTPATIENT
Start: 2020-12-15 | End: 2020-12-17 | Stop reason: HOSPADM

## 2020-12-15 RX ORDER — PANTOPRAZOLE SODIUM 40 MG/1
40 TABLET, DELAYED RELEASE ORAL DAILY
Status: DISCONTINUED | OUTPATIENT
Start: 2020-12-16 | End: 2020-12-17 | Stop reason: HOSPADM

## 2020-12-15 RX ORDER — LANOLIN ALCOHOL/MO/W.PET/CERES
100 CREAM (GRAM) TOPICAL DAILY
Status: COMPLETED | OUTPATIENT
Start: 2020-12-15 | End: 2020-12-17

## 2020-12-15 RX ORDER — FOLIC ACID 1 MG/1
1 TABLET ORAL DAILY
Status: COMPLETED | OUTPATIENT
Start: 2020-12-15 | End: 2020-12-17

## 2020-12-15 RX ORDER — MULTIVITAMIN,THER AND MINERALS
1 TABLET ORAL DAILY
Status: COMPLETED | OUTPATIENT
Start: 2020-12-15 | End: 2020-12-17

## 2020-12-15 RX ORDER — ACETAMINOPHEN 325 MG/1
650 TABLET ORAL EVERY 4 HOURS PRN
Status: DISCONTINUED | OUTPATIENT
Start: 2020-12-15 | End: 2020-12-17 | Stop reason: HOSPADM

## 2020-12-15 RX ORDER — LORAZEPAM 2 MG/ML
INJECTION INTRAMUSCULAR
Status: DISCONTINUED
Start: 2020-12-15 | End: 2020-12-16 | Stop reason: WASHOUT

## 2020-12-15 RX ORDER — DEXTROAMPHETAMINE SACCHARATE, AMPHETAMINE ASPARTATE MONOHYDRATE, DEXTROAMPHETAMINE SULFATE AND AMPHETAMINE SULFATE 7.5; 7.5; 7.5; 7.5 MG/1; MG/1; MG/1; MG/1
30 CAPSULE, EXTENDED RELEASE ORAL DAILY
Status: ON HOLD | COMMUNITY
Start: 2020-10-22 | End: 2020-12-17 | Stop reason: HOSPADM

## 2020-12-15 RX ORDER — ACETAMINOPHEN 325 MG/1
650 TABLET ORAL EVERY 4 HOURS PRN
Status: DISCONTINUED | OUTPATIENT
Start: 2020-12-15 | End: 2020-12-15 | Stop reason: SDUPTHER

## 2020-12-15 RX ORDER — LORAZEPAM 2 MG/ML
3 INJECTION INTRAMUSCULAR
Status: DISCONTINUED | OUTPATIENT
Start: 2020-12-15 | End: 2020-12-17 | Stop reason: HOSPADM

## 2020-12-15 RX ADMIN — LIDOCAINE 1 PATCH: 246 PATCH TOPICAL at 18:49

## 2020-12-15 RX ADMIN — LORAZEPAM 2 MG: 2 INJECTION INTRAMUSCULAR; INTRAVENOUS at 13:34

## 2020-12-15 RX ADMIN — ACETAMINOPHEN 650 MG: 325 TABLET, FILM COATED ORAL at 18:48

## 2020-12-15 RX ADMIN — LORAZEPAM 2 MG: 2 INJECTION INTRAMUSCULAR; INTRAVENOUS at 15:27

## 2020-12-15 RX ADMIN — Medication 100 MG: at 22:39

## 2020-12-15 RX ADMIN — MULTIPLE VITAMINS W/ MINERALS TAB 1 TABLET: TAB at 22:39

## 2020-12-15 RX ADMIN — FOLIC ACID 1 MG: 1 TABLET ORAL at 22:39

## 2020-12-15 ASSESSMENT — LIFESTYLE VARIABLES
TACTILE DISTURBANCES: NOT PRESENT
AUDIT-C TOTAL SCORE: 11
HOW OFTEN DO YOU HAVE 6 OR MORE DRINKS ON ONE OCCASION: DAILY OR ALMOST DAILY
AGITATION: 2
ANXIETY: 2
ANXIETY: 2
PAROXYSMAL SWEATS: NO SWEAT VISIBLE
TREMOR: NO TREMOR
VISUAL DISTURBANCES: NOT PRESENT
TACTILE DISTURBANCES: NOT PRESENT
AGITATION: SOMEWHAT MORE THAN NORMAL ACTIVITY
AUDITORY DISTURBANCES: NOT PRESENT
AGITATION: SOMEWHAT MORE THAN NORMAL ACTIVITY
AUDITORY DISTURBANCES: NOT PRESENT
NAUSEA AND VOMITING: NO NAUSEA AND NO VOMITING
ANXIETY: 2
HEADACHE, FULLNESS IN HEAD: NOT PRESENT
VISUAL DISTURBANCES: NOT PRESENT
TACTILE DISTURBANCES: NOT PRESENT
HOW OFTEN DO YOU HAVE A DRINK CONTAINING ALCOHOL: 4 OR MORE TIMES PER WEEK
HOW MANY STANDARD DRINKS CONTAINING ALCOHOL DO YOU HAVE ON A TYPICAL DAY: 7 TO 9
NAUSEA AND VOMITING: NO NAUSEA AND NO VOMITING
VISUAL DISTURBANCES: NOT PRESENT
VISUAL DISTURBANCES: NOT PRESENT
TREMOR: NO TREMOR
TREMOR: NO TREMOR
AUDITORY DISTURBANCES: NOT PRESENT
TACTILE DISTURBANCES: NOT PRESENT
AGITATION: 2
PAROXYSMAL SWEATS: BARELY PERCEPTIBLE SWEATING, PALMS MOIST
NAUSEA AND VOMITING: NO NAUSEA AND NO VOMITING
TREMOR: NO TREMOR
ANXIETY: MILDLY ANXIOUS
HEADACHE, FULLNESS IN HEAD: NOT PRESENT
NAUSEA AND VOMITING: NO NAUSEA AND NO VOMITING
PAROXYSMAL SWEATS: BARELY PERCEPTIBLE SWEATING, PALMS MOIST
AUDITORY DISTURBANCES: NOT PRESENT
HEADACHE, FULLNESS IN HEAD: NOT PRESENT
PAROXYSMAL SWEATS: NO SWEAT VISIBLE
HEADACHE, FULLNESS IN HEAD: NOT PRESENT

## 2020-12-15 ASSESSMENT — PAIN SCALES - WONG BAKER: WONGBAKER_NUMERICALRESPONSE: 10

## 2020-12-15 ASSESSMENT — ACTIVITIES OF DAILY LIVING (ADL)
ADL_BEFORE_ADMISSION: INDEPENDENT
ADL_SHORT_OF_BREATH: NO
CHRONIC_PAIN_PRESENT: YES, CHRONIC
DESCRIBE HOW PAIN IMPACTS YOUR LIFE: MOOD
ADL_SCORE: 12
RECENT_DECLINE_ADL: NO

## 2020-12-15 ASSESSMENT — PAIN SCALES - GENERAL
PAINLEVEL_OUTOF10: 10
PAINLEVEL_OUTOF10: 0
PAINLEVEL_OUTOF10: 9

## 2020-12-15 ASSESSMENT — COGNITIVE AND FUNCTIONAL STATUS - GENERAL
ARE YOU DEAF OR DO YOU HAVE SERIOUS DIFFICULTY  HEARING: YES
ARE YOU BLIND OR DO YOU HAVE SERIOUS DIFFICULTY SEEING, EVEN WHEN WEARING GLASSES: NO

## 2020-12-15 ASSESSMENT — PAIN DESCRIPTION - PAIN TYPE: TYPE: ACUTE PAIN

## 2020-12-16 LAB
ALBUMIN SERPL-MCNC: 3.7 G/DL (ref 3.6–5.1)
ALBUMIN/GLOB SERPL: 1 {RATIO} (ref 1–2.4)
ALP SERPL-CCNC: 74 UNITS/L (ref 45–117)
ALT SERPL-CCNC: 9 UNITS/L
ANION GAP SERPL CALC-SCNC: 11 MMOL/L (ref 10–20)
AST SERPL-CCNC: 13 UNITS/L
BASOPHILS # BLD: 0 K/MCL (ref 0–0.3)
BASOPHILS NFR BLD: 0 %
BILIRUB SERPL-MCNC: 1.2 MG/DL (ref 0.2–1)
BUN SERPL-MCNC: 10 MG/DL (ref 6–20)
BUN/CREAT SERPL: 20 (ref 7–25)
CALCIUM SERPL-MCNC: 8.8 MG/DL (ref 8.4–10.2)
CHLORIDE SERPL-SCNC: 99 MMOL/L (ref 98–107)
CO2 SERPL-SCNC: 28 MMOL/L (ref 21–32)
CREAT SERPL-MCNC: 0.51 MG/DL (ref 0.51–0.95)
DEPRECATED RDW RBC: 45.5 FL (ref 39–50)
EOSINOPHIL # BLD: 0.1 K/MCL (ref 0–0.5)
EOSINOPHIL NFR BLD: 1 %
ERYTHROCYTE [DISTWIDTH] IN BLOOD: 13.6 % (ref 11–15)
FASTING DURATION TIME PATIENT: ABNORMAL H
GFR SERPLBLD BASED ON 1.73 SQ M-ARVRAT: >90 ML/MIN/1.73M2
GLOBULIN SER-MCNC: 3.6 G/DL (ref 2–4)
GLUCOSE SERPL-MCNC: 100 MG/DL (ref 65–99)
HCT VFR BLD CALC: 39 % (ref 36–46.5)
HGB BLD-MCNC: 13.4 G/DL (ref 12–15.5)
IMM GRANULOCYTES # BLD AUTO: 0 K/MCL (ref 0–0.2)
IMM GRANULOCYTES # BLD: 0 %
LYMPHOCYTES # BLD: 1.3 K/MCL (ref 1–4.8)
LYMPHOCYTES NFR BLD: 27 %
MCH RBC QN AUTO: 31.2 PG (ref 26–34)
MCHC RBC AUTO-ENTMCNC: 34.4 G/DL (ref 32–36.5)
MCV RBC AUTO: 90.7 FL (ref 78–100)
MONOCYTES # BLD: 0.7 K/MCL (ref 0.3–0.9)
MONOCYTES NFR BLD: 14 %
NEUTROPHILS # BLD: 2.9 K/MCL (ref 1.8–7.7)
NEUTROPHILS NFR BLD: 58 %
NRBC BLD MANUAL-RTO: 0 /100 WBC
PLATELET # BLD AUTO: 156 K/MCL (ref 140–450)
POTASSIUM SERPL-SCNC: 3.6 MMOL/L (ref 3.4–5.1)
PROT SERPL-MCNC: 7.3 G/DL (ref 6.4–8.2)
RBC # BLD: 4.3 MIL/MCL (ref 4–5.2)
SODIUM SERPL-SCNC: 134 MMOL/L (ref 135–145)
WBC # BLD: 5 K/MCL (ref 4.2–11)

## 2020-12-16 PROCEDURE — 10002801 HB RX 250 W/O HCPCS: Performed by: INTERNAL MEDICINE

## 2020-12-16 PROCEDURE — 10002800 HB RX 250 W HCPCS: Performed by: PSYCHIATRY & NEUROLOGY

## 2020-12-16 PROCEDURE — 10002803 HB RX 637: Performed by: NURSE PRACTITIONER

## 2020-12-16 PROCEDURE — 10000002 HB ROOM CHARGE MED SURG

## 2020-12-16 PROCEDURE — 10004651 HB RX, NO CHARGE ITEM: Performed by: INTERNAL MEDICINE

## 2020-12-16 PROCEDURE — HZ2ZZZZ DETOXIFICATION SERVICES FOR SUBSTANCE ABUSE TREATMENT: ICD-10-PCS | Performed by: INTERNAL MEDICINE

## 2020-12-16 PROCEDURE — 90792 PSYCH DIAG EVAL W/MED SRVCS: CPT | Performed by: PSYCHIATRY & NEUROLOGY

## 2020-12-16 PROCEDURE — 85025 COMPLETE CBC W/AUTO DIFF WBC: CPT | Performed by: INTERNAL MEDICINE

## 2020-12-16 PROCEDURE — 10002803 HB RX 637: Performed by: INTERNAL MEDICINE

## 2020-12-16 PROCEDURE — 36415 COLL VENOUS BLD VENIPUNCTURE: CPT | Performed by: INTERNAL MEDICINE

## 2020-12-16 PROCEDURE — 80053 COMPREHEN METABOLIC PANEL: CPT | Performed by: INTERNAL MEDICINE

## 2020-12-16 RX ORDER — HALOPERIDOL 5 MG/ML
5 INJECTION INTRAMUSCULAR EVERY 6 HOURS PRN
Status: DISCONTINUED | OUTPATIENT
Start: 2020-12-16 | End: 2020-12-17 | Stop reason: HOSPADM

## 2020-12-16 RX ORDER — HALOPERIDOL 5 MG/ML
5 INJECTION INTRAMUSCULAR ONCE
Status: COMPLETED | OUTPATIENT
Start: 2020-12-16 | End: 2020-12-16

## 2020-12-16 RX ORDER — BUSPIRONE HYDROCHLORIDE 10 MG/1
10 TABLET ORAL 2 TIMES DAILY
Status: ON HOLD | COMMUNITY
End: 2020-12-23 | Stop reason: HOSPADM

## 2020-12-16 RX ORDER — HALOPERIDOL 5 MG/ML
INJECTION INTRAMUSCULAR
Status: DISPENSED
Start: 2020-12-16 | End: 2020-12-16

## 2020-12-16 RX ORDER — DIPHENHYDRAMINE HYDROCHLORIDE 50 MG/ML
50 INJECTION INTRAMUSCULAR; INTRAVENOUS EVERY 8 HOURS PRN
Status: DISCONTINUED | OUTPATIENT
Start: 2020-12-16 | End: 2020-12-17 | Stop reason: HOSPADM

## 2020-12-16 RX ORDER — TRAMADOL HYDROCHLORIDE 50 MG/1
50 TABLET ORAL EVERY 6 HOURS PRN
Status: DISCONTINUED | OUTPATIENT
Start: 2020-12-16 | End: 2020-12-17 | Stop reason: HOSPADM

## 2020-12-16 RX ORDER — GABAPENTIN 300 MG/1
300 CAPSULE ORAL EVERY 12 HOURS SCHEDULED
Status: DISCONTINUED | OUTPATIENT
Start: 2020-12-16 | End: 2020-12-17 | Stop reason: HOSPADM

## 2020-12-16 RX ORDER — LORAZEPAM 2 MG/ML
1 INJECTION INTRAMUSCULAR ONCE
Status: COMPLETED | OUTPATIENT
Start: 2020-12-16 | End: 2020-12-16

## 2020-12-16 RX ADMIN — ACETAMINOPHEN 650 MG: 325 TABLET, FILM COATED ORAL at 00:45

## 2020-12-16 RX ADMIN — GABAPENTIN 300 MG: 300 CAPSULE ORAL at 18:33

## 2020-12-16 RX ADMIN — METOPROLOL SUCCINATE 50 MG: 50 TABLET, EXTENDED RELEASE ORAL at 00:40

## 2020-12-16 RX ADMIN — FOLIC ACID 1 MG: 1 TABLET ORAL at 08:54

## 2020-12-16 RX ADMIN — ACETAMINOPHEN 650 MG: 325 TABLET, FILM COATED ORAL at 08:55

## 2020-12-16 RX ADMIN — PANTOPRAZOLE SODIUM 40 MG: 40 TABLET, DELAYED RELEASE ORAL at 08:54

## 2020-12-16 RX ADMIN — METOPROLOL SUCCINATE 50 MG: 50 TABLET, EXTENDED RELEASE ORAL at 22:34

## 2020-12-16 RX ADMIN — LIDOCAINE 1 PATCH: 246 PATCH TOPICAL at 08:58

## 2020-12-16 RX ADMIN — Medication 100 MG: at 08:55

## 2020-12-16 RX ADMIN — LORAZEPAM 1 MG: 2 INJECTION INTRAMUSCULAR; INTRAVENOUS at 12:00

## 2020-12-16 RX ADMIN — METOPROLOL SUCCINATE 50 MG: 50 TABLET, EXTENDED RELEASE ORAL at 08:54

## 2020-12-16 RX ADMIN — MULTIPLE VITAMINS W/ MINERALS TAB 1 TABLET: TAB at 08:54

## 2020-12-16 RX ADMIN — HALOPERIDOL LACTATE 5 MG: 5 INJECTION INTRAMUSCULAR at 11:32

## 2020-12-16 RX ADMIN — MONTELUKAST SODIUM 10 MG: 10 TABLET, FILM COATED ORAL at 08:55

## 2020-12-16 RX ADMIN — TRAMADOL HYDROCHLORIDE 50 MG: 50 TABLET, FILM COATED ORAL at 18:33

## 2020-12-16 ASSESSMENT — LIFESTYLE VARIABLES
TACTILE DISTURBANCES: NOT PRESENT
HEADACHE, FULLNESS IN HEAD: NOT PRESENT
NAUSEA AND VOMITING: NO NAUSEA AND NO VOMITING
HEADACHE, FULLNESS IN HEAD: NOT PRESENT
VISUAL DISTURBANCES: NOT PRESENT
VISUAL DISTURBANCES: NOT PRESENT
AUDITORY DISTURBANCES: NOT PRESENT
AGITATION: 2
TREMOR: NOT VISIBLE, BUT CAN BE FELT FINGERTIP TO FINGERTIP
TACTILE DISTURBANCES: NOT PRESENT
ANXIETY: NO ANXIETY, AT EASE
TACTILE DISTURBANCES: NOT PRESENT
VISUAL DISTURBANCES: NOT PRESENT
NAUSEA AND VOMITING: NO NAUSEA AND NO VOMITING
VISUAL DISTURBANCES: NOT PRESENT
PAROXYSMAL SWEATS: NO SWEAT VISIBLE
ANXIETY: MILDLY ANXIOUS
AUDITORY DISTURBANCES: NOT PRESENT
TACTILE DISTURBANCES: NOT PRESENT
TREMOR: NOT VISIBLE, BUT CAN BE FELT FINGERTIP TO FINGERTIP
TREMOR: NOT VISIBLE, BUT CAN BE FELT FINGERTIP TO FINGERTIP
PAROXYSMAL SWEATS: 2
AUDITORY DISTURBANCES: NOT PRESENT
TACTILE DISTURBANCES: NOT PRESENT
AGITATION: 2
AUDITORY DISTURBANCES: NOT PRESENT
ANXIETY: EQUIVALENT TO ACUTE PANIC STATE
AGITATION: 2
ANXIETY: MILDLY ANXIOUS
PAROXYSMAL SWEATS: NO SWEAT VISIBLE
NAUSEA AND VOMITING: NO NAUSEA AND NO VOMITING
PAROXYSMAL SWEATS: NO SWEAT VISIBLE
TREMOR: NOT VISIBLE, BUT CAN BE FELT FINGERTIP TO FINGERTIP
VISUAL DISTURBANCES: NOT PRESENT
NAUSEA AND VOMITING: NO NAUSEA AND NO VOMITING
AGITATION: SOMEWHAT MORE THAN NORMAL ACTIVITY
TREMOR: NO TREMOR
NAUSEA AND VOMITING: NO NAUSEA AND NO VOMITING
ANXIETY: NO ANXIETY, AT EASE
ANXIETY: NO ANXIETY, AT EASE
PAROXYSMAL SWEATS: NO SWEAT VISIBLE
AUDITORY DISTURBANCES: NOT PRESENT
PAROXYSMAL SWEATS: BARELY PERCEPTIBLE SWEATING, PALMS MOIST
TREMOR: NOT VISIBLE, BUT CAN BE FELT FINGERTIP TO FINGERTIP
HEADACHE, FULLNESS IN HEAD: NOT PRESENT
TACTILE DISTURBANCES: NOT PRESENT
VISUAL DISTURBANCES: NOT PRESENT
ANXIETY: NO ANXIETY, AT EASE
NAUSEA AND VOMITING: NO NAUSEA AND NO VOMITING
AGITATION: SOMEWHAT MORE THAN NORMAL ACTIVITY
TREMOR: NOT VISIBLE, BUT CAN BE FELT FINGERTIP TO FINGERTIP
NAUSEA AND VOMITING: NO NAUSEA AND NO VOMITING
AGITATION: MODERATELY FIDGETY AND RESTLESS
TACTILE DISTURBANCES: NOT PRESENT
AUDITORY DISTURBANCES: NOT PRESENT
HEADACHE, FULLNESS IN HEAD: NOT PRESENT
AUDITORY DISTURBANCES: NOT PRESENT
HEADACHE, FULLNESS IN HEAD: NOT PRESENT
AGITATION: SOMEWHAT MORE THAN NORMAL ACTIVITY
HEADACHE, FULLNESS IN HEAD: NOT PRESENT
VISUAL DISTURBANCES: NOT PRESENT
HEADACHE, FULLNESS IN HEAD: NOT PRESENT
PAROXYSMAL SWEATS: NO SWEAT VISIBLE

## 2020-12-16 ASSESSMENT — ENCOUNTER SYMPTOMS
WEAKNESS: 1
NAUSEA: 1
ACTIVITY CHANGE: 1
BACK PAIN: 1
DIARRHEA: 0
FEVER: 0
APPETITE CHANGE: 1
AGITATION: 1
NERVOUS/ANXIOUS: 1
SHORTNESS OF BREATH: 0
ABDOMINAL PAIN: 1
CONSTIPATION: 0
SORE THROAT: 0

## 2020-12-16 ASSESSMENT — PAIN SCALES - WONG BAKER
WONGBAKER_NUMERICALRESPONSE: 8
WONGBAKER_NUMERICALRESPONSE: 8

## 2020-12-16 ASSESSMENT — PAIN SCALES - GENERAL
PAINLEVEL_OUTOF10: 10
PAINLEVEL_OUTOF10: 10
PAINLEVEL_OUTOF10: 8
PAINLEVEL_OUTOF10: 10

## 2020-12-17 ENCOUNTER — HOSPITAL ENCOUNTER (INPATIENT)
Age: 40
LOS: 6 days | Discharge: HOME | DRG: 751 | End: 2020-12-23
Attending: PSYCHIATRY & NEUROLOGY | Admitting: PSYCHIATRY & NEUROLOGY

## 2020-12-17 VITALS
RESPIRATION RATE: 18 BRPM | HEART RATE: 74 BPM | DIASTOLIC BLOOD PRESSURE: 78 MMHG | WEIGHT: 150.79 LBS | OXYGEN SATURATION: 97 % | TEMPERATURE: 97.9 F | HEIGHT: 66 IN | SYSTOLIC BLOOD PRESSURE: 125 MMHG | BODY MASS INDEX: 24.23 KG/M2

## 2020-12-17 PROCEDURE — 10002801 HB RX 250 W/O HCPCS: Performed by: INTERNAL MEDICINE

## 2020-12-17 PROCEDURE — 10002803 HB RX 637: Performed by: INTERNAL MEDICINE

## 2020-12-17 PROCEDURE — 10002800 HB RX 250 W HCPCS

## 2020-12-17 PROCEDURE — 10004577 HB ROOM CHARGE PSYCH

## 2020-12-17 PROCEDURE — 10002800 HB RX 250 W HCPCS: Performed by: PSYCHIATRY & NEUROLOGY

## 2020-12-17 PROCEDURE — 10002803 HB RX 637: Performed by: NURSE PRACTITIONER

## 2020-12-17 RX ORDER — METOPROLOL SUCCINATE 50 MG/1
50 TABLET, EXTENDED RELEASE ORAL 2 TIMES DAILY
Status: DISCONTINUED | OUTPATIENT
Start: 2020-12-17 | End: 2020-12-23 | Stop reason: HOSPADM

## 2020-12-17 RX ORDER — BENZTROPINE MESYLATE 1 MG/ML
1 INJECTION INTRAMUSCULAR; INTRAVENOUS EVERY 8 HOURS PRN
Status: DISCONTINUED | OUTPATIENT
Start: 2020-12-17 | End: 2020-12-23 | Stop reason: HOSPADM

## 2020-12-17 RX ORDER — HALOPERIDOL 5 MG/ML
5 INJECTION INTRAMUSCULAR EVERY 6 HOURS PRN
Qty: 1 ML | Refills: 0 | Status: ON HOLD
Start: 2020-12-17 | End: 2020-12-23 | Stop reason: HOSPADM

## 2020-12-17 RX ORDER — GABAPENTIN 300 MG/1
300 CAPSULE ORAL EVERY 12 HOURS SCHEDULED
Qty: 60 CAPSULE | Refills: 3 | Status: ON HOLD
Start: 2020-12-17 | End: 2020-12-23 | Stop reason: HOSPADM

## 2020-12-17 RX ORDER — PANTOPRAZOLE SODIUM 40 MG/1
40 TABLET, DELAYED RELEASE ORAL DAILY
Status: DISCONTINUED | OUTPATIENT
Start: 2020-12-18 | End: 2020-12-23 | Stop reason: HOSPADM

## 2020-12-17 RX ORDER — LIDOCAINE 4 G/G
1 PATCH TOPICAL DAILY
Status: DISCONTINUED | OUTPATIENT
Start: 2020-12-18 | End: 2020-12-23 | Stop reason: HOSPADM

## 2020-12-17 RX ORDER — BUPROPION HYDROCHLORIDE 100 MG/1
100 TABLET, EXTENDED RELEASE ORAL EVERY 12 HOURS SCHEDULED
Qty: 30 TABLET | Refills: 0 | Status: ON HOLD
Start: 2020-12-17 | End: 2020-12-23 | Stop reason: HOSPADM

## 2020-12-17 RX ORDER — LORAZEPAM 1 MG/1
1 TABLET ORAL EVERY 8 HOURS PRN
Status: DISCONTINUED | OUTPATIENT
Start: 2020-12-17 | End: 2020-12-23 | Stop reason: HOSPADM

## 2020-12-17 RX ORDER — TRAZODONE HYDROCHLORIDE 50 MG/1
150 TABLET ORAL NIGHTLY
Qty: 3 TABLET | Refills: 0 | Status: ON HOLD
Start: 2020-12-17 | End: 2020-12-23 | Stop reason: HOSPADM

## 2020-12-17 RX ORDER — BUSPIRONE HYDROCHLORIDE 5 MG/1
10 TABLET ORAL 2 TIMES DAILY
Status: DISCONTINUED | OUTPATIENT
Start: 2020-12-17 | End: 2020-12-18

## 2020-12-17 RX ORDER — LORAZEPAM 2 MG/ML
1 INJECTION INTRAMUSCULAR EVERY 8 HOURS PRN
Status: DISCONTINUED | OUTPATIENT
Start: 2020-12-17 | End: 2020-12-23 | Stop reason: HOSPADM

## 2020-12-17 RX ORDER — LIDOCAINE 4 G/G
1 PATCH TOPICAL DAILY
Refills: 0 | Status: ON HOLD | COMMUNITY
Start: 2020-12-18 | End: 2020-12-23 | Stop reason: SDUPTHER

## 2020-12-17 RX ORDER — SERTRALINE HYDROCHLORIDE 100 MG/1
100 TABLET, FILM COATED ORAL DAILY
Status: DISCONTINUED | OUTPATIENT
Start: 2020-12-18 | End: 2020-12-18

## 2020-12-17 RX ORDER — ONDANSETRON 4 MG/1
4 TABLET, ORALLY DISINTEGRATING ORAL EVERY 12 HOURS PRN
Qty: 10 TABLET | Refills: 0 | Status: ON HOLD
Start: 2020-12-17 | End: 2020-12-23 | Stop reason: HOSPADM

## 2020-12-17 RX ORDER — HALOPERIDOL 5 MG/1
5 TABLET ORAL EVERY 6 HOURS PRN
Status: DISCONTINUED | OUTPATIENT
Start: 2020-12-17 | End: 2020-12-23 | Stop reason: HOSPADM

## 2020-12-17 RX ORDER — LORAZEPAM 2 MG/ML
INJECTION INTRAMUSCULAR
Status: COMPLETED
Start: 2020-12-17 | End: 2020-12-17

## 2020-12-17 RX ORDER — ACETAMINOPHEN 325 MG/1
650 TABLET ORAL EVERY 4 HOURS PRN
Status: ON HOLD | COMMUNITY
Start: 2020-12-17 | End: 2020-12-23 | Stop reason: HOSPADM

## 2020-12-17 RX ORDER — ONDANSETRON 4 MG/1
4 TABLET, ORALLY DISINTEGRATING ORAL 2 TIMES DAILY PRN
Status: DISCONTINUED | OUTPATIENT
Start: 2020-12-17 | End: 2020-12-23 | Stop reason: HOSPADM

## 2020-12-17 RX ORDER — IBUPROFEN 600 MG/1
600 TABLET ORAL EVERY 6 HOURS PRN
Status: DISCONTINUED | OUTPATIENT
Start: 2020-12-17 | End: 2020-12-23 | Stop reason: HOSPADM

## 2020-12-17 RX ORDER — TRAMADOL HYDROCHLORIDE 50 MG/1
50 TABLET ORAL EVERY 6 HOURS PRN
Status: DISCONTINUED | OUTPATIENT
Start: 2020-12-17 | End: 2020-12-23 | Stop reason: HOSPADM

## 2020-12-17 RX ORDER — MAGNESIUM HYDROXIDE/ALUMINUM HYDROXICE/SIMETHICONE 120; 1200; 1200 MG/30ML; MG/30ML; MG/30ML
20 SUSPENSION ORAL EVERY 8 HOURS PRN
Status: DISCONTINUED | OUTPATIENT
Start: 2020-12-17 | End: 2020-12-23 | Stop reason: HOSPADM

## 2020-12-17 RX ORDER — SERTRALINE HYDROCHLORIDE 100 MG/1
100 TABLET, FILM COATED ORAL DAILY
Qty: 30 TABLET | Refills: 0 | Status: ON HOLD
Start: 2020-12-17 | End: 2020-12-23 | Stop reason: HOSPADM

## 2020-12-17 RX ORDER — ALBUTEROL SULFATE 90 UG/1
2 AEROSOL, METERED RESPIRATORY (INHALATION) EVERY 4 HOURS PRN
Status: DISCONTINUED | OUTPATIENT
Start: 2020-12-17 | End: 2020-12-23 | Stop reason: HOSPADM

## 2020-12-17 RX ORDER — GABAPENTIN 300 MG/1
300 CAPSULE ORAL EVERY 12 HOURS SCHEDULED
Status: DISCONTINUED | OUTPATIENT
Start: 2020-12-17 | End: 2020-12-23 | Stop reason: HOSPADM

## 2020-12-17 RX ORDER — BENZTROPINE MESYLATE 1 MG/1
1 TABLET ORAL EVERY 8 HOURS PRN
Status: DISCONTINUED | OUTPATIENT
Start: 2020-12-17 | End: 2020-12-23 | Stop reason: HOSPADM

## 2020-12-17 RX ORDER — CLOBETASOL PROPIONATE 0.5 MG/G
1 CREAM TOPICAL 2 TIMES DAILY
Status: DISCONTINUED | OUTPATIENT
Start: 2020-12-17 | End: 2020-12-23 | Stop reason: HOSPADM

## 2020-12-17 RX ORDER — NICOTINE 21 MG/24HR
1 PATCH, TRANSDERMAL 24 HOURS TRANSDERMAL DAILY
Status: DISCONTINUED | OUTPATIENT
Start: 2020-12-18 | End: 2020-12-23 | Stop reason: HOSPADM

## 2020-12-17 RX ORDER — HALOPERIDOL 5 MG/ML
5 INJECTION INTRAMUSCULAR EVERY 6 HOURS PRN
Status: DISCONTINUED | OUTPATIENT
Start: 2020-12-17 | End: 2020-12-23 | Stop reason: HOSPADM

## 2020-12-17 RX ORDER — MONTELUKAST SODIUM 10 MG/1
10 TABLET ORAL DAILY
Status: DISCONTINUED | OUTPATIENT
Start: 2020-12-18 | End: 2020-12-23 | Stop reason: HOSPADM

## 2020-12-17 RX ADMIN — METOPROLOL SUCCINATE 50 MG: 50 TABLET, EXTENDED RELEASE ORAL at 22:17

## 2020-12-17 RX ADMIN — TRAMADOL HYDROCHLORIDE 50 MG: 50 TABLET, FILM COATED ORAL at 13:05

## 2020-12-17 RX ADMIN — PANTOPRAZOLE SODIUM 40 MG: 40 TABLET, DELAYED RELEASE ORAL at 10:15

## 2020-12-17 RX ADMIN — LORAZEPAM 1 MG: 2 INJECTION INTRAMUSCULAR; INTRAVENOUS at 20:40

## 2020-12-17 RX ADMIN — TRAZODONE HYDROCHLORIDE 150 MG: 100 TABLET ORAL at 22:17

## 2020-12-17 RX ADMIN — GABAPENTIN 300 MG: 300 CAPSULE ORAL at 10:14

## 2020-12-17 RX ADMIN — LIDOCAINE 1 PATCH: 246 PATCH TOPICAL at 10:15

## 2020-12-17 RX ADMIN — TRAMADOL HYDROCHLORIDE 50 MG: 50 TABLET, FILM COATED ORAL at 01:04

## 2020-12-17 RX ADMIN — FOLIC ACID 1 MG: 1 TABLET ORAL at 10:13

## 2020-12-17 RX ADMIN — BUSPIRONE HYDROCHLORIDE 10 MG: 5 TABLET ORAL at 22:17

## 2020-12-17 RX ADMIN — METOPROLOL SUCCINATE 50 MG: 50 TABLET, EXTENDED RELEASE ORAL at 10:14

## 2020-12-17 RX ADMIN — GABAPENTIN 300 MG: 300 CAPSULE ORAL at 22:17

## 2020-12-17 RX ADMIN — MULTIPLE VITAMINS W/ MINERALS TAB 1 TABLET: TAB at 10:14

## 2020-12-17 RX ADMIN — MONTELUKAST SODIUM 10 MG: 10 TABLET, FILM COATED ORAL at 10:14

## 2020-12-17 RX ADMIN — Medication 100 MG: at 10:14

## 2020-12-17 RX ADMIN — TRAMADOL HYDROCHLORIDE 50 MG: 50 TABLET, FILM COATED ORAL at 06:45

## 2020-12-17 ASSESSMENT — LIFESTYLE VARIABLES
CAFFEINE: YES
HEADACHE, FULLNESS IN HEAD: NOT PRESENT
AGITATION: NORMAL ACTIVITY
AUDITORY DISTURBANCES: NOT PRESENT
HAVE YOU EVER BEEN VERBALLY, EMOTIONALLY, PHYSICALLY OR SEXUALLY ABUSED, OR ABUSED VIA SOCIAL MEDIA?: NO
AT WHAT AGE STARTED USING: BOTH
AGE OF ALCOHOL ONSET: 19
HOW MANY CIGARETTES HAVE YOU SMOKED PER DAY ON AVERAGE?: DENIES
TREMOR: NOT VISIBLE, BUT CAN BE FELT FINGERTIP TO FINGERTIP
ANXIETY: NO ANXIETY, AT EASE
VOLATILE SOLVENTS/INHALANTS USE: DENIES
PAROXYSMAL SWEATS: NO SWEAT VISIBLE
ADL BEFORE ADMISSION: INDEPENDENT
AMPHETAMINES/STIMULANTS USE: DENIES
HOW OFTEN DO YOU HAVE 6 OR MORE DRINKS ON ONE OCCASION: LESS THAN MONTHLY
DESCRIBE HOW PAIN IMPACTS ON YOUR LIFE: PHYSICAL ACTIVITY
COCAINE USE: DENIES
HOW MANY STANDARD DRINKS CONTAINING ALCOHOL DO YOU HAVE ON A TYPICAL DAY: 3 OR 4
CHRONIC/CANCER PAIN PRESENT: YES, CHRONIC;YES, CANCER
AUDIT-C TOTAL SCORE: 5
TACTILE DISTURBANCES: NOT PRESENT
HOW OFTEN DO YOU HAVE A DRINK CONTAINING ALCOHOL: 2 TO 3 TIMES PER WEEK
OPIATES USE: DENIES
HAVE YOU BEEN EATING POORLY BECAUSE OF A DECREASED APPETITE: 0
HEADACHE, FULLNESS IN HEAD: NOT PRESENT
TOBACCO_USE_STATUS_IN_THE_LAST_30_DAYS: USED TOBACCO IN THE LAST 30 DAYS
E-CIGARETTE_USE: NO E-CIGARETTES USE IN THE LAST 30 DAYS
ARE YOU BLIND OR DO YOU HAVE SERIOUS DIFFICULTY SEEING, EVEN WHEN WEARING GLASSES: NO
VISUAL DISTURBANCES: NOT PRESENT
TREMOR: NOT VISIBLE, BUT CAN BE FELT FINGERTIP TO FINGERTIP
AGITATION: NORMAL ACTIVITY
ALCOHOL_USE_STATUS: UNHEALTHY DRINKING IDENTIFIED. AUDIT C: 3 OR MORE FOR WOMEN AND 4 OR MORE FOR MEN.
TACTILE DISTURBANCES: NOT PRESENT
NAUSEA AND VOMITING: NO NAUSEA AND NO VOMITING
HAVE YOU EVER BEEN EXPOSED TO OTHER VERY DISTURBING, TRAUMATIC OR ANXIETY PROVOKING EVENTS IN YOUR LIFETIME?: YES
RECENT DECLINE IN ADLS: NO
AMOUNT_OF_TOBACCO_USED: FIVE OR MORE CIGARETTES PER DAY AND/OR CIGAR OR PIPE DAILY
VISUAL DISTURBANCES: NOT PRESENT
TYPE_OF_TOBACCO_USED: CIGARETTES
ARE YOU DEAF OR DO YOU HAVE SERIOUS DIFFICULTY  HEARING: NO
SHORT OF BREATH OR FATIGUE WITH ADLS: NO
AUDITORY DISTURBANCES: NOT PRESENT
IS PATIENT ABLE TO COMPLETE ASSESSMENT AT THIS TIME: YES
ALCOHOL_ROUTE: PO
ADL NEEDS ASSIST: NO
ANXIETY: MILDLY ANXIOUS
ALCOHOL_TYPE: HARD LIQUOR
PAROXYSMAL SWEATS: NO SWEAT VISIBLE
RECENTLY LOST WEIGHT WITHOUT TRYING: 0
NAUSEA AND VOMITING: NO NAUSEA AND NO VOMITING
ALCOHOL_USE: YES

## 2020-12-17 ASSESSMENT — PAIN SCALES - GENERAL
PAINLEVEL_OUTOF10: 10
PAINLEVEL_OUTOF10: 4
PAINLEVEL_OUTOF10: 10
PAINLEVEL_OUTOF10: 9
PAINLEVEL_OUTOF10: 0

## 2020-12-17 ASSESSMENT — PAIN SCALES - WONG BAKER: WONGBAKER_NUMERICALRESPONSE: 0

## 2020-12-17 ASSESSMENT — ACTIVITIES OF DAILY LIVING (ADL): ADL_SCORE: 12

## 2020-12-18 LAB
CHOLEST SERPL-MCNC: 236 MG/DL
CHOLEST/HDLC SERPL: 4.1 {RATIO}
FASTING DURATION TIME PATIENT: ABNORMAL H
HBA1C MFR BLD: 5.3 % (ref 4.5–5.6)
HDLC SERPL-MCNC: 57 MG/DL
LDLC SERPL CALC-MCNC: 115 MG/DL
NONHDLC SERPL-MCNC: 179 MG/DL
TRIGL SERPL-MCNC: 318 MG/DL
TSH SERPL-ACNC: 0.85 MCUNITS/ML (ref 0.35–5)

## 2020-12-18 PROCEDURE — 80061 LIPID PANEL: CPT | Performed by: PSYCHIATRY & NEUROLOGY

## 2020-12-18 PROCEDURE — 83036 HEMOGLOBIN GLYCOSYLATED A1C: CPT | Performed by: PSYCHIATRY & NEUROLOGY

## 2020-12-18 PROCEDURE — 36415 COLL VENOUS BLD VENIPUNCTURE: CPT | Performed by: PSYCHIATRY & NEUROLOGY

## 2020-12-18 PROCEDURE — 10004577 HB ROOM CHARGE PSYCH

## 2020-12-18 PROCEDURE — 10002801 HB RX 250 W/O HCPCS: Performed by: INTERNAL MEDICINE

## 2020-12-18 PROCEDURE — 84443 ASSAY THYROID STIM HORMONE: CPT | Performed by: PSYCHIATRY & NEUROLOGY

## 2020-12-18 PROCEDURE — 10004281 HB COUNTER-STAFF TIME PER 15 MIN

## 2020-12-18 PROCEDURE — 10002803 HB RX 637: Performed by: INTERNAL MEDICINE

## 2020-12-18 PROCEDURE — 99223 1ST HOSP IP/OBS HIGH 75: CPT | Performed by: PSYCHIATRY & NEUROLOGY

## 2020-12-18 PROCEDURE — 10002803 HB RX 637: Performed by: PSYCHIATRY & NEUROLOGY

## 2020-12-18 RX ORDER — SERTRALINE HYDROCHLORIDE 100 MG/1
100 TABLET, FILM COATED ORAL ONCE
Status: COMPLETED | OUTPATIENT
Start: 2020-12-18 | End: 2020-12-18

## 2020-12-18 RX ORDER — SERTRALINE HYDROCHLORIDE 100 MG/1
200 TABLET, FILM COATED ORAL DAILY
Status: DISCONTINUED | OUTPATIENT
Start: 2020-12-19 | End: 2020-12-23 | Stop reason: HOSPADM

## 2020-12-18 RX ORDER — BUSPIRONE HYDROCHLORIDE 5 MG/1
15 TABLET ORAL 3 TIMES DAILY
Status: DISCONTINUED | OUTPATIENT
Start: 2020-12-18 | End: 2020-12-23 | Stop reason: HOSPADM

## 2020-12-18 RX ADMIN — BUSPIRONE HYDROCHLORIDE 10 MG: 5 TABLET ORAL at 10:09

## 2020-12-18 RX ADMIN — METOPROLOL SUCCINATE 50 MG: 50 TABLET, EXTENDED RELEASE ORAL at 10:09

## 2020-12-18 RX ADMIN — SERTRALINE HYDROCHLORIDE 100 MG: 100 TABLET, FILM COATED ORAL at 10:09

## 2020-12-18 RX ADMIN — TRAZODONE HYDROCHLORIDE 150 MG: 100 TABLET ORAL at 21:42

## 2020-12-18 RX ADMIN — HALOPERIDOL 5 MG: 5 TABLET ORAL at 21:50

## 2020-12-18 RX ADMIN — PANTOPRAZOLE SODIUM 40 MG: 40 TABLET, DELAYED RELEASE ORAL at 10:08

## 2020-12-18 RX ADMIN — TRAMADOL HYDROCHLORIDE 50 MG: 50 TABLET, FILM COATED ORAL at 10:14

## 2020-12-18 RX ADMIN — LIDOCAINE 1 PATCH: 246 PATCH TOPICAL at 10:08

## 2020-12-18 RX ADMIN — MONTELUKAST SODIUM 10 MG: 10 TABLET, FILM COATED ORAL at 10:09

## 2020-12-18 RX ADMIN — TRAMADOL HYDROCHLORIDE 50 MG: 50 TABLET, FILM COATED ORAL at 21:50

## 2020-12-18 RX ADMIN — SERTRALINE HYDROCHLORIDE 100 MG: 100 TABLET, FILM COATED ORAL at 18:05

## 2020-12-18 RX ADMIN — GABAPENTIN 300 MG: 300 CAPSULE ORAL at 10:09

## 2020-12-18 RX ADMIN — CLOBETASOL PROPIONATE 1 APPLICATION.: 0.5 CREAM TOPICAL at 10:14

## 2020-12-18 RX ADMIN — BUSPIRONE HYDROCHLORIDE 15 MG: 5 TABLET ORAL at 18:05

## 2020-12-18 RX ADMIN — BUSPIRONE HYDROCHLORIDE 15 MG: 5 TABLET ORAL at 21:51

## 2020-12-18 RX ADMIN — GABAPENTIN 300 MG: 300 CAPSULE ORAL at 21:42

## 2020-12-18 RX ADMIN — LORAZEPAM 1 MG: 1 TABLET ORAL at 21:51

## 2020-12-18 RX ADMIN — METOPROLOL SUCCINATE 50 MG: 50 TABLET, EXTENDED RELEASE ORAL at 21:42

## 2020-12-18 ASSESSMENT — PAIN SCALES - GENERAL
PAINLEVEL_OUTOF10: 9
PAINLEVEL_OUTOF10: 0
PAINLEVEL_OUTOF10: 0

## 2020-12-18 ASSESSMENT — ENCOUNTER SYMPTOMS
ABDOMINAL DISTENTION: 0
ABDOMINAL PAIN: 0
APPETITE CHANGE: 1
FEVER: 0
AGITATION: 1
ADENOPATHY: 0
BRUISES/BLEEDS EASILY: 0
BACK PAIN: 1
TREMORS: 0
WEAKNESS: 0
SHORTNESS OF BREATH: 0
FATIGUE: 0
SORE THROAT: 0

## 2020-12-18 ASSESSMENT — PULMONARY FUNCTION TESTS
FEV1/FVC: UNABLE TO OBTAIN, OR GREATER THAN 70%
FEV1_PREDICTED: 0
FEV1_PREDICTED: 0
FEV1: 0

## 2020-12-19 PROCEDURE — 99232 SBSQ HOSP IP/OBS MODERATE 35: CPT | Performed by: PSYCHIATRY & NEUROLOGY

## 2020-12-19 PROCEDURE — 10002803 HB RX 637: Performed by: PSYCHIATRY & NEUROLOGY

## 2020-12-19 PROCEDURE — 10002801 HB RX 250 W/O HCPCS: Performed by: INTERNAL MEDICINE

## 2020-12-19 PROCEDURE — 10002803 HB RX 637: Performed by: INTERNAL MEDICINE

## 2020-12-19 PROCEDURE — 10004577 HB ROOM CHARGE PSYCH

## 2020-12-19 RX ADMIN — BUSPIRONE HYDROCHLORIDE 15 MG: 5 TABLET ORAL at 22:01

## 2020-12-19 RX ADMIN — BUSPIRONE HYDROCHLORIDE 15 MG: 5 TABLET ORAL at 13:29

## 2020-12-19 RX ADMIN — METOPROLOL SUCCINATE 50 MG: 50 TABLET, EXTENDED RELEASE ORAL at 22:01

## 2020-12-19 RX ADMIN — TRAZODONE HYDROCHLORIDE 150 MG: 100 TABLET ORAL at 22:01

## 2020-12-19 RX ADMIN — GABAPENTIN 300 MG: 300 CAPSULE ORAL at 22:01

## 2020-12-19 RX ADMIN — LORAZEPAM 1 MG: 1 TABLET ORAL at 22:02

## 2020-12-19 RX ADMIN — METOPROLOL SUCCINATE 50 MG: 50 TABLET, EXTENDED RELEASE ORAL at 10:23

## 2020-12-19 RX ADMIN — CLOBETASOL PROPIONATE 1 APPLICATION.: 0.5 CREAM TOPICAL at 09:14

## 2020-12-19 RX ADMIN — HALOPERIDOL 5 MG: 5 TABLET ORAL at 22:02

## 2020-12-19 RX ADMIN — TRAMADOL HYDROCHLORIDE 50 MG: 50 TABLET, FILM COATED ORAL at 13:29

## 2020-12-19 RX ADMIN — GABAPENTIN 300 MG: 300 CAPSULE ORAL at 09:13

## 2020-12-19 RX ADMIN — TRAMADOL HYDROCHLORIDE 50 MG: 50 TABLET, FILM COATED ORAL at 22:02

## 2020-12-19 RX ADMIN — SERTRALINE HYDROCHLORIDE 200 MG: 100 TABLET, FILM COATED ORAL at 09:13

## 2020-12-19 RX ADMIN — BUSPIRONE HYDROCHLORIDE 15 MG: 5 TABLET ORAL at 09:13

## 2020-12-19 RX ADMIN — PANTOPRAZOLE SODIUM 40 MG: 40 TABLET, DELAYED RELEASE ORAL at 09:13

## 2020-12-19 RX ADMIN — MONTELUKAST SODIUM 10 MG: 10 TABLET, FILM COATED ORAL at 09:13

## 2020-12-19 RX ADMIN — LIDOCAINE 1 PATCH: 246 PATCH TOPICAL at 09:13

## 2020-12-19 ASSESSMENT — PAIN SCALES - GENERAL
PAINLEVEL_OUTOF10: 9
PAINLEVEL_OUTOF10: 0
PAINLEVEL_OUTOF10: 0
PAINLEVEL_OUTOF10: 2
PAINLEVEL_OUTOF10: 3
PAINLEVEL_OUTOF10: 0

## 2020-12-20 PROCEDURE — 10002803 HB RX 637: Performed by: INTERNAL MEDICINE

## 2020-12-20 PROCEDURE — 10004577 HB ROOM CHARGE PSYCH

## 2020-12-20 PROCEDURE — 10002803 HB RX 637: Performed by: PSYCHIATRY & NEUROLOGY

## 2020-12-20 PROCEDURE — 10002801 HB RX 250 W/O HCPCS: Performed by: INTERNAL MEDICINE

## 2020-12-20 PROCEDURE — 99232 SBSQ HOSP IP/OBS MODERATE 35: CPT | Performed by: PSYCHIATRY & NEUROLOGY

## 2020-12-20 RX ADMIN — MONTELUKAST SODIUM 10 MG: 10 TABLET, FILM COATED ORAL at 08:54

## 2020-12-20 RX ADMIN — PANTOPRAZOLE SODIUM 40 MG: 40 TABLET, DELAYED RELEASE ORAL at 08:54

## 2020-12-20 RX ADMIN — METOPROLOL SUCCINATE 50 MG: 50 TABLET, EXTENDED RELEASE ORAL at 08:55

## 2020-12-20 RX ADMIN — CLOBETASOL PROPIONATE 1 APPLICATION.: 0.5 CREAM TOPICAL at 08:55

## 2020-12-20 RX ADMIN — BUSPIRONE HYDROCHLORIDE 15 MG: 5 TABLET ORAL at 21:56

## 2020-12-20 RX ADMIN — METOPROLOL SUCCINATE 50 MG: 50 TABLET, EXTENDED RELEASE ORAL at 21:54

## 2020-12-20 RX ADMIN — GABAPENTIN 300 MG: 300 CAPSULE ORAL at 08:54

## 2020-12-20 RX ADMIN — LIDOCAINE 1 PATCH: 246 PATCH TOPICAL at 08:55

## 2020-12-20 RX ADMIN — LORAZEPAM 1 MG: 1 TABLET ORAL at 21:54

## 2020-12-20 RX ADMIN — SERTRALINE HYDROCHLORIDE 200 MG: 100 TABLET, FILM COATED ORAL at 08:54

## 2020-12-20 RX ADMIN — BUSPIRONE HYDROCHLORIDE 15 MG: 5 TABLET ORAL at 08:54

## 2020-12-20 RX ADMIN — TRAZODONE HYDROCHLORIDE 150 MG: 100 TABLET ORAL at 21:56

## 2020-12-20 RX ADMIN — TRAMADOL HYDROCHLORIDE 50 MG: 50 TABLET, FILM COATED ORAL at 14:10

## 2020-12-20 RX ADMIN — BUSPIRONE HYDROCHLORIDE 15 MG: 5 TABLET ORAL at 14:10

## 2020-12-20 RX ADMIN — TRAMADOL HYDROCHLORIDE 50 MG: 50 TABLET, FILM COATED ORAL at 21:54

## 2020-12-20 RX ADMIN — HALOPERIDOL 5 MG: 5 TABLET ORAL at 21:55

## 2020-12-20 RX ADMIN — GABAPENTIN 300 MG: 300 CAPSULE ORAL at 21:54

## 2020-12-20 ASSESSMENT — PAIN SCALES - GENERAL
PAINLEVEL_OUTOF10: 0
PAINLEVEL_OUTOF10: 2
PAINLEVEL_OUTOF10: 8
PAINLEVEL_OUTOF10: 4
PAINLEVEL_OUTOF10: 0

## 2020-12-21 PROCEDURE — 99233 SBSQ HOSP IP/OBS HIGH 50: CPT | Performed by: PSYCHIATRY & NEUROLOGY

## 2020-12-21 PROCEDURE — 10002803 HB RX 637: Performed by: PSYCHIATRY & NEUROLOGY

## 2020-12-21 PROCEDURE — 10002801 HB RX 250 W/O HCPCS: Performed by: INTERNAL MEDICINE

## 2020-12-21 PROCEDURE — 10002803 HB RX 637: Performed by: INTERNAL MEDICINE

## 2020-12-21 PROCEDURE — 10004577 HB ROOM CHARGE PSYCH

## 2020-12-21 RX ORDER — HALOPERIDOL 5 MG/1
5 TABLET ORAL NIGHTLY
Status: DISCONTINUED | OUTPATIENT
Start: 2020-12-21 | End: 2020-12-23 | Stop reason: HOSPADM

## 2020-12-21 RX ADMIN — GABAPENTIN 300 MG: 300 CAPSULE ORAL at 21:29

## 2020-12-21 RX ADMIN — HALOPERIDOL 5 MG: 5 TABLET ORAL at 21:33

## 2020-12-21 RX ADMIN — MONTELUKAST SODIUM 10 MG: 10 TABLET, FILM COATED ORAL at 09:40

## 2020-12-21 RX ADMIN — BUSPIRONE HYDROCHLORIDE 15 MG: 5 TABLET ORAL at 15:09

## 2020-12-21 RX ADMIN — METOPROLOL SUCCINATE 50 MG: 50 TABLET, EXTENDED RELEASE ORAL at 21:29

## 2020-12-21 RX ADMIN — SERTRALINE HYDROCHLORIDE 200 MG: 100 TABLET, FILM COATED ORAL at 09:39

## 2020-12-21 RX ADMIN — METOPROLOL SUCCINATE 50 MG: 50 TABLET, EXTENDED RELEASE ORAL at 09:39

## 2020-12-21 RX ADMIN — PANTOPRAZOLE SODIUM 40 MG: 40 TABLET, DELAYED RELEASE ORAL at 09:40

## 2020-12-21 RX ADMIN — LORAZEPAM 1 MG: 1 TABLET ORAL at 21:29

## 2020-12-21 RX ADMIN — CLOBETASOL PROPIONATE 1 APPLICATION.: 0.5 CREAM TOPICAL at 21:30

## 2020-12-21 RX ADMIN — TRAZODONE HYDROCHLORIDE 150 MG: 100 TABLET ORAL at 21:29

## 2020-12-21 RX ADMIN — TRAMADOL HYDROCHLORIDE 50 MG: 50 TABLET, FILM COATED ORAL at 15:10

## 2020-12-21 RX ADMIN — TRAMADOL HYDROCHLORIDE 50 MG: 50 TABLET, FILM COATED ORAL at 21:29

## 2020-12-21 RX ADMIN — CLOBETASOL PROPIONATE 1 APPLICATION.: 0.5 CREAM TOPICAL at 09:40

## 2020-12-21 RX ADMIN — LIDOCAINE 1 PATCH: 246 PATCH TOPICAL at 09:40

## 2020-12-21 RX ADMIN — BUSPIRONE HYDROCHLORIDE 15 MG: 5 TABLET ORAL at 21:30

## 2020-12-21 RX ADMIN — GABAPENTIN 300 MG: 300 CAPSULE ORAL at 09:40

## 2020-12-21 RX ADMIN — BUSPIRONE HYDROCHLORIDE 15 MG: 5 TABLET ORAL at 09:40

## 2020-12-21 ASSESSMENT — PAIN SCALES - GENERAL
PAINLEVEL_OUTOF10: 9
PAINLEVEL_OUTOF10: 8
PAINLEVEL_OUTOF10: 4
PAINLEVEL_OUTOF10: 9
PAINLEVEL_OUTOF10: 8

## 2020-12-22 PROBLEM — F10.20 UNCOMPLICATED ALCOHOL DEPENDENCE (CMD): Status: ACTIVE | Noted: 2020-12-22

## 2020-12-22 PROBLEM — F41.9 ANXIETY AND DEPRESSION: Status: RESOLVED | Noted: 2020-04-06 | Resolved: 2020-12-22

## 2020-12-22 PROBLEM — F32.A ANXIETY AND DEPRESSION: Status: RESOLVED | Noted: 2020-04-06 | Resolved: 2020-12-22

## 2020-12-22 PROBLEM — F33.2 SEVERE EPISODE OF RECURRENT MAJOR DEPRESSIVE DISORDER, WITHOUT PSYCHOTIC FEATURES (CMD): Status: ACTIVE | Noted: 2020-12-22

## 2020-12-22 PROCEDURE — 99233 SBSQ HOSP IP/OBS HIGH 50: CPT | Performed by: PSYCHIATRY & NEUROLOGY

## 2020-12-22 PROCEDURE — 10002801 HB RX 250 W/O HCPCS: Performed by: INTERNAL MEDICINE

## 2020-12-22 PROCEDURE — 10002803 HB RX 637: Performed by: PSYCHIATRY & NEUROLOGY

## 2020-12-22 PROCEDURE — 10002803 HB RX 637: Performed by: INTERNAL MEDICINE

## 2020-12-22 PROCEDURE — 10004577 HB ROOM CHARGE PSYCH

## 2020-12-22 RX ADMIN — PANTOPRAZOLE SODIUM 40 MG: 40 TABLET, DELAYED RELEASE ORAL at 08:43

## 2020-12-22 RX ADMIN — LORAZEPAM 1 MG: 1 TABLET ORAL at 22:04

## 2020-12-22 RX ADMIN — TRAZODONE HYDROCHLORIDE 150 MG: 100 TABLET ORAL at 22:01

## 2020-12-22 RX ADMIN — TRAMADOL HYDROCHLORIDE 50 MG: 50 TABLET, FILM COATED ORAL at 22:01

## 2020-12-22 RX ADMIN — SERTRALINE HYDROCHLORIDE 200 MG: 100 TABLET, FILM COATED ORAL at 08:43

## 2020-12-22 RX ADMIN — CLOBETASOL PROPIONATE 1 APPLICATION.: 0.5 CREAM TOPICAL at 08:47

## 2020-12-22 RX ADMIN — LIDOCAINE 1 PATCH: 246 PATCH TOPICAL at 08:45

## 2020-12-22 RX ADMIN — GABAPENTIN 300 MG: 300 CAPSULE ORAL at 22:01

## 2020-12-22 RX ADMIN — GABAPENTIN 300 MG: 300 CAPSULE ORAL at 08:44

## 2020-12-22 RX ADMIN — TRAMADOL HYDROCHLORIDE 50 MG: 50 TABLET, FILM COATED ORAL at 13:38

## 2020-12-22 RX ADMIN — BUSPIRONE HYDROCHLORIDE 15 MG: 5 TABLET ORAL at 22:01

## 2020-12-22 RX ADMIN — CLOBETASOL PROPIONATE 1 APPLICATION.: 0.5 CREAM TOPICAL at 22:24

## 2020-12-22 RX ADMIN — HALOPERIDOL 5 MG: 5 TABLET ORAL at 22:01

## 2020-12-22 RX ADMIN — METOPROLOL SUCCINATE 50 MG: 50 TABLET, EXTENDED RELEASE ORAL at 22:01

## 2020-12-22 RX ADMIN — METOPROLOL SUCCINATE 50 MG: 50 TABLET, EXTENDED RELEASE ORAL at 08:43

## 2020-12-22 RX ADMIN — BUSPIRONE HYDROCHLORIDE 15 MG: 5 TABLET ORAL at 08:44

## 2020-12-22 RX ADMIN — BUSPIRONE HYDROCHLORIDE 15 MG: 5 TABLET ORAL at 13:35

## 2020-12-22 RX ADMIN — MONTELUKAST SODIUM 10 MG: 10 TABLET, FILM COATED ORAL at 08:44

## 2020-12-22 ASSESSMENT — PAIN SCALES - WONG BAKER: WONGBAKER_NUMERICALRESPONSE: 0

## 2020-12-22 ASSESSMENT — PAIN SCALES - GENERAL
PAINLEVEL_OUTOF10: 2
PAINLEVEL_OUTOF10: 4
PAINLEVEL_OUTOF10: 8
PAINLEVEL_OUTOF10: 4

## 2020-12-23 VITALS
BODY MASS INDEX: 23.99 KG/M2 | HEART RATE: 62 BPM | DIASTOLIC BLOOD PRESSURE: 80 MMHG | RESPIRATION RATE: 14 BRPM | HEIGHT: 66 IN | OXYGEN SATURATION: 99 % | WEIGHT: 149.25 LBS | TEMPERATURE: 98.1 F | SYSTOLIC BLOOD PRESSURE: 120 MMHG

## 2020-12-23 PROCEDURE — 99239 HOSP IP/OBS DSCHRG MGMT >30: CPT | Performed by: PSYCHIATRY & NEUROLOGY

## 2020-12-23 PROCEDURE — 10002801 HB RX 250 W/O HCPCS: Performed by: INTERNAL MEDICINE

## 2020-12-23 PROCEDURE — 10002803 HB RX 637: Performed by: INTERNAL MEDICINE

## 2020-12-23 PROCEDURE — 10002803 HB RX 637: Performed by: PSYCHIATRY & NEUROLOGY

## 2020-12-23 RX ORDER — TRAMADOL HYDROCHLORIDE 50 MG/1
50 TABLET ORAL EVERY 6 HOURS PRN
Qty: 20 TABLET | Refills: 0 | Status: SHIPPED | OUTPATIENT
Start: 2020-12-23

## 2020-12-23 RX ORDER — HALOPERIDOL 5 MG/1
5 TABLET ORAL NIGHTLY
Qty: 10 TABLET | Refills: 0 | Status: SHIPPED | OUTPATIENT
Start: 2020-12-23 | End: 2020-12-23

## 2020-12-23 RX ORDER — BUSPIRONE HYDROCHLORIDE 15 MG/1
15 TABLET ORAL 3 TIMES DAILY
Qty: 30 TABLET | Refills: 0 | Status: ON HOLD | OUTPATIENT
Start: 2020-12-23 | End: 2023-01-24

## 2020-12-23 RX ORDER — NICOTINE 21 MG/24HR
1 PATCH, TRANSDERMAL 24 HOURS TRANSDERMAL DAILY
Qty: 15 PATCH | Refills: 0 | Status: ON HOLD | OUTPATIENT
Start: 2020-12-24 | End: 2023-01-24

## 2020-12-23 RX ORDER — MONTELUKAST SODIUM 10 MG/1
10 TABLET ORAL DAILY
Qty: 30 TABLET | Refills: 3 | Status: SHIPPED | OUTPATIENT
Start: 2020-12-23

## 2020-12-23 RX ORDER — HALOPERIDOL 5 MG/1
5 TABLET ORAL NIGHTLY
Qty: 10 TABLET | Refills: 1 | Status: SHIPPED | OUTPATIENT
Start: 2020-12-23 | End: 2020-12-23

## 2020-12-23 RX ORDER — PANTOPRAZOLE SODIUM 40 MG/1
40 TABLET, DELAYED RELEASE ORAL DAILY
Qty: 30 TABLET | Refills: 3 | Status: SHIPPED | OUTPATIENT
Start: 2020-12-23

## 2020-12-23 RX ORDER — LIDOCAINE 4 G/G
1 PATCH TOPICAL DAILY
Qty: 30 PATCH | Refills: 0 | Status: SHIPPED | OUTPATIENT
Start: 2020-12-23

## 2020-12-23 RX ORDER — HALOPERIDOL 5 MG/1
5 TABLET ORAL NIGHTLY
Qty: 10 TABLET | Refills: 1 | Status: SHIPPED | OUTPATIENT
Start: 2020-12-23

## 2020-12-23 RX ORDER — GABAPENTIN 300 MG/1
300 CAPSULE ORAL EVERY 12 HOURS SCHEDULED
Qty: 20 CAPSULE | Refills: 0 | Status: SHIPPED | OUTPATIENT
Start: 2020-12-23

## 2020-12-23 RX ORDER — SERTRALINE HYDROCHLORIDE 100 MG/1
200 TABLET, FILM COATED ORAL DAILY
Qty: 10 TABLET | Refills: 0 | Status: SHIPPED | OUTPATIENT
Start: 2020-12-24

## 2020-12-23 RX ORDER — METOPROLOL SUCCINATE 50 MG/1
50 TABLET, EXTENDED RELEASE ORAL 2 TIMES DAILY
Qty: 60 TABLET | Refills: 5 | Status: ON HOLD | OUTPATIENT
Start: 2020-12-23 | End: 2023-01-24

## 2020-12-23 RX ORDER — TRAZODONE HYDROCHLORIDE 150 MG/1
150 TABLET ORAL NIGHTLY
Qty: 10 TABLET | Refills: 0 | Status: ON HOLD | OUTPATIENT
Start: 2020-12-23 | End: 2023-01-24

## 2020-12-23 RX ADMIN — TRAMADOL HYDROCHLORIDE 50 MG: 50 TABLET, FILM COATED ORAL at 14:30

## 2020-12-23 RX ADMIN — CLOBETASOL PROPIONATE 1 APPLICATION.: 0.5 CREAM TOPICAL at 08:50

## 2020-12-23 RX ADMIN — LIDOCAINE 1 PATCH: 246 PATCH TOPICAL at 08:51

## 2020-12-23 RX ADMIN — GABAPENTIN 300 MG: 300 CAPSULE ORAL at 08:48

## 2020-12-23 RX ADMIN — MONTELUKAST SODIUM 10 MG: 10 TABLET, FILM COATED ORAL at 08:50

## 2020-12-23 RX ADMIN — PANTOPRAZOLE SODIUM 40 MG: 40 TABLET, DELAYED RELEASE ORAL at 08:56

## 2020-12-23 RX ADMIN — SERTRALINE HYDROCHLORIDE 200 MG: 100 TABLET, FILM COATED ORAL at 08:49

## 2020-12-23 RX ADMIN — BUSPIRONE HYDROCHLORIDE 15 MG: 5 TABLET ORAL at 14:30

## 2020-12-23 RX ADMIN — METOPROLOL SUCCINATE 50 MG: 50 TABLET, EXTENDED RELEASE ORAL at 08:50

## 2020-12-23 RX ADMIN — BUSPIRONE HYDROCHLORIDE 15 MG: 5 TABLET ORAL at 08:49

## 2020-12-23 ASSESSMENT — PAIN SCALES - GENERAL
PAINLEVEL_OUTOF10: 2
PAINLEVEL_OUTOF10: 3
PAINLEVEL_OUTOF10: 9

## 2021-01-04 ENCOUNTER — HOSPITAL ENCOUNTER (EMERGENCY)
Facility: HOSPITAL | Age: 41
Discharge: HOME | End: 2021-01-04
Attending: EMERGENCY MEDICINE
Payer: MEDICAID

## 2021-01-04 ENCOUNTER — APPOINTMENT (OUTPATIENT)
Dept: GENERAL RADIOLOGY | Facility: HOSPITAL | Age: 41
End: 2021-01-04
Attending: EMERGENCY MEDICINE
Payer: MEDICAID

## 2021-01-04 VITALS
RESPIRATION RATE: 20 BRPM | TEMPERATURE: 98 F | HEIGHT: 66 IN | OXYGEN SATURATION: 94 % | WEIGHT: 160 LBS | HEART RATE: 93 BPM | BODY MASS INDEX: 25.71 KG/M2 | SYSTOLIC BLOOD PRESSURE: 108 MMHG | DIASTOLIC BLOOD PRESSURE: 71 MMHG

## 2021-01-04 DIAGNOSIS — I47.1 SVT (SUPRAVENTRICULAR TACHYCARDIA) (HCC): Primary | ICD-10-CM

## 2021-01-04 DIAGNOSIS — R06.00 DYSPNEA, UNSPECIFIED TYPE: ICD-10-CM

## 2021-01-04 DIAGNOSIS — Z20.822 SUSPECTED COVID-19 VIRUS INFECTION: ICD-10-CM

## 2021-01-04 LAB
ANION GAP SERPL CALC-SCNC: 7 MMOL/L (ref 0–18)
BASOPHILS # BLD AUTO: 0.03 X10(3) UL (ref 0–0.2)
BASOPHILS NFR BLD AUTO: 0.6 %
BUN BLD-MCNC: 8 MG/DL (ref 7–18)
BUN/CREAT SERPL: 10 (ref 10–20)
CALCIUM BLD-MCNC: 9 MG/DL (ref 8.5–10.1)
CHLORIDE SERPL-SCNC: 108 MMOL/L (ref 98–112)
CO2 SERPL-SCNC: 23 MMOL/L (ref 21–32)
CREAT BLD-MCNC: 0.8 MG/DL
DEPRECATED RDW RBC AUTO: 50.4 FL (ref 35.1–46.3)
EOSINOPHIL # BLD AUTO: 0.03 X10(3) UL (ref 0–0.7)
EOSINOPHIL NFR BLD AUTO: 0.6 %
ERYTHROCYTE [DISTWIDTH] IN BLOOD BY AUTOMATED COUNT: 14.8 % (ref 11–15)
GLUCOSE BLD-MCNC: 149 MG/DL (ref 70–99)
HCT VFR BLD AUTO: 39.9 %
HGB BLD-MCNC: 13.5 G/DL
IMM GRANULOCYTES # BLD AUTO: 0.02 X10(3) UL (ref 0–1)
IMM GRANULOCYTES NFR BLD: 0.4 %
LYMPHOCYTES # BLD AUTO: 1.4 X10(3) UL (ref 1–4)
LYMPHOCYTES NFR BLD AUTO: 25.9 %
MCH RBC QN AUTO: 31.3 PG (ref 26–34)
MCHC RBC AUTO-ENTMCNC: 33.8 G/DL (ref 31–37)
MCV RBC AUTO: 92.6 FL
MONOCYTES # BLD AUTO: 0.33 X10(3) UL (ref 0.1–1)
MONOCYTES NFR BLD AUTO: 6.1 %
NEUTROPHILS # BLD AUTO: 3.6 X10 (3) UL (ref 1.5–7.7)
NEUTROPHILS # BLD AUTO: 3.6 X10(3) UL (ref 1.5–7.7)
NEUTROPHILS NFR BLD AUTO: 66.4 %
OSMOLALITY SERPL CALC.SUM OF ELEC: 287 MOSM/KG (ref 275–295)
PLATELET # BLD AUTO: 237 10(3)UL (ref 150–450)
POTASSIUM SERPL-SCNC: 3.3 MMOL/L (ref 3.5–5.1)
RBC # BLD AUTO: 4.31 X10(6)UL
SODIUM SERPL-SCNC: 138 MMOL/L (ref 136–145)
WBC # BLD AUTO: 5.4 X10(3) UL (ref 4–11)

## 2021-01-04 PROCEDURE — 99284 EMERGENCY DEPT VISIT MOD MDM: CPT

## 2021-01-04 PROCEDURE — 93005 ELECTROCARDIOGRAM TRACING: CPT

## 2021-01-04 PROCEDURE — 93010 ELECTROCARDIOGRAM REPORT: CPT

## 2021-01-04 PROCEDURE — 85025 COMPLETE CBC W/AUTO DIFF WBC: CPT | Performed by: EMERGENCY MEDICINE

## 2021-01-04 PROCEDURE — 71045 X-RAY EXAM CHEST 1 VIEW: CPT | Performed by: EMERGENCY MEDICINE

## 2021-01-04 PROCEDURE — 99285 EMERGENCY DEPT VISIT HI MDM: CPT

## 2021-01-04 PROCEDURE — 36415 COLL VENOUS BLD VENIPUNCTURE: CPT

## 2021-01-04 PROCEDURE — 80048 BASIC METABOLIC PNL TOTAL CA: CPT | Performed by: EMERGENCY MEDICINE

## 2021-01-04 RX ORDER — POTASSIUM CHLORIDE 20 MEQ/1
40 TABLET, EXTENDED RELEASE ORAL ONCE
Status: COMPLETED | OUTPATIENT
Start: 2021-01-04 | End: 2021-01-04

## 2021-01-05 LAB
ATRIAL RATE: 80 BPM
P AXIS: 55 DEGREES
P-R INTERVAL: 132 MS
Q-T INTERVAL: 372 MS
QRS DURATION: 78 MS
QTC CALCULATION (BEZET): 429 MS
R AXIS: 75 DEGREES
T AXIS: 78 DEGREES
VENTRICULAR RATE: 80 BPM

## 2021-01-05 NOTE — ED INITIAL ASSESSMENT (HPI)
Low grade fever, Shortness of breath , dry cough x 4days. Had HR 200s x 2hours, hxof SVT with ablasion. Took pm dose metoprolol 50mg and trazadone and converted after bearing down.  Arrived via EMS, pt had no SVT per EMS

## 2021-01-05 NOTE — ED PROVIDER NOTES
Patient Seen in: [facility] Emergency Department      History   Patient presents with:  Arrythmia/Palpitations  Difficulty Breathing    Stated Complaint: HR 200s at home, hx of SVT, Low grade fever, shortness of breath x 4 days    HPI/Subjective: breath x 4 days  Other systems are as noted in HPI. Constitutional and vital signs reviewed. All other systems reviewed and negative except as noted above.     Physical Exam     ED Triage Vitals [01/04/21 2030]   /90   Pulse 84   Resp 16   Temp DIFFERENTIAL[[id number]]          Abnormal            Final result                 Please view results for these tests on the individual orders.    RAINBOW DRAW BLUE   RAINBOW DRAW LAVENDER   RAINBOW DRAW LIGHT GREEN   RAINBOW DRAW GOLD   SARS-COV-2 BY PCR ( arrived via EMS for SVT. She states she has lower back pain, at the sacrum, and was moving things earlier this week. FINDINGS:  No fracture, subluxation or disc space narrowing. Intact facet joints. Curvatures are within normal limits.             CONC evaluation for pulmonary embolus that she is at risk for this given her recent travels. Patient understood the potential gravity of the diagnosis and the consequences of missing the diagnosis including death, permanent devastating neurological disability.

## 2021-01-07 LAB — SARS-COV-2 RNA RESP QL NAA+PROBE: NOT DETECTED

## 2021-01-25 NOTE — ED PROVIDER NOTES
Patient Seen in: [facility] Emergency Department      History   Patient presents with:  Eval-P    Stated Complaint: SI    HPI/Subjective:   HPI    72-year-old female past medical history of chronic back pain presents ED with complaints of chronic ba 01/24/21 2230 98 %   O2 Device 01/25/21 0155 Nasal cannula       Current:/68   Pulse 72   Temp 97.9 °F (36.6 °C) (Temporal)   Resp 16   Wt 72.6 kg   LMP 12/01/2020   SpO2 95%   BMI 25.83 kg/m²         Physical Exam  Vitals signs and nursing note revi PLATELET.   Procedure                               Abnormality         Status                     ---------                               -----------         ------                     CBC W/ DIFFERENTIAL[[id number]]          Abnormal            Final resul

## 2021-01-25 NOTE — ED NOTES
Spoke to pt's fiance, Judson Butterfield, who was made aware that pt was in ED. Pt provided this writer with consent to discuss detail about tx. Informed him of reason for ER visit. Engaged in discussion about suicidality.  He reports pt was psychiatrically

## 2021-01-25 NOTE — ED NOTES
Pt awake, c/o of soreness to her back. States it is her chronic back issues. Pt states she is not up to talking with BONITA at this time.

## 2021-01-25 NOTE — ED NOTES
Patient awake and alert. Ambulatory to bathroom with steady gait with this PCT. Patient given water per request. Denies any other needs at this time.

## 2021-01-25 NOTE — ED NOTES
Dr. Tasha Schneider made aware this writer was unable to reach [facility] to obtain collateral. Per Dr. Tasha Schneider, ok to move forward with discharge. Pt completed safety plan.

## 2021-01-25 NOTE — ED NOTES
Per rn discontinue seclusion and call for belongings.  Secueiry bag [id number] WITH SEAL INTACT GIVEN TO PT.

## 2021-01-25 NOTE — ED INITIAL ASSESSMENT (HPI)
46 yo F brought by EMS for anxiety, depression and suicidal ideation. Pt admits to alcohol. Pt is hysterical on arrival and not answering to questions. Pt states she is having back pain. Hx of chronic back pain.  Because of her pain she got anxious, sta

## 2021-01-25 NOTE — ED NOTES
Patient belongings returned from [employer]. This PCT and Corry Brown at bedside with patient belongings for phone. Phone number for fiance given to Jj Deleon. Belongings resealed and at nurse's station awaiting for [facility] update.

## 2021-01-25 NOTE — ED PROVIDER NOTES
On reevaluation the patient denies suicidal ideation. Patient denies any plan to hurt her self. Patient feels comfortable going home. Patient was assessed by [facility] crisis worker.   Patient does have referrals given and follow-up scheduled tomorrow

## 2021-01-25 NOTE — BH LEVEL OF CARE ASSESSMENT
Crisis Evaluation Assessment    Jessica Ryan YOB: 1980   Age 47year old MRN [id number]   Location [address] [place] [address] Attending Jefry Rosa DO      Patient's legal sex: female  Patient identifies as: female  Mickie Marnia actually had any thoughts of killing yourself? (past 30 days): No              6. Have you ever done anything, started to do anything, or prepared to do anything to end your life? (lifetime): Yes  7.  How long ago did you do any of these?: Over a year ago Substance Use:  Pt reports she started drinking around age 23 when she was in college. She states her drinking started to increase in her mid 35s when her depression worsened.  Pt reports she is currently drinking 5x per week and reports her dri say or do something to you that makes you feel unsafe?: No  Have You Ever Been Harmed by a Partner/Caregiver?: No  Health Concerns r/t Abuse: No  Possible Abuse Reportable to[de-identified] Not appropriate for reporting to authorities    Mental Status Exam:   General A psychiatric hospitalization 3 years ago for SI. She reports having a current psychiatrist.             Risk/Protective Factors  Protective Factors:  \"Life, family, and friends\"    Level of Care Recommendations  Consulted with: Dr. Mihai Pendleton of [facility]

## 2021-01-25 NOTE — ED NOTES
Left pt's kelsey a voicemail in attempt to obtain collateral. @ 07:05 called kelsey again and no answer. @ 07:15 placed another call for kelsey, but did not get an answer.

## 2021-03-16 ENCOUNTER — HOSPITAL ENCOUNTER (OUTPATIENT)
Dept: ULTRASOUND IMAGING | Age: 41
Discharge: HOME | End: 2021-03-16
Attending: OBSTETRICS & GYNECOLOGY

## 2021-03-16 DIAGNOSIS — N92.6 IRREGULAR MENSTRUAL CYCLE: ICD-10-CM

## 2021-03-16 PROCEDURE — 76856 US EXAM PELVIC COMPLETE: CPT

## 2021-11-08 ENCOUNTER — HOSPITAL ENCOUNTER (EMERGENCY)
Facility: HOSPITAL | Age: 41
Discharge: LEFT BEFORE DISCHARGE | End: 2021-11-08
Attending: EMERGENCY MEDICINE
Payer: MEDICAID

## 2021-11-08 VITALS
WEIGHT: 180 LBS | HEART RATE: 63 BPM | DIASTOLIC BLOOD PRESSURE: 90 MMHG | SYSTOLIC BLOOD PRESSURE: 142 MMHG | TEMPERATURE: 97 F | BODY MASS INDEX: 28.93 KG/M2 | OXYGEN SATURATION: 94 % | HEIGHT: 66 IN | RESPIRATION RATE: 15 BRPM

## 2021-11-08 PROCEDURE — 83690 ASSAY OF LIPASE: CPT | Performed by: EMERGENCY MEDICINE

## 2021-11-08 PROCEDURE — 80053 COMPREHEN METABOLIC PANEL: CPT

## 2021-11-08 PROCEDURE — 80053 COMPREHEN METABOLIC PANEL: CPT | Performed by: EMERGENCY MEDICINE

## 2021-11-08 PROCEDURE — 85025 COMPLETE CBC W/AUTO DIFF WBC: CPT

## 2021-11-08 PROCEDURE — 85025 COMPLETE CBC W/AUTO DIFF WBC: CPT | Performed by: EMERGENCY MEDICINE

## 2021-11-08 PROCEDURE — 83690 ASSAY OF LIPASE: CPT

## 2021-11-08 NOTE — ED INITIAL ASSESSMENT (HPI)
PT PRESENTS TO ED WITH RIGHT UPPER QUAD PAIN THAT STARTED 2 DAYS AGO, PT HAS NAUSEA, VOMITING AND DIARRHEA, PT STATES SHE BELIEVES SHE IS HAVING A PANCREATIC FLARE UP. PT HAS HX OF PANCREATITIS.

## 2021-11-09 ENCOUNTER — APPOINTMENT (OUTPATIENT)
Dept: CT IMAGING | Facility: HOSPITAL | Age: 41
DRG: 439 | End: 2021-11-09
Attending: EMERGENCY MEDICINE
Payer: MEDICAID

## 2021-11-09 PROBLEM — R10.9 ABDOMINAL PAIN, ACUTE: Status: ACTIVE | Noted: 2021-11-09

## 2021-11-09 PROBLEM — K85.20 ALCOHOL-INDUCED ACUTE PANCREATITIS, UNSPECIFIED COMPLICATION STATUS: Status: ACTIVE | Noted: 2021-11-09

## 2021-11-09 PROBLEM — K85.20 ALCOHOL-INDUCED ACUTE PANCREATITIS, UNSPECIFIED COMPLICATION STATUS (HCC): Status: ACTIVE | Noted: 2021-11-09

## 2021-11-09 PROCEDURE — 74177 CT ABD & PELVIS W/CONTRAST: CPT | Performed by: EMERGENCY MEDICINE

## 2021-11-09 NOTE — ED INITIAL ASSESSMENT (HPI)
Patient c/o bad abd pain sts \"I think it's pancreatitis. \" last had that 1 years ago. Patient sts pain started 2 days ago.  Patient sts checked in here earlier and had blood drawn but was never seen so she went home and pain has gotten progressively worse

## 2021-11-09 NOTE — H&P
[facility]  History and Physical     Brant Bottoms Patient Status:  Emergency    1980 MRN [id number]   Location [address] [facility] Attending Maritza Hernandez, [address] [facility] Day # 0 PCP MICAH Gutierrez     Chief Complaint: abd pa calcipotriene 0.005 % External Cream, Apply topically 2 (two) times daily. To affected area, Monday-Friday , Disp: , Rfl:   Clobetasol Propionate 0.05 % External Solution, Apply topically nightly as needed.  To scalp , Disp: , Rfl:   hydrocortisone 2.5 % traZODone HCl 100 MG Oral Tab, Take 200 mg by mouth nightly., Disp: , Rfl:         Review of Systems:   A comprehensive 14 point review of systems was completed. Pertinent positives and negatives noted in the HPI.     Physical Exam:    BP (!) 145/100 PCT in the last 168 hours. Cardiac  No results for input(s): TROP, PBNP in the last 168 hours. Creatinine Kinase  No results for input(s): CK in the last 168 hours.     Inflammatory Markers  No results for input(s): CRP, JUAN, LDH, DDIMER in the last 1

## 2021-11-09 NOTE — CONSULTS
[facility]                       Gastroenterology [address] [facility] Gastroenterology    Leah Moreno Patient Status:  Inpatient    1980 MRN [id number]   [facility] 3NE-A Attending Carmen Steiner MD   Hosp Day # 0 KYLE Pelayo ondansetron (ZOFRAN) injection 4 mg, 4 mg, Intravenous, Once  [COMPLETED] sodium chloride 0.9% IV bolus 1,000 mL, 1,000 mL, Intravenous, Once  [COMPLETED] iohexol (OMNIPAQUE) 350 MG/ML injection 100 mL, 100 mL, Intravenous, ONCE PRN  [] 0.9% NaCl in SVT            Respiratory: No shortness of breath, asthma, copd, recurrent pneumonia            Hematologic: The patient reports no easy bruising, frequent gum bleeding or nose bleeding;   The patient has no history of known chronic anemia            Derma 11/09/2021     11/09/2021    CO2 25.0 11/09/2021     11/09/2021    CA 8.3 11/09/2021    ALB 2.9 11/09/2021    ALKPHO 88 11/09/2021    BILT 1.1 11/09/2021     11/09/2021    ALT 50 11/09/2021     11/09/2021     Recent Labs   Lab 11 noted.     SPLEEN:  No enlargement or focal lesion.     KIDNEYS:  No mass, obstruction, or calcification.     ADRENALS:  No mass or enlargement.     AORTA/VASCULAR:  No aneurysm or dissection.     RETROPERITONEUM:  No mass or adenopathy.     BOWEL/MESENTER Acute on chronic pancreatitis most likely d/t EtOH. Will increase IV fluids, continue supportive care measures, and monitor symptoms. Will need follow-up imaging--timing to be determined based on pt's clinical course   Recommendations:     1.  IV fluids: LR pancreatitis, will consider OP EUS vs. MRCP to rule out malignant lesion; d/w pt      Fernie Redd MD  11/9/2021  [facility] Gastroenterology

## 2021-11-09 NOTE — ED PROVIDER NOTES
Patient Seen in: [facility] Emergency Department      History   Patient presents with:  Abdomen/Flank Pain    Stated Complaint: ABD PAIN    Subjective:   HPI    51-year-old female presents emergency room with chief complaint of upper abdominal pain. Resp 18   Temp 97.3 °F (36.3 °C)   Temp src Temporal   SpO2 98 %   O2 Device None (Room air)       Current:BP (!) 145/100   Pulse 70   Temp 97.3 °F (36.3 °C) (Temporal)   Resp 18   Ht 167.6 cm (5' 6\")   Wt 81.6 kg   LMP 10/20/2021   SpO2 96%   BMI 29.05 CBC W/ DIFFERENTIAL[[id number]]          Abnormal            Final result                 Please view results for these tests on the individual orders. We will place IV, check blood work including CBC, chemistry, lipase.   CT abdomen/pelvis Patient will be admitted and GI will be consulted. Patient remain n.p.o.  I discussed all results and plan with the patient.   Admission disposition: 11/9/2021  3:54 AM                                  Disposition and Plan     Clinical Impression:  Alcohol

## 2021-11-09 NOTE — PLAN OF CARE
Patient alert and oriented x4. On Ra, . NSR on tele. Patient complains of 9/10 abdominal pain. PRN dilaudid. NPO sips with meds. IVF. Resting comfortalby in bed. Will continue to monitor.

## 2021-11-09 NOTE — ED QUICK NOTES
Orders for admission, patient is aware of plan and ready to go upstairs. Any questions, please call ED RN tony  at extension [phone]. Vaccinated?  yes  Type of COVID test sent:rapid, negative  COVID Suspicion level: Low/High LOw      Titratable drug(s

## 2021-11-10 ENCOUNTER — APPOINTMENT (OUTPATIENT)
Dept: ULTRASOUND IMAGING | Facility: HOSPITAL | Age: 41
DRG: 439 | End: 2021-11-10
Attending: STUDENT IN AN ORGANIZED HEALTH CARE EDUCATION/TRAINING PROGRAM
Payer: MEDICAID

## 2021-11-10 PROCEDURE — 76700 US EXAM ABDOM COMPLETE: CPT | Performed by: STUDENT IN AN ORGANIZED HEALTH CARE EDUCATION/TRAINING PROGRAM

## 2021-11-10 NOTE — PROGRESS NOTES
[facility]     Hospitalist Progress Note     Erika Cam Patient Status:  Inpatient    1980 MRN [id number]   [facility] 3NE-A Attending Aide Walters MD   Hosp Day # 1 PCP Quintin Montero     Chief Complaint: abd pain     Keren Bilis the last 168 hours. Creatinine Kinase  No results for input(s): CK in the last 168 hours. Inflammatory Markers  No results for input(s): CRP, JUAN, LDH, DDIMER in the last 168 hours. Imaging: Imaging data reviewed in Epic.     Medications:   • busPI

## 2021-11-10 NOTE — PLAN OF CARE
Assumed care at [address]. A&Ox4, on 2L O2 via NC, baseline RA. NSR on tele, afebrile, VSS. PRN hydralazine for elevated SBP above 160. Heparin subcutaneous. NPO. Dilaudid PRN for c/o pain. PRN compazine and zofran for c/o n/v. Denies SOB. Up standby.  LR infusi

## 2021-11-10 NOTE — PLAN OF CARE
Pt is alert x 4, on Ra during the day, NSR on the monitor. PT denies any cardiovascular symptoms at this time. Pt is reporting right sided abdominal pain, managed with IV medication see MAR , Pt is up with SBA, continent of B/B.  All needs met and will cont analgesics based on type and severity of pain and evaluate response  - Implement non-pharmacological measures as appropriate and evaluate response  - Consider cultural and social influences on pain and pain management  - Manage/alleviate anxiety  - Utilize

## 2021-11-10 NOTE — PROGRESS NOTES
[address] [facility] Gastroenterology     Marianela Bombard Patient Status:  Inpatient    1980 MRN [id number]   [facility] 3NE-A Attending Kun Flanagan MD   Hosp Day # 1 PCP [address] [facility] 13.5 12.8 12.1   MCV 94.1 93.0 93.1 94.1   .0* 149.0* 139.0* 118.0*       Recent Labs   Lab 11/09/21  0226 11/09/21  0801 11/10/21  0659   * 102* 104*   BUN 6* 4* 2*   CREATSERUM 0.65 0.52* 0.40*   GFRAA 129 139 152   GFRNAA 112 121 132   CA with intervention as indicated. PROCEDURE DESCRIPTION:   A regular upper endoscope was introduced into the patient’s mouth, hypo pharynx, esophagus, stomach and the first and second portion of the duodenum.  Retroflexion of the endoscope was performed in t 2. Chronic pancreatitis changes complicated by 2.1 cm pancreatic pseudocyst with necrotic features confirmed on EUS-FNA. No malignancy identified.      ASSESSMENT / PLAN:     Patient is a 47 yo F w/ hx of acute on chronic pancreatitis who p

## 2021-11-10 NOTE — PAYOR COMM NOTE
--------------  ADMISSION REVIEW     Payor: ASHLEY [address] #:  [id number]  Authorization Number: PENDING    Admit date: 11/9/21  Admit time:  7:41 AM       REVIEW DOCUMENTATION:     ED Provider Notes      ED Provider Notes signed by DO solitario Ocasio Alcohol use:  Yes      Alcohol/week: 7.0 standard drinks      Types: 7 Shots of liquor per week      Comment: 7 SHOTS DAILY    Drug use: Yes      Types: Cannabis             Review of Systems    Positive for stated complaint: ABD PAIN  Other systems are as Lymphocyte Absolute 0.89 (*)     All other components within normal limits   LIPASE - Normal   POCT PREGNANCY URINE - Normal   RAPID SARS-COV-2 BY PCR - Normal   CBC WITH DIFFERENTIAL WITH PLATELET    Narrative:      The following orders were created for pa Patient given IV fluid hydration and pain medication. CBC white count 5.9 hemoglobin 13.5 platelet 332. Rapid Covid is negative. Lipase 288. Chemistry sodium 136 potassium 3.3 BUN 6 creatinine 0.65 glucose 154.   CT of the abdomen/pelvis consistent with [address] last weekend and was drinking heavily. She complains of upper abd pain and nausea but no vomiting. She has had pancreatitis in the past due to ETOH use. She denies any F/C. No CP or SOB.      Past Medical History:  Past Medical History:   Diagnosis D daily., Disp: , Rfl:   Multiple Vitamins-Minerals (HAIR SKIN AND NAILS FORMULA) Oral Tab, Take 1 tablet by mouth daily. , Disp: , Rfl:   BLACK COHOSH OR, Take 1 tablet by mouth daily. , Disp: , Rfl:   loratadine 10 MG Oral Tab, Take 10 mg by mouth daily as n distress. Alert and oriented x 3. HEENT: Normocephalic atraumatic. Moist mucous membranes. EOM-I. PERRLA. Anicteric. Neck: No lymphadenopathy. No JVD. No carotid bruits. Respiratory: Clear to auscultation bilaterally. No wheezes. No rhonchi.   Cardiovasc control  -GI eval in AM  -NPO   -advised on ETOH cessation    #Depression/Anxiety    #Ess HTN        Quality:  · DVT Prophylaxis: HSQ  · CODE status: full  · Muro: none  · If COVID testing is negative, may discontinue isolation: yes     Plan of care discu Eliane Ji RN    11/10/2021 0000 New Bag (none) Intravenous Eliane Ji RN    11/9/2021 1627 Rate/Dose Change (none) Intravenous Diana Phelan RN      metoprolol succinate (Toprol XL) 24 hr tab 50 mg     Date Action Dose Route User    11/1 11/09/21 1700 98 °F (36.7 °C) — 18 — — — — — RB    11/09/21 1233 98.3 °F (36.8 °C) 71 18 154/103 90 % — None (Room air) — MV    11/09/21 0623 — 65 16 149/101 95 % — None (Room air) — MM    11/09/21 0445 — 66 — 138/94 94 % — — — MM    11/09/21 0440 — 67 16 inflammatory changes surrounding the pancreatic head and uncinate process is noted.  A 1.2 cm low-density focus in the pancreatic head/uncinate process is noted.  This may represent a focus of necrosis.  A developing pancreatic    lesion/malignancy cannot (37.5 °C)  Pulse:  [62-81] 78  Resp:  [18] 18  BP: (123-166)/(71-96) 123/71     Diagnostic Data:    Labs:         Recent Labs   Lab 11/08/21  1743 11/09/21  0226 11/09/21  0801 11/10/21  0659   WBC 5.4 5.9 5.9 7.1   HGB 13.2 13.5 12.8 12.1   MCV 94.1 93.0

## 2021-11-11 ENCOUNTER — APPOINTMENT (OUTPATIENT)
Dept: GENERAL RADIOLOGY | Facility: HOSPITAL | Age: 41
DRG: 439 | End: 2021-11-11
Attending: INTERNAL MEDICINE
Payer: MEDICAID

## 2021-11-11 PROCEDURE — 71045 X-RAY EXAM CHEST 1 VIEW: CPT | Performed by: INTERNAL MEDICINE

## 2021-11-11 NOTE — DISCHARGE SUMMARY
[facility] HOSPITALIST  DISCHARGE SUMMARY     Rico Diallo Patient Status:  Inpatient    1980 MRN [id number]   [facility] 3NE-A Attending No att. providers found   [address] Day # 2 PCP MICAH Ceja Headings     Date of Admission: 2021  Date of Take half tablet if blood pressure less than 130/80   Refills: 0        CONTINUE taking these medications      Instructions Prescription details   acetaminophen 500 MG Tabs  Commonly known as: TYLENOL EXTRA STRENGTH      Take 1,000 mg by mouth every 6 (six tablet  Refills: 0     traZODone 100 MG Tabs  Commonly known as: DESYREL      Take 200 mg by mouth nightly.    Refills: 0           Where to Get Your Medications      These medications were sent to [address] [place] [address]  [phone],

## 2021-11-11 NOTE — PLAN OF CARE
Assumed care at 0700, A/Ox4, Weaned to R/A, IS encouraged. Up ad brady. IV fluids discontinued and SL. [place] for pain w/some relief. Tolerating LFS diet. G/I f/u. Outpatient. Voids. Discharge pending. Will continue to monitor.    Problem: PAIN - ADULT  Goal:

## 2021-11-11 NOTE — PLAN OF CARE
Patient alert and oriented x4. Up with standby. NSR on tele. VSS. Maintaining o2 sats on RA. C/o severe abdominal pain, prn dilaudid given with relief. Encouraged patient to use warm/ice packs to help with pain. Patient requested antiemetics for nausea.  IV of falls.   - [place] fall precautions as indicated by assessment.  - Educate pt/family on patient safety including physical limitations  - Instruct pt to call for assistance with activity based on assessment  - Modify environment to reduce risk of injury

## 2021-11-11 NOTE — PROGRESS NOTES
NURSING DISCHARGE NOTE    Discharged Home via [place]. Accompanied by Support staff  Belongings Taken by patient/family. Discharge paperwork provided and explained. Scripts sent to pharmacy of choice. All questions an concerns addressed.  Pt able t

## 2021-11-12 NOTE — PAYOR COMM NOTE
--------------  DISCHARGE REVIEW    Payor: Serafin Vzaquez #:  [id number]  Authorization Number: PENDING    Admit date: 11/9/21  Admit time:   7:41 AM  Discharge Date: 11/11/2021 12:12 PM     Admitting Physician: Yessy Jackson MD  Attending Physician:

## 2021-12-02 ENCOUNTER — APPOINTMENT (OUTPATIENT)
Dept: CT IMAGING | Facility: HOSPITAL | Age: 41
End: 2021-12-02
Attending: EMERGENCY MEDICINE
Payer: MEDICAID

## 2021-12-02 PROBLEM — K86.0 ALCOHOL-INDUCED CHRONIC PANCREATITIS (HCC): Status: ACTIVE | Noted: 2021-12-02

## 2021-12-02 PROCEDURE — 74177 CT ABD & PELVIS W/CONTRAST: CPT | Performed by: EMERGENCY MEDICINE

## 2021-12-02 NOTE — ED PROVIDER NOTES
Patient Seen in: [facility] Emergency Department      History   Patient presents with:  Abdomen/Flank Pain    Stated Complaint: abd pain     Subjective:   HPI    Patient is a 82-year-old female comes to emergency room for evaluation of abdominal valerie All other systems reviewed and negative except as noted above.     Physical Exam     ED Triage Vitals [12/02/21 0445]   BP (!) 145/96   Pulse 74   Resp 16   Temp 97 °F (36.1 °C)   Temp src    SpO2 97 %   O2 Device None (Room air)       Current:/85 ---------                               -----------         ------                     CBC W/ DIFFERENTIAL[[id number]]                              Final result                 Please view results for these tests on the individual orders.    CBC W/ DIFFERE

## 2021-12-02 NOTE — H&P
[facility] HOSPITALIST  History and Physical     Forrestine Cover Patient Status:  Emergency    1980 MRN [id number]   Location [address] [facility] Attending Nino Castillo MD   Hosp Day # 0 PCP Carmina Griggs     Chief Complaint: Abd ANAPHYLAXIS  Morphine                HIVES  Fentanyl                RASH    Medications:  No current facility-administered medications on file prior to encounter.   HYDROcodone-acetaminophen  MG Oral Tab, Take 1 tablet by mouth every 4 (four 100 MG Oral Tab, Take 200 mg by mouth nightly., Disp: , Rfl:     Review of Systems:   A comprehensive 14 point review of systems was completed. Pertinent positives and negatives noted in the HPI.     Physical Exam:    /85   Pulse 66   Temp 97 °F (3 168 hours. Inflammatory Markers  No results for input(s): CRP, JUAN, LDH, DDIMER in the last 168 hours. Imaging: Imaging data reviewed in Epic. ASSESSMENT / PLAN:     1. Acute on chronic pancreatitis.   Patient has history of alcohol use though meera

## 2021-12-02 NOTE — ED INITIAL ASSESSMENT (HPI)
Pt c/o RUQ abd pain. Pt states she was hospitalized 2 weeks ago with pancreatitis and she isn't improving.

## 2021-12-02 NOTE — PLAN OF CARE
Assumed care at 0700 from ER. Pt. A&O x4. RA. NSR on tele. Lovenox. On contact isolation to rule out C diff. Electrolyte protocol (noncardiac). Rt arm precautions. PRN dilaudid and benadryl given. Up to bathroom. All needs are met at this time.  Staff will

## 2021-12-02 NOTE — ED QUICK NOTES
Orders for admission, patient is aware of plan and ready to go upstairs. Any questions, please call ED RN mindy  at extension [phone].      Vaccinated yes  Type of COVID test sent: rapid  COVID Suspicion level: Low    Titratable drug(s) infusing:  Rate:    LO

## 2021-12-02 NOTE — CONSULTS
[facility]                       Gastroenterology Consultation-[facility]    Sharl Headings Patient Status:  Observation    1980 MRN [id number]   [facility] 3NE-A Attending Hortencia Cash [facility] mL, 1,000 mL, Intravenous, Once  [COMPLETED] ondansetron (ZOFRAN) injection 4 mg, 4 mg, Intravenous, Once  LORazepam (ATIVAN) injection 0.25 mg, 0.25 mg, Intravenous, TID PRN  metoprolol Tartrate (LOPRESSOR) injection 5 mg, 5 mg, Intravenous, Q6H  Pantopra recurrent pneumonia            Hematologic: The patient reports no easy bruising, frequent gum bleeding or nose bleeding;   The patient has no history of known chronic anemia            Dermatologic: The patient reports no recent rashes or chronic skin diso 12/02/2021    CA 9.3 12/02/2021    ALB 3.3 12/02/2021    ALKPHO 75 12/02/2021    BILT 0.4 12/02/2021    AST 9 12/02/2021    ALT 14 12/02/2021     12/02/2021     Recent Labs   Lab 12/02/21  0503   *   BUN 8   CREATSERUM 0.63   GFRAA 131   GFRN tail the   pancreas measuring 1.8 x 2.3 cm which is smaller than on the prior exam.   SPLEEN:  No enlargement or focal lesion.     KIDNEYS:  No mass, obstruction, or calcification.     ADRENALS:  No mass or enlargement.     AORTA/VASCULAR:  No aneurysm or assess pancreas/area of of necrosis versus prior to discharge if fevers develop or symptoms fail to improve with supportive measures  4. Encourage activity as tolerated; IS hourly while awake; DVT prophylaxis per PCP recommendations  5.  Pain control per [facility]

## 2021-12-03 NOTE — PROGRESS NOTES
[facility]     Hospitalist Progress Note     Bibstormy Sadie Patient Status:  Observation    1980 MRN [id number]   [facility] 3NW-A Attending Elli Boggs [address] Day # 0 PCP Prentis Stagers     Chief Complaint: Abdominal for input(s): CK in the last 168 hours. Inflammatory Markers  No results for input(s): CRP, JUAN, LDH, DDIMER in the last 168 hours. Imaging: Imaging data reviewed in Epic.     Medications:   • metoprolol Tartrate  5 mg Intravenous Q6H   • pantoprazole

## 2021-12-03 NOTE — PLAN OF CARE
Patient is alert and oriented x3  Up ad brady; does not want to ambulate in hallway   Voiding  NPO status maintained  Refusing SCDs; rationale provided  Pain meds given per [facility]  Problem: Patient/Family Goals  Goal: Patient/Family Long Term Goal  Description: by assessment.  - Educate pt/family on patient safety including physical limitations  - Instruct pt to call for assistance with activity based on assessment  - Modify environment to reduce risk of injury  - Provide assistive devices as appropriate  - Consi

## 2021-12-03 NOTE — PLAN OF CARE
Assumed care at [address]. A&Ox4, RA, NSR on tele, afebrile, VSS. On isolation for r/o c.diff. Strict NPO. LR infusing at 150mL/hour. Up ad brady. PRN dilaudid for pain. R arm precautions. Noncardiac electrolyte protocol. No c/o n/v at this time. No c/o SOB.  PRN

## 2021-12-03 NOTE — PLAN OF CARE
NURSING ADMISSION NOTE      Patient admitted via Cart from [address] to room. Safety precautions initiated. Bed in low position. Call light in reach. Patient complains of RUQ abdominal pain treated with dilaudid. No nausea. NPO.   Benadryl

## 2021-12-04 NOTE — PROGRESS NOTES
[facility]     Hospitalist Progress Note     Jaswantabhishek Sykestammi Patient Status:  Observation    1980 MRN [id number]   [facility] 3NW-A Attending Delories Seip [address] Day # 0 PCP Polly Nieto     Chief Complaint: Abdominal in the last 168 hours. Inflammatory Markers  No results for input(s): CRP, JUAN, LDH, DDIMER in the last 168 hours. Imaging: Imaging data reviewed in Epic.     Medications:   • metoprolol Tartrate  5 mg Intravenous Q6H   • pantoprazole (PROTONIX) IV pu

## 2021-12-04 NOTE — PROGRESS NOTES
Gastroenterology Follow-Up Note      Marcell Kocher Patient Status:  Observation    1980 MRN [id number]   [facility] 3NW-A Attending Estefanía Andre [address] Day # 0 PCP 17[address] [facility]     Chief Complaint/Reason for Follow

## 2021-12-04 NOTE — PROGRESS NOTES
[facility]  Progress Note    Corby Ryder Patient Status:  Observation    1980 MRN [id number]   [facility] 3NW-A Attending Anthony Alvarez*   Date 12/3/2021 PCP MICAH Baibn     Subjective:  Corby Ryder is a(n) 46 Alcohol-induced acute pancreatitis without infection or necrosis     SVT (supraventricular tachycardia) (HCC)     Alcohol abuse     Alcohol-induced acute pancreatitis, unspecified complication status     Abdominal pain, acute     Primary hypertension     E

## 2021-12-04 NOTE — PLAN OF CARE
Pt is alert and oriented x4. Lungs are clear bilaterally on room air. Bowel sounds are active. Pt reports passing flatus and formed stool. Reports severe abdominal pain 9/10. IV pain meds administered. Diet advanced to clears per GI.  Pt tolerating small am Problem: SAFETY ADULT - FALL  Goal: Free from fall injury  Description: INTERVENTIONS:  - Assess pt frequently for physical needs  - Identify cognitive and physical deficits and behaviors that affect risk of falls.   - [place] fall precautions as indica Establish method for patient to ask for assistance (call light)  - Provide an  as needed  - Communicate barriers and strategies to overcome with those who interact with patient  Outcome: Progressing

## 2021-12-05 NOTE — PLAN OF CARE
Problem: Pancreatitis    Data: Patient alert and oriented. Patient complains of upper abdominal pain, treated with PRN Dilaudid and Norco. SPO2 remains greater then 92% on room air. Lung sounds diminished. No cough noted.  Patient has increased pain with PO ADULT  Goal: Absence of fever/infection during anticipated neutropenic period  Description: INTERVENTIONS  - Monitor WBC  - Administer growth factors as ordered  - Implement neutropenic guidelines  Outcome: Progressing     Problem: [address] [facility] communication style  - Implement communication aides and strategies  - Use visual cues when possible  - Listen attentively, be patient, do not interrupt  - Minimize distractions  - Allow time for understanding and response  - Establish method for patient t

## 2021-12-05 NOTE — PLAN OF CARE
Problem: Patient/Family Goals  Goal: Patient/Family Long Term Goal  Description: Patient's Long Term Goal: discharge     Interventions:  - continue with scheduled and PRN medications  - complete all order testing  - follow diet plan  - follow electrolyte activity based on assessment  - Modify environment to reduce risk of injury  - Provide assistive devices as appropriate  - Consider OT/PT consult to assist with strengthening/mobility  - Encourage toileting schedule  Outcome: Progressing     Problem: [facility] needing both IV and PO pain medication for abdominal pain. Clear liquid diet. IVF. All questions and concerns addressed at this time. Will continue to monitor.

## 2021-12-05 NOTE — PLAN OF CARE
Patient is alert and oriented. On room air. Abdomen is soft/ non-distended. Patient states passing gas. Denies nausea. Patient is tolerating low fiber diet. Patient denies pain. Voiding freely. Tele with NSR. Ambulating the halls.  Plan of care updated with INTERVENTIONS:  - Assess pt frequently for physical needs  - Identify cognitive and physical deficits and behaviors that affect risk of falls.   - [place] fall precautions as indicated by assessment.  - Educate pt/family on patient safety including physic

## 2021-12-05 NOTE — PROGRESS NOTES
[facility]     Hospitalist Progress Note     Marianela Rose Patient Status:  Observation    1980 MRN [id number]   [facility] 3NW-A Attending Gio Yoon [address] Day # 0 PCP Tisha Lind     Chief Complaint: Abdominal Markers  No results for input(s): CRP, JUAN, LDH, DDIMER in the last 168 hours. Imaging: Imaging data reviewed in Epic.     Medications:   • metoprolol Tartrate  5 mg Intravenous Q6H   • pantoprazole (PROTONIX) IV push  40 mg Intravenous QAM AC    Or   •

## 2021-12-05 NOTE — PROGRESS NOTES
NURSING DISCHARGE NOTE    Discharged Home via Wheelchair. Accompanied by RN  Belongings Taken by patient/family. IV taken out. Discharge instructions given to patient. Prescription sent to patient's pharmacy. Patient verbalized understanding.

## 2021-12-05 NOTE — PROGRESS NOTES
Gastroenterology Follow-Up Note      Lelia Narvaez Patient Status:  Observation    1980 MRN [id number]   [facility] 3NW-A Attending Lora Lee [address] Day # 0 PCP [facility]     Chief Complaint/Reason for Follow

## 2021-12-19 NOTE — DISCHARGE SUMMARY
[facility] HOSPITALIST  DISCHARGE SUMMARY     Brant Day Patient Status:  Observation    1980 MRN [id number]   [facility] 3NW-A Attending No att. providers found   Hosp Day # 0 PCP MICAH Gutierrez     Date of Admission: 2021  Date o Yuli    Discharge Medication List:     Discharge Medications      START taking these medications      Instructions Prescription details   HYDROcodone-acetaminophen 5-325 MG Tabs  Commonly known as: Marie Matthews  Notes to patient: Do not take with other acetam SINGULAIR      Take 10 mg by mouth daily. Refills: 0     ondansetron 4 MG Tbdp  Commonly known as: ZOFRAN-ODT      Take 1 tablet (4 mg total) by mouth every 4 (four) hours as needed for Nausea.    Quantity: 30 tablet  Refills: 0     pantoprazole 40 MG Tbe

## 2021-12-22 ENCOUNTER — HOSPITAL ENCOUNTER (EMERGENCY)
Facility: HOSPITAL | Age: 41
Discharge: LEFT BEFORE DISCHARGE | End: 2021-12-22
Payer: MEDICAID

## 2021-12-22 NOTE — ED PROVIDER NOTES
Patient Seen in: [facility]      History   Patient presents with:  Abdomen/Flank Pain    Stated Complaint: abd pain yesterday, nausea    Subjective:   HPI    30-year-old female complaint abdominal pain started yesterday and epig (Temporal)   Resp 18   Ht 167.6 cm (5' 6\")   Wt 81.6 kg   LMP 12/13/2021   SpO2 94%   BMI 29.05 kg/m²         Physical Exam    Patient is alert orient x3 no acute distress HEENT exam within normal limits neck there is no lymphadenopathy or JVD lungs are c (primary encounter diagnosis)     Disposition:  Discharge  12/22/2021 11:08 am    Follow-up:  Anyi Gandara  [address]  [facility] [address] [facility]    In 3 days      Lito Mckeon, [address] [place] [address] Junes [phone]-

## 2021-12-22 NOTE — ED INITIAL ASSESSMENT (HPI)
PT PRESENTS TO ED WITH RIGHT UPPER QUAD ABD PAIN THAT STARTED YESTERDAY, NAUSEA, PT HAS HX OF PANCREATITIS, ADMITS TO DRINKING YESTERDAY.

## 2022-02-10 ENCOUNTER — LAB REQUISITION (OUTPATIENT)
Dept: LAB | Age: 42
End: 2022-02-10

## 2022-02-10 DIAGNOSIS — Z12.4 ENCOUNTER FOR SCREENING FOR MALIGNANT NEOPLASM OF CERVIX: ICD-10-CM

## 2022-02-10 PROCEDURE — 88175 CYTOPATH C/V AUTO FLUID REDO: CPT | Performed by: CLINICAL MEDICAL LABORATORY

## 2022-02-10 PROCEDURE — 87625 HPV TYPES 16 & 18 ONLY: CPT | Performed by: CLINICAL MEDICAL LABORATORY

## 2022-02-10 PROCEDURE — 87624 HPV HI-RISK TYP POOLED RSLT: CPT | Performed by: CLINICAL MEDICAL LABORATORY

## 2022-02-16 LAB
CASE RPRT: NORMAL
CLINICAL INFO: NORMAL
CYTOLOGY CVX/VAG STUDY: NORMAL
HPV16+18+45 E6+E7MRNA CVX NAA+PROBE: POSITIVE
Lab: ABNORMAL
PAP EDUCATIONAL NOTE: NORMAL
SPECIMEN ADEQUACY: NORMAL

## 2022-02-17 LAB
HPV GENOTYPE 16: NEGATIVE
HPV GENOTYPE 18/45: NEGATIVE
Lab: NORMAL

## 2022-02-21 ENCOUNTER — APPOINTMENT (OUTPATIENT)
Dept: CT IMAGING | Facility: HOSPITAL | Age: 42
DRG: 439 | End: 2022-02-21
Attending: EMERGENCY MEDICINE
Payer: MEDICAID

## 2022-02-21 PROCEDURE — 74177 CT ABD & PELVIS W/CONTRAST: CPT | Performed by: EMERGENCY MEDICINE

## 2022-02-21 NOTE — ED QUICK NOTES
Orders for admission, patient is aware of plan and ready to go upstairs. Any questions, please call ED RN Raoul Monroy at extension [phone].      Patient Covid vaccination status: Unvaccinated     COVID Test Ordered in ED: Rapid SARS-CoV-2 by PCR    COVID Suspicion at Admission: Low clinical suspicion for COVID    Running Infusions:  None    Mental Status/LOC at time of transport: a/o x4    Other pertinent information:   CIWA score: N/A   NIH score:  N/A

## 2022-02-21 NOTE — PLAN OF CARE
Patient alert and oriented x 3. Patient complained of RUQ abdominal pain and was given pain medications. Patient also complained of nausea and was given an antiemetic. Patient placed on C-diff precautions due to patient having more than 3 episodes of diarrhea in the past 24 hrs. Awaiting sample to send to lab. Patient is NPO except for ice chips and sips of water with medications. Patient counseled about the importance of smoking cessation. There is LR @ 200 ml/hr per orders. All safety measures are in place and will be maintained. Patient is on fall and right arm precautions. Will continue to monitor.   Problem: GASTROINTESTINAL - ADULT  Goal: Minimal or absence of nausea and vomiting  Description: INTERVENTIONS:  - Maintain adequate hydration with IV or PO as ordered and tolerated  - Nasogastric tube to low intermittent suction as ordered  - Evaluate effectiveness of ordered antiemetic medications  - Provide nonpharmacologic comfort measures as appropriate  - Advance diet as tolerated, if ordered  - Obtain nutritional consult as needed  - Evaluate fluid balance  Outcome: Progressing  Goal: Maintains or returns to baseline bowel function  Description: INTERVENTIONS:  - Assess bowel function  - Maintain adequate hydration with IV or PO as ordered and tolerated  - Evaluate effectiveness of GI medications  - Encourage mobilization and activity  - Obtain nutritional consult as needed  - Establish a toileting routine/schedule  - Consider collaborating with pharmacy to review patient's medication profile  Outcome: Progressing  Goal: Maintains adequate nutritional intake (undernourished)  Description: INTERVENTIONS:  - Monitor percentage of each meal consumed  - Identify factors contributing to decreased intake, treat as appropriate  - Assist with meals as needed  - Monitor I&O, WT and lab values  - Obtain nutritional consult as needed  - Optimize oral hygiene and moisture  - Encourage food from home; allow for food preferences  - Enhance eating environment  Outcome: Progressing  Goal: Achieves appropriate nutritional intake (bariatric)  Description: INTERVENTIONS:  - Monitor for over-consumption  - Identify factors contributing to increased intake, treat as appropriate  - Monitor I&O, WT and lab values  - Obtain nutritional consult as needed  - Evaluate psychosocial factors contributing to over-consumption  Outcome: Progressing     Problem: PAIN - ADULT  Goal: Verbalizes/displays adequate comfort level or patient's stated pain goal  Description: INTERVENTIONS:  - Encourage pt to monitor pain and request assistance  - Assess pain using appropriate pain scale  - Administer analgesics based on type and severity of pain and evaluate response  - Implement non-pharmacological measures as appropriate and evaluate response  - Consider cultural and social influences on pain and pain management  - Manage/alleviate anxiety  - Utilize distraction and/or relaxation techniques  - Monitor for opioid side effects  - Notify MD/LIP if interventions unsuccessful or patient reports new pain  - Anticipate increased pain with activity and pre-medicate as appropriate  Outcome: Progressing

## 2022-02-22 NOTE — PLAN OF CARE
Assumed patient care at 1. A&Ox4. VSS. Room air. Sinus Freeda Flank- on Tele. Scheduled Metoprolol held due to low HR (55-58). On Lovenox SubQ for DVT Prophylaxis. NPO with sips of water. BS-active. C/O Nausea/vomiting and severe pain to RUQ. Administered medications per MAR. PIV to Left AC- infusing LR at 200ml/hr. Continent. No BM this shift. Pending stool sample to rule out C.diff. Contact+ isolation and Fall precautions in place. All needs met at this time.         Problem: GASTROINTESTINAL - ADULT  Goal: Minimal or absence of nausea and vomiting  Description: INTERVENTIONS:  - Maintain adequate hydration with IV or PO as ordered and tolerated  - Nasogastric tube to low intermittent suction as ordered  - Evaluate effectiveness of ordered antiemetic medications  - Provide nonpharmacologic comfort measures as appropriate  - Advance diet as tolerated, if ordered  - Obtain nutritional consult as needed  - Evaluate fluid balance  Outcome: Progressing  Goal: Maintains or returns to baseline bowel function  Description: INTERVENTIONS:  - Assess bowel function  - Maintain adequate hydration with IV or PO as ordered and tolerated  - Evaluate effectiveness of GI medications  - Encourage mobilization and activity  - Obtain nutritional consult as needed  - Establish a toileting routine/schedule  - Consider collaborating with pharmacy to review patient's medication profile  Outcome: Progressing  Goal: Maintains adequate nutritional intake (undernourished)  Description: INTERVENTIONS:  - Monitor percentage of each meal consumed  - Identify factors contributing to decreased intake, treat as appropriate  - Assist with meals as needed  - Monitor I&O, WT and lab values  - Obtain nutritional consult as needed  - Optimize oral hygiene and moisture  - Encourage food from home; allow for food preferences  - Enhance eating environment  Outcome: Progressing  Goal: Achieves appropriate nutritional intake (bariatric)  Description: INTERVENTIONS:  - Monitor for over-consumption  - Identify factors contributing to increased intake, treat as appropriate  - Monitor I&O, WT and lab values  - Obtain nutritional consult as needed  - Evaluate psychosocial factors contributing to over-consumption  Outcome: Progressing

## 2022-02-22 NOTE — PLAN OF CARE
Patient alert and oriented x 4. Patient IVF of LR decreased to 100 ml/hr. Patient still complaining of abdominal pain in the RUQ. Patient states that the pain is worse today. Patient still complaining of nausea but has not vomited. No BM yet. The contact plus isolation for rule out C-DIFF was discontinued. Patient ambulates to the bathroom independently. All safety measures in place and maintained. Will continue to monitor.   Problem: GASTROINTESTINAL - ADULT  Goal: Minimal or absence of nausea and vomiting  Description: INTERVENTIONS:  - Maintain adequate hydration with IV or PO as ordered and tolerated  - Nasogastric tube to low intermittent suction as ordered  - Evaluate effectiveness of ordered antiemetic medications  - Provide nonpharmacologic comfort measures as appropriate  - Advance diet as tolerated, if ordered  - Obtain nutritional consult as needed  - Evaluate fluid balance  Outcome: Progressing  Goal: Maintains or returns to baseline bowel function  Description: INTERVENTIONS:  - Assess bowel function  - Maintain adequate hydration with IV or PO as ordered and tolerated  - Evaluate effectiveness of GI medications  - Encourage mobilization and activity  - Obtain nutritional consult as needed  - Establish a toileting routine/schedule  - Consider collaborating with pharmacy to review patient's medication profile  Outcome: Progressing  Goal: Maintains adequate nutritional intake (undernourished)  Description: INTERVENTIONS:  - Monitor percentage of each meal consumed  - Identify factors contributing to decreased intake, treat as appropriate  - Assist with meals as needed  - Monitor I&O, WT and lab values  - Obtain nutritional consult as needed  - Optimize oral hygiene and moisture  - Encourage food from home; allow for food preferences  - Enhance eating environment  Outcome: Progressing  Goal: Achieves appropriate nutritional intake (bariatric)  Description: INTERVENTIONS:  - Monitor for over-consumption  - Identify factors contributing to increased intake, treat as appropriate  - Monitor I&O, WT and lab values  - Obtain nutritional consult as needed  - Evaluate psychosocial factors contributing to over-consumption  Outcome: Progressing     Problem: PAIN - ADULT  Goal: Verbalizes/displays adequate comfort level or patient's stated pain goal  Description: INTERVENTIONS:  - Encourage pt to monitor pain and request assistance  - Assess pain using appropriate pain scale  - Administer analgesics based on type and severity of pain and evaluate response  - Implement non-pharmacological measures as appropriate and evaluate response  - Consider cultural and social influences on pain and pain management  - Manage/alleviate anxiety  - Utilize distraction and/or relaxation techniques  - Monitor for opioid side effects  - Notify MD/LIP if interventions unsuccessful or patient reports new pain  - Anticipate increased pain with activity and pre-medicate as appropriate  Outcome: Progressing     Problem: Patient/Family Goals  Goal: Patient/Family Long Term Goal  Description: Patient's Long Term Goal: To go home. Interventions:  - MD Consults  - IV Fluids    - See additional Care Plan goals for specific interventions  Outcome: Progressing  Goal: Patient/Family Short Term Goal  Description: Patient's Short Term Goal: Manage pain/ Nausea/ vomiting.     Interventions:   - IV Fluids  - Pain Medication PRN  - Nausea Medication PRN  - NPO  - See additional Care Plan goals for specific interventions  Outcome: Progressing

## 2022-02-23 NOTE — PLAN OF CARE
Assumed patient care at [address] [place] [address]. A&Ox4. VSS. Room air. NSR-Sinus Gabrielle Palencia- on Tele. HR between 50's-60's. On Lovenox SubQ for DVT Prophylaxis. NPO except with ice chips & sips of water with meds. Denies nausea/vomiting. BS- active and present, No BM since 02/20/22. Patient c/o severe pain to RUQ. On PRN Norco Q4h and PRN IV Dilaudid Q4h. Medicated per MAR. PIV to Left AC- infusing LR at 100ml/hr. Continent and up adlib. Call light within reach. All needs met at this time.          Problem: GASTROINTESTINAL - ADULT  Goal: Minimal or absence of nausea and vomiting  Description: INTERVENTIONS:  - Maintain adequate hydration with IV or PO as ordered and tolerated  - Nasogastric tube to low intermittent suction as ordered  - Evaluate effectiveness of ordered antiemetic medications  - Provide nonpharmacologic comfort measures as appropriate  - Advance diet as tolerated, if ordered  - Obtain nutritional consult as needed  - Evaluate fluid balance  Outcome: Progressing  Goal: Maintains or returns to baseline bowel function  Description: INTERVENTIONS:  - Assess bowel function  - Maintain adequate hydration with IV or PO as ordered and tolerated  - Evaluate effectiveness of GI medications  - Encourage mobilization and activity  - Obtain nutritional consult as needed  - Establish a toileting routine/schedule  - Consider collaborating with pharmacy to review patient's medication profile  Outcome: Progressing  Goal: Maintains adequate nutritional intake (undernourished)  Description: INTERVENTIONS:  - Monitor percentage of each meal consumed  - Identify factors contributing to decreased intake, treat as appropriate  - Assist with meals as needed  - Monitor I&O, WT and lab values  - Obtain nutritional consult as needed  - Optimize oral hygiene and moisture  - Encourage food from home; allow for food preferences  - Enhance eating environment  Outcome: Progressing  Goal: Achieves appropriate nutritional intake (bariatric)  Description: INTERVENTIONS:  - Monitor for over-consumption  - Identify factors contributing to increased intake, treat as appropriate  - Monitor I&O, WT and lab values  - Obtain nutritional consult as needed  - Evaluate psychosocial factors contributing to over-consumption  Outcome: Progressing     Problem: PAIN - ADULT  Goal: Verbalizes/displays adequate comfort level or patient's stated pain goal  Description: INTERVENTIONS:  - Encourage pt to monitor pain and request assistance  - Assess pain using appropriate pain scale  - Administer analgesics based on type and severity of pain and evaluate response  - Implement non-pharmacological measures as appropriate and evaluate response  - Consider cultural and social influences on pain and pain management  - Manage/alleviate anxiety  - Utilize distraction and/or relaxation techniques  - Monitor for opioid side effects  - Notify MD/LIP if interventions unsuccessful or patient reports new pain  - Anticipate increased pain with activity and pre-medicate as appropriate  Outcome: Progressing     Problem: Patient/Family Goals  Goal: Patient/Family Long Term Goal  Description: Patient's Long Term Goal: To go home. Interventions:  - MD Consults  - IV Fluids    - See additional Care Plan goals for specific interventions  Outcome: Progressing  Goal: Patient/Family Short Term Goal  Description: Patient's Short Term Goal: Manage pain/ Nausea/ vomiting.     Interventions:   - IV Fluids  - Pain Medication PRN  - Nausea Medication PRN  - NPO  - See additional Care Plan goals for specific interventions  Outcome: Progressing

## 2022-02-23 NOTE — PLAN OF CARE
Patient resting in bed. Patient denies having any pain or nausea. Patient alert and oriented x 4. IV in [facility] with LR @ 100 ml/hr infusing. Patient is able to ambulate independently. TELE in place with NSR and HR-65. All safety measures in place and will be maintained. Will continue to monitor.   Problem: GASTROINTESTINAL - ADULT  Goal: Minimal or absence of nausea and vomiting  Description: INTERVENTIONS:  - Maintain adequate hydration with IV or PO as ordered and tolerated  - Nasogastric tube to low intermittent suction as ordered  - Evaluate effectiveness of ordered antiemetic medications  - Provide nonpharmacologic comfort measures as appropriate  - Advance diet as tolerated, if ordered  - Obtain nutritional consult as needed  - Evaluate fluid balance  Outcome: Progressing  Goal: Maintains or returns to baseline bowel function  Description: INTERVENTIONS:  - Assess bowel function  - Maintain adequate hydration with IV or PO as ordered and tolerated  - Evaluate effectiveness of GI medications  - Encourage mobilization and activity  - Obtain nutritional consult as needed  - Establish a toileting routine/schedule  - Consider collaborating with pharmacy to review patient's medication profile  Outcome: Progressing  Goal: Maintains adequate nutritional intake (undernourished)  Description: INTERVENTIONS:  - Monitor percentage of each meal consumed  - Identify factors contributing to decreased intake, treat as appropriate  - Assist with meals as needed  - Monitor I&O, WT and lab values  - Obtain nutritional consult as needed  - Optimize oral hygiene and moisture  - Encourage food from home; allow for food preferences  - Enhance eating environment  Outcome: Progressing  Goal: Achieves appropriate nutritional intake (bariatric)  Description: INTERVENTIONS:  - Monitor for over-consumption  - Identify factors contributing to increased intake, treat as appropriate  - Monitor I&O, WT and lab values  - Obtain nutritional consult as needed  - Evaluate psychosocial factors contributing to over-consumption  Outcome: Progressing     Problem: PAIN - ADULT  Goal: Verbalizes/displays adequate comfort level or patient's stated pain goal  Description: INTERVENTIONS:  - Encourage pt to monitor pain and request assistance  - Assess pain using appropriate pain scale  - Administer analgesics based on type and severity of pain and evaluate response  - Implement non-pharmacological measures as appropriate and evaluate response  - Consider cultural and social influences on pain and pain management  - Manage/alleviate anxiety  - Utilize distraction and/or relaxation techniques  - Monitor for opioid side effects  - Notify MD/LIP if interventions unsuccessful or patient reports new pain  - Anticipate increased pain with activity and pre-medicate as appropriate  Outcome: Progressing     Problem: Patient/Family Goals  Goal: Patient/Family Long Term Goal  Description: Patient's Long Term Goal: To go home. Interventions:  - MD Consults  - IV Fluids    - See additional Care Plan goals for specific interventions  Outcome: Progressing  Goal: Patient/Family Short Term Goal  Description: Patient's Short Term Goal: Manage pain/ Nausea/ vomiting.     Interventions:   - IV Fluids  - Pain Medication PRN  - Nausea Medication PRN  - NPO  - See additional Care Plan goals for specific interventions  Outcome: Progressing

## 2022-02-24 NOTE — PROGRESS NOTES
Pt a/ox4  ra  Cont  Ambulatory  Tele  No pain  Independent  Low fiber soft diet  IV to left AC  Pt needs currently met  Bed low call light near safety precautions I place  Plan is to dc today  Staff will continue to monitor.

## 2022-02-24 NOTE — PLAN OF CARE
Assumed care at [address] [place] [address]. A/O x4. On RA. NSR on tele. Continent. Ambulates independently. L AC PIV c/d/i, infusing LR at 75 ml/hr. C/o pain, prn norco given. R arm prec. Non card elec protocol. Low fiber soft diet. Safety prec in place. Needs being met at this time.     Problem: GASTROINTESTINAL - ADULT  Goal: Minimal or absence of nausea and vomiting  Description: INTERVENTIONS:  - Maintain adequate hydration with IV or PO as ordered and tolerated  - Nasogastric tube to low intermittent suction as ordered  - Evaluate effectiveness of ordered antiemetic medications  - Provide nonpharmacologic comfort measures as appropriate  - Advance diet as tolerated, if ordered  - Obtain nutritional consult as needed  - Evaluate fluid balance  Outcome: Progressing  Goal: Maintains or returns to baseline bowel function  Description: INTERVENTIONS:  - Assess bowel function  - Maintain adequate hydration with IV or PO as ordered and tolerated  - Evaluate effectiveness of GI medications  - Encourage mobilization and activity  - Obtain nutritional consult as needed  - Establish a toileting routine/schedule  - Consider collaborating with pharmacy to review patient's medication profile  Outcome: Progressing  Goal: Maintains adequate nutritional intake (undernourished)  Description: INTERVENTIONS:  - Monitor percentage of each meal consumed  - Identify factors contributing to decreased intake, treat as appropriate  - Assist with meals as needed  - Monitor I&O, WT and lab values  - Obtain nutritional consult as needed  - Optimize oral hygiene and moisture  - Encourage food from home; allow for food preferences  - Enhance eating environment  Outcome: Progressing  Goal: Achieves appropriate nutritional intake (bariatric)  Description: INTERVENTIONS:  - Monitor for over-consumption  - Identify factors contributing to increased intake, treat as appropriate  - Monitor I&O, WT and lab values  - Obtain nutritional consult as needed  - Evaluate psychosocial factors contributing to over-consumption  Outcome: Progressing     Problem: PAIN - ADULT  Goal: Verbalizes/displays adequate comfort level or patient's stated pain goal  Description: INTERVENTIONS:  - Encourage pt to monitor pain and request assistance  - Assess pain using appropriate pain scale  - Administer analgesics based on type and severity of pain and evaluate response  - Implement non-pharmacological measures as appropriate and evaluate response  - Consider cultural and social influences on pain and pain management  - Manage/alleviate anxiety  - Utilize distraction and/or relaxation techniques  - Monitor for opioid side effects  - Notify MD/LIP if interventions unsuccessful or patient reports new pain  - Anticipate increased pain with activity and pre-medicate as appropriate  Outcome: Progressing     Problem: Patient/Family Goals  Goal: Patient/Family Long Term Goal  Description: Patient's Long Term Goal: To go home. Interventions:  - MD Consults  - IV Fluids    - See additional Care Plan goals for specific interventions  Outcome: Progressing  Goal: Patient/Family Short Term Goal  Description: Patient's Short Term Goal: Manage pain/ Nausea/ vomiting.     Interventions:   - IV Fluids  - Pain Medication PRN  - Nausea Medication PRN  - NPO  - See additional Care Plan goals for specific interventions  Outcome: Progressing

## 2022-02-24 NOTE — PROGRESS NOTES
NURSING DISCHARGE NOTE    Discharged Home via Wheelchair. Accompanied by Support staff  Belongings Taken by patient/family.   Pt encouraged to meet with pcp post discharge and to be careful as she is on blood thinners  Left safely with transport

## 2022-02-24 NOTE — PROGRESS NOTES
Patient seen and examined  Home today  Full note to follow  Discussed with patient and RN.   Moiz RHOADES

## 2022-03-28 ENCOUNTER — LAB REQUISITION (OUTPATIENT)
Dept: LAB | Age: 42
End: 2022-03-28

## 2022-03-28 DIAGNOSIS — R87.810 CERVICAL HIGH RISK HUMAN PAPILLOMAVIRUS (HPV) DNA TEST POSITIVE: ICD-10-CM

## 2022-03-28 PROCEDURE — 88305 TISSUE EXAM BY PATHOLOGIST: CPT | Performed by: CLINICAL MEDICAL LABORATORY

## 2022-04-01 LAB
ASR DISCLAIMER: NORMAL
CASE RPRT: NORMAL
CLINICAL INFO: NORMAL
PATH REPORT.FINAL DX SPEC: NORMAL
PATH REPORT.GROSS SPEC: NORMAL

## 2022-06-14 ENCOUNTER — HOSPITAL ENCOUNTER (EMERGENCY)
Facility: HOSPITAL | Age: 42
Discharge: LEFT BEFORE DISCHARGE | End: 2022-06-14
Attending: EMERGENCY MEDICINE
Payer: MEDICAID

## 2022-06-14 VITALS
HEART RATE: 82 BPM | WEIGHT: 185.19 LBS | SYSTOLIC BLOOD PRESSURE: 154 MMHG | DIASTOLIC BLOOD PRESSURE: 98 MMHG | TEMPERATURE: 98 F | RESPIRATION RATE: 18 BRPM | OXYGEN SATURATION: 96 % | BODY MASS INDEX: 30 KG/M2

## 2022-06-14 DIAGNOSIS — Z72.89 CHRONIC ALCOHOL USE: ICD-10-CM

## 2022-06-14 DIAGNOSIS — R11.0 NAUSEA: ICD-10-CM

## 2022-06-14 DIAGNOSIS — R10.9 CHRONIC ABDOMINAL PAIN: Primary | ICD-10-CM

## 2022-06-14 DIAGNOSIS — G89.29 CHRONIC ABDOMINAL PAIN: Primary | ICD-10-CM

## 2022-06-14 LAB
ALBUMIN SERPL-MCNC: 3 G/DL (ref 3.4–5)
ALBUMIN/GLOB SERPL: 0.8 {RATIO} (ref 1–2)
ALP LIVER SERPL-CCNC: 89 U/L
ALT SERPL-CCNC: 18 U/L
ANION GAP SERPL CALC-SCNC: 5 MMOL/L (ref 0–18)
AST SERPL-CCNC: 14 U/L (ref 15–37)
BASOPHILS # BLD AUTO: 0.03 X10(3) UL (ref 0–0.2)
BASOPHILS NFR BLD AUTO: 0.5 %
BILIRUB SERPL-MCNC: 0.7 MG/DL (ref 0.1–2)
BILIRUB UR QL CFM: NEGATIVE
BUN BLD-MCNC: 5 MG/DL (ref 7–18)
CALCIUM BLD-MCNC: 8.5 MG/DL (ref 8.5–10.1)
CHLORIDE SERPL-SCNC: 103 MMOL/L (ref 98–112)
CO2 SERPL-SCNC: 27 MMOL/L (ref 21–32)
COLOR UR AUTO: YELLOW
CREAT BLD-MCNC: 0.69 MG/DL
EOSINOPHIL # BLD AUTO: 0.12 X10(3) UL (ref 0–0.7)
EOSINOPHIL NFR BLD AUTO: 2 %
ERYTHROCYTE [DISTWIDTH] IN BLOOD BY AUTOMATED COUNT: 13.9 %
GLOBULIN PLAS-MCNC: 3.6 G/DL (ref 2.8–4.4)
GLUCOSE BLD-MCNC: 137 MG/DL (ref 70–99)
GLUCOSE UR STRIP.AUTO-MCNC: NEGATIVE MG/DL
HCT VFR BLD AUTO: 40.6 %
HGB BLD-MCNC: 14 G/DL
IMM GRANULOCYTES # BLD AUTO: 0.03 X10(3) UL (ref 0–1)
IMM GRANULOCYTES NFR BLD: 0.5 %
KETONES UR STRIP.AUTO-MCNC: NEGATIVE MG/DL
LEUKOCYTE ESTERASE UR QL STRIP.AUTO: NEGATIVE
LIPASE SERPL-CCNC: 99 U/L (ref 73–393)
LYMPHOCYTES # BLD AUTO: 1.15 X10(3) UL (ref 1–4)
LYMPHOCYTES NFR BLD AUTO: 18.9 %
MCH RBC QN AUTO: 33.1 PG (ref 26–34)
MCHC RBC AUTO-ENTMCNC: 34.5 G/DL (ref 31–37)
MCV RBC AUTO: 96 FL
MONOCYTES # BLD AUTO: 0.57 X10(3) UL (ref 0.1–1)
MONOCYTES NFR BLD AUTO: 9.4 %
NEUTROPHILS # BLD AUTO: 4.18 X10 (3) UL (ref 1.5–7.7)
NEUTROPHILS # BLD AUTO: 4.18 X10(3) UL (ref 1.5–7.7)
NEUTROPHILS NFR BLD AUTO: 68.7 %
NITRITE UR QL STRIP.AUTO: NEGATIVE
OSMOLALITY SERPL CALC.SUM OF ELEC: 279 MOSM/KG (ref 275–295)
PH UR STRIP.AUTO: 7 [PH] (ref 5–8)
PLATELET # BLD AUTO: 141 10(3)UL (ref 150–450)
POTASSIUM SERPL-SCNC: 3.9 MMOL/L (ref 3.5–5.1)
PROT SERPL-MCNC: 6.6 G/DL (ref 6.4–8.2)
PROT UR STRIP.AUTO-MCNC: 30 MG/DL
RBC # BLD AUTO: 4.23 X10(6)UL
RBC UR QL AUTO: NEGATIVE
SODIUM SERPL-SCNC: 135 MMOL/L (ref 136–145)
SP GR UR STRIP.AUTO: 1.03 (ref 1–1.03)
UROBILINOGEN UR STRIP.AUTO-MCNC: 4 MG/DL
WBC # BLD AUTO: 6.1 X10(3) UL (ref 4–11)

## 2022-06-14 PROCEDURE — 83690 ASSAY OF LIPASE: CPT

## 2022-06-14 PROCEDURE — 80053 COMPREHEN METABOLIC PANEL: CPT | Performed by: EMERGENCY MEDICINE

## 2022-06-14 PROCEDURE — 96375 TX/PRO/DX INJ NEW DRUG ADDON: CPT

## 2022-06-14 PROCEDURE — 99284 EMERGENCY DEPT VISIT MOD MDM: CPT

## 2022-06-14 PROCEDURE — 83690 ASSAY OF LIPASE: CPT | Performed by: EMERGENCY MEDICINE

## 2022-06-14 PROCEDURE — 96374 THER/PROPH/DIAG INJ IV PUSH: CPT

## 2022-06-14 PROCEDURE — 85025 COMPLETE CBC W/AUTO DIFF WBC: CPT | Performed by: EMERGENCY MEDICINE

## 2022-06-14 PROCEDURE — 81001 URINALYSIS AUTO W/SCOPE: CPT | Performed by: EMERGENCY MEDICINE

## 2022-06-14 PROCEDURE — 96361 HYDRATE IV INFUSION ADD-ON: CPT

## 2022-06-14 RX ORDER — ONDANSETRON 2 MG/ML
4 INJECTION INTRAMUSCULAR; INTRAVENOUS ONCE
Status: COMPLETED | OUTPATIENT
Start: 2022-06-14 | End: 2022-06-14

## 2022-06-14 RX ORDER — KETOROLAC TROMETHAMINE 30 MG/ML
15 INJECTION, SOLUTION INTRAMUSCULAR; INTRAVENOUS ONCE
Status: COMPLETED | OUTPATIENT
Start: 2022-06-14 | End: 2022-06-14

## 2022-06-14 RX ORDER — ONDANSETRON 4 MG/1
4 TABLET, ORALLY DISINTEGRATING ORAL EVERY 8 HOURS PRN
Qty: 10 TABLET | Refills: 0 | Status: SHIPPED | OUTPATIENT
Start: 2022-06-14 | End: 2022-06-24

## 2022-06-14 NOTE — ED QUICK NOTES
Pt repeatedly used the call light to inform the staff about the pain she was experincing. Asked medical staff for additional \"pain killers\". after admin of Toradol, pt was not satisfied with their treatment and medication. When writer passed the room that the pt was in. The pt was gone and there was a pool of blood with a blood stained gown in the bed. The pt returned to grab their phone and acknowledged that they left AMA.

## 2022-06-14 NOTE — ED QUICK NOTES
Pt asking for more pain medicine, dr. Maria E Mckeon aware. Dr. Maria E Mckeon told pt she would not be ordering narcotics at this time as we are waiting for ct. Pt offered other comfort measures but declined.

## 2022-06-14 NOTE — ED INITIAL ASSESSMENT (HPI)
Pt arrives with c/o abdominal pain and nausea x2 days. Pt states she has hx of pancreatitis an this feels the same. Pt states last drink  Friday night.

## 2022-08-12 ENCOUNTER — APPOINTMENT (OUTPATIENT)
Dept: ULTRASOUND IMAGING | Facility: HOSPITAL | Age: 42
End: 2022-08-12
Attending: EMERGENCY MEDICINE
Payer: MEDICAID

## 2022-08-12 PROCEDURE — 76700 US EXAM ABDOM COMPLETE: CPT | Performed by: EMERGENCY MEDICINE

## 2022-08-12 NOTE — ED INITIAL ASSESSMENT (HPI)
Pt c/o RUQ pain since last night. Pt has hx of pancreatitis. Pt c/o vomiting. Pt denies urinary symptoms.

## 2022-08-16 ENCOUNTER — APPOINTMENT (OUTPATIENT)
Dept: CT IMAGING | Facility: HOSPITAL | Age: 42
End: 2022-08-16
Attending: HOSPITALIST
Payer: MEDICAID

## 2022-08-16 PROCEDURE — 74178 CT ABD&PLV WO CNTR FLWD CNTR: CPT | Performed by: HOSPITALIST

## 2022-08-16 NOTE — PROGRESS NOTES
NURSING ADMISSION NOTE      Patient admitted via Cart  Oriented to room. Safety precautions initiated. Bed in low position. Call light in reach.     Pt alert and oriented x 4  Assumed pt care at 0945  Pt received pain medication in ED still reports abd pain 7/10  Hx bilateral mastectomy-Right and left limb precautions  PIV left AC  Last drink of alcohol Thursday

## 2022-08-16 NOTE — ED INITIAL ASSESSMENT (HPI)
Patient here with RUQ pain, seen here Friday for pancreatitis. Patient states having nausea, vomiting, and diarrhea.

## 2022-08-16 NOTE — ED QUICK NOTES
Orders for admission, patient is aware of plan and ready to go upstairs. Any questions, please call ED RN Mary Mendoza at extension [phone].      Patient Covid vaccination status: Unvaccinated     COVID Test Ordered in ED: None    COVID Suspicion at Admission: N/A    Running Infusions:  None    Mental Status/LOC at time of transport: Alert    Other pertinent information:   CIWA score: N/A   NIH score:  N/A

## 2022-08-17 NOTE — PLAN OF CARE
Pt alert and oriented x 4  Assumed pt care at 0730  Pt reports severe pain and is receiving  Dilaudid Q2H PRN  Hx bilat side mastectomy-Right and left limb precautions  PIV left AC with LR at 100ml/hr  RA  Tele-NSR, ST  NPO ice chips ok  Denies nausea today  Accucheck Q6H  Continue to monitor    Problem: PAIN - ADULT  Goal: Verbalizes/displays adequate comfort level or patient's stated pain goal  Description: INTERVENTIONS:  - Encourage pt to monitor pain and request assistance  - Assess pain using appropriate pain scale  - Administer analgesics based on type and severity of pain and evaluate response  - Implement non-pharmacological measures as appropriate and evaluate response  - Consider cultural and social influences on pain and pain management  - Manage/alleviate anxiety  - Utilize distraction and/or relaxation techniques  - Monitor for opioid side effects  - Notify MD/LIP if interventions unsuccessful or patient reports new pain  - Anticipate increased pain with activity and pre-medicate as appropriate  Outcome: Progressing     Problem: GASTROINTESTINAL - ADULT  Goal: Minimal or absence of nausea and vomiting  Description: INTERVENTIONS:  - Maintain adequate hydration with IV or PO as ordered and tolerated  - Nasogastric tube to low intermittent suction as ordered  - Evaluate effectiveness of ordered antiemetic medications  - Provide nonpharmacologic comfort measures as appropriate  - Advance diet as tolerated, if ordered  - Obtain nutritional consult as needed  - Evaluate fluid balance  Outcome: Progressing  Goal: Maintains or returns to baseline bowel function  Description: INTERVENTIONS:  - Assess bowel function  - Maintain adequate hydration with IV or PO as ordered and tolerated  - Evaluate effectiveness of GI medications  - Encourage mobilization and activity  - Obtain nutritional consult as needed  - Establish a toileting routine/schedule  - Consider collaborating with pharmacy to review patient's medication profile  Outcome: Progressing

## 2022-08-18 NOTE — PLAN OF CARE
Patient is A&O x4.  Calm and cooperative. VSS. On tele-NSR. On RA. IV- ml/hr. C/o abdominal pain, IV dilaudid prn per MAR. Encouraged to take PO pain meds (Norco)-pt stating she rather not bc has not worked in past.   C/o nausea, Zofran prn given per MAR. Full liquid diet. Limb precautions. Bed at lowest position. Call light within reach. All needs met at this time.       Problem: PAIN - ADULT  Goal: Verbalizes/displays adequate comfort level or patient's stated pain goal  Description: INTERVENTIONS:  - Encourage pt to monitor pain and request assistance  - Assess pain using appropriate pain scale  - Administer analgesics based on type and severity of pain and evaluate response  - Implement non-pharmacological measures as appropriate and evaluate response  - Consider cultural and social influences on pain and pain management  - Manage/alleviate anxiety  - Utilize distraction and/or relaxation techniques  - Monitor for opioid side effects  - Notify MD/LIP if interventions unsuccessful or patient reports new pain  - Anticipate increased pain with activity and pre-medicate as appropriate  Outcome: Progressing     Problem: GASTROINTESTINAL - ADULT  Goal: Minimal or absence of nausea and vomiting  Description: INTERVENTIONS:  - Maintain adequate hydration with IV or PO as ordered and tolerated  - Nasogastric tube to low intermittent suction as ordered  - Evaluate effectiveness of ordered antiemetic medications  - Provide nonpharmacologic comfort measures as appropriate  - Advance diet as tolerated, if ordered  - Obtain nutritional consult as needed  - Evaluate fluid balance  Outcome: Progressing  Goal: Maintains or returns to baseline bowel function  Description: INTERVENTIONS:  - Assess bowel function  - Maintain adequate hydration with IV or PO as ordered and tolerated  - Evaluate effectiveness of GI medications  - Encourage mobilization and activity  - Obtain nutritional consult as needed  - Establish a toileting routine/schedule  - Consider collaborating with pharmacy to review patient's medication profile  Outcome: Progressing

## 2022-08-18 NOTE — PLAN OF CARE
Assumed care of patient @ 0730. No acute distress noted. A&Ox4. VSS on RA. NSR on tele. Continent of B&B. C/o severe abd pain, prn dilaudid and norco per MAR. On full liquid diet, advance as tolerated. LR infusing @ 100ml/hr via LT AC PIV. Pt updated on POC. All needs met at this time.     Problem: Patient/Family Goals  Goal: Patient/Family Long Term Goal  Description: Patient's Long Term Goal: Go home  Interventions:  - advance diet as tolerated  - pain management   - See additional Care Plan goals for specific interventions  Outcome: Progressing  Goal: Patient/Family Short Term Goal  Description: Patient's Short Term Goal: pain management  Interventions:   - per [facility]  Per hospitalist  - See additional Care Plan goals for specific interventions  Outcome: Progressing  Problem: PAIN - ADULT  Goal: Verbalizes/displays adequate comfort level or patient's stated pain goal  Description: INTERVENTIONS:  - Encourage pt to monitor pain and request assistance  - Assess pain using appropriate pain scale  - Administer analgesics based on type and severity of pain and evaluate response  - Implement non-pharmacological measures as appropriate and evaluate response  - Consider cultural and social influences on pain and pain management  - Manage/alleviate anxiety  - Utilize distraction and/or relaxation techniques  - Monitor for opioid side effects  - Notify MD/LIP if interventions unsuccessful or patient reports new pain  - Anticipate increased pain with activity and pre-medicate as appropriate  Outcome: Progressing  Problem: GASTROINTESTINAL - ADULT  Goal: Minimal or absence of nausea and vomiting  Description: INTERVENTIONS:  - Maintain adequate hydration with IV or PO as ordered and tolerated  - Nasogastric tube to low intermittent suction as ordered  - Evaluate effectiveness of ordered antiemetic medications  - Provide nonpharmacologic comfort measures as appropriate  - Advance diet as tolerated, if ordered  - Obtain nutritional consult as needed  - Evaluate fluid balance  Outcome: Progressing  Goal: Maintains or returns to baseline bowel function  Description: INTERVENTIONS:  - Assess bowel function  - Maintain adequate hydration with IV or PO as ordered and tolerated  - Evaluate effectiveness of GI medications  - Encourage mobilization and activity  - Obtain nutritional consult as needed  - Establish a toileting routine/schedule  - Consider collaborating with pharmacy to review patient's medication profile  Outcome: Progressing

## 2022-08-18 NOTE — PROGRESS NOTES
NURSING DISCHARGE NOTE    Discharged Home via Ambulatory. Accompanied by RN  Belongings Taken by patient/family. Pt discharged in stable condition. IV and tele removed. Tolerated diet well. Discharge paperwork reviewed in detail. Patient verbalized understanding.

## 2022-10-30 NOTE — PROGRESS NOTES
Diagnosis:   Cholestasis of pregnancy    Result:  Reactive       Plan:  Discharge home    F/U as scheduled      Charlene Smiht MD Gastroenterology Progress Note  S: Feels ready to go home, pain improved, tolerating diet  O: /78 (BP Location: Left arm)   Pulse 92   Temp 98.3 °F (36.8 °C) (Oral)   Resp 16   Ht 66\"   Wt 152 lb (68.9 kg)   LMP 03/22/2020   SpO2 95%   BMI 24.53 kg/

## 2023-01-06 PROBLEM — E87.20 METABOLIC ACIDOSIS: Status: ACTIVE | Noted: 2023-01-06

## 2023-01-09 ENCOUNTER — APPOINTMENT (OUTPATIENT)
Dept: GENERAL RADIOLOGY | Facility: HOSPITAL | Age: 43
End: 2023-01-09
Attending: HOSPITALIST
Payer: MEDICAID

## 2023-01-09 PROCEDURE — 71045 X-RAY EXAM CHEST 1 VIEW: CPT | Performed by: HOSPITALIST

## 2023-01-10 ENCOUNTER — APPOINTMENT (OUTPATIENT)
Dept: GENERAL RADIOLOGY | Facility: HOSPITAL | Age: 43
End: 2023-01-10
Attending: INTERNAL MEDICINE
Payer: MEDICAID

## 2023-01-10 PROCEDURE — 74018 RADEX ABDOMEN 1 VIEW: CPT | Performed by: INTERNAL MEDICINE

## 2023-01-12 PROBLEM — J18.9 PNEUMONIA DUE TO INFECTIOUS ORGANISM: Status: ACTIVE | Noted: 2020-03-07

## 2023-01-14 ENCOUNTER — APPOINTMENT (OUTPATIENT)
Dept: CT IMAGING | Facility: HOSPITAL | Age: 43
End: 2023-01-14
Attending: EMERGENCY MEDICINE
Payer: MEDICAID

## 2023-01-14 ENCOUNTER — APPOINTMENT (OUTPATIENT)
Dept: GENERAL RADIOLOGY | Facility: HOSPITAL | Age: 43
End: 2023-01-14
Attending: EMERGENCY MEDICINE
Payer: MEDICAID

## 2023-01-14 ENCOUNTER — HOSPITAL ENCOUNTER (INPATIENT)
Facility: HOSPITAL | Age: 43
LOS: 3 days | Discharge: HOME | End: 2023-01-17
Attending: EMERGENCY MEDICINE | Admitting: HOSPITALIST
Payer: MEDICAID

## 2023-01-14 DIAGNOSIS — E87.1 HYPONATREMIA: ICD-10-CM

## 2023-01-14 DIAGNOSIS — E87.20 METABOLIC ACIDOSIS: ICD-10-CM

## 2023-01-14 DIAGNOSIS — R73.9 HYPERGLYCEMIA: ICD-10-CM

## 2023-01-14 DIAGNOSIS — R06.00 DYSPNEA, UNSPECIFIED TYPE: ICD-10-CM

## 2023-01-14 DIAGNOSIS — E08.10 DIABETIC KETOACIDOSIS WITHOUT COMA ASSOCIATED WITH DIABETES MELLITUS DUE TO UNDERLYING CONDITION (HCC): Primary | ICD-10-CM

## 2023-01-14 LAB
ALBUMIN SERPL-MCNC: 2.7 G/DL (ref 3.4–5)
ALBUMIN/GLOB SERPL: 0.6 {RATIO} (ref 1–2)
ALP LIVER SERPL-CCNC: 119 U/L
ALT SERPL-CCNC: 24 U/L
ANION GAP SERPL CALC-SCNC: 20 MMOL/L (ref 0–18)
AST SERPL-CCNC: 21 U/L (ref 15–37)
B-HCG UR QL: NEGATIVE
BASE EXCESS BLDV CALC-SCNC: -11.2 MMOL/L
BASOPHILS # BLD: 0 X10(3) UL (ref 0–0.2)
BASOPHILS NFR BLD: 0 %
BILIRUB SERPL-MCNC: 0.7 MG/DL (ref 0.1–2)
BILIRUB UR QL CFM: NEGATIVE
BUN BLD-MCNC: 6 MG/DL (ref 7–18)
CALCIUM BLD-MCNC: 9 MG/DL (ref 8.5–10.1)
CHLORIDE SERPL-SCNC: 88 MMOL/L (ref 98–112)
CLARITY UR REFRACT.AUTO: CLEAR
CO2 SERPL-SCNC: 13 MMOL/L (ref 21–32)
COLOR UR AUTO: YELLOW
CREAT BLD-MCNC: 0.84 MG/DL
EOSINOPHIL # BLD: 0 X10(3) UL (ref 0–0.7)
EOSINOPHIL NFR BLD: 0 %
ERYTHROCYTE [DISTWIDTH] IN BLOOD BY AUTOMATED COUNT: 14.6 %
EST. AVERAGE GLUCOSE BLD GHB EST-MCNC: 157 MG/DL (ref 68–126)
GFR SERPLBLD BASED ON 1.73 SQ M-ARVRAT: 90 ML/MIN/1.73M2 (ref 60–?)
GLOBULIN PLAS-MCNC: 4.5 G/DL (ref 2.8–4.4)
GLUCOSE BLD-MCNC: 144 MG/DL (ref 70–99)
GLUCOSE BLD-MCNC: 312 MG/DL (ref 70–99)
GLUCOSE BLD-MCNC: 520 MG/DL (ref 70–99)
GLUCOSE BLD-MCNC: 677 MG/DL (ref 70–99)
GLUCOSE UR STRIP.AUTO-MCNC: 500 MG/DL
HBA1C MFR BLD: 7.1 % (ref ?–5.7)
HCO3 BLDV-SCNC: 16 MEQ/L (ref 22–26)
HCT VFR BLD AUTO: 40.8 %
HGB BLD-MCNC: 14.5 G/DL
HYALINE CASTS #/AREA URNS AUTO: PRESENT /LPF
HYALINE CASTS #/AREA URNS AUTO: PRESENT /LPF
KETONES UR STRIP.AUTO-MCNC: 80 MG/DL
LACTATE SERPL-SCNC: 1.8 MMOL/L (ref 0.4–2)
LEUKOCYTE ESTERASE UR QL STRIP.AUTO: NEGATIVE
LIPASE SERPL-CCNC: 91 U/L (ref 73–393)
LYMPHOCYTES NFR BLD: 1.46 X10(3) UL (ref 1–4)
LYMPHOCYTES NFR BLD: 18 %
MAGNESIUM SERPL-MCNC: 1.7 MG/DL (ref 1.6–2.6)
MCH RBC QN AUTO: 34.4 PG (ref 26–34)
MCHC RBC AUTO-ENTMCNC: 35.5 G/DL (ref 31–37)
MCV RBC AUTO: 96.7 FL
MONOCYTES # BLD: 0.73 X10(3) UL (ref 0.1–1)
MONOCYTES NFR BLD: 9 %
MORPHOLOGY: NORMAL
NEUTROPHILS # BLD AUTO: 5.41 X10 (3) UL (ref 1.5–7.7)
NEUTROPHILS NFR BLD: 71 %
NEUTS BAND NFR BLD: 2 %
NEUTS HYPERSEG # BLD: 5.91 X10(3) UL (ref 1.5–7.7)
NITRITE UR QL STRIP.AUTO: NEGATIVE
NT-PROBNP SERPL-MCNC: 607 PG/ML (ref ?–125)
OSMOLALITY SERPL CALC.SUM OF ELEC: 282 MOSM/KG (ref 275–295)
OXYHGB MFR BLDV: 91.5 % (ref 72–78)
PCO2 BLDV: 29 MM HG (ref 38–50)
PH BLDV: 7.29 [PH] (ref 7.33–7.43)
PH UR STRIP.AUTO: 5.5 [PH] (ref 5–8)
PLATELET # BLD AUTO: 163 10(3)UL (ref 150–450)
PLATELET MORPHOLOGY: NORMAL
PO2 BLDV: 78 MM HG (ref 30–50)
POTASSIUM SERPL-SCNC: 3.9 MMOL/L (ref 3.5–5.1)
PROCALCITONIN SERPL-MCNC: 0.14 NG/ML (ref ?–0.16)
PROT SERPL-MCNC: 7.2 G/DL (ref 6.4–8.2)
PROT UR STRIP.AUTO-MCNC: NEGATIVE MG/DL
RBC # BLD AUTO: 4.22 X10(6)UL
RBC #/AREA URNS AUTO: >10 /HPF
RBC #/AREA URNS AUTO: >10 /HPF
SARS-COV-2 RNA RESP QL NAA+PROBE: NOT DETECTED
SODIUM SERPL-SCNC: 121 MMOL/L (ref 136–145)
SP GR UR STRIP.AUTO: 1.01 (ref 1–1.03)
TOTAL CELLS COUNTED BLD: 100
TRIGL SERPL-MCNC: 291 MG/DL (ref 30–149)
UROBILINOGEN UR STRIP.AUTO-MCNC: 0.2 MG/DL
VENOUS LITERS PER MINUTE: 4 L/MIN
WBC # BLD AUTO: 8.1 X10(3) UL (ref 4–11)
YEAST UR QL: PRESENT /HPF
YEAST UR QL: PRESENT /HPF

## 2023-01-14 PROCEDURE — 71045 X-RAY EXAM CHEST 1 VIEW: CPT | Performed by: EMERGENCY MEDICINE

## 2023-01-14 PROCEDURE — 71275 CT ANGIOGRAPHY CHEST: CPT | Performed by: EMERGENCY MEDICINE

## 2023-01-14 PROCEDURE — 99291 CRITICAL CARE FIRST HOUR: CPT | Performed by: NURSE PRACTITIONER

## 2023-01-14 PROCEDURE — 99291 CRITICAL CARE FIRST HOUR: CPT | Performed by: INTERNAL MEDICINE

## 2023-01-14 RX ORDER — SODIUM CHLORIDE 9 MG/ML
INJECTION, SOLUTION INTRAVENOUS CONTINUOUS
Status: DISCONTINUED | OUTPATIENT
Start: 2023-01-14 | End: 2023-01-14

## 2023-01-14 RX ORDER — POLYETHYLENE GLYCOL 3350 17 G/17G
17 POWDER, FOR SOLUTION ORAL DAILY PRN
Status: DISCONTINUED | OUTPATIENT
Start: 2023-01-14 | End: 2023-01-17

## 2023-01-14 RX ORDER — INSULIN ASPART 100 [IU]/ML
0.15 INJECTION, SOLUTION INTRAVENOUS; SUBCUTANEOUS ONCE
Status: COMPLETED | OUTPATIENT
Start: 2023-01-14 | End: 2023-01-14

## 2023-01-14 RX ORDER — BISACODYL 10 MG
10 SUPPOSITORY, RECTAL RECTAL
Status: DISCONTINUED | OUTPATIENT
Start: 2023-01-14 | End: 2023-01-17

## 2023-01-14 RX ORDER — ONDANSETRON 2 MG/ML
4 INJECTION INTRAMUSCULAR; INTRAVENOUS EVERY 6 HOURS PRN
Status: DISCONTINUED | OUTPATIENT
Start: 2023-01-14 | End: 2023-01-14

## 2023-01-14 RX ORDER — SODIUM PHOSPHATE, DIBASIC AND SODIUM PHOSPHATE, MONOBASIC 7; 19 G/133ML; G/133ML
1 ENEMA RECTAL ONCE AS NEEDED
Status: DISCONTINUED | OUTPATIENT
Start: 2023-01-14 | End: 2023-01-17

## 2023-01-14 RX ORDER — ENOXAPARIN SODIUM 100 MG/ML
40 INJECTION SUBCUTANEOUS DAILY
Status: DISCONTINUED | OUTPATIENT
Start: 2023-01-15 | End: 2023-01-17

## 2023-01-14 RX ORDER — SODIUM CHLORIDE 9 MG/ML
INJECTION, SOLUTION INTRAVENOUS CONTINUOUS
Status: DISCONTINUED | OUTPATIENT
Start: 2023-01-14 | End: 2023-01-17

## 2023-01-14 RX ORDER — NICOTINE POLACRILEX 4 MG
30 LOZENGE BUCCAL
Status: DISCONTINUED | OUTPATIENT
Start: 2023-01-14 | End: 2023-01-14

## 2023-01-14 RX ORDER — MELATONIN
3 NIGHTLY PRN
Status: DISCONTINUED | OUTPATIENT
Start: 2023-01-14 | End: 2023-01-17

## 2023-01-14 RX ORDER — MAGNESIUM SULFATE HEPTAHYDRATE 40 MG/ML
2 INJECTION, SOLUTION INTRAVENOUS ONCE
Status: COMPLETED | OUTPATIENT
Start: 2023-01-14 | End: 2023-01-15

## 2023-01-14 RX ORDER — DEXTROSE MONOHYDRATE 25 G/50ML
50 INJECTION, SOLUTION INTRAVENOUS
Status: DISCONTINUED | OUTPATIENT
Start: 2023-01-14 | End: 2023-01-14

## 2023-01-14 RX ORDER — PROCHLORPERAZINE EDISYLATE 5 MG/ML
5 INJECTION INTRAMUSCULAR; INTRAVENOUS EVERY 8 HOURS PRN
Status: DISCONTINUED | OUTPATIENT
Start: 2023-01-14 | End: 2023-01-17

## 2023-01-14 RX ORDER — DEXTROSE AND SODIUM CHLORIDE 5; .45 G/100ML; G/100ML
100 INJECTION, SOLUTION INTRAVENOUS CONTINUOUS PRN
Status: DISCONTINUED | OUTPATIENT
Start: 2023-01-14 | End: 2023-01-15

## 2023-01-14 RX ORDER — SENNOSIDES 8.6 MG
17.2 TABLET ORAL NIGHTLY PRN
Status: DISCONTINUED | OUTPATIENT
Start: 2023-01-14 | End: 2023-01-17

## 2023-01-14 RX ORDER — ONDANSETRON 2 MG/ML
4 INJECTION INTRAMUSCULAR; INTRAVENOUS ONCE
Status: DISCONTINUED | OUTPATIENT
Start: 2023-01-14 | End: 2023-01-17

## 2023-01-14 RX ORDER — ACETAMINOPHEN 500 MG
500 TABLET ORAL EVERY 4 HOURS PRN
Status: DISCONTINUED | OUTPATIENT
Start: 2023-01-14 | End: 2023-01-17

## 2023-01-14 RX ORDER — NICOTINE POLACRILEX 4 MG
15 LOZENGE BUCCAL
Status: DISCONTINUED | OUTPATIENT
Start: 2023-01-14 | End: 2023-01-14

## 2023-01-15 ENCOUNTER — APPOINTMENT (OUTPATIENT)
Dept: CV DIAGNOSTICS | Facility: HOSPITAL | Age: 43
End: 2023-01-15
Attending: INTERNAL MEDICINE
Payer: MEDICAID

## 2023-01-15 LAB
ADENOVIRUS F 40/41 PCR: NEGATIVE
ANION GAP SERPL CALC-SCNC: 10 MMOL/L (ref 0–18)
ANION GAP SERPL CALC-SCNC: 13 MMOL/L (ref 0–18)
ANION GAP SERPL CALC-SCNC: 14 MMOL/L (ref 0–18)
ANION GAP SERPL CALC-SCNC: 9 MMOL/L (ref 0–18)
ASTROVIRUS PCR: NEGATIVE
ATRIAL RATE: 119 BPM
ATRIAL RATE: 92 BPM
BUN BLD-MCNC: 3 MG/DL (ref 7–18)
BUN BLD-MCNC: 4 MG/DL (ref 7–18)
BUN BLD-MCNC: 4 MG/DL (ref 7–18)
BUN BLD-MCNC: 5 MG/DL (ref 7–18)
C CAYETANENSIS DNA SPEC QL NAA+PROBE: NEGATIVE
C DIFF TOX B STL QL: NEGATIVE
CALCIUM BLD-MCNC: 8.6 MG/DL (ref 8.5–10.1)
CALCIUM BLD-MCNC: 9.1 MG/DL (ref 8.5–10.1)
CAMPY SP DNA.DIARRHEA STL QL NAA+PROBE: NEGATIVE
CHLORIDE SERPL-SCNC: 98 MMOL/L (ref 98–112)
CHLORIDE SERPL-SCNC: 99 MMOL/L (ref 98–112)
CO2 SERPL-SCNC: 18 MMOL/L (ref 21–32)
CO2 SERPL-SCNC: 19 MMOL/L (ref 21–32)
CO2 SERPL-SCNC: 22 MMOL/L (ref 21–32)
CO2 SERPL-SCNC: 23 MMOL/L (ref 21–32)
CREAT BLD-MCNC: 0.51 MG/DL
CREAT BLD-MCNC: 0.52 MG/DL
CREAT BLD-MCNC: 0.6 MG/DL
CREAT BLD-MCNC: 0.62 MG/DL
CRYPTOSP DNA SPEC QL NAA+PROBE: NEGATIVE
EAEC PAA PLAS AGGR+AATA ST NAA+NON-PRB: POSITIVE
EC STX1+STX2 + H7 FLIC SPEC NAA+PROBE: NEGATIVE
ENTAMOEBA HISTOLYTICA PCR: NEGATIVE
EPEC EAE GENE STL QL NAA+NON-PROBE: NEGATIVE
ERYTHROCYTE [DISTWIDTH] IN BLOOD BY AUTOMATED COUNT: 14.6 %
ETEC LTA+ST1A+ST1B TOX ST NAA+NON-PROBE: NEGATIVE
GFR SERPLBLD BASED ON 1.73 SQ M-ARVRAT: 115 ML/MIN/1.73M2 (ref 60–?)
GFR SERPLBLD BASED ON 1.73 SQ M-ARVRAT: 116 ML/MIN/1.73M2 (ref 60–?)
GFR SERPLBLD BASED ON 1.73 SQ M-ARVRAT: 120 ML/MIN/1.73M2 (ref 60–?)
GFR SERPLBLD BASED ON 1.73 SQ M-ARVRAT: 121 ML/MIN/1.73M2 (ref 60–?)
GIARDIA LAMBLIA PCR: NEGATIVE
GLUCOSE BLD-MCNC: 134 MG/DL (ref 70–99)
GLUCOSE BLD-MCNC: 142 MG/DL (ref 70–99)
GLUCOSE BLD-MCNC: 146 MG/DL (ref 70–99)
GLUCOSE BLD-MCNC: 154 MG/DL (ref 70–99)
GLUCOSE BLD-MCNC: 165 MG/DL (ref 70–99)
GLUCOSE BLD-MCNC: 167 MG/DL (ref 70–99)
GLUCOSE BLD-MCNC: 168 MG/DL (ref 70–99)
GLUCOSE BLD-MCNC: 180 MG/DL (ref 70–99)
GLUCOSE BLD-MCNC: 182 MG/DL (ref 70–99)
GLUCOSE BLD-MCNC: 187 MG/DL (ref 70–99)
GLUCOSE BLD-MCNC: 214 MG/DL (ref 70–99)
GLUCOSE BLD-MCNC: 215 MG/DL (ref 70–99)
GLUCOSE BLD-MCNC: 227 MG/DL (ref 70–99)
GLUCOSE BLD-MCNC: 248 MG/DL (ref 70–99)
GLUCOSE BLD-MCNC: 264 MG/DL (ref 70–99)
GLUCOSE BLD-MCNC: 301 MG/DL (ref 70–99)
GLUCOSE BLD-MCNC: >600 MG/DL (ref 70–99)
HCT VFR BLD AUTO: 37.4 %
HGB BLD-MCNC: 13 G/DL
MAGNESIUM SERPL-MCNC: 1.9 MG/DL (ref 1.6–2.6)
MAGNESIUM SERPL-MCNC: 2 MG/DL (ref 1.6–2.6)
MCH RBC QN AUTO: 34.4 PG (ref 26–34)
MCHC RBC AUTO-ENTMCNC: 34.8 G/DL (ref 31–37)
MCV RBC AUTO: 98.9 FL
NOROVIRUS GI/GII PCR: NEGATIVE
OSMOLALITY SERPL CALC.SUM OF ELEC: 269 MOSM/KG (ref 275–295)
OSMOLALITY SERPL CALC.SUM OF ELEC: 272 MOSM/KG (ref 275–295)
OSMOLALITY SERPL CALC.SUM OF ELEC: 273 MOSM/KG (ref 275–295)
OSMOLALITY SERPL CALC.SUM OF ELEC: 278 MOSM/KG (ref 275–295)
P AXIS: 34 DEGREES
P AXIS: 62 DEGREES
P SHIGELLOIDES DNA STL QL NAA+PROBE: NEGATIVE
P-R INTERVAL: 126 MS
P-R INTERVAL: 126 MS
PHOSPHATE SERPL-MCNC: 2.5 MG/DL (ref 2.5–4.9)
PLATELET # BLD AUTO: 153 10(3)UL (ref 150–450)
POTASSIUM SERPL-SCNC: 2.8 MMOL/L (ref 3.5–5.1)
POTASSIUM SERPL-SCNC: 3.2 MMOL/L (ref 3.5–5.1)
POTASSIUM SERPL-SCNC: 3.2 MMOL/L (ref 3.5–5.1)
POTASSIUM SERPL-SCNC: 3.3 MMOL/L (ref 3.5–5.1)
POTASSIUM SERPL-SCNC: 3.3 MMOL/L (ref 3.5–5.1)
Q-T INTERVAL: 332 MS
Q-T INTERVAL: 374 MS
QRS DURATION: 68 MS
QRS DURATION: 68 MS
QTC CALCULATION (BEZET): 462 MS
QTC CALCULATION (BEZET): 467 MS
R AXIS: 55 DEGREES
R AXIS: 73 DEGREES
RBC # BLD AUTO: 3.78 X10(6)UL
ROTAVIRUS A PCR: NEGATIVE
SALMONELLA DNA SPEC QL NAA+PROBE: NEGATIVE
SAPOVIRUS PCR: NEGATIVE
SHIGELLA SP+EIEC IPAH ST NAA+NON-PROBE: NEGATIVE
SODIUM SERPL-SCNC: 130 MMOL/L (ref 136–145)
SODIUM SERPL-SCNC: 131 MMOL/L (ref 136–145)
T AXIS: 266 DEGREES
T AXIS: 63 DEGREES
V CHOLERAE DNA SPEC QL NAA+PROBE: NEGATIVE
VENTRICULAR RATE: 119 BPM
VENTRICULAR RATE: 92 BPM
VIBRIO DNA SPEC NAA+PROBE: NEGATIVE
WBC # BLD AUTO: 8.2 X10(3) UL (ref 4–11)
YERSINIA DNA SPEC NAA+PROBE: NEGATIVE

## 2023-01-15 PROCEDURE — 93306 TTE W/DOPPLER COMPLETE: CPT | Performed by: INTERNAL MEDICINE

## 2023-01-15 PROCEDURE — 99233 SBSQ HOSP IP/OBS HIGH 50: CPT | Performed by: HOSPITALIST

## 2023-01-15 RX ORDER — POTASSIUM CHLORIDE 14.9 MG/ML
20 INJECTION INTRAVENOUS ONCE
Status: COMPLETED | OUTPATIENT
Start: 2023-01-15 | End: 2023-01-15

## 2023-01-15 RX ORDER — HYDROMORPHONE HYDROCHLORIDE 1 MG/ML
0.2 INJECTION, SOLUTION INTRAMUSCULAR; INTRAVENOUS; SUBCUTANEOUS EVERY 2 HOUR PRN
Status: DISCONTINUED | OUTPATIENT
Start: 2023-01-15 | End: 2023-01-17

## 2023-01-15 RX ORDER — PANTOPRAZOLE SODIUM 40 MG/1
40 TABLET, DELAYED RELEASE ORAL
Status: DISCONTINUED | OUTPATIENT
Start: 2023-01-15 | End: 2023-01-17

## 2023-01-15 RX ORDER — CLOTRIMAZOLE 10 MG/1
1 LOZENGE ORAL; TOPICAL
Status: DISCONTINUED | OUTPATIENT
Start: 2023-01-15 | End: 2023-01-17

## 2023-01-15 RX ORDER — HYDROMORPHONE HYDROCHLORIDE 1 MG/ML
0.5 INJECTION, SOLUTION INTRAMUSCULAR; INTRAVENOUS; SUBCUTANEOUS EVERY 2 HOUR PRN
Status: DISCONTINUED | OUTPATIENT
Start: 2023-01-15 | End: 2023-01-17

## 2023-01-15 RX ORDER — POTASSIUM CHLORIDE 14.9 MG/ML
20 INJECTION INTRAVENOUS ONCE
Status: DISCONTINUED | OUTPATIENT
Start: 2023-01-15 | End: 2023-01-15

## 2023-01-15 NOTE — DISCHARGE INSTRUCTIONS
You were incidentally found to have a lung nodule. You will need to see your primary care physician for repeat CT chest in 6-8 months.

## 2023-01-15 NOTE — PLAN OF CARE
Neuro intact. RA vs 2L NC. AFVSS. No runs of SVT noted. C/o abd pain. PRN dilaudid. Diet per GI. Poor appetite. Transitioned off insulin gtt this AM. Levemir/novolog per MAR.

## 2023-01-15 NOTE — ED QUICK NOTES
Pt placed on o2 at 4L n/c for o2 sat of 88-91% on room air. Pt denies hx of COPD or home o2 use. Pt does report she was on oxygen when she was last hospitalized.

## 2023-01-15 NOTE — PLAN OF CARE
Received pt from ED at approximately 2150. Monitor BG hourly, insulin gtt titrated per protocol. Pt reports SOB. Pt in bed on 5L NC. NSR/ST on monitor. Intermittent occurrences of SVT, improving w/electrolyte replacement. BP stable. Afebrile. Right arm precautions, hx breast cancer. Pt reports moderate abdominal pain, prn dilaudid given. Pt indicated loose stools/diarrhea for past few days, stool sample collected, C diff (-). Replaced electrolytes per protocol. See flowsheet.

## 2023-01-15 NOTE — ED INITIAL ASSESSMENT (HPI)
Per ems, pt to ed with c/o shortness of breath and heart racing. Pt received Zofran in route. Ems reports pt left hospital AMA 4 days ago after being admitted for SVT. Pt is a&ox3. Pt reports s/s x 1.5 week. Pt denies chest pain. Pt is a smoker. Respirations easy, nonlabored.

## 2023-01-16 LAB
ALBUMIN SERPL-MCNC: 2 G/DL (ref 3.4–5)
ALBUMIN/GLOB SERPL: 0.6 {RATIO} (ref 1–2)
ALP LIVER SERPL-CCNC: 146 U/L
ALT SERPL-CCNC: 40 U/L
ANION GAP SERPL CALC-SCNC: 6 MMOL/L (ref 0–18)
AST SERPL-CCNC: 102 U/L (ref 15–37)
BASOPHILS # BLD AUTO: 0.03 X10(3) UL (ref 0–0.2)
BASOPHILS NFR BLD AUTO: 0.8 %
BILIRUB SERPL-MCNC: 0.4 MG/DL (ref 0.1–2)
BUN BLD-MCNC: 2 MG/DL (ref 7–18)
CALCIUM BLD-MCNC: 8.4 MG/DL (ref 8.5–10.1)
CHLORIDE SERPL-SCNC: 102 MMOL/L (ref 98–112)
CO2 SERPL-SCNC: 25 MMOL/L (ref 21–32)
CREAT BLD-MCNC: 0.34 MG/DL
EOSINOPHIL # BLD AUTO: 0.02 X10(3) UL (ref 0–0.7)
EOSINOPHIL NFR BLD AUTO: 0.5 %
ERYTHROCYTE [DISTWIDTH] IN BLOOD BY AUTOMATED COUNT: 15 %
GFR SERPLBLD BASED ON 1.73 SQ M-ARVRAT: 133 ML/MIN/1.73M2 (ref 60–?)
GLOBULIN PLAS-MCNC: 3.5 G/DL (ref 2.8–4.4)
GLUCOSE BLD-MCNC: 120 MG/DL (ref 70–99)
GLUCOSE BLD-MCNC: 137 MG/DL (ref 70–99)
GLUCOSE BLD-MCNC: 212 MG/DL (ref 70–99)
GLUCOSE BLD-MCNC: 247 MG/DL (ref 70–99)
GLUCOSE BLD-MCNC: 256 MG/DL (ref 70–99)
HCT VFR BLD AUTO: 34.2 %
HGB BLD-MCNC: 12.1 G/DL
IMM GRANULOCYTES # BLD AUTO: 0.16 X10(3) UL (ref 0–1)
IMM GRANULOCYTES NFR BLD: 4.1 %
LYMPHOCYTES # BLD AUTO: 0.76 X10(3) UL (ref 1–4)
LYMPHOCYTES NFR BLD AUTO: 19.6 %
MCH RBC QN AUTO: 35.1 PG (ref 26–34)
MCHC RBC AUTO-ENTMCNC: 35.4 G/DL (ref 31–37)
MCV RBC AUTO: 99.1 FL
MONOCYTES # BLD AUTO: 0.39 X10(3) UL (ref 0.1–1)
MONOCYTES NFR BLD AUTO: 10.1 %
NEUTROPHILS # BLD AUTO: 2.52 X10 (3) UL (ref 1.5–7.7)
NEUTROPHILS # BLD AUTO: 2.52 X10(3) UL (ref 1.5–7.7)
NEUTROPHILS NFR BLD AUTO: 64.9 %
OSMOLALITY SERPL CALC.SUM OF ELEC: 280 MOSM/KG (ref 275–295)
PHOSPHATE SERPL-MCNC: 2.5 MG/DL (ref 2.5–4.9)
PLATELET # BLD AUTO: 101 10(3)UL (ref 150–450)
POTASSIUM SERPL-SCNC: 3.4 MMOL/L (ref 3.5–5.1)
POTASSIUM SERPL-SCNC: 4.1 MMOL/L (ref 3.5–5.1)
PROT SERPL-MCNC: 5.5 G/DL (ref 6.4–8.2)
RBC # BLD AUTO: 3.45 X10(6)UL
SODIUM SERPL-SCNC: 133 MMOL/L (ref 136–145)
WBC # BLD AUTO: 3.9 X10(3) UL (ref 4–11)

## 2023-01-16 PROCEDURE — 99232 SBSQ HOSP IP/OBS MODERATE 35: CPT | Performed by: HOSPITALIST

## 2023-01-16 RX ORDER — DIPHENHYDRAMINE HYDROCHLORIDE 50 MG/ML
12.5 INJECTION INTRAMUSCULAR; INTRAVENOUS EVERY 4 HOURS PRN
Status: DISCONTINUED | OUTPATIENT
Start: 2023-01-16 | End: 2023-01-17

## 2023-01-16 RX ORDER — AZITHROMYCIN 250 MG/1
500 TABLET, FILM COATED ORAL
Status: DISCONTINUED | OUTPATIENT
Start: 2023-01-16 | End: 2023-01-17

## 2023-01-16 RX ORDER — DIPHENHYDRAMINE HCL 25 MG
25 CAPSULE ORAL EVERY 4 HOURS PRN
Status: DISCONTINUED | OUTPATIENT
Start: 2023-01-16 | End: 2023-01-17

## 2023-01-16 RX ORDER — POTASSIUM CHLORIDE 20 MEQ/1
40 TABLET, EXTENDED RELEASE ORAL ONCE
Status: COMPLETED | OUTPATIENT
Start: 2023-01-16 | End: 2023-01-16

## 2023-01-16 NOTE — PLAN OF CARE
Assumed care at [facility]. A/O x4. RA. NSR on tele. C/o abd pain- prn dilaudid given per mar. Continent. 0.9 infusing at 125 ml/hr. Non cardiac elec protocol- K replaced per mar. Accuchecks qid. Carb controlled diet. Safety prec in place. Needs being met at this time.      Problem: CARDIOVASCULAR - ADULT  Goal: Absence of cardiac arrhythmias or at baseline  Description: INTERVENTIONS:  - Continuous cardiac monitoring, monitor vital signs, obtain 12 lead EKG if indicated  - Evaluate effectiveness of antiarrhythmic and heart rate control medications as ordered  - Initiate emergency measures for life threatening arrhythmias  - Monitor electrolytes and administer replacement therapy as ordered  Outcome: Progressing     Problem: RESPIRATORY - ADULT  Goal: Achieves optimal ventilation and oxygenation  Description: INTERVENTIONS:  - Assess for changes in respiratory status  - Assess for changes in mentation and behavior  - Position to facilitate oxygenation and minimize respiratory effort  - Oxygen supplementation based on oxygen saturation or ABGs  - Provide Smoking Cessation handout, if applicable  - Encourage broncho-pulmonary hygiene including cough, deep breathe, Incentive Spirometry  - Assess the need for suctioning and perform as needed  - Assess and instruct to report SOB or any respiratory difficulty  - Respiratory Therapy support as indicated  - Manage/alleviate anxiety  - Monitor for signs/symptoms of CO2 retention  Outcome: Progressing     Problem: GASTROINTESTINAL - ADULT  Goal: Minimal or absence of nausea and vomiting  Description: INTERVENTIONS:  - Maintain adequate hydration with IV or PO as ordered and tolerated  - Nasogastric tube to low intermittent suction as ordered  - Evaluate effectiveness of ordered antiemetic medications  - Provide nonpharmacologic comfort measures as appropriate  - Advance diet as tolerated, if ordered  - Obtain nutritional consult as needed  - Evaluate fluid balance  Outcome: Progressing     Problem: GASTROINTESTINAL - ADULT  Goal: Maintains or returns to baseline bowel function  Description: INTERVENTIONS:  - Assess bowel function  - Maintain adequate hydration with IV or PO as ordered and tolerated  - Evaluate effectiveness of GI medications  - Encourage mobilization and activity  - Obtain nutritional consult as needed  - Establish a toileting routine/schedule  - Consider collaborating with pharmacy to review patient's medication profile  Outcome: Progressing     Problem: METABOLIC/FLUID AND ELECTROLYTES - ADULT  Goal: Glucose maintained within prescribed range  Description: INTERVENTIONS:  - Monitor Blood Glucose as ordered  - Assess for signs and symptoms of hyperglycemia and hypoglycemia  - Administer ordered medications to maintain glucose within target range  - Assess barriers to adequate nutritional intake and initiate nutrition consult as needed  - Instruct patient on self management of diabetes  Outcome: Progressing     Problem: METABOLIC/FLUID AND ELECTROLYTES - ADULT  Goal: Electrolytes maintained within normal limits  Description: INTERVENTIONS:  - Monitor labs and rhythm and assess patient for signs and symptoms of electrolyte imbalances  - Administer electrolyte replacement as ordered  - Monitor response to electrolyte replacements, including rhythm and repeat lab results as appropriate  - Fluid restriction as ordered  - Instruct patient on fluid and nutrition restrictions as appropriate  Outcome: Progressing     Problem: PAIN - ADULT  Goal: Verbalizes/displays adequate comfort level or patient's stated pain goal  Description: INTERVENTIONS:  - Encourage pt to monitor pain and request assistance  - Assess pain using appropriate pain scale  - Administer analgesics based on type and severity of pain and evaluate response  - Implement non-pharmacological measures as appropriate and evaluate response  - Consider cultural and social influences on pain and pain management  - Manage/alleviate anxiety  - Utilize distraction and/or relaxation techniques  - Monitor for opioid side effects  - Notify MD/LIP if interventions unsuccessful or patient reports new pain  - Anticipate increased pain with activity and pre-medicate as appropriate  Outcome: Progressing     Problem: PAIN - ADULT  Goal: Verbalizes/displays adequate comfort level or patient's stated pain goal  Description: INTERVENTIONS:  - Encourage pt to monitor pain and request assistance  - Assess pain using appropriate pain scale  - Administer analgesics based on type and severity of pain and evaluate response  - Implement non-pharmacological measures as appropriate and evaluate response  - Consider cultural and social influences on pain and pain management  - Manage/alleviate anxiety  - Utilize distraction and/or relaxation techniques  - Monitor for opioid side effects  - Notify MD/LIP if interventions unsuccessful or patient reports new pain  - Anticipate increased pain with activity and pre-medicate as appropriate  Outcome: Progressing

## 2023-01-16 NOTE — PLAN OF CARE
Problem: CARDIOVASCULAR - ADULT  Goal: Absence of cardiac arrhythmias or at baseline  Description: INTERVENTIONS:  - Continuous cardiac monitoring, monitor vital signs, obtain 12 lead EKG if indicated  - Evaluate effectiveness of antiarrhythmic and heart rate control medications as ordered  - Initiate emergency measures for life threatening arrhythmias  - Monitor electrolytes and administer replacement therapy as ordered  Outcome: Progressing

## 2023-01-16 NOTE — OCCUPATIONAL THERAPY NOTE
OT orders received, chart reviewed. Attempted to see patient for OT eval this pm however patient politely but adamantly declining any offered activity at this time, reports too tired and in too much pain (8 out of 10 in abdomen) to move, requesting therapist return tomorrow. RN notified. Patient encouraged at a minimum to be up to chair with nursing staff assist for meals. Will reattempt tomorrow as able.

## 2023-01-17 VITALS
HEIGHT: 66 IN | DIASTOLIC BLOOD PRESSURE: 79 MMHG | BODY MASS INDEX: 27.16 KG/M2 | OXYGEN SATURATION: 98 % | HEART RATE: 67 BPM | WEIGHT: 169 LBS | SYSTOLIC BLOOD PRESSURE: 132 MMHG | RESPIRATION RATE: 18 BRPM | TEMPERATURE: 98 F

## 2023-01-17 LAB
GLUCOSE BLD-MCNC: 179 MG/DL (ref 70–99)
GLUCOSE BLD-MCNC: 216 MG/DL (ref 70–99)
NEUTRAL FAT, FECAL: NORMAL
PHOSPHATE SERPL-MCNC: 2.6 MG/DL (ref 2.5–4.9)
POTASSIUM SERPL-SCNC: 3.8 MMOL/L (ref 3.5–5.1)
SPLIT FAT, FECAL: NORMAL

## 2023-01-17 PROCEDURE — 99239 HOSP IP/OBS DSCHRG MGMT >30: CPT | Performed by: HOSPITALIST

## 2023-01-17 RX ORDER — HYDROCODONE BITARTRATE AND ACETAMINOPHEN 5; 325 MG/1; MG/1
1 TABLET ORAL EVERY 4 HOURS PRN
Status: DISCONTINUED | OUTPATIENT
Start: 2023-01-17 | End: 2023-01-17

## 2023-01-17 RX ORDER — HYDROCODONE BITARTRATE AND ACETAMINOPHEN 5; 325 MG/1; MG/1
2 TABLET ORAL EVERY 4 HOURS PRN
Status: DISCONTINUED | OUTPATIENT
Start: 2023-01-17 | End: 2023-01-17

## 2023-01-17 RX ORDER — AZITHROMYCIN 500 MG/1
500 TABLET, FILM COATED ORAL DAILY
Qty: 1 TABLET | Refills: 0 | Status: SHIPPED | OUTPATIENT
Start: 2023-01-18 | End: 2023-01-20 | Stop reason: CLARIF

## 2023-01-17 RX ORDER — HYDROCODONE BITARTRATE AND ACETAMINOPHEN 5; 325 MG/1; MG/1
1 TABLET ORAL EVERY 6 HOURS PRN
Qty: 20 TABLET | Refills: 0 | Status: SHIPPED | OUTPATIENT
Start: 2023-01-17

## 2023-01-17 NOTE — PLAN OF CARE
[facility] care 0730. Alert and oriented x4.  RA. Accu checks  Carb control diet  PIV infusing 0.9 NS @ 83 ml/hr. Lovenox for VTE prophylaxis. Isolation for GI Illness- PO Zithromax. Daily wts. PRN medications given for nausea. See MAR. Electrolyte NC protocol. PT/OT recommending home. Right arm precautions. Continue to monitor pt.        Problem: CARDIOVASCULAR - ADULT  Goal: Absence of cardiac arrhythmias or at baseline  Description: INTERVENTIONS:  - Continuous cardiac monitoring, monitor vital signs, obtain 12 lead EKG if indicated  - Evaluate effectiveness of antiarrhythmic and heart rate control medications as ordered  - Initiate emergency measures for life threatening arrhythmias  - Monitor electrolytes and administer replacement therapy as ordered  Outcome: Progressing     Problem: RESPIRATORY - ADULT  Goal: Achieves optimal ventilation and oxygenation  Description: INTERVENTIONS:  - Assess for changes in respiratory status  - Assess for changes in mentation and behavior  - Position to facilitate oxygenation and minimize respiratory effort  - Oxygen supplementation based on oxygen saturation or ABGs  - Provide Smoking Cessation handout, if applicable  - Encourage broncho-pulmonary hygiene including cough, deep breathe, Incentive Spirometry  - Assess the need for suctioning and perform as needed  - Assess and instruct to report SOB or any respiratory difficulty  - Respiratory Therapy support as indicated  - Manage/alleviate anxiety  - Monitor for signs/symptoms of CO2 retention  Outcome: Progressing     Problem: GASTROINTESTINAL - ADULT  Goal: Minimal or absence of nausea and vomiting  Description: INTERVENTIONS:  - Maintain adequate hydration with IV or PO as ordered and tolerated  - Nasogastric tube to low intermittent suction as ordered  - Evaluate effectiveness of ordered antiemetic medications  - Provide nonpharmacologic comfort measures as appropriate  - Advance diet as tolerated, if ordered  - Obtain nutritional consult as needed  - Evaluate fluid balance  Outcome: Progressing  Goal: Maintains or returns to baseline bowel function  Description: INTERVENTIONS:  - Assess bowel function  - Maintain adequate hydration with IV or PO as ordered and tolerated  - Evaluate effectiveness of GI medications  - Encourage mobilization and activity  - Obtain nutritional consult as needed  - Establish a toileting routine/schedule  - Consider collaborating with pharmacy to review patient's medication profile  Outcome: Progressing     Problem: METABOLIC/FLUID AND ELECTROLYTES - ADULT  Goal: Glucose maintained within prescribed range  Description: INTERVENTIONS:  - Monitor Blood Glucose as ordered  - Assess for signs and symptoms of hyperglycemia and hypoglycemia  - Administer ordered medications to maintain glucose within target range  - Assess barriers to adequate nutritional intake and initiate nutrition consult as needed  - Instruct patient on self management of diabetes  Outcome: Progressing  Goal: Electrolytes maintained within normal limits  Description: INTERVENTIONS:  - Monitor labs and rhythm and assess patient for signs and symptoms of electrolyte imbalances  - Administer electrolyte replacement as ordered  - Monitor response to electrolyte replacements, including rhythm and repeat lab results as appropriate  - Fluid restriction as ordered  - Instruct patient on fluid and nutrition restrictions as appropriate  Outcome: Progressing     Problem: PAIN - ADULT  Goal: Verbalizes/displays adequate comfort level or patient's stated pain goal  Description: INTERVENTIONS:  - Encourage pt to monitor pain and request assistance  - Assess pain using appropriate pain scale  - Administer analgesics based on type and severity of pain and evaluate response  - Implement non-pharmacological measures as appropriate and evaluate response  - Consider cultural and social influences on pain and pain management  - Manage/alleviate anxiety  - Utilize distraction and/or relaxation techniques  - Monitor for opioid side effects  - Notify MD/LIP if interventions unsuccessful or patient reports new pain  - Anticipate increased pain with activity and pre-medicate as appropriate  Outcome: Progressing Normal vision: sees adequately in most situations; can see medication labels, newsprint

## 2023-01-17 NOTE — PROGRESS NOTES
NURSING DISCHARGE NOTE    Discharged Home via Ambulatory. Accompanied by RN  Belongings Taken by patient/family.   PIV discontinued  AVS reviewed  All questions answered

## 2023-01-17 NOTE — PLAN OF CARE
Assumed care at [facility]. A/O x4. RA. NSR on tele. C/o abd pain and nausea- prn meds given per mar. Pt c/o itchiness- MD notified- benadryl ordered and given per mar. Continent. PO azithromycin. 0.9 infusing at 83 ml/hr. Non cardiac elec protocol. Accuchecks qid. Carb controlled diet. Safety prec in place. Needs being met at this time.      Problem: CARDIOVASCULAR - ADULT  Goal: Absence of cardiac arrhythmias or at baseline  Description: INTERVENTIONS:  - Continuous cardiac monitoring, monitor vital signs, obtain 12 lead EKG if indicated  - Evaluate effectiveness of antiarrhythmic and heart rate control medications as ordered  - Initiate emergency measures for life threatening arrhythmias  - Monitor electrolytes and administer replacement therapy as ordered  Outcome: Progressing     Problem: RESPIRATORY - ADULT  Goal: Achieves optimal ventilation and oxygenation  Description: INTERVENTIONS:  - Assess for changes in respiratory status  - Assess for changes in mentation and behavior  - Position to facilitate oxygenation and minimize respiratory effort  - Oxygen supplementation based on oxygen saturation or ABGs  - Provide Smoking Cessation handout, if applicable  - Encourage broncho-pulmonary hygiene including cough, deep breathe, Incentive Spirometry  - Assess the need for suctioning and perform as needed  - Assess and instruct to report SOB or any respiratory difficulty  - Respiratory Therapy support as indicated  - Manage/alleviate anxiety  - Monitor for signs/symptoms of CO2 retention  Outcome: Progressing     Problem: GASTROINTESTINAL - ADULT  Goal: Minimal or absence of nausea and vomiting  Description: INTERVENTIONS:  - Maintain adequate hydration with IV or PO as ordered and tolerated  - Nasogastric tube to low intermittent suction as ordered  - Evaluate effectiveness of ordered antiemetic medications  - Provide nonpharmacologic comfort measures as appropriate  - Advance diet as tolerated, if ordered  - Obtain nutritional consult as needed  - Evaluate fluid balance  Outcome: Progressing     Problem: GASTROINTESTINAL - ADULT  Goal: Maintains or returns to baseline bowel function  Description: INTERVENTIONS:  - Assess bowel function  - Maintain adequate hydration with IV or PO as ordered and tolerated  - Evaluate effectiveness of GI medications  - Encourage mobilization and activity  - Obtain nutritional consult as needed  - Establish a toileting routine/schedule  - Consider collaborating with pharmacy to review patient's medication profile  Outcome: Progressing     Problem: METABOLIC/FLUID AND ELECTROLYTES - ADULT  Goal: Glucose maintained within prescribed range  Description: INTERVENTIONS:  - Monitor Blood Glucose as ordered  - Assess for signs and symptoms of hyperglycemia and hypoglycemia  - Administer ordered medications to maintain glucose within target range  - Assess barriers to adequate nutritional intake and initiate nutrition consult as needed  - Instruct patient on self management of diabetes  Outcome: Progressing     Problem: METABOLIC/FLUID AND ELECTROLYTES - ADULT  Goal: Electrolytes maintained within normal limits  Description: INTERVENTIONS:  - Monitor labs and rhythm and assess patient for signs and symptoms of electrolyte imbalances  - Administer electrolyte replacement as ordered  - Monitor response to electrolyte replacements, including rhythm and repeat lab results as appropriate  - Fluid restriction as ordered  - Instruct patient on fluid and nutrition restrictions as appropriate  Outcome: Progressing     Problem: PAIN - ADULT  Goal: Verbalizes/displays adequate comfort level or patient's stated pain goal  Description: INTERVENTIONS:  - Encourage pt to monitor pain and request assistance  - Assess pain using appropriate pain scale  - Administer analgesics based on type and severity of pain and evaluate response  - Implement non-pharmacological measures as appropriate and evaluate response  - Consider cultural and social influences on pain and pain management  - Manage/alleviate anxiety  - Utilize distraction and/or relaxation techniques  - Monitor for opioid side effects  - Notify MD/LIP if interventions unsuccessful or patient reports new pain  - Anticipate increased pain with activity and pre-medicate as appropriate  Outcome: Progressing

## 2023-01-18 ENCOUNTER — PATIENT OUTREACH (OUTPATIENT)
Dept: CASE MANAGEMENT | Age: 43
End: 2023-01-18

## 2023-01-18 ENCOUNTER — TELEPHONE (OUTPATIENT)
Dept: CASE MANAGEMENT | Age: 43
End: 2023-01-18

## 2023-01-18 DIAGNOSIS — E11.10 TYPE 2 DIABETES MELLITUS WITH KETOACIDOSIS WITHOUT COMA, WITHOUT LONG-TERM CURRENT USE OF INSULIN (HCC): Primary | ICD-10-CM

## 2023-01-18 LAB — GLUTAMIC ACID DECARBOXYLASE AB: <5 IU/ML

## 2023-01-18 RX ORDER — LANCETS 33 GAUGE
1 EACH MISCELLANEOUS
Qty: 1 EACH | Refills: 0 | Status: SHIPPED | OUTPATIENT
Start: 2023-01-18 | End: 2023-01-20 | Stop reason: CLARIF

## 2023-01-18 RX ORDER — BLOOD-GLUCOSE METER
1 EACH MISCELLANEOUS
Qty: 1 KIT | Refills: 0 | Status: SHIPPED | OUTPATIENT
Start: 2023-01-18 | End: 2023-01-20 | Stop reason: CLARIF

## 2023-01-18 RX ORDER — BLOOD SUGAR DIAGNOSTIC
STRIP MISCELLANEOUS
Qty: 50 STRIP | Refills: 0 | Status: SHIPPED | OUTPATIENT
Start: 2023-01-18

## 2023-01-18 NOTE — TELEPHONE ENCOUNTER
Patient discharged on 1/17/23 with new diabetes medication. Needs testing supplies. Patient to be seen in [facility] on Friday 1/20/23. Will send prescriptions for blood glucose meter and supplies so patient can bring blood sugar readings to appointment on Friday.

## 2023-01-19 LAB
ISLET ANTIGEN-2 ANTIBODY: <5.4 U/ML
PANCREATIC ELASTASE , FECAL: <10 UG/G
ZINC TRANSPORTER 8 ANTIBODY: <10 U/ML

## 2023-01-19 NOTE — PROGRESS NOTES
Attempted to reach the patient to complete a Hospital FU call. Left a message for the pt to call the NCM back at, [phone]. The number for the TCC was also given to the patient: [phone]. The patient is scheduled with the TCC on 1/20/2023.

## 2023-01-20 ENCOUNTER — HOSPITAL ENCOUNTER (EMERGENCY)
Facility: HOSPITAL | Age: 43
Discharge: HOME | End: 2023-01-20
Attending: EMERGENCY MEDICINE
Payer: MEDICAID

## 2023-01-20 ENCOUNTER — OFFICE VISIT (OUTPATIENT)
Dept: INTERNAL MEDICINE CLINIC | Facility: CLINIC | Age: 43
End: 2023-01-20
Payer: MEDICAID

## 2023-01-20 VITALS
BODY MASS INDEX: 26.52 KG/M2 | SYSTOLIC BLOOD PRESSURE: 118 MMHG | RESPIRATION RATE: 18 BRPM | HEIGHT: 66 IN | DIASTOLIC BLOOD PRESSURE: 74 MMHG | HEART RATE: 82 BPM | WEIGHT: 165 LBS | TEMPERATURE: 98 F | OXYGEN SATURATION: 98 %

## 2023-01-20 VITALS
OXYGEN SATURATION: 97 % | DIASTOLIC BLOOD PRESSURE: 80 MMHG | HEART RATE: 99 BPM | SYSTOLIC BLOOD PRESSURE: 113 MMHG | RESPIRATION RATE: 18 BRPM | TEMPERATURE: 98 F

## 2023-01-20 DIAGNOSIS — F32.A ANXIETY AND DEPRESSION: ICD-10-CM

## 2023-01-20 DIAGNOSIS — A09 DIARRHEA OF INFECTIOUS ORIGIN: ICD-10-CM

## 2023-01-20 DIAGNOSIS — E87.20 METABOLIC ACIDOSIS: ICD-10-CM

## 2023-01-20 DIAGNOSIS — E87.1 HYPONATREMIA: ICD-10-CM

## 2023-01-20 DIAGNOSIS — E87.6 HYPOKALEMIA: ICD-10-CM

## 2023-01-20 DIAGNOSIS — R91.1 LUNG NODULE: ICD-10-CM

## 2023-01-20 DIAGNOSIS — F10.10 ALCOHOL ABUSE: ICD-10-CM

## 2023-01-20 DIAGNOSIS — F17.200 TOBACCO DEPENDENCE: ICD-10-CM

## 2023-01-20 DIAGNOSIS — I10 PRIMARY HYPERTENSION: ICD-10-CM

## 2023-01-20 DIAGNOSIS — K85.90 ACUTE PANCREATITIS, UNSPECIFIED COMPLICATION STATUS, UNSPECIFIED PANCREATITIS TYPE: ICD-10-CM

## 2023-01-20 DIAGNOSIS — R73.9 HYPERGLYCEMIA: Primary | ICD-10-CM

## 2023-01-20 DIAGNOSIS — F41.9 ANXIETY AND DEPRESSION: ICD-10-CM

## 2023-01-20 DIAGNOSIS — E08.10 DIABETIC KETOACIDOSIS WITHOUT COMA ASSOCIATED WITH DIABETES MELLITUS DUE TO UNDERLYING CONDITION (HCC): Primary | ICD-10-CM

## 2023-01-20 DIAGNOSIS — J96.01 ACUTE RESPIRATORY FAILURE WITH HYPOXIA (HCC): ICD-10-CM

## 2023-01-20 DIAGNOSIS — B37.0 ORAL THRUSH: ICD-10-CM

## 2023-01-20 DIAGNOSIS — E11.9 NEW ONSET TYPE 2 DIABETES MELLITUS (HCC): ICD-10-CM

## 2023-01-20 DIAGNOSIS — A04.4: ICD-10-CM

## 2023-01-20 LAB
ACETONE: NEGATIVE
ALBUMIN SERPL-MCNC: 3 G/DL (ref 3.4–5)
ALBUMIN/GLOB SERPL: 0.7 {RATIO} (ref 1–2)
ALP LIVER SERPL-CCNC: 106 U/L
ALT SERPL-CCNC: 21 U/L
ANION GAP SERPL CALC-SCNC: 12 MMOL/L (ref 0–18)
ANION GAP SERPL CALC-SCNC: 9 MMOL/L (ref 0–18)
AST SERPL-CCNC: 60 U/L (ref 15–37)
BASE EXCESS BLDV CALC-SCNC: 0.1 MMOL/L
BASOPHILS # BLD AUTO: 0.05 X10(3) UL (ref 0–0.2)
BASOPHILS # BLD AUTO: 0.05 X10(3) UL (ref 0–0.2)
BASOPHILS NFR BLD AUTO: 0.8 %
BASOPHILS NFR BLD AUTO: 0.8 %
BILIRUB SERPL-MCNC: 1.1 MG/DL (ref 0.1–2)
BUN BLD-MCNC: 4 MG/DL (ref 7–18)
BUN BLD-MCNC: 5 MG/DL (ref 7–18)
CALCIUM BLD-MCNC: 9.5 MG/DL (ref 8.5–10.1)
CALCIUM BLD-MCNC: 9.6 MG/DL (ref 8.5–10.1)
CHLORIDE SERPL-SCNC: 87 MMOL/L (ref 98–112)
CHLORIDE SERPL-SCNC: 90 MMOL/L (ref 98–112)
CO2 SERPL-SCNC: 24 MMOL/L (ref 21–32)
CO2 SERPL-SCNC: 25 MMOL/L (ref 21–32)
CREAT BLD-MCNC: 0.75 MG/DL
CREAT BLD-MCNC: 0.83 MG/DL
EOSINOPHIL # BLD AUTO: 0.03 X10(3) UL (ref 0–0.7)
EOSINOPHIL # BLD AUTO: 0.04 X10(3) UL (ref 0–0.7)
EOSINOPHIL NFR BLD AUTO: 0.5 %
EOSINOPHIL NFR BLD AUTO: 0.6 %
ERYTHROCYTE [DISTWIDTH] IN BLOOD BY AUTOMATED COUNT: 14.9 %
ERYTHROCYTE [DISTWIDTH] IN BLOOD BY AUTOMATED COUNT: 15 %
FASTING STATUS PATIENT QL REPORTED: YES
GFR SERPLBLD BASED ON 1.73 SQ M-ARVRAT: 103 ML/MIN/1.73M2 (ref 60–?)
GFR SERPLBLD BASED ON 1.73 SQ M-ARVRAT: 91 ML/MIN/1.73M2 (ref 60–?)
GLOBULIN PLAS-MCNC: 4.2 G/DL (ref 2.8–4.4)
GLUCOSE BLD-MCNC: 300 MG/DL (ref 70–99)
GLUCOSE BLD-MCNC: 499 MG/DL (ref 70–99)
GLUCOSE BLD-MCNC: 553 MG/DL (ref 70–99)
GLUCOSE BLD-MCNC: 577 MG/DL (ref 70–99)
HCO3 BLDV-SCNC: 24.6 MEQ/L (ref 22–26)
HCT VFR BLD AUTO: 37.7 %
HCT VFR BLD AUTO: 38.5 %
HGB BLD-MCNC: 13.4 G/DL
HGB BLD-MCNC: 13.9 G/DL
IMM GRANULOCYTES # BLD AUTO: 0.07 X10(3) UL (ref 0–1)
IMM GRANULOCYTES # BLD AUTO: 0.08 X10(3) UL (ref 0–1)
IMM GRANULOCYTES NFR BLD: 1.2 %
IMM GRANULOCYTES NFR BLD: 1.2 %
LYMPHOCYTES # BLD AUTO: 1.49 X10(3) UL (ref 1–4)
LYMPHOCYTES # BLD AUTO: 1.57 X10(3) UL (ref 1–4)
LYMPHOCYTES NFR BLD AUTO: 24.5 %
LYMPHOCYTES NFR BLD AUTO: 25.3 %
MCH RBC QN AUTO: 34.6 PG (ref 26–34)
MCH RBC QN AUTO: 35 PG (ref 26–34)
MCHC RBC AUTO-ENTMCNC: 35.5 G/DL (ref 31–37)
MCHC RBC AUTO-ENTMCNC: 36.1 G/DL (ref 31–37)
MCV RBC AUTO: 97 FL
MCV RBC AUTO: 97.4 FL
MONOCYTES # BLD AUTO: 0.72 X10(3) UL (ref 0.1–1)
MONOCYTES # BLD AUTO: 0.78 X10(3) UL (ref 0.1–1)
MONOCYTES NFR BLD AUTO: 12.1 %
MONOCYTES NFR BLD AUTO: 12.2 %
NEUTROPHILS # BLD AUTO: 3.54 X10 (3) UL (ref 1.5–7.7)
NEUTROPHILS # BLD AUTO: 3.54 X10(3) UL (ref 1.5–7.7)
NEUTROPHILS # BLD AUTO: 3.9 X10 (3) UL (ref 1.5–7.7)
NEUTROPHILS # BLD AUTO: 3.9 X10(3) UL (ref 1.5–7.7)
NEUTROPHILS NFR BLD AUTO: 60 %
NEUTROPHILS NFR BLD AUTO: 60.8 %
OSMOLALITY SERPL CALC.SUM OF ELEC: 278 MOSM/KG (ref 275–295)
OSMOLALITY SERPL CALC.SUM OF ELEC: 279 MOSM/KG (ref 275–295)
OXYHGB MFR BLDV: 82 % (ref 72–78)
PCO2 BLDV: 35 MM HG (ref 38–50)
PH BLDV: 7.44 [PH] (ref 7.33–7.43)
PLATELET # BLD AUTO: 416 10(3)UL (ref 150–450)
PLATELET # BLD AUTO: 483 10(3)UL (ref 150–450)
PO2 BLDV: 53 MM HG (ref 30–50)
POTASSIUM SERPL-SCNC: 4.2 MMOL/L (ref 3.5–5.1)
POTASSIUM SERPL-SCNC: 5.1 MMOL/L (ref 3.5–5.1)
PROT SERPL-MCNC: 7.2 G/DL (ref 6.4–8.2)
RBC # BLD AUTO: 3.87 X10(6)UL
RBC # BLD AUTO: 3.97 X10(6)UL
SARS-COV-2 RNA RESP QL NAA+PROBE: NOT DETECTED
SODIUM SERPL-SCNC: 123 MMOL/L (ref 136–145)
SODIUM SERPL-SCNC: 124 MMOL/L (ref 136–145)
WBC # BLD AUTO: 5.9 X10(3) UL (ref 4–11)
WBC # BLD AUTO: 6.4 X10(3) UL (ref 4–11)

## 2023-01-20 PROCEDURE — 3074F SYST BP LT 130 MM HG: CPT | Performed by: NURSE PRACTITIONER

## 2023-01-20 PROCEDURE — 80053 COMPREHEN METABOLIC PANEL: CPT | Performed by: NURSE PRACTITIONER

## 2023-01-20 PROCEDURE — 82009 KETONE BODYS QUAL: CPT | Performed by: EMERGENCY MEDICINE

## 2023-01-20 PROCEDURE — 82803 BLOOD GASES ANY COMBINATION: CPT | Performed by: EMERGENCY MEDICINE

## 2023-01-20 PROCEDURE — 82962 GLUCOSE BLOOD TEST: CPT

## 2023-01-20 PROCEDURE — 96360 HYDRATION IV INFUSION INIT: CPT

## 2023-01-20 PROCEDURE — 96361 HYDRATE IV INFUSION ADD-ON: CPT

## 2023-01-20 PROCEDURE — 3008F BODY MASS INDEX DOCD: CPT | Performed by: NURSE PRACTITIONER

## 2023-01-20 PROCEDURE — 93010 ELECTROCARDIOGRAM REPORT: CPT

## 2023-01-20 PROCEDURE — 93005 ELECTROCARDIOGRAM TRACING: CPT

## 2023-01-20 PROCEDURE — 99285 EMERGENCY DEPT VISIT HI MDM: CPT

## 2023-01-20 PROCEDURE — 3078F DIAST BP <80 MM HG: CPT | Performed by: NURSE PRACTITIONER

## 2023-01-20 PROCEDURE — 85025 COMPLETE CBC W/AUTO DIFF WBC: CPT | Performed by: NURSE PRACTITIONER

## 2023-01-20 PROCEDURE — 99214 OFFICE O/P EST MOD 30 MIN: CPT | Performed by: NURSE PRACTITIONER

## 2023-01-20 PROCEDURE — 85025 COMPLETE CBC W/AUTO DIFF WBC: CPT | Performed by: EMERGENCY MEDICINE

## 2023-01-20 PROCEDURE — 99284 EMERGENCY DEPT VISIT MOD MDM: CPT

## 2023-01-20 RX ORDER — INSULIN ASPART 100 [IU]/ML
0.2 INJECTION, SOLUTION INTRAVENOUS; SUBCUTANEOUS ONCE
Status: COMPLETED | OUTPATIENT
Start: 2023-01-20 | End: 2023-01-20

## 2023-01-20 RX ORDER — GLIMEPIRIDE 4 MG/1
4 TABLET ORAL
Qty: 30 TABLET | Refills: 0 | Status: SHIPPED | OUTPATIENT
Start: 2023-01-20

## 2023-01-20 RX ORDER — LEVOFLOXACIN 500 MG/1
500 TABLET, FILM COATED ORAL DAILY
Qty: 3 TABLET | Refills: 0 | Status: SHIPPED | OUTPATIENT
Start: 2023-01-20 | End: 2023-01-23

## 2023-01-20 NOTE — PROGRESS NOTES
Multiple attempts to reach pt and messages left with no return call. Patient went in for HFU appt with TCC on 1/20/23. Encounter closing.

## 2023-01-20 NOTE — ED INITIAL ASSESSMENT (HPI)
A&Ox3 patient p/w high BG    Patient is newly Dx w/ DM    Was hospitalized for DKA recently and has not been taking any insulin for the past 4 days \"they didn't give me any\"    BG reading 533 upon arrival    Endorses mild abdominal discomfort, diarrhea, vomiting, polyuria, dizziness    RR even/NL

## 2023-01-22 LAB
ATRIAL RATE: 75 BPM
P AXIS: 52 DEGREES
P-R INTERVAL: 126 MS
Q-T INTERVAL: 446 MS
QRS DURATION: 70 MS
QTC CALCULATION (BEZET): 498 MS
R AXIS: 79 DEGREES
T AXIS: 68 DEGREES
VENTRICULAR RATE: 75 BPM

## 2023-01-23 ENCOUNTER — HOSPITAL ENCOUNTER (INPATIENT)
Age: 43
LOS: 2 days | Discharge: HOME | DRG: 420 | End: 2023-01-25
Attending: STUDENT IN AN ORGANIZED HEALTH CARE EDUCATION/TRAINING PROGRAM | Admitting: INTERNAL MEDICINE

## 2023-01-23 DIAGNOSIS — Z79.4 TYPE 2 DIABETES MELLITUS WITH HYPERGLYCEMIA, WITH LONG-TERM CURRENT USE OF INSULIN (CMD): ICD-10-CM

## 2023-01-23 DIAGNOSIS — R73.9 HYPERGLYCEMIA: Primary | ICD-10-CM

## 2023-01-23 DIAGNOSIS — E11.65 TYPE 2 DIABETES MELLITUS WITH HYPERGLYCEMIA, WITH LONG-TERM CURRENT USE OF INSULIN (CMD): ICD-10-CM

## 2023-01-23 LAB
ALBUMIN SERPL-MCNC: 3.6 G/DL (ref 3.6–5.1)
ALBUMIN/GLOB SERPL: 0.8 {RATIO} (ref 1–2.4)
ALP SERPL-CCNC: 107 UNITS/L (ref 45–117)
ALT SERPL-CCNC: 21 UNITS/L
ANION GAP SERPL CALC-SCNC: 13 MMOL/L (ref 7–19)
APPEARANCE UR: CLEAR
AST SERPL-CCNC: 14 UNITS/L
B-OH-BUTYR SERPL-SCNC: 2.2 MMOL/L (ref 0–0.3)
BACTERIA #/AREA URNS HPF: ABNORMAL /HPF
BASE EXCESS / DEFICIT, VENOUS - RESPIRATORY: 3 MMOL/L (ref -2–2)
BASOPHILS # BLD: 0.1 K/MCL (ref 0–0.3)
BASOPHILS NFR BLD: 1 %
BDY SITE: ABNORMAL
BILIRUB SERPL-MCNC: 0.7 MG/DL (ref 0.2–1)
BILIRUB UR QL STRIP: NEGATIVE
BODY TEMPERATURE: 37 DEGREES
BUN SERPL-MCNC: 4 MG/DL (ref 6–20)
BUN/CREAT SERPL: 8 (ref 7–25)
CALCIUM SERPL-MCNC: 9.7 MG/DL (ref 8.4–10.2)
CHLORIDE SERPL-SCNC: 87 MMOL/L (ref 97–110)
CO2 SERPL-SCNC: 26 MMOL/L (ref 21–32)
COLOR UR: ABNORMAL
CREAT SERPL-MCNC: 0.52 MG/DL (ref 0.51–0.95)
DEPRECATED RDW RBC: 53.1 FL (ref 39–50)
EOSINOPHIL # BLD: 0.1 K/MCL (ref 0–0.5)
EOSINOPHIL NFR BLD: 1 %
ERYTHROCYTE [DISTWIDTH] IN BLOOD: 14.6 % (ref 11–15)
ETHANOL SERPL-MCNC: NORMAL MG/DL
FASTING DURATION TIME PATIENT: ABNORMAL H
FIO2 ON VENT: 21 %
FLUAV RNA RESP QL NAA+PROBE: NOT DETECTED
FLUBV RNA RESP QL NAA+PROBE: NOT DETECTED
GFR SERPLBLD BASED ON 1.73 SQ M-ARVRAT: >90 ML/MIN
GLOBULIN SER-MCNC: 4.4 G/DL (ref 2–4)
GLUCOSE BLDC GLUCOMTR-MCNC: 111 MG/DL (ref 70–99)
GLUCOSE BLDC GLUCOMTR-MCNC: 128 MG/DL (ref 70–99)
GLUCOSE BLDC GLUCOMTR-MCNC: 140 MG/DL (ref 70–99)
GLUCOSE BLDC GLUCOMTR-MCNC: 188 MG/DL (ref 70–99)
GLUCOSE BLDC GLUCOMTR-MCNC: 232 MG/DL (ref 70–99)
GLUCOSE BLDC GLUCOMTR-MCNC: 369 MG/DL (ref 70–99)
GLUCOSE BLDC GLUCOMTR-MCNC: 430 MG/DL (ref 70–99)
GLUCOSE BLDC GLUCOMTR-MCNC: 552 MG/DL (ref 70–99)
GLUCOSE SERPL-MCNC: 547 MG/DL (ref 70–99)
GLUCOSE UR STRIP-MCNC: >1000 MG/DL
HCG UR QL: NEGATIVE
HCO3 BLDV-SCNC: 28.5 MMOL/L (ref 22–28)
HCT VFR BLD CALC: 42.9 % (ref 36–46.5)
HGB BLD-MCNC: 15 G/DL (ref 12–15.5)
HGB UR QL STRIP: NEGATIVE
HYALINE CASTS #/AREA URNS LPF: ABNORMAL /LPF
IMM GRANULOCYTES # BLD AUTO: 0 K/MCL (ref 0–0.2)
IMM GRANULOCYTES # BLD: 1 %
KETONES UR STRIP-MCNC: 40 MG/DL
LEUKOCYTE ESTERASE UR QL STRIP: NEGATIVE
LIPASE SERPL-CCNC: 134 UNITS/L (ref 73–393)
LYMPHOCYTES # BLD: 1.9 K/MCL (ref 1–4.8)
LYMPHOCYTES NFR BLD: 31 %
MCH RBC QN AUTO: 34.6 PG (ref 26–34)
MCHC RBC AUTO-ENTMCNC: 35 G/DL (ref 32–36.5)
MCV RBC AUTO: 98.8 FL (ref 78–100)
MONOCYTES # BLD: 0.7 K/MCL (ref 0.3–0.9)
MONOCYTES NFR BLD: 11 %
MUCOUS THREADS URNS QL MICRO: PRESENT
NEUTROPHILS # BLD: 3.4 K/MCL (ref 1.8–7.7)
NEUTROPHILS NFR BLD: 55 %
NITRITE UR QL STRIP: NEGATIVE
NRBC BLD MANUAL-RTO: 0 /100 WBC
PCO2 BLDV: 45 MM HG (ref 38–51)
PH BLDV: 7.41 UNITS (ref 7.35–7.45)
PH UR STRIP: 6 [PH] (ref 5–7)
PLATELET # BLD AUTO: 536 K/MCL (ref 140–450)
PO2 BLDV: <20 MM HG (ref 35–42)
POTASSIUM SERPL-SCNC: 4 MMOL/L (ref 3.4–5.1)
PROT SERPL-MCNC: 8 G/DL (ref 6.4–8.2)
PROT UR STRIP-MCNC: NEGATIVE MG/DL
RBC # BLD: 4.34 MIL/MCL (ref 4–5.2)
RBC #/AREA URNS HPF: ABNORMAL /HPF
RENAL EPI CELLS #/AREA URNS HPF: ABNORMAL /HPF
RSV AG NPH QL IA.RAPID: NOT DETECTED
SARS-COV-2 RNA RESP QL NAA+PROBE: NOT DETECTED
SERVICE CMNT-IMP: NORMAL
SERVICE CMNT-IMP: NORMAL
SODIUM SERPL-SCNC: 122 MMOL/L (ref 135–145)
SP GR UR STRIP: <1.005 (ref 1–1.03)
SQUAMOUS #/AREA URNS HPF: ABNORMAL /HPF
UROBILINOGEN UR STRIP-MCNC: 0.2 MG/DL
WBC # BLD: 6.2 K/MCL (ref 4.2–11)
WBC #/AREA URNS HPF: ABNORMAL /HPF

## 2023-01-23 PROCEDURE — 10003585 HB ROOM CHARGE INTERMEDIATE CARE

## 2023-01-23 PROCEDURE — 10002807 HB RX 258: Performed by: STUDENT IN AN ORGANIZED HEALTH CARE EDUCATION/TRAINING PROGRAM

## 2023-01-23 PROCEDURE — 84703 CHORIONIC GONADOTROPIN ASSAY: CPT

## 2023-01-23 PROCEDURE — 82962 GLUCOSE BLOOD TEST: CPT

## 2023-01-23 PROCEDURE — 82010 KETONE BODYS QUAN: CPT | Performed by: STUDENT IN AN ORGANIZED HEALTH CARE EDUCATION/TRAINING PROGRAM

## 2023-01-23 PROCEDURE — 10002803 HB RX 637: Performed by: INTERNAL MEDICINE

## 2023-01-23 PROCEDURE — 81001 URINALYSIS AUTO W/SCOPE: CPT | Performed by: EMERGENCY MEDICINE

## 2023-01-23 PROCEDURE — 82803 BLOOD GASES ANY COMBINATION: CPT

## 2023-01-23 PROCEDURE — 96374 THER/PROPH/DIAG INJ IV PUSH: CPT

## 2023-01-23 PROCEDURE — 99285 EMERGENCY DEPT VISIT HI MDM: CPT

## 2023-01-23 PROCEDURE — 82077 ASSAY SPEC XCP UR&BREATH IA: CPT | Performed by: STUDENT IN AN ORGANIZED HEALTH CARE EDUCATION/TRAINING PROGRAM

## 2023-01-23 PROCEDURE — 96361 HYDRATE IV INFUSION ADD-ON: CPT

## 2023-01-23 PROCEDURE — 85025 COMPLETE CBC W/AUTO DIFF WBC: CPT | Performed by: EMERGENCY MEDICINE

## 2023-01-23 PROCEDURE — 83690 ASSAY OF LIPASE: CPT | Performed by: STUDENT IN AN ORGANIZED HEALTH CARE EDUCATION/TRAINING PROGRAM

## 2023-01-23 PROCEDURE — 80053 COMPREHEN METABOLIC PANEL: CPT | Performed by: EMERGENCY MEDICINE

## 2023-01-23 PROCEDURE — 96375 TX/PRO/DX INJ NEW DRUG ADDON: CPT

## 2023-01-23 PROCEDURE — 0241U COVID/FLU/RSV PANEL: CPT | Performed by: STUDENT IN AN ORGANIZED HEALTH CARE EDUCATION/TRAINING PROGRAM

## 2023-01-23 PROCEDURE — 10002800 HB RX 250 W HCPCS: Performed by: STUDENT IN AN ORGANIZED HEALTH CARE EDUCATION/TRAINING PROGRAM

## 2023-01-23 RX ORDER — PROCHLORPERAZINE MALEATE 5 MG/1
10 TABLET ORAL EVERY 6 HOURS PRN
Status: DISCONTINUED | OUTPATIENT
Start: 2023-01-23 | End: 2023-01-25 | Stop reason: HOSPADM

## 2023-01-23 RX ORDER — DIPHENHYDRAMINE HYDROCHLORIDE 50 MG/ML
25 INJECTION INTRAMUSCULAR; INTRAVENOUS ONCE
Status: COMPLETED | OUTPATIENT
Start: 2023-01-23 | End: 2023-01-23

## 2023-01-23 RX ORDER — ALPRAZOLAM 0.25 MG/1
0.25 TABLET ORAL 3 TIMES DAILY PRN
Status: DISCONTINUED | OUTPATIENT
Start: 2023-01-23 | End: 2023-01-25 | Stop reason: HOSPADM

## 2023-01-23 RX ORDER — BUSPIRONE HYDROCHLORIDE 15 MG/1
15 TABLET ORAL 3 TIMES DAILY
Status: DISCONTINUED | OUTPATIENT
Start: 2023-01-23 | End: 2023-01-23 | Stop reason: SDUPTHER

## 2023-01-23 RX ORDER — METOPROLOL SUCCINATE 100 MG/1
100 TABLET, EXTENDED RELEASE ORAL 2 TIMES DAILY
Status: DISCONTINUED | OUTPATIENT
Start: 2023-01-23 | End: 2023-01-25 | Stop reason: HOSPADM

## 2023-01-23 RX ORDER — LIDOCAINE 4 G/G
1 PATCH TOPICAL DAILY
Status: DISCONTINUED | OUTPATIENT
Start: 2023-01-24 | End: 2023-01-25 | Stop reason: HOSPADM

## 2023-01-23 RX ORDER — HYDROCODONE BITARTRATE AND ACETAMINOPHEN 5; 325 MG/1; MG/1
1 TABLET ORAL EVERY 6 HOURS PRN
COMMUNITY

## 2023-01-23 RX ORDER — TACROLIMUS 1 MG/G
OINTMENT TOPICAL 2 TIMES DAILY
Status: ON HOLD | COMMUNITY
End: 2023-01-24

## 2023-01-23 RX ORDER — GLIMEPIRIDE 4 MG/1
4 TABLET ORAL
Status: ON HOLD | COMMUNITY
End: 2023-01-25 | Stop reason: HOSPADM

## 2023-01-23 RX ORDER — NICOTINE 21 MG/24HR
1 PATCH, TRANSDERMAL 24 HOURS TRANSDERMAL DAILY
Status: DISCONTINUED | OUTPATIENT
Start: 2023-01-24 | End: 2023-01-25 | Stop reason: HOSPADM

## 2023-01-23 RX ORDER — PANCRELIPASE LIPASE, PANCRELIPASE PROTEASE, PANCRELIPASE AMYLASE 20000; 63000; 84000 [USP'U]/1; [USP'U]/1; [USP'U]/1
2 CAPSULE, DELAYED RELEASE ORAL
COMMUNITY
Start: 2023-01-19

## 2023-01-23 RX ORDER — TRAMADOL HYDROCHLORIDE 50 MG/1
50 TABLET ORAL EVERY 6 HOURS PRN
Status: DISCONTINUED | OUTPATIENT
Start: 2023-01-23 | End: 2023-01-25 | Stop reason: HOSPADM

## 2023-01-23 RX ORDER — HALOPERIDOL 5 MG/1
5 TABLET ORAL NIGHTLY
Status: DISCONTINUED | OUTPATIENT
Start: 2023-01-23 | End: 2023-01-25 | Stop reason: HOSPADM

## 2023-01-23 RX ORDER — ALPRAZOLAM 0.25 MG/1
0.25 TABLET ORAL 3 TIMES DAILY PRN
COMMUNITY

## 2023-01-23 RX ORDER — NICOTINE POLACRILEX 4 MG
30 LOZENGE BUCCAL PRN
Status: DISCONTINUED | OUTPATIENT
Start: 2023-01-23 | End: 2023-01-25 | Stop reason: HOSPADM

## 2023-01-23 RX ORDER — DEXTROSE MONOHYDRATE 25 G/50ML
12.5 INJECTION, SOLUTION INTRAVENOUS PRN
Status: DISCONTINUED | OUTPATIENT
Start: 2023-01-23 | End: 2023-01-25 | Stop reason: HOSPADM

## 2023-01-23 RX ORDER — GABAPENTIN 300 MG/1
300 CAPSULE ORAL EVERY 12 HOURS SCHEDULED
Status: DISCONTINUED | OUTPATIENT
Start: 2023-01-23 | End: 2023-01-25 | Stop reason: HOSPADM

## 2023-01-23 RX ORDER — SERTRALINE HYDROCHLORIDE 100 MG/1
200 TABLET, FILM COATED ORAL DAILY
Status: DISCONTINUED | OUTPATIENT
Start: 2023-01-24 | End: 2023-01-25 | Stop reason: HOSPADM

## 2023-01-23 RX ORDER — NICOTINE POLACRILEX 4 MG
15 LOZENGE BUCCAL PRN
Status: DISCONTINUED | OUTPATIENT
Start: 2023-01-23 | End: 2023-01-25 | Stop reason: HOSPADM

## 2023-01-23 RX ORDER — METOPROLOL SUCCINATE 50 MG/1
50 TABLET, EXTENDED RELEASE ORAL 2 TIMES DAILY
Status: DISCONTINUED | OUTPATIENT
Start: 2023-01-23 | End: 2023-01-23 | Stop reason: SDUPTHER

## 2023-01-23 RX ORDER — DEXTROSE MONOHYDRATE 25 G/50ML
25 INJECTION, SOLUTION INTRAVENOUS PRN
Status: DISCONTINUED | OUTPATIENT
Start: 2023-01-23 | End: 2023-01-25 | Stop reason: HOSPADM

## 2023-01-23 RX ORDER — PROCHLORPERAZINE MALEATE 10 MG
10 TABLET ORAL EVERY 6 HOURS PRN
Status: ON HOLD | COMMUNITY
End: 2023-01-25 | Stop reason: HOSPADM

## 2023-01-23 RX ORDER — DEXTROAMPHETAMINE SACCHARATE, AMPHETAMINE ASPARTATE MONOHYDRATE, DEXTROAMPHETAMINE SULFATE AND AMPHETAMINE SULFATE 7.5; 7.5; 7.5; 7.5 MG/1; MG/1; MG/1; MG/1
30 CAPSULE, EXTENDED RELEASE ORAL DAILY
COMMUNITY

## 2023-01-23 RX ORDER — PANTOPRAZOLE SODIUM 40 MG/1
40 TABLET, DELAYED RELEASE ORAL DAILY
Status: DISCONTINUED | OUTPATIENT
Start: 2023-01-24 | End: 2023-01-25 | Stop reason: HOSPADM

## 2023-01-23 RX ORDER — ALBUTEROL SULFATE 90 UG/1
2 AEROSOL, METERED RESPIRATORY (INHALATION) EVERY 4 HOURS PRN
Status: DISCONTINUED | OUTPATIENT
Start: 2023-01-23 | End: 2023-01-25 | Stop reason: HOSPADM

## 2023-01-23 RX ORDER — BUSPIRONE HYDROCHLORIDE 5 MG/1
10 TABLET ORAL 2 TIMES DAILY
Status: DISCONTINUED | OUTPATIENT
Start: 2023-01-23 | End: 2023-01-25 | Stop reason: HOSPADM

## 2023-01-23 RX ORDER — MONTELUKAST SODIUM 10 MG/1
10 TABLET ORAL DAILY
Status: DISCONTINUED | OUTPATIENT
Start: 2023-01-24 | End: 2023-01-25 | Stop reason: HOSPADM

## 2023-01-23 RX ORDER — CLOBETASOL PROPIONATE 0.5 MG/G
1 CREAM TOPICAL 2 TIMES DAILY
Status: DISCONTINUED | OUTPATIENT
Start: 2023-01-23 | End: 2023-01-25 | Stop reason: HOSPADM

## 2023-01-23 RX ADMIN — BUSPIRONE HYDROCHLORIDE 10 MG: 5 TABLET ORAL at 23:12

## 2023-01-23 RX ADMIN — INSULIN HUMAN 5.5 UNITS/HR: 1 INJECTION, SOLUTION INTRAVENOUS at 16:48

## 2023-01-23 RX ADMIN — MORPHINE SULFATE 4 MG: 4 INJECTION INTRAVENOUS at 16:19

## 2023-01-23 RX ADMIN — DIPHENHYDRAMINE HYDROCHLORIDE 25 MG: 50 INJECTION, SOLUTION INTRAMUSCULAR; INTRAVENOUS at 16:06

## 2023-01-23 RX ADMIN — HALOPERIDOL 5 MG: 5 TABLET ORAL at 23:12

## 2023-01-23 RX ADMIN — GABAPENTIN 300 MG: 300 CAPSULE ORAL at 23:12

## 2023-01-23 RX ADMIN — SODIUM CHLORIDE 1000 ML: 9 INJECTION, SOLUTION INTRAVENOUS at 15:33

## 2023-01-23 RX ADMIN — ALPRAZOLAM 0.25 MG: 0.25 TABLET ORAL at 23:16

## 2023-01-23 RX ADMIN — DIPHENHYDRAMINE HYDROCHLORIDE 25 MG: 50 INJECTION, SOLUTION INTRAMUSCULAR; INTRAVENOUS at 18:19

## 2023-01-23 RX ADMIN — MORPHINE SULFATE 4 MG: 4 INJECTION INTRAVENOUS at 18:22

## 2023-01-23 SDOH — ECONOMIC STABILITY: HOUSING INSECURITY: ARE YOU WORRIED ABOUT LOSING YOUR HOUSING?: NO

## 2023-01-23 SDOH — HEALTH STABILITY: PHYSICAL HEALTH: DO YOU HAVE SERIOUS DIFFICULTY WALKING OR CLIMBING STAIRS?: NO

## 2023-01-23 SDOH — HEALTH STABILITY: GENERAL
BECAUSE OF A PHYSICAL, MENTAL, OR EMOTIONAL CONDITION, DO YOU HAVE SERIOUS DIFFICULTY CONCENTRATING, REMEMBERING OR MAKING DECISIONS?: NO

## 2023-01-23 SDOH — ECONOMIC STABILITY: TRANSPORTATION INSECURITY
IN THE PAST 12 MONTHS, HAS THE LACK OF TRANSPORTATION KEPT YOU FROM MEDICAL APPOINTMENTS OR FROM GETTING MEDICATIONS?: NO

## 2023-01-23 SDOH — SOCIAL STABILITY: SOCIAL NETWORK
HOW OFTEN DO YOU SEE OR TALK TO PEOPLE THAT YOU CARE ABOUT AND FEEL CLOSE TO? (FOR EXAMPLE: TALKING TO FRIENDS ON THE PHONE, VISITING FRIENDS OR FAMILY, GOING TO CHURCH OR CLUB MEETINGS): 5 OR MORE TIMES A WEEK

## 2023-01-23 SDOH — ECONOMIC STABILITY: TRANSPORTATION INSECURITY
IN THE PAST 12 MONTHS, HAS LACK OF TRANSPORTATION KEPT YOU FROM MEETINGS, WORK, OR FROM GETTING THINGS NEEDED FOR DAILY LIVING?: NO

## 2023-01-23 SDOH — ECONOMIC STABILITY: GENERAL

## 2023-01-23 SDOH — ECONOMIC STABILITY: HOUSING INSECURITY: WHAT IS YOUR LIVING SITUATION TODAY?: CONDO

## 2023-01-23 SDOH — HEALTH STABILITY: GENERAL: BECAUSE OF A PHYSICAL, MENTAL, OR EMOTIONAL CONDITION, DO YOU HAVE DIFFICULTY DOING ERRANDS ALONE?: NO

## 2023-01-23 SDOH — ECONOMIC STABILITY: HOUSING INSECURITY: WHAT IS YOUR LIVING SITUATION TODAY?: FAMILY MEMBERS

## 2023-01-23 SDOH — HEALTH STABILITY: PHYSICAL HEALTH: DO YOU HAVE DIFFICULTY DRESSING OR BATHING?: NO

## 2023-01-23 SDOH — SOCIAL STABILITY: SOCIAL NETWORK: SUPPORT SYSTEMS: FAMILY MEMBERS;FRIENDS

## 2023-01-23 SDOH — ECONOMIC STABILITY: FOOD INSECURITY: HOW OFTEN IN THE PAST 12 MONTHS WERE YOU WORRIED OR STRESSED ABOUT HAVING ENOUGH MONEY TO BUY NUTRITIOUS MEALS?: NEVER

## 2023-01-23 ASSESSMENT — COLUMBIA-SUICIDE SEVERITY RATING SCALE - C-SSRS
6. HAVE YOU EVER DONE ANYTHING, STARTED TO DO ANYTHING, OR PREPARED TO DO ANYTHING TO END YOUR LIFE?: NO
2. HAVE YOU ACTUALLY HAD ANY THOUGHTS OF KILLING YOURSELF?: NO
1. WITHIN THE PAST MONTH, HAVE YOU WISHED YOU WERE DEAD OR WISHED YOU COULD GO TO SLEEP AND NOT WAKE UP?: NO
IS THE PATIENT ABLE TO COMPLETE C-SSRS: YES

## 2023-01-23 ASSESSMENT — PATIENT HEALTH QUESTIONNAIRE - PHQ9
SUM OF ALL RESPONSES TO PHQ9 QUESTIONS 1 AND 2: 0
2. FEELING DOWN, DEPRESSED OR HOPELESS: NOT AT ALL
IS PATIENT ABLE TO COMPLETE PHQ2 OR PHQ9: YES
CLINICAL INTERPRETATION OF PHQ2 SCORE: NO FURTHER SCREENING NEEDED
1. LITTLE INTEREST OR PLEASURE IN DOING THINGS: NOT AT ALL
SUM OF ALL RESPONSES TO PHQ9 QUESTIONS 1 AND 2: 0

## 2023-01-23 ASSESSMENT — ACTIVITIES OF DAILY LIVING (ADL)
RECENT_DECLINE_ADL: NO
ADL_SCORE: 12
ADL_BEFORE_ADMISSION: INDEPENDENT
ADL_SHORT_OF_BREATH: NO

## 2023-01-23 ASSESSMENT — PAIN SCALES - GENERAL: PAINLEVEL_OUTOF10: 0

## 2023-01-23 ASSESSMENT — LIFESTYLE VARIABLES
HOW OFTEN DO YOU HAVE 6 OR MORE DRINKS ON ONE OCCASION: NEVER
HOW MANY STANDARD DRINKS CONTAINING ALCOHOL DO YOU HAVE ON A TYPICAL DAY: 0,1 OR 2
AUDIT-C TOTAL SCORE: 0
ALCOHOL_USE_STATUS: NO OR LOW RISK WITH VALIDATED TOOL
HOW OFTEN DO YOU HAVE A DRINK CONTAINING ALCOHOL: NEVER

## 2023-01-24 LAB
ALBUMIN SERPL-MCNC: 3 G/DL (ref 3.6–5.1)
ALBUMIN/GLOB SERPL: 0.9 {RATIO} (ref 1–2.4)
ALP SERPL-CCNC: 84 UNITS/L (ref 45–117)
ALT SERPL-CCNC: 18 UNITS/L
ANION GAP SERPL CALC-SCNC: 11 MMOL/L (ref 7–19)
AST SERPL-CCNC: 21 UNITS/L
BILIRUB SERPL-MCNC: 0.4 MG/DL (ref 0.2–1)
BUN SERPL-MCNC: 4 MG/DL (ref 6–20)
BUN/CREAT SERPL: 8 (ref 7–25)
CALCIUM SERPL-MCNC: 9.2 MG/DL (ref 8.4–10.2)
CHLORIDE SERPL-SCNC: 98 MMOL/L (ref 97–110)
CO2 SERPL-SCNC: 26 MMOL/L (ref 21–32)
CREAT SERPL-MCNC: 0.5 MG/DL (ref 0.51–0.95)
DEPRECATED RDW RBC: 53.3 FL (ref 39–50)
ERYTHROCYTE [DISTWIDTH] IN BLOOD: 14.8 % (ref 11–15)
FASTING DURATION TIME PATIENT: ABNORMAL H
GFR SERPLBLD BASED ON 1.73 SQ M-ARVRAT: >90 ML/MIN
GLOBULIN SER-MCNC: 3.4 G/DL (ref 2–4)
GLUCOSE BLDC GLUCOMTR-MCNC: 183 MG/DL (ref 70–99)
GLUCOSE BLDC GLUCOMTR-MCNC: 210 MG/DL (ref 70–99)
GLUCOSE BLDC GLUCOMTR-MCNC: 219 MG/DL (ref 70–99)
GLUCOSE BLDC GLUCOMTR-MCNC: 256 MG/DL (ref 70–99)
GLUCOSE BLDC GLUCOMTR-MCNC: 267 MG/DL (ref 70–99)
GLUCOSE SERPL-MCNC: 249 MG/DL (ref 70–99)
HCT VFR BLD CALC: 37.1 % (ref 36–46.5)
HGB BLD-MCNC: 12.9 G/DL (ref 12–15.5)
MCH RBC QN AUTO: 34.3 PG (ref 26–34)
MCHC RBC AUTO-ENTMCNC: 34.8 G/DL (ref 32–36.5)
MCV RBC AUTO: 98.7 FL (ref 78–100)
NRBC BLD MANUAL-RTO: 0 /100 WBC
PLATELET # BLD AUTO: 395 K/MCL (ref 140–450)
POTASSIUM SERPL-SCNC: 3.9 MMOL/L (ref 3.4–5.1)
PROT SERPL-MCNC: 6.4 G/DL (ref 6.4–8.2)
RAINBOW EXTRA TUBES HOLD SPECIMEN: NORMAL
RBC # BLD: 3.76 MIL/MCL (ref 4–5.2)
SODIUM SERPL-SCNC: 131 MMOL/L (ref 135–145)
WBC # BLD: 4.2 K/MCL (ref 4.2–11)

## 2023-01-24 PROCEDURE — 10002019 HB COUNTER RESP ASSESSMENT

## 2023-01-24 PROCEDURE — 10002800 HB RX 250 W HCPCS: Performed by: INTERNAL MEDICINE

## 2023-01-24 PROCEDURE — 10002803 HB RX 637: Performed by: INTERNAL MEDICINE

## 2023-01-24 PROCEDURE — 85027 COMPLETE CBC AUTOMATED: CPT | Performed by: INTERNAL MEDICINE

## 2023-01-24 PROCEDURE — 36415 COLL VENOUS BLD VENIPUNCTURE: CPT | Performed by: INTERNAL MEDICINE

## 2023-01-24 PROCEDURE — 80053 COMPREHEN METABOLIC PANEL: CPT | Performed by: INTERNAL MEDICINE

## 2023-01-24 PROCEDURE — 10002801 HB RX 250 W/O HCPCS: Performed by: INTERNAL MEDICINE

## 2023-01-24 PROCEDURE — 10006031 HB ROOM CHARGE TELEMETRY

## 2023-01-24 RX ORDER — TRAZODONE HYDROCHLORIDE 100 MG/1
200 TABLET ORAL NIGHTLY
COMMUNITY

## 2023-01-24 RX ORDER — METOPROLOL SUCCINATE 100 MG/1
100 TABLET, EXTENDED RELEASE ORAL 2 TIMES DAILY
COMMUNITY

## 2023-01-24 RX ORDER — BUSPIRONE HYDROCHLORIDE 10 MG/1
20 TABLET ORAL 2 TIMES DAILY
Status: ON HOLD | COMMUNITY
End: 2023-01-25 | Stop reason: HOSPADM

## 2023-01-24 RX ORDER — DEXTROAMPHETAMINE SACCHARATE, AMPHETAMINE ASPARTATE, DEXTROAMPHETAMINE SULFATE AND AMPHETAMINE SULFATE 2.5; 2.5; 2.5; 2.5 MG/1; MG/1; MG/1; MG/1
10 TABLET ORAL DAILY
COMMUNITY

## 2023-01-24 RX ORDER — HYDROCODONE BITARTRATE AND ACETAMINOPHEN 5; 325 MG/1; MG/1
1 TABLET ORAL EVERY 4 HOURS PRN
Status: DISCONTINUED | OUTPATIENT
Start: 2023-01-24 | End: 2023-01-25 | Stop reason: HOSPADM

## 2023-01-24 RX ADMIN — INSULIN LISPRO 6 UNITS: 100 INJECTION, SOLUTION INTRAVENOUS; SUBCUTANEOUS at 12:49

## 2023-01-24 RX ADMIN — HALOPERIDOL 5 MG: 5 TABLET ORAL at 20:47

## 2023-01-24 RX ADMIN — METOPROLOL SUCCINATE 100 MG: 100 TABLET, EXTENDED RELEASE ORAL at 20:47

## 2023-01-24 RX ADMIN — TRAMADOL HYDROCHLORIDE 50 MG: 50 TABLET, COATED ORAL at 14:19

## 2023-01-24 RX ADMIN — SERTRALINE HYDROCHLORIDE 200 MG: 100 TABLET, FILM COATED ORAL at 08:03

## 2023-01-24 RX ADMIN — GABAPENTIN 300 MG: 300 CAPSULE ORAL at 20:47

## 2023-01-24 RX ADMIN — HYDROCODONE BITARTRATE AND ACETAMINOPHEN 1 TABLET: 5; 325 TABLET ORAL at 22:21

## 2023-01-24 RX ADMIN — METOPROLOL SUCCINATE 100 MG: 100 TABLET, EXTENDED RELEASE ORAL at 08:03

## 2023-01-24 RX ADMIN — PANTOPRAZOLE SODIUM 40 MG: 40 TABLET, DELAYED RELEASE ORAL at 08:03

## 2023-01-24 RX ADMIN — MONTELUKAST SODIUM 10 MG: 10 TABLET, FILM COATED ORAL at 08:03

## 2023-01-24 RX ADMIN — INSULIN LISPRO 3 UNITS: 100 INJECTION, SOLUTION INTRAVENOUS; SUBCUTANEOUS at 17:37

## 2023-01-24 RX ADMIN — GABAPENTIN 300 MG: 300 CAPSULE ORAL at 08:03

## 2023-01-24 RX ADMIN — DEXTROAMPHETAMINE SACCHARATE, AMPHETAMINE ASPARTATE MONOHYDRATE, DEXTROAMPHETAMINE SULFATE, AMPHETAMINE SULFATE 40 MG: 3.75; 3.75; 3.75; 3.75 CAPSULE, EXTENDED RELEASE ORAL at 08:43

## 2023-01-24 RX ADMIN — BUSPIRONE HYDROCHLORIDE 10 MG: 5 TABLET ORAL at 08:03

## 2023-01-24 RX ADMIN — LIDOCAINE 1 PATCH: 4 PATCH TOPICAL at 08:00

## 2023-01-24 RX ADMIN — HYDROCODONE BITARTRATE AND ACETAMINOPHEN 1 TABLET: 5; 325 TABLET ORAL at 16:12

## 2023-01-24 RX ADMIN — INSULIN LISPRO 9 UNITS: 100 INJECTION, SOLUTION INTRAVENOUS; SUBCUTANEOUS at 06:42

## 2023-01-24 RX ADMIN — INSULIN LISPRO 6 UNITS: 100 INJECTION, SOLUTION INTRAVENOUS; SUBCUTANEOUS at 20:56

## 2023-01-24 RX ADMIN — BUSPIRONE HYDROCHLORIDE 10 MG: 5 TABLET ORAL at 20:47

## 2023-01-24 ASSESSMENT — ENCOUNTER SYMPTOMS
WEAKNESS: 1
SORE THROAT: 0
NAUSEA: 1
CONSTIPATION: 0
COUGH: 0
VOMITING: 0
APPETITE CHANGE: 1
ABDOMINAL DISTENTION: 0
WOUND: 0
ACTIVITY CHANGE: 1
FATIGUE: 1
BRUISES/BLEEDS EASILY: 1
ABDOMINAL PAIN: 1

## 2023-01-24 ASSESSMENT — COGNITIVE AND FUNCTIONAL STATUS - GENERAL
BECAUSE OF A PHYSICAL, MENTAL, OR EMOTIONAL CONDITION, DO YOU HAVE SERIOUS DIFFICULTY CONCENTRATING, REMEMBERING OR MAKING DECISIONS: NO
DO YOU HAVE DIFFICULTY DRESSING OR BATHING: NO
BECAUSE OF A PHYSICAL, MENTAL, OR EMOTIONAL CONDITION, DO YOU HAVE DIFFICULTY DOING ERRANDS ALONE: NO

## 2023-01-24 ASSESSMENT — PAIN SCALES - GENERAL
PAINLEVEL_OUTOF10: 0
PAINLEVEL_OUTOF10: 0
PAINLEVEL_OUTOF10: 2
PAINLEVEL_OUTOF10: 9
PAINLEVEL_OUTOF10: 9
PAINLEVEL_OUTOF10: 6
PAINLEVEL_OUTOF10: 2
PAINLEVEL_OUTOF10: 6
PAINLEVEL_OUTOF10: 9
PAINLEVEL_OUTOF10: 0
PAINLEVEL_OUTOF10: 0

## 2023-01-24 ASSESSMENT — PULMONARY FUNCTION TESTS: FEV1/FVC: UNABLE TO OBTAIN, OR GREATER THAN 70%

## 2023-01-25 VITALS
TEMPERATURE: 98 F | WEIGHT: 158.07 LBS | HEART RATE: 71 BPM | RESPIRATION RATE: 16 BRPM | SYSTOLIC BLOOD PRESSURE: 115 MMHG | HEIGHT: 66 IN | OXYGEN SATURATION: 98 % | DIASTOLIC BLOOD PRESSURE: 79 MMHG | BODY MASS INDEX: 25.4 KG/M2

## 2023-01-25 LAB
ALBUMIN SERPL-MCNC: 3 G/DL (ref 3.6–5.1)
ALBUMIN/GLOB SERPL: 0.8 {RATIO} (ref 1–2.4)
ALP SERPL-CCNC: 85 UNITS/L (ref 45–117)
ALT SERPL-CCNC: 21 UNITS/L
ANION GAP SERPL CALC-SCNC: 14 MMOL/L (ref 7–19)
AST SERPL-CCNC: 26 UNITS/L
BASOPHILS # BLD: 0.1 K/MCL (ref 0–0.3)
BASOPHILS NFR BLD: 1 %
BILIRUB SERPL-MCNC: 0.4 MG/DL (ref 0.2–1)
BUN SERPL-MCNC: 3 MG/DL (ref 6–20)
BUN/CREAT SERPL: 6 (ref 7–25)
CALCIUM SERPL-MCNC: 9.2 MG/DL (ref 8.4–10.2)
CHLORIDE SERPL-SCNC: 98 MMOL/L (ref 97–110)
CO2 SERPL-SCNC: 26 MMOL/L (ref 21–32)
CREAT SERPL-MCNC: 0.49 MG/DL (ref 0.51–0.95)
DEPRECATED RDW RBC: 52.5 FL (ref 39–50)
EOSINOPHIL # BLD: 0.1 K/MCL (ref 0–0.5)
EOSINOPHIL NFR BLD: 2 %
ERYTHROCYTE [DISTWIDTH] IN BLOOD: 14.5 % (ref 11–15)
FASTING DURATION TIME PATIENT: ABNORMAL H
GFR SERPLBLD BASED ON 1.73 SQ M-ARVRAT: >90 ML/MIN
GLOBULIN SER-MCNC: 3.6 G/DL (ref 2–4)
GLUCOSE BLDC GLUCOMTR-MCNC: 192 MG/DL (ref 70–99)
GLUCOSE BLDC GLUCOMTR-MCNC: 262 MG/DL (ref 70–99)
GLUCOSE SERPL-MCNC: 217 MG/DL (ref 70–99)
HCT VFR BLD CALC: 38.7 % (ref 36–46.5)
HGB BLD-MCNC: 13.5 G/DL (ref 12–15.5)
IMM GRANULOCYTES # BLD AUTO: 0 K/MCL (ref 0–0.2)
IMM GRANULOCYTES # BLD: 0 %
LIPASE SERPL-CCNC: 182 UNITS/L (ref 73–393)
LYMPHOCYTES # BLD: 1.7 K/MCL (ref 1–4.8)
LYMPHOCYTES NFR BLD: 37 %
MCH RBC QN AUTO: 34.3 PG (ref 26–34)
MCHC RBC AUTO-ENTMCNC: 34.9 G/DL (ref 32–36.5)
MCV RBC AUTO: 98.2 FL (ref 78–100)
MONOCYTES # BLD: 0.6 K/MCL (ref 0.3–0.9)
MONOCYTES NFR BLD: 13 %
NEUTROPHILS # BLD: 2.1 K/MCL (ref 1.8–7.7)
NEUTROPHILS NFR BLD: 47 %
NRBC BLD MANUAL-RTO: 0 /100 WBC
PLATELET # BLD AUTO: 358 K/MCL (ref 140–450)
POTASSIUM SERPL-SCNC: 3.7 MMOL/L (ref 3.4–5.1)
PROT SERPL-MCNC: 6.6 G/DL (ref 6.4–8.2)
RBC # BLD: 3.94 MIL/MCL (ref 4–5.2)
SODIUM SERPL-SCNC: 134 MMOL/L (ref 135–145)
WBC # BLD: 4.5 K/MCL (ref 4.2–11)

## 2023-01-25 PROCEDURE — 80053 COMPREHEN METABOLIC PANEL: CPT | Performed by: INTERNAL MEDICINE

## 2023-01-25 PROCEDURE — 10002803 HB RX 637: Performed by: INTERNAL MEDICINE

## 2023-01-25 PROCEDURE — 36415 COLL VENOUS BLD VENIPUNCTURE: CPT | Performed by: INTERNAL MEDICINE

## 2023-01-25 PROCEDURE — 10002800 HB RX 250 W HCPCS: Performed by: INTERNAL MEDICINE

## 2023-01-25 PROCEDURE — 85025 COMPLETE CBC W/AUTO DIFF WBC: CPT | Performed by: INTERNAL MEDICINE

## 2023-01-25 PROCEDURE — 10002801 HB RX 250 W/O HCPCS: Performed by: INTERNAL MEDICINE

## 2023-01-25 PROCEDURE — 83690 ASSAY OF LIPASE: CPT | Performed by: INTERNAL MEDICINE

## 2023-01-25 RX ORDER — INSULIN GLARGINE 100 [IU]/ML
8 INJECTION, SOLUTION SUBCUTANEOUS NIGHTLY
Qty: 15 ML | Refills: 12 | Status: SHIPPED | OUTPATIENT
Start: 2023-01-25

## 2023-01-25 RX ORDER — BUSPIRONE HYDROCHLORIDE 10 MG/1
10 TABLET ORAL 2 TIMES DAILY
Status: SHIPPED | COMMUNITY
Start: 2023-01-25

## 2023-01-25 RX ORDER — INSULIN GLARGINE 100 [IU]/ML
4 INJECTION, SOLUTION SUBCUTANEOUS ONCE
Status: COMPLETED | OUTPATIENT
Start: 2023-01-25 | End: 2023-01-25

## 2023-01-25 RX ORDER — NICOTINE 21 MG/24HR
1 PATCH, TRANSDERMAL 24 HOURS TRANSDERMAL DAILY
Qty: 15 PATCH | Refills: 0 | Status: SHIPPED | OUTPATIENT
Start: 2023-01-25

## 2023-01-25 RX ORDER — INSULIN LISPRO 100 [IU]/ML
INJECTION, SOLUTION INTRAVENOUS; SUBCUTANEOUS
Qty: 20 ML | Refills: 3 | Status: SHIPPED | OUTPATIENT
Start: 2023-01-25

## 2023-01-25 RX ORDER — CLOBETASOL PROPIONATE 0.5 MG/G
1 CREAM TOPICAL 2 TIMES DAILY
Qty: 30 G | Refills: 0 | Status: SHIPPED | OUTPATIENT
Start: 2023-01-25

## 2023-01-25 RX ADMIN — MONTELUKAST SODIUM 10 MG: 10 TABLET, FILM COATED ORAL at 08:54

## 2023-01-25 RX ADMIN — INSULIN GLARGINE 4 UNITS: 100 INJECTION, SOLUTION SUBCUTANEOUS at 11:47

## 2023-01-25 RX ADMIN — HYDROCODONE BITARTRATE AND ACETAMINOPHEN 1 TABLET: 5; 325 TABLET ORAL at 06:25

## 2023-01-25 RX ADMIN — INSULIN LISPRO 9 UNITS: 100 INJECTION, SOLUTION INTRAVENOUS; SUBCUTANEOUS at 08:53

## 2023-01-25 RX ADMIN — GABAPENTIN 300 MG: 300 CAPSULE ORAL at 08:53

## 2023-01-25 RX ADMIN — METOPROLOL SUCCINATE 100 MG: 100 TABLET, EXTENDED RELEASE ORAL at 08:54

## 2023-01-25 RX ADMIN — INSULIN LISPRO 3 UNITS: 100 INJECTION, SOLUTION INTRAVENOUS; SUBCUTANEOUS at 11:48

## 2023-01-25 RX ADMIN — PANTOPRAZOLE SODIUM 40 MG: 40 TABLET, DELAYED RELEASE ORAL at 08:54

## 2023-01-25 RX ADMIN — BUSPIRONE HYDROCHLORIDE 10 MG: 5 TABLET ORAL at 08:54

## 2023-01-25 RX ADMIN — LIDOCAINE 1 PATCH: 4 PATCH TOPICAL at 08:59

## 2023-01-25 RX ADMIN — DEXTROAMPHETAMINE SACCHARATE, AMPHETAMINE ASPARTATE MONOHYDRATE, DEXTROAMPHETAMINE SULFATE, AMPHETAMINE SULFATE 40 MG: 3.75; 3.75; 3.75; 3.75 CAPSULE, EXTENDED RELEASE ORAL at 09:49

## 2023-01-25 RX ADMIN — SERTRALINE HYDROCHLORIDE 200 MG: 100 TABLET, FILM COATED ORAL at 08:54

## 2023-01-25 ASSESSMENT — PAIN SCALES - GENERAL
PAINLEVEL_OUTOF10: 6
PAINLEVEL_OUTOF10: 2
PAINLEVEL_OUTOF10: 4
PAINLEVEL_OUTOF10: 0

## 2023-01-29 PROBLEM — R91.1 LUNG NODULE: Status: ACTIVE | Noted: 2023-01-29

## 2023-03-27 ENCOUNTER — LAB REQUISITION (OUTPATIENT)
Dept: OBGYN | Age: 43
End: 2023-03-27

## 2023-03-27 DIAGNOSIS — Z12.4 ENCOUNTER FOR SCREENING FOR MALIGNANT NEOPLASM OF CERVIX: ICD-10-CM

## 2023-03-27 PROCEDURE — 88142 CYTOPATH C/V THIN LAYER: CPT | Performed by: CLINICAL MEDICAL LABORATORY

## 2023-03-27 PROCEDURE — 87624 HPV HI-RISK TYP POOLED RSLT: CPT | Performed by: CLINICAL MEDICAL LABORATORY

## 2023-03-31 LAB
CASE RPRT: ABNORMAL
CLINICAL INFO: ABNORMAL
CYTOLOGY CVX/VAG STUDY: ABNORMAL
CYTOLOGY CVX/VAG STUDY: ABNORMAL
GEN CATEG CVX/VAG CYTO-IMP: ABNORMAL
HPV16+18+45 E6+E7MRNA CVX NAA+PROBE: POSITIVE
Lab: ABNORMAL
PAP EDUCATIONAL NOTE: ABNORMAL
SPECIMEN ADEQUACY: ABNORMAL

## 2023-05-05 ENCOUNTER — HOSPITAL ENCOUNTER (OUTPATIENT)
Dept: ULTRASOUND IMAGING | Age: 43
Discharge: HOME | End: 2023-05-05
Attending: OBSTETRICS & GYNECOLOGY

## 2023-05-05 DIAGNOSIS — N93.9 ABNORMAL UTERINE BLEEDING: ICD-10-CM

## 2023-05-05 PROCEDURE — 76830 TRANSVAGINAL US NON-OB: CPT

## 2023-05-25 ENCOUNTER — EXTERNAL RECORD (OUTPATIENT)
Dept: OTHER | Age: 43
End: 2023-05-25

## 2023-06-14 ENCOUNTER — LAB REQUISITION (OUTPATIENT)
Dept: OBGYN | Age: 43
End: 2023-06-14

## 2023-06-14 DIAGNOSIS — R87.810 CERVICAL HIGH RISK HUMAN PAPILLOMAVIRUS (HPV) DNA TEST POSITIVE: ICD-10-CM

## 2023-06-14 DIAGNOSIS — R87.610 ATYPICAL SQUAMOUS CELLS OF UNDETERMINED SIGNIFICANCE ON CYTOLOGIC SMEAR OF CERVIX (ASC-US): ICD-10-CM

## 2023-06-14 PROCEDURE — 88305 TISSUE EXAM BY PATHOLOGIST: CPT | Performed by: CLINICAL MEDICAL LABORATORY

## 2023-07-26 ENCOUNTER — LAB REQUISITION (OUTPATIENT)
Dept: OBGYN | Age: 43
End: 2023-07-26

## 2023-07-26 DIAGNOSIS — D06.0 CARCINOMA IN SITU OF ENDOCERVIX: ICD-10-CM

## 2023-07-26 PROCEDURE — 88307 TISSUE EXAM BY PATHOLOGIST: CPT | Performed by: CLINICAL MEDICAL LABORATORY

## 2023-08-01 ENCOUNTER — APPOINTMENT (OUTPATIENT)
Dept: CT IMAGING | Facility: HOSPITAL | Age: 43
End: 2023-08-01
Attending: EMERGENCY MEDICINE
Payer: MEDICAID

## 2023-08-01 PROBLEM — I48.91 ATRIAL FIBRILLATION WITH RAPID VENTRICULAR RESPONSE (HCC): Status: ACTIVE | Noted: 2023-08-01

## 2023-08-01 LAB — HBA1C MFR BLD: 9.1 %

## 2023-08-01 PROCEDURE — 74177 CT ABD & PELVIS W/CONTRAST: CPT | Performed by: EMERGENCY MEDICINE

## 2023-08-04 ENCOUNTER — PATIENT OUTREACH (OUTPATIENT)
Dept: CASE MANAGEMENT | Age: 43
End: 2023-08-04

## 2023-08-04 NOTE — PROGRESS NOTES
Hospital follow up, first attempt. (Dc 8/3)    No answer, left a voicemail with callback information.

## 2023-08-07 NOTE — PROGRESS NOTES
Hospital follow up, second attempt. (Dc 8/3)    Geraldo Cevallos MD  Geriatric Medicine  [place] [address]    No answer, left a voicemail with callback information.

## 2023-08-08 NOTE — PROGRESS NOTES
Hospital follow up, final attempt. (Dc 8/3)    Viri Whittaker MD  Geriatric Medicine  [place] [address]    Patient declined appointment. Confirmed with patient. Closing encounter.

## 2023-08-16 ENCOUNTER — CLINICAL ABSTRACT (OUTPATIENT)
Dept: CARE COORDINATION | Age: 43
End: 2023-08-16

## 2023-10-24 ENCOUNTER — EXTERNAL RECORD (OUTPATIENT)
Dept: OTHER | Age: 43
End: 2023-10-24

## 2023-10-31 ENCOUNTER — LAB SERVICES (OUTPATIENT)
Dept: LAB | Age: 43
End: 2023-10-31

## 2023-10-31 DIAGNOSIS — E11.65 TYPE 2 DIABETES MELLITUS WITH HYPERGLYCEMIA (CMD): Primary | ICD-10-CM

## 2023-10-31 DIAGNOSIS — Z79.899 OTHER LONG TERM (CURRENT) DRUG THERAPY: ICD-10-CM

## 2023-10-31 DIAGNOSIS — L40.0 PSORIASIS VULGARIS: Primary | ICD-10-CM

## 2023-10-31 DIAGNOSIS — L40.0 PSORIASIS VULGARIS: ICD-10-CM

## 2023-10-31 LAB
ALBUMIN SERPL-MCNC: 3.5 G/DL (ref 3.6–5.1)
ALBUMIN/GLOB SERPL: 0.9 {RATIO} (ref 1–2.4)
ALP SERPL-CCNC: 76 UNITS/L (ref 45–117)
ALT SERPL-CCNC: 18 UNITS/L
ANION GAP SERPL CALC-SCNC: 7 MMOL/L (ref 7–19)
ANNOTATION COMMENT IMP: NORMAL
AST SERPL-CCNC: 14 UNITS/L
BASOPHILS # BLD: 0 K/MCL (ref 0–0.3)
BASOPHILS NFR BLD: 1 %
BILIRUB SERPL-MCNC: 0.3 MG/DL (ref 0.2–1)
BUN SERPL-MCNC: 7 MG/DL (ref 6–20)
BUN/CREAT SERPL: 13 (ref 7–25)
CALCIUM SERPL-MCNC: 9.3 MG/DL (ref 8.4–10.2)
CHLORIDE SERPL-SCNC: 99 MMOL/L (ref 97–110)
CO2 SERPL-SCNC: 29 MMOL/L (ref 21–32)
CREAT SERPL-MCNC: 0.53 MG/DL (ref 0.51–0.95)
DEPRECATED RDW RBC: 44.9 FL (ref 39–50)
EGFRCR SERPLBLD CKD-EPI 2021: >90 ML/MIN/{1.73_M2}
EOSINOPHIL # BLD: 0 K/MCL (ref 0–0.5)
EOSINOPHIL NFR BLD: 1 %
ERYTHROCYTE [DISTWIDTH] IN BLOOD: 13 % (ref 11–15)
FASTING DURATION TIME PATIENT: ABNORMAL H
GLOBULIN SER-MCNC: 3.7 G/DL (ref 2–4)
GLUCOSE SERPL-MCNC: 229 MG/DL (ref 70–99)
HBA1C MFR BLD: 9.8 % (ref 4.5–5.6)
HBV CORE IGG+IGM SER QL: NEGATIVE
HBV SURFACE AB SER QL: POSITIVE
HBV SURFACE AG SER QL: NEGATIVE
HCT VFR BLD CALC: 41 % (ref 36–46.5)
HCV AB SER QL: NEGATIVE
HGB BLD-MCNC: 13.7 G/DL (ref 12–15.5)
IMM GRANULOCYTES # BLD AUTO: 0 K/MCL (ref 0–0.2)
IMM GRANULOCYTES # BLD: 0 %
LYMPHOCYTES # BLD: 1.4 K/MCL (ref 1–4.8)
LYMPHOCYTES NFR BLD: 23 %
MCH RBC QN AUTO: 31.5 PG (ref 26–34)
MCHC RBC AUTO-ENTMCNC: 33.4 G/DL (ref 32–36.5)
MCV RBC AUTO: 94.3 FL (ref 78–100)
MONOCYTES # BLD: 0.6 K/MCL (ref 0.3–0.9)
MONOCYTES NFR BLD: 9 %
NEUTROPHILS # BLD: 4.2 K/MCL (ref 1.8–7.7)
NEUTROPHILS NFR BLD: 66 %
NRBC BLD MANUAL-RTO: 0 /100 WBC
PLATELET # BLD AUTO: 157 K/MCL (ref 140–450)
POTASSIUM SERPL-SCNC: 4 MMOL/L (ref 3.4–5.1)
PROT SERPL-MCNC: 7.2 G/DL (ref 6.4–8.2)
RBC # BLD: 4.35 MIL/MCL (ref 4–5.2)
SODIUM SERPL-SCNC: 131 MMOL/L (ref 135–145)
WBC # BLD: 6.3 K/MCL (ref 4.2–11)

## 2023-10-31 PROCEDURE — 85025 COMPLETE CBC W/AUTO DIFF WBC: CPT

## 2023-10-31 PROCEDURE — 86480 TB TEST CELL IMMUN MEASURE: CPT

## 2023-10-31 PROCEDURE — 80053 COMPREHEN METABOLIC PANEL: CPT

## 2023-10-31 PROCEDURE — 83036 HEMOGLOBIN GLYCOSYLATED A1C: CPT

## 2023-10-31 PROCEDURE — 36415 COLL VENOUS BLD VENIPUNCTURE: CPT

## 2023-10-31 PROCEDURE — 82570 ASSAY OF URINE CREATININE: CPT

## 2023-10-31 PROCEDURE — 86803 HEPATITIS C AB TEST: CPT

## 2023-11-01 LAB
CREAT UR-MCNC: 47.9 MG/DL
MICROALBUMIN UR-MCNC: <0.5 MG/DL
MICROALBUMIN/CREAT UR: NORMAL MG/G{CREAT}

## 2023-11-02 LAB
GAMMA INTERFERON BACKGROUND BLD IA-ACNC: 0.05 IU/ML
M TB IFN-G BLD-IMP: NEGATIVE
M TB IFN-G CD4+ BCKGRND COR BLD-ACNC: 0 IU/ML
M TB IFN-G CD4+CD8+ BCKGRND COR BLD-ACNC: 0 IU/ML
MITOGEN IGNF BCKGRD COR BLD-ACNC: 7.87 IU/ML

## 2024-01-09 ENCOUNTER — EXTERNAL RECORD (OUTPATIENT)
Dept: OTHER | Age: 44
End: 2024-01-09

## 2024-01-30 ENCOUNTER — LAB REQUISITION (OUTPATIENT)
Dept: OBGYN | Age: 44
End: 2024-01-30

## 2024-01-30 DIAGNOSIS — Z12.4 ENCOUNTER FOR SCREENING FOR MALIGNANT NEOPLASM OF CERVIX: ICD-10-CM

## 2024-01-30 DIAGNOSIS — R87.810 CERVICAL HIGH RISK HUMAN PAPILLOMAVIRUS (HPV) DNA TEST POSITIVE: ICD-10-CM

## 2024-01-30 DIAGNOSIS — R87.610 ATYPICAL SQUAMOUS CELLS OF UNDETERMINED SIGNIFICANCE ON CYTOLOGIC SMEAR OF CERVIX (ASC-US): ICD-10-CM

## 2024-01-30 PROCEDURE — 88305 TISSUE EXAM BY PATHOLOGIST: CPT | Performed by: CLINICAL MEDICAL LABORATORY

## 2024-01-30 PROCEDURE — 87624 HPV HI-RISK TYP POOLED RSLT: CPT | Performed by: CLINICAL MEDICAL LABORATORY

## 2024-01-30 PROCEDURE — 88175 CYTOPATH C/V AUTO FLUID REDO: CPT | Performed by: CLINICAL MEDICAL LABORATORY

## 2024-02-05 LAB
CASE RPRT: NORMAL
CLINICAL INFO: NORMAL
CYTOLOGY CVX/VAG STUDY: NORMAL
HPV16+18+45 E6+E7MRNA CVX NAA+PROBE: NEGATIVE
Lab: NORMAL
PAP EDUCATIONAL NOTE: NORMAL
SPECIMEN ADEQUACY: NORMAL

## 2024-11-11 ENCOUNTER — HOSPITAL ENCOUNTER (INPATIENT)
Facility: HOSPITAL | Age: 44
LOS: 2 days | Discharge: HOME | End: 2024-11-13
Attending: EMERGENCY MEDICINE | Admitting: INTERNAL MEDICINE
Payer: MEDICAID

## 2024-11-11 ENCOUNTER — APPOINTMENT (OUTPATIENT)
Dept: GENERAL RADIOLOGY | Facility: HOSPITAL | Age: 44
End: 2024-11-11
Attending: EMERGENCY MEDICINE
Payer: MEDICAID

## 2024-11-11 ENCOUNTER — APPOINTMENT (OUTPATIENT)
Dept: CT IMAGING | Facility: HOSPITAL | Age: 44
End: 2024-11-11
Attending: EMERGENCY MEDICINE
Payer: MEDICAID

## 2024-11-11 DIAGNOSIS — F10.10 ALCOHOL ABUSE: ICD-10-CM

## 2024-11-11 DIAGNOSIS — D69.6 THROMBOCYTOPENIA (HCC): ICD-10-CM

## 2024-11-11 DIAGNOSIS — E10.10 TYPE 1 DIABETES MELLITUS WITH KETOACIDOSIS WITHOUT COMA (HCC): Primary | ICD-10-CM

## 2024-11-11 DIAGNOSIS — F10.929 ALCOHOLIC INTOXICATION WITH COMPLICATION (HCC): ICD-10-CM

## 2024-11-11 DIAGNOSIS — K85.20 ALCOHOL-INDUCED ACUTE PANCREATITIS WITHOUT INFECTION OR NECROSIS (HCC): ICD-10-CM

## 2024-11-11 DIAGNOSIS — K86.3 PSEUDOCYST OF PANCREAS (HCC): ICD-10-CM

## 2024-11-11 DIAGNOSIS — K85.20 ALCOHOL-INDUCED ACUTE PANCREATITIS, UNSPECIFIED COMPLICATION STATUS (HCC): ICD-10-CM

## 2024-11-11 DIAGNOSIS — K85.90 ACUTE PANCREATITIS, UNSPECIFIED COMPLICATION STATUS, UNSPECIFIED PANCREATITIS TYPE (HCC): ICD-10-CM

## 2024-11-11 LAB
ALBUMIN SERPL-MCNC: 5 G/DL (ref 3.2–4.8)
ALBUMIN/GLOB SERPL: 1.4 {RATIO} (ref 1–2)
ALP LIVER SERPL-CCNC: 111 U/L
ALT SERPL-CCNC: 14 U/L
ANION GAP SERPL CALC-SCNC: 13 MMOL/L (ref 0–18)
ANION GAP SERPL CALC-SCNC: 24 MMOL/L (ref 0–18)
ANION GAP SERPL CALC-SCNC: 8 MMOL/L (ref 0–18)
AST SERPL-CCNC: 30 U/L (ref ?–34)
ATRIAL RATE: 108 BPM
BASE EXCESS BLDV CALC-SCNC: -12.2 MMOL/L
BASOPHILS # BLD AUTO: 0.05 X10(3) UL (ref 0–0.2)
BASOPHILS NFR BLD AUTO: 0.4 %
BILIRUB SERPL-MCNC: 0.5 MG/DL (ref 0.3–1.2)
BUN BLD-MCNC: 6 MG/DL (ref 9–23)
BUN BLD-MCNC: 6 MG/DL (ref 9–23)
BUN BLD-MCNC: 8 MG/DL (ref 9–23)
CALCIUM BLD-MCNC: 8.5 MG/DL (ref 8.7–10.4)
CALCIUM BLD-MCNC: 8.9 MG/DL (ref 8.7–10.4)
CALCIUM BLD-MCNC: 9.4 MG/DL (ref 8.7–10.4)
CHLORIDE SERPL-SCNC: 90 MMOL/L (ref 98–112)
CHLORIDE SERPL-SCNC: 96 MMOL/L (ref 98–112)
CHLORIDE SERPL-SCNC: 96 MMOL/L (ref 98–112)
CO2 SERPL-SCNC: 15 MMOL/L (ref 21–32)
CO2 SERPL-SCNC: 19 MMOL/L (ref 21–32)
CO2 SERPL-SCNC: 25 MMOL/L (ref 21–32)
CREAT BLD-MCNC: 0.78 MG/DL
CREAT BLD-MCNC: 0.93 MG/DL
CREAT BLD-MCNC: 1.23 MG/DL
EGFRCR SERPLBLD CKD-EPI 2021: 56 ML/MIN/1.73M2 (ref 60–?)
EGFRCR SERPLBLD CKD-EPI 2021: 79 ML/MIN/1.73M2 (ref 60–?)
EGFRCR SERPLBLD CKD-EPI 2021: 97 ML/MIN/1.73M2 (ref 60–?)
EOSINOPHIL # BLD AUTO: 0.02 X10(3) UL (ref 0–0.7)
EOSINOPHIL NFR BLD AUTO: 0.1 %
ERYTHROCYTE [DISTWIDTH] IN BLOOD BY AUTOMATED COUNT: 14.6 %
EST. AVERAGE GLUCOSE BLD GHB EST-MCNC: 246 MG/DL (ref 68–126)
ETHANOL SERPL-MCNC: 37 MG/DL (ref ?–3)
GLOBULIN PLAS-MCNC: 3.5 G/DL (ref 2–3.5)
GLUCOSE BLD-MCNC: 130 MG/DL (ref 70–99)
GLUCOSE BLD-MCNC: 134 MG/DL (ref 70–99)
GLUCOSE BLD-MCNC: 148 MG/DL (ref 70–99)
GLUCOSE BLD-MCNC: 149 MG/DL (ref 70–99)
GLUCOSE BLD-MCNC: 150 MG/DL (ref 70–99)
GLUCOSE BLD-MCNC: 151 MG/DL (ref 70–99)
GLUCOSE BLD-MCNC: 187 MG/DL (ref 70–99)
GLUCOSE BLD-MCNC: 203 MG/DL (ref 70–99)
GLUCOSE BLD-MCNC: 244 MG/DL (ref 70–99)
GLUCOSE BLD-MCNC: 249 MG/DL (ref 70–99)
GLUCOSE BLD-MCNC: 250 MG/DL (ref 70–99)
GLUCOSE BLD-MCNC: 263 MG/DL (ref 70–99)
GLUCOSE BLD-MCNC: 320 MG/DL (ref 70–99)
GLUCOSE BLD-MCNC: 330 MG/DL (ref 70–99)
HBA1C MFR BLD: 10.2 % (ref ?–5.7)
HCO3 BLDV-SCNC: 13.7 MEQ/L (ref 22–26)
HCT VFR BLD AUTO: 51.6 %
HGB BLD-MCNC: 17.9 G/DL
IMM GRANULOCYTES # BLD AUTO: 0.11 X10(3) UL (ref 0–1)
IMM GRANULOCYTES NFR BLD: 0.8 %
LACTATE BLD-SCNC: 6.8 MMOL/L (ref 0.5–2)
LIPASE SERPL-CCNC: 35 U/L (ref 12–53)
LYMPHOCYTES # BLD AUTO: 0.97 X10(3) UL (ref 1–4)
LYMPHOCYTES NFR BLD AUTO: 7 %
MAGNESIUM SERPL-MCNC: 1 MG/DL (ref 1.6–2.6)
MAGNESIUM SERPL-MCNC: 3.2 MG/DL (ref 1.6–2.6)
MCH RBC QN AUTO: 31.6 PG (ref 26–34)
MCHC RBC AUTO-ENTMCNC: 34.7 G/DL (ref 31–37)
MCV RBC AUTO: 91.2 FL
MONOCYTES # BLD AUTO: 0.99 X10(3) UL (ref 0.1–1)
MONOCYTES NFR BLD AUTO: 7.1 %
MRSA DNA SPEC QL NAA+PROBE: NEGATIVE
NEUTROPHILS # BLD AUTO: 11.72 X10 (3) UL (ref 1.5–7.7)
NEUTROPHILS # BLD AUTO: 11.72 X10(3) UL (ref 1.5–7.7)
NEUTROPHILS NFR BLD AUTO: 84.6 %
OSMOLALITY SERPL CALC.SUM OF ELEC: 268 MOSM/KG (ref 275–295)
OSMOLALITY SERPL CALC.SUM OF ELEC: 272 MOSM/KG (ref 275–295)
OSMOLALITY SERPL CALC.SUM OF ELEC: 279 MOSM/KG (ref 275–295)
OXYHGB MFR BLDV: 35.4 % (ref 72–78)
P AXIS: 69 DEGREES
P-R INTERVAL: 132 MS
PCO2 BLDV: 30 MM HG (ref 38–50)
PH BLDV: 7.26 [PH] (ref 7.33–7.43)
PHOSPHATE SERPL-MCNC: 1.2 MG/DL (ref 2.4–5.1)
PHOSPHATE SERPL-MCNC: 1.9 MG/DL (ref 2.4–5.1)
PLATELET # BLD AUTO: 322 10(3)UL (ref 150–450)
PO2 BLDV: 29 MM HG (ref 30–50)
POTASSIUM SERPL-SCNC: 3.8 MMOL/L (ref 3.5–5.1)
POTASSIUM SERPL-SCNC: 3.8 MMOL/L (ref 3.5–5.1)
POTASSIUM SERPL-SCNC: 3.9 MMOL/L (ref 3.5–5.1)
PROCALCITONIN SERPL-MCNC: 0.12 NG/ML (ref ?–0.05)
PROT SERPL-MCNC: 8.5 G/DL (ref 5.7–8.2)
Q-T INTERVAL: 406 MS
QRS DURATION: 66 MS
QTC CALCULATION (BEZET): 544 MS
R AXIS: 81 DEGREES
RBC # BLD AUTO: 5.66 X10(6)UL
SODIUM SERPL-SCNC: 128 MMOL/L (ref 136–145)
SODIUM SERPL-SCNC: 129 MMOL/L (ref 136–145)
SODIUM SERPL-SCNC: 129 MMOL/L (ref 136–145)
T AXIS: 58 DEGREES
VENTRICULAR RATE: 108 BPM
WBC # BLD AUTO: 13.9 X10(3) UL (ref 4–11)

## 2024-11-11 PROCEDURE — 71045 X-RAY EXAM CHEST 1 VIEW: CPT | Performed by: EMERGENCY MEDICINE

## 2024-11-11 PROCEDURE — 99291 CRITICAL CARE FIRST HOUR: CPT | Performed by: HOSPITALIST

## 2024-11-11 PROCEDURE — 99223 1ST HOSP IP/OBS HIGH 75: CPT | Performed by: INTERNAL MEDICINE

## 2024-11-11 PROCEDURE — 74177 CT ABD & PELVIS W/CONTRAST: CPT | Performed by: EMERGENCY MEDICINE

## 2024-11-11 PROCEDURE — HZ2ZZZZ DETOXIFICATION SERVICES FOR SUBSTANCE ABUSE TREATMENT: ICD-10-PCS | Performed by: INTERNAL MEDICINE

## 2024-11-11 RX ORDER — CHLORDIAZEPOXIDE HYDROCHLORIDE 25 MG/1
25 CAPSULE, GELATIN COATED ORAL EVERY 12 HOURS
Status: DISCONTINUED | OUTPATIENT
Start: 2024-11-13 | End: 2024-11-12 | Stop reason: SDUPTHER

## 2024-11-11 RX ORDER — INSULIN DEGLUDEC 100 U/ML
26 INJECTION, SOLUTION SUBCUTANEOUS NIGHTLY
Status: DISCONTINUED | OUTPATIENT
Start: 2024-11-11 | End: 2024-11-12

## 2024-11-11 RX ORDER — POLYETHYLENE GLYCOL 3350 17 G/17G
17 POWDER, FOR SOLUTION ORAL DAILY PRN
Status: DISCONTINUED | OUTPATIENT
Start: 2024-11-11 | End: 2024-11-13

## 2024-11-11 RX ORDER — HALOPERIDOL 5 MG/1
5 TABLET ORAL NIGHTLY
Status: DISCONTINUED | OUTPATIENT
Start: 2024-11-12 | End: 2024-11-13

## 2024-11-11 RX ORDER — DEXTROSE MONOHYDRATE, SODIUM CHLORIDE, AND POTASSIUM CHLORIDE 50; 1.49; 4.5 G/1000ML; G/1000ML; G/1000ML
INJECTION, SOLUTION INTRAVENOUS CONTINUOUS PRN
Status: DISCONTINUED | OUTPATIENT
Start: 2024-11-11 | End: 2024-11-12

## 2024-11-11 RX ORDER — NICOTINE POLACRILEX 4 MG
30 LOZENGE BUCCAL
Status: DISCONTINUED | OUTPATIENT
Start: 2024-11-11 | End: 2024-11-13

## 2024-11-11 RX ORDER — BISACODYL 10 MG
10 SUPPOSITORY, RECTAL RECTAL
Status: DISCONTINUED | OUTPATIENT
Start: 2024-11-11 | End: 2024-11-13

## 2024-11-11 RX ORDER — INSULIN DEGLUDEC 100 U/ML
12 INJECTION, SOLUTION SUBCUTANEOUS DAILY
Status: DISCONTINUED | OUTPATIENT
Start: 2024-11-11 | End: 2024-11-11

## 2024-11-11 RX ORDER — LORAZEPAM 2 MG/ML
1 INJECTION INTRAMUSCULAR
Status: DISCONTINUED | OUTPATIENT
Start: 2024-11-11 | End: 2024-11-13

## 2024-11-11 RX ORDER — ONDANSETRON 2 MG/ML
4 INJECTION INTRAMUSCULAR; INTRAVENOUS ONCE
Status: COMPLETED | OUTPATIENT
Start: 2024-11-11 | End: 2024-11-11

## 2024-11-11 RX ORDER — LORAZEPAM 2 MG/ML
3 INJECTION INTRAMUSCULAR
Status: DISCONTINUED | OUTPATIENT
Start: 2024-11-11 | End: 2024-11-13

## 2024-11-11 RX ORDER — FOLIC ACID 1 MG/1
1 TABLET ORAL DAILY
Status: DISCONTINUED | OUTPATIENT
Start: 2024-11-11 | End: 2024-11-13

## 2024-11-11 RX ORDER — LORAZEPAM 2 MG/ML
2 INJECTION INTRAMUSCULAR
Status: DISCONTINUED | OUTPATIENT
Start: 2024-11-11 | End: 2024-11-13

## 2024-11-11 RX ORDER — LORAZEPAM 1 MG/1
3 TABLET ORAL
Status: DISCONTINUED | OUTPATIENT
Start: 2024-11-11 | End: 2024-11-13

## 2024-11-11 RX ORDER — CHLORDIAZEPOXIDE HYDROCHLORIDE 25 MG/1
25 CAPSULE, GELATIN COATED ORAL EVERY 6 HOURS
Status: DISCONTINUED | OUTPATIENT
Start: 2024-11-11 | End: 2024-11-12 | Stop reason: SDUPTHER

## 2024-11-11 RX ORDER — PROCHLORPERAZINE EDISYLATE 5 MG/ML
5 INJECTION INTRAMUSCULAR; INTRAVENOUS EVERY 8 HOURS PRN
Status: DISCONTINUED | OUTPATIENT
Start: 2024-11-11 | End: 2024-11-13

## 2024-11-11 RX ORDER — LORAZEPAM 1 MG/1
2 TABLET ORAL
Status: DISCONTINUED | OUTPATIENT
Start: 2024-11-11 | End: 2024-11-13

## 2024-11-11 RX ORDER — LORAZEPAM 1 MG/1
1 TABLET ORAL
Status: DISCONTINUED | OUTPATIENT
Start: 2024-11-11 | End: 2024-11-13

## 2024-11-11 RX ORDER — DOXEPIN HYDROCHLORIDE 50 MG/1
1 CAPSULE ORAL DAILY
Status: DISCONTINUED | OUTPATIENT
Start: 2024-11-11 | End: 2024-11-13

## 2024-11-11 RX ORDER — HYDROMORPHONE HYDROCHLORIDE 1 MG/ML
0.5 INJECTION, SOLUTION INTRAMUSCULAR; INTRAVENOUS; SUBCUTANEOUS EVERY 30 MIN PRN
Status: COMPLETED | OUTPATIENT
Start: 2024-11-11 | End: 2024-11-11

## 2024-11-11 RX ORDER — HYDROMORPHONE HYDROCHLORIDE 1 MG/ML
0.4 INJECTION, SOLUTION INTRAMUSCULAR; INTRAVENOUS; SUBCUTANEOUS EVERY 2 HOUR PRN
Status: DISCONTINUED | OUTPATIENT
Start: 2024-11-11 | End: 2024-11-13

## 2024-11-11 RX ORDER — LORAZEPAM 2 MG/ML
INJECTION INTRAMUSCULAR
Status: COMPLETED
Start: 2024-11-11 | End: 2024-11-11

## 2024-11-11 RX ORDER — DEXTROSE MONOHYDRATE AND SODIUM CHLORIDE 5; .45 G/100ML; G/100ML
100 INJECTION, SOLUTION INTRAVENOUS CONTINUOUS PRN
Status: DISCONTINUED | OUTPATIENT
Start: 2024-11-11 | End: 2024-11-11

## 2024-11-11 RX ORDER — CHLORDIAZEPOXIDE HYDROCHLORIDE 25 MG/1
25 CAPSULE, GELATIN COATED ORAL EVERY 24 HOURS
Status: DISCONTINUED | OUTPATIENT
Start: 2024-11-14 | End: 2024-11-12 | Stop reason: SDUPTHER

## 2024-11-11 RX ORDER — GABAPENTIN 300 MG/1
300 CAPSULE ORAL 2 TIMES DAILY
Status: DISCONTINUED | OUTPATIENT
Start: 2024-11-11 | End: 2024-11-13

## 2024-11-11 RX ORDER — DEXTROSE MONOHYDRATE 25 G/50ML
50 INJECTION, SOLUTION INTRAVENOUS
Status: DISCONTINUED | OUTPATIENT
Start: 2024-11-11 | End: 2024-11-13

## 2024-11-11 RX ORDER — NICOTINE POLACRILEX 4 MG
15 LOZENGE BUCCAL
Status: DISCONTINUED | OUTPATIENT
Start: 2024-11-11 | End: 2024-11-13

## 2024-11-11 RX ORDER — SENNOSIDES 8.6 MG
17.2 TABLET ORAL NIGHTLY PRN
Status: DISCONTINUED | OUTPATIENT
Start: 2024-11-11 | End: 2024-11-13

## 2024-11-11 RX ORDER — MONTELUKAST SODIUM 10 MG/1
10 TABLET ORAL NIGHTLY
Status: DISCONTINUED | OUTPATIENT
Start: 2024-11-11 | End: 2024-11-13

## 2024-11-11 RX ORDER — SODIUM PHOSPHATE, DIBASIC AND SODIUM PHOSPHATE, MONOBASIC 7; 19 G/230ML; G/230ML
1 ENEMA RECTAL ONCE AS NEEDED
Status: DISCONTINUED | OUTPATIENT
Start: 2024-11-11 | End: 2024-11-13

## 2024-11-11 RX ORDER — SODIUM CHLORIDE 9 MG/ML
INJECTION, SOLUTION INTRAVENOUS CONTINUOUS
Status: ACTIVE | OUTPATIENT
Start: 2024-11-11 | End: 2024-11-11

## 2024-11-11 RX ORDER — ONDANSETRON 2 MG/ML
4 INJECTION INTRAMUSCULAR; INTRAVENOUS EVERY 6 HOURS PRN
Status: DISCONTINUED | OUTPATIENT
Start: 2024-11-11 | End: 2024-11-13

## 2024-11-11 RX ORDER — HYDROMORPHONE HYDROCHLORIDE 1 MG/ML
0.1 INJECTION, SOLUTION INTRAMUSCULAR; INTRAVENOUS; SUBCUTANEOUS EVERY 2 HOUR PRN
Status: DISCONTINUED | OUTPATIENT
Start: 2024-11-11 | End: 2024-11-13

## 2024-11-11 RX ORDER — ENOXAPARIN SODIUM 100 MG/ML
40 INJECTION SUBCUTANEOUS DAILY
Status: DISCONTINUED | OUTPATIENT
Start: 2024-11-11 | End: 2024-11-13

## 2024-11-11 RX ORDER — DEXTROSE MONOHYDRATE AND SODIUM CHLORIDE 5; .45 G/100ML; G/100ML
INJECTION, SOLUTION INTRAVENOUS ONCE
Status: COMPLETED | OUTPATIENT
Start: 2024-11-11 | End: 2024-11-11

## 2024-11-11 RX ORDER — ACETAMINOPHEN 500 MG
1000 TABLET ORAL EVERY 4 HOURS PRN
Status: DISCONTINUED | OUTPATIENT
Start: 2024-11-11 | End: 2024-11-13

## 2024-11-11 RX ORDER — ECHINACEA PURPUREA EXTRACT 125 MG
1 TABLET ORAL
Status: DISCONTINUED | OUTPATIENT
Start: 2024-11-11 | End: 2024-11-13

## 2024-11-11 RX ORDER — SODIUM CHLORIDE 9 MG/ML
125 INJECTION, SOLUTION INTRAVENOUS CONTINUOUS
Status: DISCONTINUED | OUTPATIENT
Start: 2024-11-11 | End: 2024-11-11

## 2024-11-11 RX ORDER — LORAZEPAM 2 MG/ML
4 INJECTION INTRAMUSCULAR EVERY 30 MIN PRN
Status: DISCONTINUED | OUTPATIENT
Start: 2024-11-11 | End: 2024-11-13

## 2024-11-11 RX ORDER — BUSPIRONE HYDROCHLORIDE 5 MG/1
20 TABLET ORAL 2 TIMES DAILY
Status: DISCONTINUED | OUTPATIENT
Start: 2024-11-11 | End: 2024-11-13

## 2024-11-11 RX ORDER — PANTOPRAZOLE SODIUM 40 MG/1
40 TABLET, DELAYED RELEASE ORAL
Status: DISCONTINUED | OUTPATIENT
Start: 2024-11-12 | End: 2024-11-13

## 2024-11-11 RX ORDER — SODIUM CHLORIDE 9 MG/ML
INJECTION, SOLUTION INTRAVENOUS CONTINUOUS
Status: DISCONTINUED | OUTPATIENT
Start: 2024-11-11 | End: 2024-11-12

## 2024-11-11 RX ORDER — ONDANSETRON 2 MG/ML
4 INJECTION INTRAMUSCULAR; INTRAVENOUS EVERY 4 HOURS PRN
Status: ACTIVE | OUTPATIENT
Start: 2024-11-11 | End: 2024-11-11

## 2024-11-11 RX ORDER — TRAZODONE HYDROCHLORIDE 100 MG/1
200 TABLET ORAL NIGHTLY
Status: DISCONTINUED | OUTPATIENT
Start: 2024-11-11 | End: 2024-11-13

## 2024-11-11 RX ORDER — HYDROMORPHONE HYDROCHLORIDE 1 MG/ML
0.2 INJECTION, SOLUTION INTRAMUSCULAR; INTRAVENOUS; SUBCUTANEOUS EVERY 2 HOUR PRN
Status: DISCONTINUED | OUTPATIENT
Start: 2024-11-11 | End: 2024-11-13

## 2024-11-11 RX ORDER — METOPROLOL SUCCINATE 50 MG/1
100 TABLET, EXTENDED RELEASE ORAL 2 TIMES DAILY
Status: DISCONTINUED | OUTPATIENT
Start: 2024-11-11 | End: 2024-11-13

## 2024-11-11 RX ORDER — THIAMINE HYDROCHLORIDE 100 MG/ML
100 INJECTION, SOLUTION INTRAMUSCULAR; INTRAVENOUS ONCE
Status: COMPLETED | OUTPATIENT
Start: 2024-11-11 | End: 2024-11-11

## 2024-11-11 RX ORDER — ALPRAZOLAM 0.25 MG/1
0.25 TABLET ORAL 3 TIMES DAILY PRN
Status: CANCELLED | OUTPATIENT
Start: 2024-11-11

## 2024-11-11 RX ORDER — MELATONIN
100 DAILY
Status: DISCONTINUED | OUTPATIENT
Start: 2024-11-12 | End: 2024-11-13

## 2024-11-11 RX ORDER — LORAZEPAM 2 MG/ML
5 INJECTION INTRAMUSCULAR
Status: DISCONTINUED | OUTPATIENT
Start: 2024-11-11 | End: 2024-11-13

## 2024-11-11 RX ORDER — CHLORDIAZEPOXIDE HYDROCHLORIDE 25 MG/1
25 CAPSULE, GELATIN COATED ORAL EVERY 8 HOURS
Status: DISCONTINUED | OUTPATIENT
Start: 2024-11-12 | End: 2024-11-12 | Stop reason: SDUPTHER

## 2024-11-11 RX ORDER — HALOPERIDOL 5 MG/1
5 TABLET ORAL NIGHTLY
Status: CANCELLED | OUTPATIENT
Start: 2024-11-11

## 2024-11-11 RX ORDER — LORAZEPAM 2 MG/ML
6 INJECTION INTRAMUSCULAR EVERY 10 MIN PRN
Status: DISCONTINUED | OUTPATIENT
Start: 2024-11-11 | End: 2024-11-13

## 2024-11-11 NOTE — PLAN OF CARE
Received pt from ED for DKA/pancreatitis/ETOH withdrawal. 2L NC. Sinus tachycardic at times. Pt stated recently had loose stools, r/o cdif per protocol. DTV. Complaints of severe pain to abdomen, Dilaudid given, see MAR. Ativan given per [facility] protocol. See [facility] flowsheet. Alert and oriented, calm and cooperative. Q1 accuchecks, insulin gtt infusing.

## 2024-11-11 NOTE — H&P
[facility]     Blanca Coats Patient Status:  Emergency    1980 MRN [id number]   Location [facility] EMERGENCY DEPARTMENT Attending Ezra Ritchei MD   Hosp Day # 0 PCP None Pcp     Chief Complaint:   Chief Complaint   Patient presents with    Hyperglycemia    Eval-D       Subjective:    History of Present Illness:     Blanca Coats is a 44 year old female with a past medical history of etoh abuse, pancreatitis, ani=xiety, DM1.  She comes to the ED now due to dyspnea, abdominal pain and nausea with nonbilious, nonbloody vomiting.  She states that she has been drinking more  alcohol.  She has had alcohol withdrawal in the past and has had increasing anxiety and tremors.  In the emergency room she was noted to be in DKA and was started on insulin drip.  She states that her Omnipod ran out of insulin.    History/Other:    Past Medical History:  Past Medical History:    Anxiety    Arrhythmia    SVT    Breast CA (HCC)    Depression    High blood pressure    OTHER DISEASES    seasonal allergies    Pancreatitis (HCC)    Pneumonia due to organism    SVT (supraventricular tachycardia) (HCC)     Past Surgical History:   Past Surgical History:   Procedure Laterality Date    Breast surgery      Implant left      Implant right      Mastectomy left      Mastectomy right      Ndsc ablation & rcnstj atria lmtd w/o bypass        Family History:   Family History   Problem Relation Age of Onset    Heart Disorder Father     Diabetes Father     Hypertension Mother     Cancer Mother         breast     Social History:    reports that she has been smoking cigarettes. She has never used smokeless tobacco. She reports current alcohol use of about 7.0 standard drinks of alcohol per week. She reports current drug use. Frequency: 7.00 times per week. Drug: Cannabis.     Allergies: Allergies[1]    Medications:  Medications Ordered Prior to Encounter[2]    Review of Systems:   A comprehensive review of  systems was completed.    Pertinent positives and negatives noted in the HPI.    Objective:   Physical Exam:    BP (!) 133/97   Pulse 100   Temp 98.1 °F (36.7 °C) (Temporal)   Resp (!) 27   Ht 5' 6\" (1.676 m)   Wt 165 lb (74.8 kg)   SpO2 97%   BMI 26.63 kg/m²   General: No acute distress, Alert  Respiratory: No rhonchi, no wheezes  Cardiovascular: S1, S2.  tachy  Abdomen: Soft, Non-tender, Non-distended, Positive bowel sounds  Neuro: No new focal deficits, +tremors  Extremities: No edema      Results:    Labs:      Labs Last 24 Hours:  Recent Labs   Lab 11/11/24  1210   WBC 13.9*   HGB 17.9*   MCV 91.2   .0       Recent Labs   Lab 11/11/24  1210   *   BUN 8*   CREATSERUM 1.23*   CA 9.4   ALB 5.0*   *   K 3.9   CL 90*   CO2 15.0*   ALKPHO 111*   AST 30   ALT 14   BILT 0.5   TP 8.5*       Estimated Creatinine Clearance: 55.8 mL/min (A) (based on SCr of 1.23 mg/dL (H)).    No results for input(s): \"TROP\", \"TROPHS\", \"CK\" in the last 168 hours.    No results for input(s): \"PTP\", \"INR\" in the last 168 hours.    No results for input(s): \"TROP\", \"CK\" in the last 168 hours.      Imaging: Imaging data reviewed in Epic.    Assessment & Plan:      #DKA  Insulin drip, transition to subcutaneous when able  DKA protocol  IVF    #Etoh dependance and w/d  CIWA    #Chronic pancreatitis with pancreatic pseudocyst    #Chronic splenic andrés occlusion    #Etoh hepatic steatosis    #Anxiety/depression/ADHD  Hold stimulants, benzos, trazodone while on CIWA      All diagnosis' and recommendations discussed with patient and/or family in detail.      Plan of care discussed with ED physician      Nj Ashley MD    A total of  38 minutes of critical care time (exclusive of billable procedures) was administered. This involved direct patient intervention, complex decision making, and/or extensive discussions (>50% face to face time) with the patient, family, and clinical staff.      Supplementary Documentation:      The 21st Century Cures Act makes medical notes like these available to patients in the interest of transparency. Please be advised this is a medical document. Medical documents are intended to carry relevant information, facts as evident, and the clinical opinion of the practitioner. The medical note is intended as peer to peer communication and may appear blunt or direct. It is written in medical language and may contain abbreviations or verbiage that are unfamiliar.                                         [1]   Allergies  Allergen Reactions    Bee Venom ANAPHYLAXIS    Morphine HIVES    Fentanyl RASH   [2]   No current facility-administered medications on file prior to encounter.     Current Outpatient Medications on File Prior to Encounter   Medication Sig Dispense Refill    HUMALOG KWIKPEN 100 UNIT/ML Subcutaneous Solution Pen-injector Inject 25 units per sliding scale four times a day by subcutaneous route      Glucose Blood (ONETOUCH VERIO) In Vitro Strip Check blood sugar two times day, before breakfast and before dinner.  (may substitute any brand covered by insurance) 50 strip 0    ondansetron 4 MG Oral Tablet Dispersible Take 1 tablet (4 mg total) by mouth every 8 (eight) hours as needed for Nausea. 20 tablet 0    metoprolol succinate 100 MG Oral Tablet 24 Hr Take 1 tablet (100 mg total) by mouth 2 (two) times daily. Take half tablet if blood pressure less than 130/80  0    haloperidol 5 MG Oral Tab Take 1 tablet (5 mg total) by mouth at bedtime.      Amphetamine-Dextroamphet ER 10 MG Oral Capsule SR 24 Hr Take 1 capsule (10 mg total) by mouth every morning.      pantoprazole 40 MG Oral Tab EC Take 1 tablet (40 mg total) by mouth every morning before breakfast.      Amphetamine-Dextroamphet ER 30 MG Oral Capsule SR 24 Hr Take 1 capsule (30 mg total) by mouth every morning.      ALPRAZolam 0.25 MG Oral Tab Take 1 tablet (0.25 mg total) by mouth 3 (three) times daily as needed.      gabapentin 300 MG Oral  Cap Take 1 capsule (300 mg total) by mouth in the morning and 1 capsule (300 mg total) before bedtime.      Montelukast Sodium 10 MG Oral Tab Take 1 tablet (10 mg total) by mouth daily.      Sertraline HCl 100 MG Oral Tab Take 2 tablets (200 mg total) by mouth daily.      busPIRone 10 MG Oral Tab Take 2 tablets (20 mg total) by mouth 2 (two) times daily.      traZODone HCl 100 MG Oral Tab Take 2 tablets (200 mg total) by mouth nightly.      HYDROcodone-acetaminophen 5-325 MG Oral Tab Take 1 tablet by mouth every 4 (four) hours as needed. (Patient not taking: Reported on 11/11/2024) 15 tablet 0    sennosides 17.2 MG Oral Tab Take 1 tablet (17.2 mg total) by mouth nightly as needed (constipation, as needed if no bowel movement that day). (Patient not taking: Reported on 11/11/2024) 10 tablet 0    LANTUS SOLOSTAR 100 UNIT/ML Subcutaneous Solution Pen-injector Inject 12 units in the morning & inject 14 units at night (Patient not taking: Reported on 11/11/2024)

## 2024-11-11 NOTE — CONSULTS
ICU  Critical Care APRN Consult Note    NAME: Blanca Coats - ROOM: C5/C5 - MRN: [id number] - Age: 44 year old - :1980    History Of Present Illness:  Blanca Coats is a 44 year old female with PMHx significant for DM1, ETOH use disorder, pancreatitis who presented to the ED with c/o SOB, abdominal pain, nausea and vomiting stating she is withdrawing from ETOH. States her last drink was last night where she drank 1 pint of vodka. She denies history of etoh withdrawal seizures. Labs were notable for DKA, glucose 320, bicarb 15, Cr 1.23, PH 7.26 suggestive of DKA. Her omnipod ran out last night and she got behind on her insulin dosing. She was started on an insulin drip and ICU was called for further management of DKA, STEVEN and etoh withdrawal.   PMH:  Past Medical History:    Anxiety    Arrhythmia    SVT    Breast CA (HCC)    Depression    High blood pressure    OTHER DISEASES    seasonal allergies    Pancreatitis (HCC)    Pneumonia due to organism    SVT (supraventricular tachycardia) (HCC)       Social Hx:  Social History     Socioeconomic History    Marital status: Single   Tobacco Use    Smoking status: Every Day     Current packs/day: 0.50     Types: Cigarettes    Smokeless tobacco: Never    Tobacco comments:     1/2ppd x 8 years   Vaping Use    Vaping status: Never Used   Substance and Sexual Activity    Alcohol use: Yes     Alcohol/week: 7.0 standard drinks of alcohol     Types: 7 Shots of liquor per week     Comment: 3-4x/wk    Drug use: Yes     Frequency: 7.0 times per week     Types: Cannabis     Comment: use daily   Other Topics Concern    Exercise Yes     Comment: MODERATE    Seat Belt Yes    Self-Exams Yes     Social Drivers of Health     Financial Resource Strain: Low Risk  (2023)    [facility] [place]    Financial Resource Strain     In the past year, have you or any family members you live with been unable to get any of the following when it was  really needed? Check all that apply.: None   Food Insecurity: No Food Insecurity (1/23/2023)    [facility]    Food Insecurity     RETIRE How often in the past 12 months would you say you are worried or stressed about having enough money to buy nutritious meals? : Never   Transportation Needs: No Transportation Needs (1/23/2023)    [facility]    PRAPARE - Transportation     In the past 12 months, has lack of transportation kept you from medical appointments or from getting medications?: No     In the past 12 months, has lack of transportation kept you from meetings, work, or from getting things needed for daily living?: No   Social Connections: Socially Integrated (1/23/2023)    [facility]    Social Connections     How often do you see or talk to people that you care about and feel close to? (For example: talking to friends on the phone, visiting friends or family, going to [employer] or club meetings): 5 or more times a week       Family Hx:  Family History   Problem Relation Age of Onset    Heart Disorder Father     Diabetes Father     Hypertension Mother     Cancer Mother         breast         Review of Systems:   A comprehensive 10 point review of systems was completed.  Pertinent positives and negatives noted in the HPI.    Current Facility-Administered Medications   Medication Dose Route Frequency    HYDROmorphone (Dilaudid) 1 MG/ML injection 0.5 mg  0.5 mg Intravenous Q30 Min PRN    insulin regular human (Novolin R, Humulin R) 100 Units in sodium chloride 0.9% 100 mL standard infusion (100 mL)  0.5-9 Units/hr Intravenous Continuous    sodium chloride 0.9% infusion  125 mL/hr Intravenous Continuous       OBJECTIVE  Vitals:  BP (!) 125/93   Pulse 112   Temp 98.1 °F (36.7 °C) (Temporal)   Resp (!) 29   Ht 167.6 cm (5' 6\")   Wt 165 lb (74.8  kg)   SpO2 99%   BMI 26.63 kg/m²                  Physical Exam:    General Appearance: Alert, cooperative, anxious, tremulous  Neck: No JVD, neck supple, no adenopathy, trachea midline  Lungs: Clear to auscultation bilaterally, respirations unlabored  Heart: Regular rate and rhythm, S1 and S2 normal, no murmur, rub or gallop  Abdomen: Soft, non-tender, bowel sounds active all four quadrants, no masses, no organomegaly  Extremities: Extremities normal, atraumatic, no cyanosis or edema, capillary refill <3 sec.    Pulses: 2+ and symmetric all extremities  Skin: Skin color, texture, turgor normal for ethnicity, no rashes or lesions, warm and dry      Data this admission:  XR CHEST AP PORTABLE  (CPT=71045)    Result Date: 11/11/2024  CONCLUSION:  No evidence of active cardiopulmonary disease.   LOCATION:  [facility]      Dictated by (CST): Bay De La Rosa MD on 11/11/2024 at 12:58 PM     Finalized by (CST): Bay De La Rosa MD on 11/11/2024 at 12:59 PM         Labs:  Lab Results   Component Value Date    WBC 13.9 11/11/2024    HGB 17.9 11/11/2024    HCT 51.6 11/11/2024    .0 11/11/2024    CREATSERUM 1.23 11/11/2024    BUN 8 11/11/2024     11/11/2024    K 3.9 11/11/2024    CL 90 11/11/2024    CO2 15.0 11/11/2024     11/11/2024    CA 9.4 11/11/2024    ALB 5.0 11/11/2024    ALKPHO 111 11/11/2024    BILT 0.5 11/11/2024    TP 8.5 11/11/2024    AST 30 11/11/2024    ALT 14 11/11/2024           Assessment/Plan:  DKA  - IVF  - Insulin drip per DKA protocol  - Accuchecks Q1h  - BMP, mg, phos q4h  - NPO  - DM APRN to see    ETOH intoxication  ETOH use disorder  ETOH withdrawal  - Last drink 11/11, ethyl alcohol 37 on admission  - CIWA protocol, PRN ativan  - Will start PO librium taper  - MV/Thiamine/Folic acid  - Seizure precautions  - Psych to see    STEVEN  - IVF  - Avoid nephrotoxins  - Strict I/O  - Daily BMP    Hyponatremia  - Corrected sodium for hyperglycemia ~133  - IVF    Leukocytosis  - Likely reactive 2/2 to  above  - Check procal  - Check UA    Abdominal pain  Nausea  Vomiting  - Lipase normal  - CT A/P pending with findings suggestive of chronic pancreatitis no evidence of acute inflammation  - Pain management  - Antiemetics PRN    History of DM1  - A1c pending  - PTA lantus, humalog on hold  - DM APRN to see    History of breast ca  - s/p bilateral mastectomy with reconstruction, last chemo 2017    History of anxiety/depression  - PTA meds    HTN  SVT  - BB    F/E/N  - IVF  - Replete electrolytes per protocol  - NPO    Ppx  - subcutaneous lovenox  - SCDs    Dispo  - Full code  - ICU monitoring      Plan of care discussed with intensivist on-call, Dr. Dykes.      Ashley Young [facility]  Critical Care  [id number]      INTENSIVIST ADDENDUM      I agree with critical care APN note above. I have independently seen & evaluated the patient.  I agree with the management plan outlined above with the following changes/additions:     Patient admitted with DKA and etoh w/d. She says she last drank etoh yesterday. She is not interested in resources to help her quit drinking, she says she can do it on her own.    Plan:  -monitor accuchecks, electrolytes  -insulin drip until AG normalizes and blood sugars improve  -IVF  -monitor CIWA, use ativan prn and librium taper  -ICU monitoring, patient is critically ill        Walter Dykes M.D.  Pulmonary/Critical Care   On call Intensivist 11/11/24

## 2024-11-11 NOTE — ED INITIAL ASSESSMENT (HPI)
Pt presents to ER with h/x of DM1. Pt states she is also going thru alcohol detox. Pt last drink last night. She drinks 1.5 bottles of vodka a day. Hx of pancreatitis. Pt is SOB, abdominal pain, n/v.

## 2024-11-11 NOTE — ED PROVIDER NOTES
Patient Seen in: [facility] Emergency Department      History     Chief Complaint   Patient presents with    Hyperglycemia    Eval-D     Stated Complaint:     Subjective:   HPI      Patient is a 45-year-old female who states she has been drinking heavily for the past 4 days.  Patient states she is an alcoholic.  Patient states her last drink was last night.  Patient states this morning she started vomiting has vomited roughly 8-10 times liquid or if she tries to drink.  Patient states she developed abdominal pain up to 8 out of 10 epigastric right upper quadrant.  Patient also had elevated blood sugar 385.  Patient did take 15 units Humalog about 3 hours ago.  Patient has a history of DKA as well as pancreatitis and states it does feel similar to both.  Patient called the ambulance came in for evaluation.  Remainder of review of systems negative.    Objective:     Past Medical History:    Anxiety    Arrhythmia    SVT    Breast CA (HCC)    Depression    High blood pressure    OTHER DISEASES    seasonal allergies    Pancreatitis (HCC)    Pneumonia due to organism    SVT (supraventricular tachycardia) (HCC)              Past Surgical History:   Procedure Laterality Date    Breast surgery      Implant left      Implant right      Mastectomy left      Mastectomy right      Ndsc ablation & rcnstj atria lmtd w/o bypass                  Social History     Socioeconomic History    Marital status: Single   Tobacco Use    Smoking status: Every Day     Current packs/day: 0.50     Types: Cigarettes    Smokeless tobacco: Never    Tobacco comments:     1/2ppd x 8 years   Vaping Use    Vaping status: Never Used   Substance and Sexual Activity    Alcohol use: Yes     Alcohol/week: 7.0 standard drinks of alcohol     Types: 7 Shots of liquor per week     Comment: 3-4x/wk    Drug use: Yes     Frequency: 7.0 times per week     Types: Cannabis     Comment: use daily   Other Topics Concern    Exercise Yes     Comment: MODERATE     Seat Belt Yes    Self-Exams Yes     Social Drivers of Health     Financial Resource Strain: Low Risk  (1/23/2023)    Received from [employer], [facility]    Financial Resource Strain     In the past year, have you or any family members you live with been unable to get any of the following when it was really needed? Check all that apply.: None   Food Insecurity: No Food Insecurity (1/23/2023)    Received from [employer], [facility]    Food Insecurity     RETIRE How often in the past 12 months would you say you are worried or stressed about having enough money to buy nutritious meals? : Never   Transportation Needs: No Transportation Needs (1/23/2023)    Received from [employer], [facility], [facility]    PRAPARE - Transportation     In the past 12 months, has lack of transportation kept you from medical appointments or from getting medications?: No     In the past 12 months, has lack of transportation kept you from meetings, work, or from getting things needed for daily living?: No   Social Connections: Socially Integrated (1/23/2023)    Received from [employer], [facility]    Social Connections     How often do you see or talk to people that you care about and feel close to? (For example: talking to friends on the phone, visiting friends or family, going to [employer] or club meetings): 5 or more times a week                  Physical Exam     ED Triage Vitals   BP 11/11/24 1200 (!) 143/92   Pulse 11/11/24 1200 120   Resp 11/11/24 1200 26   Temp 11/11/24 1235 98.1 °F (36.7 °C)   Temp src 11/11/24 1235 Temporal   SpO2 11/11/24 1200 100 %   O2 Device 11/11/24 1200 None (Room air)       Current Vitals:   Vital Signs  BP: (!) 133/97  Pulse: 100  Resp: (!) 27  Temp: 98.1 °F (36.7 °C)  Temp src: Temporal  MAP (mmHg): (!) 108    Oxygen Therapy  SpO2: 97 %  O2 Device: None (Room air)        Physical Exam   GENERAL:  Patient resting  on the cart in no acute distress.  HEENT: Extraocular muscles intact, pupils equal round reactive to light and accommodation.  Mouth normal, neck supple, no meningismus.  LUNGS: Lungs clear to auscultation bilaterally.  CARDIOVASCULAR: + S1-S2, regular rate and rhythm, no murmurs.  BACK: No CVA tenderness, no midline bony tenderness.  ABDOMEN: + Bowel sounds, soft, moderate diffuse tenderness especially epigastric right upper quad, nondistended.  No rebound, no guarding, no hepatosplenomegaly.  EXTREMITIES: Full range of motion, no tenderness, good capillary refill.  SKIN: No rash, good turgor.  NEURO: Patient answers questions appropriately.  No focal deficits appreciated.        ED Course     Labs Reviewed   COMP METABOLIC PANEL (14) - Abnormal; Notable for the following components:       Result Value    Glucose 320 (*)     Sodium 129 (*)     Chloride 90 (*)     CO2 15.0 (*)     Anion Gap 24 (*)     BUN 8 (*)     Creatinine 1.23 (*)     eGFR-Cr 56 (*)     Alkaline Phosphatase 111 (*)     Total Protein 8.5 (*)     Albumin 5.0 (*)     All other components within normal limits   CBC WITH DIFFERENTIAL WITH PLATELET - Abnormal; Notable for the following components:    WBC 13.9 (*)     RBC 5.66 (*)     HGB 17.9 (*)     HCT 51.6 (*)     Neutrophil Absolute Prelim 11.72 (*)     Neutrophil Absolute 11.72 (*)     Lymphocyte Absolute 0.97 (*)     All other components within normal limits   VENOUS BLOOD GAS - Abnormal; Notable for the following components:    Venous pH 7.26 (*)     Venous pCO2 30 (*)     Venous pO2 29 (*)     Venous HCO3 13.7 (*)     Venous O2Hb 35.4 (*)     All other components within normal limits   ETHYL ALCOHOL - Abnormal; Notable for the following components:    Ethyl Alcohol 37 (*)     All other components within normal limits   LACTIC ACID RESPIRATORY - Abnormal; Notable for the following components:    Lactic Acid (Blood Gas) 6.8 (*)     All other components within normal limits    POCT GLUCOSE - Abnormal; Notable for the following components:    POC Glucose 330 (*)     All other components within normal limits   POCT GLUCOSE - Abnormal; Notable for the following components:    POC Glucose 263 (*)     All other components within normal limits   LIPASE - Normal   URINALYSIS WITH CULTURE REFLEX   HEMOGLOBIN A1C   PROCALCITONIN   RAINBOW DRAW LAVENDER   RAINBOW DRAW LIGHT GREEN   RAINBOW DRAW BLUE   RAINBOW DRAW GOLD     EKG    Rate, intervals and axes as noted on EKG Report.  Rate: 108  Rhythm: Sinus Rhythm  Reading: Sinus tachycardia, nonspecific ST changes                Chest x-ray No evidence of active cardiopulmonary disease.   Independent reviewed by myself, no pneumothorax    CT abdomen pelvis1. Findings of chronic pancreatitis with prominent parenchymal atrophy and calcification of the residual pancreas.  No regional inflammatory stranding to indicate an acute inflammatory component.      2. There are thick-walled fluid collections largely replacing the pancreatic parenchyma within the body and tail segments most in keeping with pancreatic pseudocyst, dimensions as above.  The collection of the pancreatic body is increased in size   compared to 08/01/2023.  No surrounding inflammatory stranding to indicate superimposed infection, though this is difficult to exclude by imaging alone.  Clinical and biochemical correlation advised.      3. Normal liver morphology with moderate hepatic steatosis.      4. Chronic splenic vein occlusion with portosystemic collaterals of the left upper quadrant and mid abdomen.           MDM    Patient given IV fluids.  Patient given Zofran for nausea.  Patient given Dilaudid for pain.  Patient's lipase did come back normal.  Patient's blood sugar was 330, CO2 15 with pH 7.26.  Patient's symptoms consistent with DKA.  Patient was given additional IV fluids as well as started on an insulin drip.  I did speak with the intensivist patient admitted to the ICU.  I  did speak with the [place] hospitalist.  Patient was stable during her stay in the emergency department.  I did consider DKA, pancreatitis, alcohol withdrawal, gastroenteritis.    A total of 50 minutes of critical care time (exclusive of billable procedures) was administered to manage the patient's critical lab values due to her DKA.  This involved direct patient intervention, complex decision making, and/or extensive discussions with the patient, family, and clinical staff.      Admission disposition: 11/11/2024  2:29 PM           Medical Decision Making      Disposition and Plan     Clinical Impression:  1. Type 1 diabetes mellitus with ketoacidosis without coma (HCC)    2. Alcohol abuse    3. Pseudocyst of pancreas (HCC)         Disposition:  Admit  11/11/2024  2:29 pm    Follow-up:  No follow-up provider specified.        Medications Prescribed:  Current Discharge Medication List              Supplementary Documentation:         Hospital Problems       Present on Admission  Date Reviewed: 1/29/2023            ICD-10-CM Noted POA    * (Principal) Type 1 diabetes mellitus with ketoacidosis without coma (HCC) E10.10 11/11/2024 Unknown

## 2024-11-12 ENCOUNTER — APPOINTMENT (OUTPATIENT)
Dept: GENERAL RADIOLOGY | Facility: HOSPITAL | Age: 44
End: 2024-11-12
Payer: MEDICAID

## 2024-11-12 LAB
ADENOVIRUS PCR:: NOT DETECTED
ALBUMIN SERPL-MCNC: 3.6 G/DL (ref 3.2–4.8)
ALBUMIN/GLOB SERPL: 1.5 {RATIO} (ref 1–2)
ALP LIVER SERPL-CCNC: 76 U/L
ALT SERPL-CCNC: 9 U/L
ANION GAP SERPL CALC-SCNC: 5 MMOL/L (ref 0–18)
ANION GAP SERPL CALC-SCNC: 7 MMOL/L (ref 0–18)
AST SERPL-CCNC: 22 U/L (ref ?–34)
B PARAPERT DNA SPEC QL NAA+PROBE: NOT DETECTED
B PERT DNA SPEC QL NAA+PROBE: NOT DETECTED
BILIRUB SERPL-MCNC: 0.8 MG/DL (ref 0.3–1.2)
BILIRUB UR QL STRIP.AUTO: NEGATIVE
BUN BLD-MCNC: <5 MG/DL (ref 9–23)
BUN BLD-MCNC: <5 MG/DL (ref 9–23)
C PNEUM DNA SPEC QL NAA+PROBE: NOT DETECTED
CALCIUM BLD-MCNC: 8.5 MG/DL (ref 8.7–10.4)
CALCIUM BLD-MCNC: 8.8 MG/DL (ref 8.7–10.4)
CHLORIDE SERPL-SCNC: 100 MMOL/L (ref 98–112)
CHLORIDE SERPL-SCNC: 98 MMOL/L (ref 98–112)
CLARITY UR REFRACT.AUTO: CLEAR
CO2 SERPL-SCNC: 26 MMOL/L (ref 21–32)
CO2 SERPL-SCNC: 27 MMOL/L (ref 21–32)
CORONAVIRUS 229E PCR:: NOT DETECTED
CORONAVIRUS HKU1 PCR:: NOT DETECTED
CORONAVIRUS NL63 PCR:: NOT DETECTED
CORONAVIRUS OC43 PCR:: NOT DETECTED
CREAT BLD-MCNC: 0.72 MG/DL
CREAT BLD-MCNC: 0.76 MG/DL
EGFRCR SERPLBLD CKD-EPI 2021: 100 ML/MIN/1.73M2 (ref 60–?)
EGFRCR SERPLBLD CKD-EPI 2021: 107 ML/MIN/1.73M2 (ref 60–?)
ERYTHROCYTE [DISTWIDTH] IN BLOOD BY AUTOMATED COUNT: 14.5 %
FLUAV RNA SPEC QL NAA+PROBE: NOT DETECTED
FLUBV RNA SPEC QL NAA+PROBE: NOT DETECTED
GLOBULIN PLAS-MCNC: 2.4 G/DL (ref 2–3.5)
GLUCOSE BLD-MCNC: 147 MG/DL (ref 70–99)
GLUCOSE BLD-MCNC: 148 MG/DL (ref 70–99)
GLUCOSE BLD-MCNC: 210 MG/DL (ref 70–99)
GLUCOSE BLD-MCNC: 336 MG/DL (ref 70–99)
GLUCOSE BLD-MCNC: 348 MG/DL (ref 70–99)
GLUCOSE UR STRIP.AUTO-MCNC: NORMAL MG/DL
HCT VFR BLD AUTO: 37 %
HGB BLD-MCNC: 13.2 G/DL
KETONES UR STRIP.AUTO-MCNC: 40 MG/DL
LEUKOCYTE ESTERASE UR QL STRIP.AUTO: NEGATIVE
MAGNESIUM SERPL-MCNC: 2.1 MG/DL (ref 1.6–2.6)
MAGNESIUM SERPL-MCNC: 2.4 MG/DL (ref 1.6–2.6)
MCH RBC QN AUTO: 31.3 PG (ref 26–34)
MCHC RBC AUTO-ENTMCNC: 35.7 G/DL (ref 31–37)
MCV RBC AUTO: 87.7 FL
METAPNEUMOVIRUS PCR:: NOT DETECTED
MYCOPLASMA PNEUMONIA PCR:: NOT DETECTED
NITRITE UR QL STRIP.AUTO: NEGATIVE
PARAINFLUENZA 1 PCR:: NOT DETECTED
PARAINFLUENZA 2 PCR:: NOT DETECTED
PARAINFLUENZA 3 PCR:: NOT DETECTED
PARAINFLUENZA 4 PCR:: NOT DETECTED
PH UR STRIP.AUTO: 6.5 [PH] (ref 5–8)
PHOSPHATE SERPL-MCNC: 1.8 MG/DL (ref 2.4–5.1)
PHOSPHATE SERPL-MCNC: 2.6 MG/DL (ref 2.4–5.1)
PLATELET # BLD AUTO: 135 10(3)UL (ref 150–450)
POTASSIUM SERPL-SCNC: 3.4 MMOL/L (ref 3.5–5.1)
POTASSIUM SERPL-SCNC: 3.5 MMOL/L (ref 3.5–5.1)
POTASSIUM SERPL-SCNC: 3.5 MMOL/L (ref 3.5–5.1)
PROT SERPL-MCNC: 6 G/DL (ref 5.7–8.2)
RBC # BLD AUTO: 4.22 X10(6)UL
RHINOVIRUS/ENTERO PCR:: NOT DETECTED
RSV RNA SPEC QL NAA+PROBE: NOT DETECTED
SARS-COV-2 RNA NPH QL NAA+NON-PROBE: NOT DETECTED
SODIUM SERPL-SCNC: 131 MMOL/L (ref 136–145)
SODIUM SERPL-SCNC: 132 MMOL/L (ref 136–145)
SP GR UR STRIP.AUTO: 1.02 (ref 1–1.03)
UROBILINOGEN UR STRIP.AUTO-MCNC: NORMAL MG/DL
WBC # BLD AUTO: 8.7 X10(3) UL (ref 4–11)

## 2024-11-12 PROCEDURE — 71045 X-RAY EXAM CHEST 1 VIEW: CPT

## 2024-11-12 PROCEDURE — 99222 1ST HOSP IP/OBS MODERATE 55: CPT | Performed by: CLINICAL NURSE SPECIALIST

## 2024-11-12 PROCEDURE — 99233 SBSQ HOSP IP/OBS HIGH 50: CPT | Performed by: INTERNAL MEDICINE

## 2024-11-12 PROCEDURE — 99233 SBSQ HOSP IP/OBS HIGH 50: CPT | Performed by: HOSPITALIST

## 2024-11-12 RX ORDER — CHLORDIAZEPOXIDE HYDROCHLORIDE 25 MG/1
25 CAPSULE, GELATIN COATED ORAL EVERY 12 HOURS
Status: DISCONTINUED | OUTPATIENT
Start: 2024-11-13 | End: 2024-11-13

## 2024-11-12 RX ORDER — CHLORDIAZEPOXIDE HYDROCHLORIDE 25 MG/1
25 CAPSULE, GELATIN COATED ORAL EVERY 24 HOURS
Status: DISCONTINUED | OUTPATIENT
Start: 2024-11-14 | End: 2024-11-13

## 2024-11-12 RX ORDER — CHLORDIAZEPOXIDE HYDROCHLORIDE 25 MG/1
25 CAPSULE, GELATIN COATED ORAL EVERY 8 HOURS
Status: COMPLETED | OUTPATIENT
Start: 2024-11-12 | End: 2024-11-13

## 2024-11-12 RX ORDER — INSULIN DEGLUDEC 100 U/ML
28 INJECTION, SOLUTION SUBCUTANEOUS NIGHTLY
Status: DISCONTINUED | OUTPATIENT
Start: 2024-11-12 | End: 2024-11-13

## 2024-11-12 RX ORDER — POTASSIUM CHLORIDE 14.9 MG/ML
20 INJECTION INTRAVENOUS ONCE
Status: COMPLETED | OUTPATIENT
Start: 2024-11-12 | End: 2024-11-12

## 2024-11-12 NOTE — PLAN OF CARE
Assumed care of patient after report. Alert and oriented. NC. NSR on tele monitor. Pain noted in abdomen. Medication given. CIWA protocol. Max CIWA 6. Seizure precautions in place. Received on insulin gtt. Transitioned off and on to subQ insulin. Q4 BMP, mag and phos DCed after insulin gtt stopped. Voiding. See MAR and flowsheets for additional information.

## 2024-11-12 NOTE — DIETARY NOTE
[facility]   part of [facility]   CLINICAL NUTRITION    Blanca Coats     Admitting diagnosis:  Pseudocyst of pancreas (HCC) [K86.3]  Alcohol abuse [F10.10]  Type 1 diabetes mellitus with ketoacidosis without coma (HCC) [E10.10]    Ht: 167.6 cm (5' 6\")  Wt: 63.1 kg (139 lb 1.8 oz).   Body mass index is 22.45 kg/m².    Wt Readings from Last 6 Encounters:   11/12/24 63.1 kg (139 lb 1.8 oz)   08/01/23 74.8 kg (164 lb 14.5 oz)   01/20/23 74.8 kg (165 lb)   01/16/23 76.7 kg (169 lb)   01/06/23 80.1 kg (176 lb 8 oz)   08/16/22 83.9 kg (185 lb)      Labs/Meds reviewed    Diet:       Procedures    Carbohydrate controlled diet 1800 kcal/60 grams; Is Patient on Accuchecks? Yes     Percent Meals Eaten (last 3 days)       Date/Time Percent Meals Eaten (%)    11/11/24 1516 0 %    11/11/24 1600 0 %          Pt chart reviewed d/t elevated A1C. Type 1 DM. Pt here with ETOH withdrawal. Covid isolation at this time. RD to follow for po intakes and offer education when appropriate.    Patient reports poor appetite at this time.  Nursing notes reports Percent Meals Eaten (%): 0 % intake for last meal.  Tolerating po diet without diarrhea, emesis, or constipation.   No significant weight changes noted. -15% x 1 year, non-significant per EMR.     Patient is at low nutrition risk at this time.    Please consult if patient status changes or nutrition issues arise.    Nichole Henley RD, LDN  Clinical Dietitian

## 2024-11-12 NOTE — PROGRESS NOTES
Blanca Coats Patient Status:  Inpatient    1980 MRN [id number]   [facility] 4SW-A Attending Katia Rivera, DO   Hosp Day # 1 PCP None Pcp     Critical Care Progress Note          Subjective:  Transitioned off insulin drip overnight, remains on PO librium taper for ETOH withdrawal. Max CIWA 6 overnight, no ativan requirements.     Objective:    Medications:   potassium phosphate dibasic 15 mmol in sodium chloride 0.9% 250 mL IVPB  15 mmol Intravenous Once    Followed by    potassium chloride  20 mEq Intravenous Once    gabapentin  300 mg Oral BID    busPIRone  20 mg Oral BID    metoprolol succinate ER  100 mg Oral BID    montelukast  10 mg Oral Nightly    pantoprazole  40 mg Oral QAM AC    sertraline  200 mg Oral Daily    traZODone  200 mg Oral Nightly    enoxaparin  40 mg Subcutaneous Daily    chlordiazePOXIDE  25 mg Oral Q6H    Followed by    chlordiazePOXIDE  25 mg Oral Q8H    Followed by    [START ON 2024] chlordiazePOXIDE  25 mg Oral Q12H    Followed by    [START ON 2024] chlordiazePOXIDE  25 mg Oral Q24H    thiamine  100 mg Oral Daily    multivitamin  1 tablet Oral Daily    folic acid  1 mg Oral Daily    insulin degludec  26 Units Subcutaneous Nightly    insulin aspart  1-68 Units Subcutaneous TID CC    haloperidol  5 mg Oral Nightly              Intake/Output Summary (Last 24 hours) at 2024 0727  Last data filed at 2024 0615  Gross per 24 hour   Intake 3703.9 ml   Output 1100 ml   Net 2603.9 ml       /65   Pulse 82   Temp 99 °F (37.2 °C) (Tympanic)   Resp 16   Ht 167.6 cm (5' 6\")   Wt 139 lb 1.8 oz (63.1 kg)   SpO2 94%   BMI 22.45 kg/m²     Physical Exam:   General Appearance: Alert, cooperative, no distress, appears stated age  Neck: No JVD, neck supple, no adenopathy, trachea midline  Lungs: Clear to auscultation bilaterally, respirations unlabored  Heart: Regular rate and rhythm, S1 and S2 normal, no murmur, rub or gallop  Abdomen: Soft,  non-tender, bowel sounds active all four quadrants, no masses, no organomegaly  Extremities: Extremities normal, atraumatic, no cyanosis or edema,capillary refill <3 sec.    Pulses: 2+ and symmetric all extremities  Skin: Skin color, texture, turgor normal for ethnicity, no rashes or lesions, warm and dry      Recent Labs   Lab 11/11/24  1210 11/12/24  0442   RBC 5.66* 4.22   HGB 17.9* 13.2   HCT 51.6* 37.0   MCV 91.2 87.7   MCH 31.6 31.3   MCHC 34.7 35.7   RDW 14.6 14.5   NEPRELIM 11.72*  --    WBC 13.9* 8.7   .0 135.0*     Recent Labs   Lab 11/11/24  1210 11/11/24  1616 11/11/24 2004 11/12/24  0040 11/12/24  0442   *   < > 150* 148* 147*   BUN 8*   < > 6* <5* <5*   CREATSERUM 1.23*   < > 0.78 0.72 0.76   CA 9.4   < > 8.9 8.8 8.5*   ALB 5.0*  --   --   --  3.6   *   < > 129* 131* 132*   K 3.9   < > 3.8 3.4* 3.5  3.5   CL 90*   < > 96* 98 100   CO2 15.0*   < > 25.0 26.0 27.0   ALKPHO 111*  --   --   --  76   AST 30  --   --   --  22   ALT 14  --   --   --  9*   BILT 0.5  --   --   --  0.8   TP 8.5*  --   --   --  6.0    < > = values in this interval not displayed.     No results for input(s): \"ABGPHT\", \"BYMREZ0J\", \"SUGHC9C\", \"ABGHCO3\", \"ABGBE\", \"TEMP\", \"HALLIE\", \"SITE\", \"DEV\", \"THGB\" in the last 168 hours.    Invalid input(s): \"KTD01OON\", \"CHOB\"  No results for input(s): \"BNP\" in the last 168 hours.  No results for input(s): \"TROP\", \"CK\" in the last 168 hours.    CT ABDOMEN+PELVIS(CONTRAST ONLY)(CPT=74177)    Result Date: 11/11/2024  CONCLUSION:   1. Findings of chronic pancreatitis with prominent parenchymal atrophy and calcification of the residual pancreas.  No regional inflammatory stranding to indicate an acute inflammatory component.  2. There are thick-walled fluid collections largely replacing the pancreatic parenchyma within the body and tail segments most in keeping with pancreatic pseudocyst, dimensions as above.  The collection of the pancreatic body is increased in size compared to  08/01/2023.  No surrounding inflammatory stranding to indicate superimposed infection, though this is difficult to exclude by imaging alone.  Clinical and biochemical correlation advised.  3. Normal liver morphology with moderate hepatic steatosis.  4. Chronic splenic vein occlusion with portosystemic collaterals of the left upper quadrant and mid abdomen.    LOCATION:  [facility]   Dictated by (CST): Glenny Quezada MD on 11/11/2024 at 2:15 PM     Finalized by (CST): Glenny Quezada MD on 11/11/2024 at 2:23 PM       XR CHEST AP PORTABLE  (CPT=71045)    Result Date: 11/11/2024  CONCLUSION:  No evidence of active cardiopulmonary disease.   LOCATION:  [facility]      Dictated by (CST): Bay De La Rosa MD on 11/11/2024 at 12:58 PM     Finalized by (CST): Bay De La Rosa MD on 11/11/2024 at 12:59 PM           Assessment/Plan:  DKA- resolved  - IVF  - Insulin drip transitioned off overnight  - ADAT  - Daily BMP    ETOH use disorder  ETOH withdrawal  - Last drink 11/11, ethyl alcohol 37 on admission  - CIWA protocol, PRN ativan- none required overnight  - Continue PO librium taper  - MV/Thiamine/Folic acid  - Seizure precautions  - Psych to see    Hypoxia  - Requiring 2L/NC, congested cough  - Check chest xray today  - Check RVP    STEVEN  - Avoid nephrotoxins  - Strict I/O- ~1100cc output overnight  - Daily BMP    Abdominal pain  Nausea  Vomiting  - Lipase normal  - CT A/P pending with findings suggestive of chronic pancreatitis no evidence of acute inflammation  - Pain management  - Antiemetics PRN    History of DM1  - A1c 10.1  - Degludec, SSI  - DM APRN to see     History of breast ca  - s/p bilateral mastectomy with reconstruction, last chemo 2017     History of anxiety/depression  - PTA meds     HTN  SVT  - BB    F/E/N  - Replete electrolytes per protocol  - ADAT    Ppx  - subcutaneous lovenox  - SCDs    Dispo:   - Full code  - Stable to transfer to general medicine floor      Plan of care discussed with intensivist, Dr. Dykes.      Ashley  [facility]  Critical Care  [id number]      INTENSIVIST ADDENDUM      I agree with critical care APN note above. I have independently seen & evaluated the patient.  I agree with the management plan outlined above with the following changes/additions:     Patient has a cough and rhinorrhea. No other new complaints. Insulin drip was stopped and patient is back on subcutaneous insulin.     Plan:  -monitor accuchecks  -adjust insulin as necessary  -librium taper for etoh w/d   -wean off O2.  -transfer to general medical floor    Walter Dykes M.D.  Pulmonary/Critical Care   On call Intensivist 11/12/24

## 2024-11-12 NOTE — PLAN OF CARE
Received pt alert x 4.   On room air.   On tele, VSS  Chest xray this am, respiratory -panel complete  CIWA 2 , HA    Phys therapy in room  Bed in low position,  Call lightl within reach  Family at bedside.

## 2024-11-12 NOTE — OCCUPATIONAL THERAPY NOTE
OCCUPATIONAL THERAPY EVALUATION - INPATIENT    Room Number: 3601/3601-A  Evaluation Date: 11/12/2024     Type of Evaluation: Initial  Presenting Problem: DKA, alcohol abuse    Physician Order: IP Consult to Occupational Therapy  Reason for Therapy:  ADL/IADL Dysfunction and Discharge Planning  History related to admission: Presented from home with     OCCUPATIONAL THERAPY ASSESSMENT   Patient is a 44 year old female admitted on 11/11/2024 with Presenting Problem: DKA, alcohol abuse. Co-Morbidities : ETOH use disorder, DMI  Patient is currently functioning near baseline with toileting, upper body dressing, lower body dressing, grooming, bed mobility, transfers, static sitting balance, dynamic sitting balance, static standing balance, dynamic standing balance, and functional standing tolerance.  Prior to admission, patient's baseline is independent.  Patient met all OT goals at supervision to independent level.  Patient reports no further questions/concerns at this time.     Patient will benefit from continued mobilization with staff to prevent deconditioning.   No skilled OT needs; will sign off.     Recommendations for nursing staff:   Transfers: up with SUP, no device  Toileting location: Toilet    EVALUATION SESSION:  Patient at start of session: supine    FUNCTIONAL TRANSFER ASSESSMENT  Sit to Stand: Edge of Bed  Edge of Bed: Supervision  Toilet Transfer: Supervision    BED MOBILITY  Supine to Sit : Independent  Scooting: independent    BALANCE ASSESSMENT  Static Standing: Independent    FUNCTIONAL ADL ASSESSMENT  Grooming Standing: Stand-by Assist  LB Dressing Seated: Independent  LB Dressing Standing: Supervision  Toileting Seated: Independent  Toileting Standing: Supervision    ACTIVITY TOLERANCE: Denied shortness of breath, on room air; appeared to desaturate with exertion to upper 80s however pleth was questionable. O2 saturation mid 90s at rest                         O2 SATURATIONS       COGNITION  Overall  Cognitive Status:  WFL - within functional limits  Not formally tested    COGNITION ASSESSMENTS     Upper Extremity:   ROM: within functional limits   Strength: is within functional limits 5/5  Coordination:  Gross motor: wfl  Fine motor: wfl  Sensation: denied UE deficits    EDUCATION PROVIDED  Patient Education : Functional Transfer Techniques; Role of Occupational Therapy; Plan of Care  Patient's Response to Education: Verbalized Understanding    Equipment used: gait belt  Demonstrates functional use    Therapist comments: Patient was easily able to don hospital socks while in supine using hip and knee flexion and forward reach. Sat up at side of bed with independence; ambulated in hallway for simulated household distances with good tolerance;  left seated in chair with alarm set and needs met; instructed to call not fall.     Patient End of Session: RN aware of session/findings;Call light within reach;Needs met;Up in chair;All patient questions and concerns addressed;Hospital anti-slip socks;Alarm set    OCCUPATIONAL PROFILE    HOME SITUATION  Type of Home: Condo (3rd floor)  Home Layout: One level;Elevator  Lives With:  (Vasu Leung)    Toilet and Equipment: Standard height toilet  Shower/Tub and Equipment: Tub-shower combo       Occupation/Status: caregiver for elderly     Drives: Yes  Patient Regularly Uses: Reading glasses    Prior Level of Function: Lives in a condo with her two dogs and her kelsey who works. 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 Uses reading glasses. Reports one recent fall related to drinking where she injured her R pectoral area. Did not seek medical care.     SUBJECTIVE  Participatory  \"Oh, the gait belt.\"    PAIN ASSESSMENT  Ratin          OBJECTIVE  Precautions: Bed/chair alarm;Seizure  Fall Risk: Standard fall risk    WEIGHT BEARING RESTRICTION       AM-PAC ‘6-Clicks’ Inpatient Daily Activity Short Form  -   Putting on and taking off regular lower body  clothing?: A Little  -   Bathing (including washing, rinsing, drying)?: A Little  -   Toileting, which includes using toilet, bedpan or urinal? : A Little  -   Putting on and taking off regular upper body clothing?: None  -   Taking care of personal grooming such as brushing teeth?: None  -   Eating meals?: None    AM-PAC Score:  Score: 21  Approx Degree of Impairment: 32.79%  Standardized Score (AM-PAC Scale): 44.27    ADDITIONAL TESTS     NEUROLOGICAL FINDINGS      PLAN   Patient has been evaluated and presents with no skilled Occupational Therapy needs at this time.  Patient discharged from Occupational Therapy services.  Please re-order if a new functional limitation presents during this admission.    OT Device Recommendations: None    Patient Evaluation Complexity Level:   Occupational Profile/Medical History LOW - Brief history including review of medical or therapy records    Specific performance deficits impacting engagement in ADL/IADL LOW  1 - 3 performance deficits    Client Assessment/Performance Deficits LOW - No comorbidities nor modifications of tasks    Clinical Decision Making LOW - Analysis of occupational profile, problem-focused assessments, limited treatment options    Overall Complexity LOW     OT Session Time: 12 minutes  Self-Care Home Management: 10 minutes

## 2024-11-12 NOTE — PROGRESS NOTES
[facility]   part of [facility]     Hospitalist Progress Note     Blanca Coats Patient Status:  Inpatient    1980 MRN [id number]   Location [facility] 3NE-A Attending Nj Ashley MD   Hosp Day # 1 PCP Liya Velez PA-C     Chief Complaint: hyperglycemia    Subjective:     Patient feels better today    Objective:    Review of Systems:   ROS completed; pertinent positive and negatives stated in subjective.    Vital signs:  Temp:  [96.8 °F (36 °C)-99.1 °F (37.3 °C)] 98.3 °F (36.8 °C)  Pulse:  [] 77  Resp:  [9-29] 14  BP: ()/(45-98) 100/68  SpO2:  [90 %-100 %] 92 %    Physical Exam:    General: No acute distress  Respiratory: Clear to auscultation bilaterally  Cardiovascular: S1, S2.  Abdomen: Soft  Neuro: No new focal deficits      Diagnostic Data:    Labs:  Recent Labs   Lab 24  1210 24  0442   WBC 13.9* 8.7   HGB 17.9* 13.2   MCV 91.2 87.7   .0 135.0*       Recent Labs   Lab 24  1210 24  1616 24  0040 24  0442   *   < > 150* 148* 147*   BUN 8*   < > 6* <5* <5*   CREATSERUM 1.23*   < > 0.78 0.72 0.76   CA 9.4   < > 8.9 8.8 8.5*   ALB 5.0*  --   --   --  3.6   *   < > 129* 131* 132*   K 3.9   < > 3.8 3.4* 3.5  3.5   CL 90*   < > 96* 98 100   CO2 15.0*   < > 25.0 26.0 27.0   ALKPHO 111*  --   --   --  76   AST 30  --   --   --  22   ALT 14  --   --   --  9*   BILT 0.5  --   --   --  0.8   TP 8.5*  --   --   --  6.0    < > = values in this interval not displayed.       Estimated Creatinine Clearance: 90.3 mL/min (based on SCr of 0.76 mg/dL).     No results for input(s): \"TROP\", \"TROPHS\", \"CK\" in the last 168 hours.    No results for input(s): \"PTP\", \"INR\" in the last 168 hours.           Imaging: Imaging data reviewed in Epic.    Medications:    potassium chloride  20 mEq Intravenous Once    insulin degludec  28 Units Subcutaneous Nightly    insulin aspart  1-68 Units Subcutaneous TID AC&HS    gabapentin   300 mg Oral BID    busPIRone  20 mg Oral BID    metoprolol succinate ER  100 mg Oral BID    montelukast  10 mg Oral Nightly    pantoprazole  40 mg Oral QAM AC    sertraline  200 mg Oral Daily    traZODone  200 mg Oral Nightly    enoxaparin  40 mg Subcutaneous Daily    chlordiazePOXIDE  25 mg Oral Q6H    Followed by    chlordiazePOXIDE  25 mg Oral Q8H    Followed by    [START ON 11/13/2024] chlordiazePOXIDE  25 mg Oral Q12H    Followed by    [START ON 11/14/2024] chlordiazePOXIDE  25 mg Oral Q24H    thiamine  100 mg Oral Daily    multivitamin  1 tablet Oral Daily    folic acid  1 mg Oral Daily    insulin aspart  1-68 Units Subcutaneous TID CC    haloperidol  5 mg Oral Nightly       Assessment & Plan:     #DKA, uncontrolled DM2  Insulin drip->subcutaneous insulin     #Etoh dependance and w/d  CIWA  Librium taper    #Cough with mild hypoxia/resp insuff  CXR noted  Start IS and monitor  RVP pending     #Chronic pancreatitis with pancreatic pseudocyst     #Chronic splenic andrés occlusion     #Etoh hepatic steatosis     #Anxiety/depression/ADHD    Rm Rolon MD    Supplementary Documentation:     Quality:    DVT Mechanical Prophylaxis:   SCDs, Early ambuation  DVT Pharmacologic Prophylaxis   Medication    enoxaparin (Lovenox) 40 MG/0.4ML SUBQ injection 40 mg                Code Status: Full Code  Muro: No urinary catheter in place  Muro Duration (in days):   Central line:    MARK:       Discharge is dependent on: clinical stability  At this point Ms. Coats is expected to be discharge to: home    The 21st Century Cures Act makes medical notes like these available to patients in the interest of transparency. Please be advised this is a medical document. Medical documents are intended to carry relevant information, facts as evident, and the clinical opinion of the practitioner. The medical note is intended as peer to peer communication and may appear blunt or direct. It is written in medical language and may contain  abbreviations or verbiage that are unfamiliar.

## 2024-11-12 NOTE — CONSULTS
[facility]  Diabetes Consult Note    Blanca Coats Patient Status:  Inpatient    1980 MRN [id number]   Location [facility] 3NE-A Attending Nj Ashley MD   Hosp Day # 1 PCP Liya Velez PA-C     Reason for Consult:   Management recommendations in setting of T1DM w/ DKA      Provider Requesting Consult:  Ashley Young (ICU APN)      Diagnosis:  Patient Active Problem List   Diagnosis    Community acquired pneumonia    Alcohol-induced chronic pancreatitis (HCC)    Pseudocyst of pancreas (HCC)    Splenic vein thrombosis    Pneumonia due to infectious organism    Alcoholic intoxication with complication (HCC)    Pancreas cyst (HCC)    Pancreatic necrosis (HCC)    Acute pancreatitis (HCC)    Hyponatremia    Acute pancreatitis, unspecified complication status, unspecified pancreatitis type (HCC)    Hypokalemia    Thrombocytopenia (HCC)    Hyperglycemia    Alcohol-induced acute pancreatitis without infection or necrosis (HCC)    SVT (supraventricular tachycardia) (HCC)    Alcohol abuse    Alcohol-induced acute pancreatitis, unspecified complication status (HCC)    Abdominal pain, acute    Primary hypertension    Elevated liver enzymes    Anxiety and depression    Pancreatic cyst (HCC)    Metabolic acidosis    Dyspnea, unspecified type    Diabetic ketoacidosis without coma associated with diabetes mellitus due to underlying condition (HCC)    Tachycardia    Acute respiratory failure with hypoxia (HCC)    Oral thrush    Colitis due to Escherichia coli    Diarrhea of infectious origin    Tobacco dependence    New onset type 2 diabetes mellitus (HCC)    Lung nodule    Atrial fibrillation with rapid ventricular response (HCC)    Type 1 diabetes mellitus with ketoacidosis without coma (HCC)         Medical History:  Past Medical History:    Anxiety    Arrhythmia    SVT    Breast CA (HCC)    Depression    High blood pressure    OTHER DISEASES    seasonal allergies    Pancreatitis (HCC)    Pneumonia  due to organism    SVT (supraventricular tachycardia) (HCC)     Past Surgical History:   Procedure Laterality Date    Breast surgery      Implant left      Implant right      Mastectomy left      Mastectomy right      Ndsc ablation & rcnstj atria lmtd w/o bypass       Family History   Problem Relation Age of Onset    Heart Disorder Father     Diabetes Father     Hypertension Mother     Cancer Mother         breast         Diabetes history:  Type:  T1DM  Onset: 1/2023 w/ DKA admission  Family history of DM: father and brother w/ T1DM      Allergies: Allergies[1]      Medications: Complete list reviewed. Active diabetes medications include degludec and aspart.      Labs:  Recent Labs   Lab 11/11/24 2003 11/11/24 2103 11/11/24  2204 11/11/24  2305 11/12/24  1134   PGLU 151* 149* 130* 148* 348*     Recent Labs     11/11/24  1210 11/11/24  1325 11/11/24  1616 11/11/24  1654 11/11/24 2004 11/11/24 2103 11/12/24  0040 11/12/24  0442 11/12/24  1134   *  --  128*  --  129*  --  131* 132*  --    CL 90*  --  96*  --  96*  --  98 100  --    CO2 15.0*  --  19.0*  --  25.0  --  26.0 27.0  --    BUN 8*  --  6*  --  6*  --  <5* <5*  --    CREATSERUM 1.23*  --  0.93  --  0.78  --  0.72 0.76  --    A1C 10.2*  --   --   --   --   --   --   --   --    PGLU  --    < >  --    < >  --    < >  --   --  348*   CA 9.4  --  8.5*  --  8.9  --  8.8 8.5*  --    ALB 5.0*  --   --   --   --   --   --  3.6  --     < > = values in this interval not displayed.                    History of Present Illness: Blanca Coats is a 44 year old female with a PMH of T1DM c/b DKA (1/2023), chronic pancreatitis w/ pancreatic pseudocyst, ETOH abuse, breast CA s/p b/l mastectomy w/ chemo (2017), anxiety, depression, HTN, SVT and ETOH hepatic steatosis admitted 11/11/2024 with complaints of abdominal pain and nausea/vomiting. ED evaluation revealed ETOH detox, as well as DKA (, CO2 15, pH 7.26, bicarb 15, Cr 1.23, A1C 10.2%).          Assessment/Recommendations:  Upon interview today, patient states that during her last ETOH binge, she didn't realize her OmniPod insulin pump ran out of insulin and she never replaced it; when she noticed ~24 later, she tried self-dosing insulin via pen, but was not able to correct glucose at home. Patient states she was started on OmniPod in July and admits to multiple episodes of forgetting to replace the device when it runs out of insulin. 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 Finally, patient states she eats multiple small meals per day d/t continuous nausea, especially after high-carb meals (I.e. pizza and chinese food); explained to patient likely component of gastroparesis and recommended to continue multiple small meals, but focus on low-carb options.     Explained to patient choice between restarting OmniPod insulin pump versus reverting back to multiple daily injections of basal/bolus insulin. Patient states she thinks OmniPod is a better option as it will provide more routine insulin infusion; states she often forgets to inject insulin throughout the day, especially when drinking. Patient did state she has seen multiple days of high glucose readings with recent OmniPod. Explained could be bad site versus need for change in infusion settings. Patient does not have OmniPod reader or new cartridges at bedside; states her fiance will need to pick supplies up from pharmacy. Explained to patient we can help her with restarting and switching settings if he is able to bring in supplies prior to discharge; reiterated need to restart pump 24 hours after last long-acting insulin injection. If patient without supplies prior to discharge, will need to restart pump once home. Finally, recommended patient seek ETOH abuse assistance; patient declines help and states she will be able to stop  drinking on her own. Patient aware of plan of care and endorses willingness to participate in plan.      Plan:  Inpatient recommendations -   Degludec = recommend continuing at 28u every day  Per outpatient endo, pump backup plan includes basal insulin at 30u every day  Aspart = recommend initiating at outpatient endo's pump backup plan recommendations - 1:40>140 & 1:10gm CHO  Discharge recommendations -   When able, restart OmniPod pump 24 hours after last degludec dose  If not able to restart inpatient, will need to see outpatient endo for setting changes      Prescription Recommendations:  [employer] [place]  Insulin: Humalog, Levemir, Lantus   Supplies:  BD pen needles ([employer])  Glucometer:  One Touch Ultra (Orthogem)  CGM:  Dexcom G6 & 7, Freestyle Fernando 3   Insulin Pump:  OmniPod       Provider F/U Recommendations:  PCP - MEGAN Best ([facility])  Endocrinology - Dr. Schoenberger ([facility])      A total of 60 minutes were spent with the patient, 100% was spent counseling and coordinating care for T1 diabetes self-management including nutrition, exercise, blood glucose monitoring, insulin administration, medications, treatment options, follow-up coordination and resources.    Whit Frankel, APRN  11/12/2024  12:10 PM       [1]   Allergies  Allergen Reactions    Bee Venom ANAPHYLAXIS    Morphine HIVES    Fentanyl RASH

## 2024-11-12 NOTE — PAYOR COMM NOTE
--------------  ADMISSION REVIEW     Payor: TIA  Subscriber #:  [id number]  Authorization Number: [id number]    Admit date: 11/11/24  Admit time:  3:03 PM       REVIEW DOCUMENTATION:     ED Provider Notes        ED Provider Notes signed by Ezra Ritchie MD at 11/11/2024  2:33 PM       Author: Ezra Ritchie MD Service: -- Author Type: Physician    Filed: 11/11/2024  2:33 PM Date of Service: 11/11/2024 12:25 PM Status: Signed    : Ezra Ritchie MD (Physician)           Patient Seen in: [facility] Emergency Department      History     Chief Complaint   Patient presents with    Hyperglycemia    Eval-D     Stated Complaint:     Subjective:   HPI      Patient is a 45-year-old female who states she has been drinking heavily for the past 4 days.  Patient states she is an alcoholic.  Patient states her last drink was last night.  Patient states this morning she started vomiting has vomited roughly 8-10 times liquid or if she tries to drink.  Patient states she developed abdominal pain up to 8 out of 10 epigastric right upper quadrant.  Patient also had elevated blood sugar 385.  Patient did take 15 units Humalog about 3 hours ago.  Patient has a history of DKA as well as pancreatitis and states it does feel similar to both.  Patient called the ambulance came in for evaluation.  Remainder of review of systems negative.    Objective:     Past Medical History:    Anxiety    Arrhythmia    SVT    Breast CA (HCC)    Depression    High blood pressure    OTHER DISEASES    seasonal allergies    Pancreatitis (HCC)    Pneumonia due to organism    SVT (supraventricular tachycardia) (HCC)              Past Surgical History:   Procedure Laterality Date    Breast surgery      Implant left      Implant right      Mastectomy left      Mastectomy right      Ndsc ablation & rcnstj [facility] w/o bypass                  Social History     Socioeconomic History    Marital status: Single   Tobacco Use    Smoking status: Every Day      Current packs/day: 0.50     Types: Cigarettes    Smokeless tobacco: Never    Tobacco comments:     1/2ppd x 8 years   Vaping Use    Vaping status: Never Used   Substance and Sexual Activity    Alcohol use: Yes     Alcohol/week: 7.0 standard drinks of alcohol     Types: 7 Shots of liquor per week     Comment: 3-4x/wk    Drug use: Yes     Frequency: 7.0 times per week     Types: Cannabis     Comment: use daily   Other Topics Concern    Exercise Yes     Comment: MODERATE    Seat Belt Yes    Self-Exams Yes     Social Drivers of Health     Financial Resource Strain: Low Risk  (1/23/2023)    Received from [facility], [facility]    Financial Resource Strain     In the past year, have you or any family members you live with been unable to get any of the following when it was really needed? Check all that apply.: None   Food Insecurity: No Food Insecurity (1/23/2023)    Received from [facility], [facility]    Food Insecurity     RETIRE How often in the past 12 months would you say you are worried or stressed about having enough money to buy nutritious meals? : Never   Transportation Needs: No Transportation Needs (1/23/2023)    Received from [facility], [facility], [facility]    PRAPARE - Transportation     In the past 12 months, has lack of transportation kept you from medical appointments or from getting medications?: No     In the past 12 months, has lack of transportation kept you from meetings, work, or from getting things needed for daily living?: No   Social Connections: Socially Integrated (1/23/2023)    Received from [facility], [facility]    Social Connections     How often do you see or talk to people that you care about and feel close to? (For example: talking to friends on the phone, visiting friends or family, going to [employer] or club meetings): 5 or more times a week                  Physical Exam     ED  Triage Vitals   BP 11/11/24 1200 (!) 143/92   Pulse 11/11/24 1200 120   Resp 11/11/24 1200 26   Temp 11/11/24 1235 98.1 °F (36.7 °C)   Temp src 11/11/24 1235 Temporal   SpO2 11/11/24 1200 100 %   O2 Device 11/11/24 1200 None (Room air)       Current Vitals:   Vital Signs  BP: (!) 133/97  Pulse: 100  Resp: (!) 27  Temp: 98.1 °F (36.7 °C)  Temp src: Temporal  MAP (mmHg): (!) 108    Oxygen Therapy  SpO2: 97 %  O2 Device: None (Room air)        Physical Exam   GENERAL: Patient resting  on the cart in no acute distress.  HEENT: Extraocular muscles intact, pupils equal round reactive to light and accommodation.  Mouth normal, neck supple, no meningismus.  LUNGS: Lungs clear to auscultation bilaterally.  CARDIOVASCULAR: + S1-S2, regular rate and rhythm, no murmurs.  BACK: No CVA tenderness, no midline bony tenderness.  ABDOMEN: + Bowel sounds, soft, moderate diffuse tenderness especially epigastric right upper quad, nondistended.  No rebound, no guarding, no hepatosplenomegaly.  EXTREMITIES: Full range of motion, no tenderness, good capillary refill.  SKIN: No rash, good turgor.  NEURO: Patient answers questions appropriately.  No focal deficits appreciated.        ED Course     Labs Reviewed   COMP METABOLIC PANEL (14) - Abnormal; Notable for the following components:       Result Value    Glucose 320 (*)     Sodium 129 (*)     Chloride 90 (*)     CO2 15.0 (*)     Anion Gap 24 (*)     BUN 8 (*)     Creatinine 1.23 (*)     eGFR-Cr 56 (*)     Alkaline Phosphatase 111 (*)     Total Protein 8.5 (*)     Albumin 5.0 (*)     All other components within normal limits   CBC WITH DIFFERENTIAL WITH PLATELET - Abnormal; Notable for the following components:    WBC 13.9 (*)     RBC 5.66 (*)     HGB 17.9 (*)     HCT 51.6 (*)     Neutrophil Absolute Prelim 11.72 (*)     Neutrophil Absolute 11.72 (*)     Lymphocyte Absolute 0.97 (*)     All other components within normal limits   VENOUS BLOOD GAS - Abnormal; Notable for the following  components:    Venous pH 7.26 (*)     Venous pCO2 30 (*)     Venous pO2 29 (*)     Venous HCO3 13.7 (*)     Venous O2Hb 35.4 (*)     All other components within normal limits   ETHYL ALCOHOL - Abnormal; Notable for the following components:    Ethyl Alcohol 37 (*)     All other components within normal limits   LACTIC ACID RESPIRATORY - Abnormal; Notable for the following components:    Lactic Acid (Blood Gas) 6.8 (*)     All other components within normal limits   POCT GLUCOSE - Abnormal; Notable for the following components:    POC Glucose 330 (*)     All other components within normal limits   POCT GLUCOSE - Abnormal; Notable for the following components:    POC Glucose 263 (*)     All other components within normal limits   LIPASE - Normal   URINALYSIS WITH CULTURE REFLEX   HEMOGLOBIN A1C   PROCALCITONIN   RAINBOW DRAW LAVENDER   RAINBOW DRAW LIGHT GREEN   RAINBOW DRAW BLUE   RAINBOW DRAW GOLD     EKG    Rate, intervals and axes as noted on EKG Report.  Rate: 108  Rhythm: Sinus Rhythm  Reading: Sinus tachycardia, nonspecific ST changes                Chest x-ray No evidence of active cardiopulmonary disease.   Independent reviewed by myself, no pneumothorax    CT abdomen pelvis1. Findings of chronic pancreatitis with prominent parenchymal atrophy and calcification of the residual pancreas.  No regional inflammatory stranding to indicate an acute inflammatory component.      2. There are thick-walled fluid collections largely replacing the pancreatic parenchyma within the body and tail segments most in keeping with pancreatic pseudocyst, dimensions as above.  The collection of the pancreatic body is increased in size   compared to 08/01/2023.  No surrounding inflammatory stranding to indicate superimposed infection, though this is difficult to exclude by imaging alone.  Clinical and biochemical correlation advised.      3. Normal liver morphology with moderate hepatic steatosis.      4. 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          MDM    Patient given IV fluids.  Patient given Zofran for nausea.  Patient given Dilaudid for pain.  Patient's lipase did come back normal.  Patient's blood sugar was 330, CO2 15 with pH 7.26.  Patient's symptoms consistent with DKA.  Patient was given additional IV fluids as well as started on an insulin drip.  I did speak with the intensivist patient admitted to the ICU.  I did speak with the [place] hospitalist.  Patient was stable during her stay in the emergency department.  I did consider DKA, pancreatitis, alcohol withdrawal, gastroenteritis.    A total of 50 minutes of critical care time (exclusive of billable procedures) was administered to manage the patient's critical lab values due to her DKA.  This involved direct patient intervention, complex decision making, and/or extensive discussions with the patient, family, and clinical staff.      Admission disposition: 11/11/2024  2:29 PM           Medical Decision Making      Disposition and Plan     Clinical Impression:  1. Type 1 diabetes mellitus with ketoacidosis without coma (HCC)    2. Alcohol abuse    3. Pseudocyst of pancreas (HCC)         Disposition:  Admit  11/11/2024  2:29 pm    Follow-up:  No follow-up provider specified.        Medications Prescribed:  Current Discharge Medication List              Supplementary Documentation:         Hospital Problems       Present on Admission  Date Reviewed: 1/29/2023            ICD-10-CM Noted POA    * (Principal) Type 1 diabetes mellitus with ketoacidosis without coma (HCC) E10.10 11/11/2024 Unknown                                                          Signed by Ezra Ritchie MD on 11/11/2024  2:33 PM         MEDICATIONS ADMINISTERED IN LAST 1 DAY:  busPIRone (Buspar) tab 20 mg       Date Action Dose Route User    11/12/2024 0837 Given 20 mg Oral Tracey Yu, RN    11/11/2024 2111 Given 20 mg Oral Cecil  JAMAAL Pagan          chlordiazePOXIDE (Librium) cap 25 mg       Date Action Dose Route User    11/12/2024 0837 Given 25 mg Oral Tracey Yu RN    11/12/2024 0450 Given 25 mg Oral Latasha Jacob RN    11/11/2024 2110 Given 25 mg Oral Latasha Jacob RN          dextrose 5%-sodium chloride 0.45% infusion       Date Action Dose Route User    11/11/2024 1445 New Bag (none) Intravenous Oleg Blancas RN          folic acid (Folvite) tab 1 mg       Date Action Dose Route User    11/12/2024 0838 Given 1 mg Oral Tracey Yu RN          gabapentin (Neurontin) cap 300 mg       Date Action Dose Route User    11/12/2024 0837 Given 300 mg Oral Tracey Yu RN    11/11/2024 2111 Given 300 mg Oral Latasha Jacob RN          HYDROmorphone (Dilaudid) 1 MG/ML injection 0.5 mg       Date Action Dose Route User    11/11/2024 1513 Given 0.5 mg Intravenous Savannah Blancas RN          HYDROmorphone (Dilaudid) 1 MG/ML injection 0.2 mg       Date Action Dose Route User    11/12/2024 1124 Given 0.2 mg Intravenous Lala Giordano RN    11/12/2024 0512 Given 0.2 mg Intravenous Latasha Jacob RN    11/12/2024 0039 Given 0.2 mg Intravenous Latasha Jacob RN          HYDROmorphone (Dilaudid) 1 MG/ML injection 0.4 mg       Date Action Dose Route User    11/12/2024 0830 Given 0.4 mg Intravenous Tracey Yu RN    11/11/2024 2159 Given 0.4 mg Intravenous Latasha Jacob RN    11/11/2024 1939 Given 0.4 mg Intravenous Latasha Jacob RN    11/11/2024 1734 Given 0.4 mg Intravenous Savannah Blancas RN          insulin aspart (NovoLOG) 100 Units/mL FlexPen 1-68 Units       Date Action Dose Route User    11/12/2024 1234 Given 3 Units Subcutaneous (Left Upper Arm) Lala Giordano RN    11/12/2024 0845 Given 2 Units Subcutaneous (Right Upper Arm) Tracey Yu, RN          insulin aspart (NovoLOG) 100 Units/mL FlexPen 1-68 Units       Date Action Dose Route User    11/12/2024 1140 Given 5 Units  Subcutaneous (Left Lower Abdomen) Lala Giordano RN          insulin degludec (Tresiba) 100 units/mL flextouch 26 Units       Date Action Dose Route User    11/11/2024 2140 Given 26 Units Subcutaneous (Left Lower Abdomen) Latasha Jacob RN          insulin regular human (Novolin R, Humulin R) 100 Units in sodium chloride 0.9% 100 mL standard infusion (100 mL)       Date Action Dose Route User    11/11/2024 1441 Rate/Dose Change 2 Units/hr Intravenous Oleg Blancas RN          insulin regular human (Novolin R, Humulin R) 100 Units in sodium chloride 0.9% 100 mL standard infusion (100 mL)       Date Action Dose Route User    11/11/2024 2205 Rate/Dose Change 0.5 Units/hr Intravenous Latasha Jacob RN    11/11/2024 2104 Rate/Dose Change 1 Units/hr Intravenous Latasha Jacob RN    11/11/2024 2004 Rate/Dose Change 1.5 Units/hr Intravenous Latasha Jacob RN    11/11/2024 1900 Rate/Dose Change 1 Units/hr Intravenous Savannah Blancas RN    11/11/2024 1803 Rate/Dose Change 2 Units/hr Intravenous Savannah Blancas RN    11/11/2024 1555 Rate/Dose Change 4 Units/hr Intravenous Savannah Blancas RN          LORazepam (Ativan) 2 mg/mL injection 1 mg       Date Action Dose Route User    11/11/2024 1546 Given 1 mg Intravenous Savannah Blancas RN          LORazepam (Ativan) 2 mg/mL injection       Date Action Dose Route User    11/11/2024 1546 Given 1 mg Intravenous Savannah Blancas RN          magnesium sulfate 4 g/100mL IVPB premix 4 g       Date Action Dose Route User    11/11/2024 1757 New Bag 4 g Intravenous Savannah Blancas RN          metoprolol succinate ER (Toprol XL) 24 hr tab 100 mg       Date Action Dose Route User    11/12/2024 0837 Given 100 mg Oral Tracey Yu RN    11/11/2024 2110 Given 100 mg Oral Latasha Jacob RN          montelukast (Singulair) tab 10 mg       Date Action Dose Route User    11/11/2024 2110 Given 10 mg Oral Latasha Jacob, RN          ondansetron (Zofran) 4 MG/2ML  injection 4 mg       Date Action Dose Route User    11/12/2024 0830 Given 4 mg Intravenous Tracey Yu RN    11/11/2024 2145 Given 4 mg Intravenous Latasha Jacob RN          pantoprazole (Protonix) DR tab 40 mg       Date Action Dose Route User    11/12/2024 0614 Given 40 mg Oral Latasha Jacob RN          potassium chloride 20 mEq/100mL IVPB premix 20 mEq       Date Action Dose Route User    11/12/2024 1129 New Bag 20 mEq Intravenous GiordanoLala child RN          potassium phosphate dibasic 15 mmol in sodium chloride 0.9% 250 mL IVPB       Date Action Dose Route User    11/11/2024 1752 New Bag 15 mmol Intravenous Savannah Blancas RN          potassium phosphate dibasic 15 mmol in sodium chloride 0.9% 250 mL IVPB       Date Action Dose Route User    11/12/2024 0615 New Bag 15 mmol Intravenous Latasha Jacob RN          sertraline (Zoloft) tab 200 mg       Date Action Dose Route User    11/12/2024 0837 Given 200 mg Oral Tracey Yu RN          sodium phosphate 15 mmol in 0.9% NaCl 100mL IVPB premix       Date Action Dose Route User    11/11/2024 2159 Given 15 mmol Intravenous Latasha Jacob RN          multivitamin (Tab-A-Toni/Beta Carotene) tab 1 tablet       Date Action Dose Route User    11/12/2024 0837 Given 1 tablet Oral Tracey Yu RN          thiamine (Vitamin B1) tab 100 mg       Date Action Dose Route User    11/12/2024 0838 Given 100 mg Oral Tracey Yu RN          thiamine 100 mg/mL injection 100 mg       Date Action Dose Route User    11/11/2024 1733 Given 100 mg Intravenous Savannah Blancas RN          traZODone (Desyrel) tab 200 mg       Date Action Dose Route User    11/11/2024 2111 Given 200 mg Oral Latasha Jacob RN            Vitals (last day)       Date/Time Temp Pulse Resp BP SpO2 Weight O2 Device O2 Flow Rate (L/min) Who    11/12/24 1204 -- 67 -- -- 90 % -- -- -- AL    11/12/24 1203 -- 73 -- -- 95 % -- -- -- AL    11/12/24 1202 -- 72 -- -- 96 % -- --  -- AL    11/12/24 1200 -- 67 -- 146/83 -- -- -- -- AD    11/12/24 1200 98.3 °F (36.8 °C) -- 16 -- 94 % -- None (Room air) 0 L/min AL    11/12/24 1159 -- 75 -- -- 95 % -- -- -- AL    11/12/24 1158 -- 70 -- -- 95 % -- -- -- AL    11/12/24 1003 -- 80 20 -- 94 % -- -- -- AL    11/12/24 1002 -- 89 20 -- 85 % -- -- -- AL    11/12/24 1001 -- 87 23 -- 93 % -- -- -- AL    11/12/24 1000 -- -- -- 100/68 -- -- -- -- MD    11/12/24 1000 -- 82 15 -- 87 % -- -- -- AL    11/12/24 0959 -- 84 21 -- 91 % -- -- -- AL    11/12/24 0958 -- 82 30 -- 93 % -- -- -- AL    11/12/24 0951 -- 77 14 -- -- -- -- -- NL    11/12/24 0900 98.3 °F (36.8 °C) 71 11 100/68 92 % -- -- -- MD    11/12/24 0800 -- 89 17 120/73 90 % -- -- -- MD    11/12/24 0700 -- 82 16 108/65 94 % -- -- --     11/12/24 0600 -- 72 11 100/67 92 % -- -- --     11/12/24 0500 -- 88 17 107/64 94 % -- -- --     11/12/24 0400 99 °F (37.2 °C) 82 11 85/45 91 % 139 lb 1.8 oz (63.1 kg) Nasal cannula 2 L/min     11/12/24 0310 -- 79 10 88/50 90 % -- -- --     11/12/24 0300 -- 77 13 81/54 91 % -- -- --     11/12/24 0200 -- 72 16 101/65 93 % -- -- --     11/12/24 0100 -- 72 17 94/62 94 % -- -- --     11/12/24 0030 -- 90 23 106/67 97 % -- Nasal cannula 2 L/min     11/12/24 0000 99.1 °F (37.3 °C) -- -- -- -- -- -- --     11/11/24 2300 -- 75 9 107/66 94 % -- -- --     11/11/24 2200 -- 83 29 137/84 94 % -- -- --     11/11/24 2100 -- 91 18 145/93 93 % -- -- --     11/11/24 2000 98.5 °F (36.9 °C) 80 9 136/88 94 % -- Nasal cannula 2 L/min     11/11/24 1900 -- 76 14 130/83 97 % -- -- --     11/11/24 1800 -- 82 13 91/71 93 % -- Nasal cannula 2 L/min     11/11/24 1700 -- 86 18 124/81 91 % -- -- --     11/11/24 1630 -- -- -- -- -- 139 lb 8.8 oz (63.3 kg) -- --     11/11/24 1600 96.8 °F (36 °C) 83 17 109/85 95 % -- None (Room air) --     11/11/24 1515 -- 90 23 123/78 94 % -- -- --     11/11/24 1500 97.6 °F (36.4 °C) 96 20 123/78 97 % 139 lb 8.8 oz (63.3 kg) None  (Room air) --     11/11/24 1330 -- 100 27 133/97 97 % -- -- --     11/11/24 1300 -- 110 24 123/92 97 % -- -- --     11/11/24 1258 -- 112 -- 125/93 -- -- -- --     11/11/24 1245 -- 112 26 125/93 97 % -- -- --     11/11/24 1235 98.1 °F (36.7 °C) -- -- -- -- -- -- --     11/11/24 1230 -- 101 29 137/98 99 % -- None (Room air) --     11/11/24 1209 -- -- -- -- -- -- None (Room air) --     11/11/24 1207 -- -- -- -- -- 165 lb (74.8 kg) -- --     11/11/24 1200 -- 120 26 143/92 100 % -- None (Room air) --           CIWA Scores (since admission)       Date/Time CIWA-Ar Total Who    11/12/24 1200 0 AD    11/12/24 1000 1 MD    11/12/24 0800 2 MD    11/12/24 0400 0     11/12/24 0000 4     11/11/24 2200 3     11/11/24 2100 6     11/11/24 1800 2 AG    11/11/24 1700 2     11/11/24 1600 1 AG    11/11/24 1515 7     11/11/24 1258 5             H&P    Chief Complaint:       Chief Complaint   Patient presents with    Hyperglycemia    Eval-D            Subjective:  History of Present Illness:      Blanca Coats is a 44 year old female with a past medical history of etoh abuse, pancreatitis, ani=xiety, DM1.  She comes to the ED now due to dyspnea, abdominal pain and nausea with nonbilious, nonbloody vomiting.  She states that she has been drinking more  alcohol.  She has had alcohol withdrawal in the past and has had increasing anxiety and tremors.  In the emergency room she was noted to be in DKA and was started on insulin drip.  She states that her Omnipod ran out of insulin.           Assessment & Plan:  #DKA  Insulin drip, transition to subcutaneous when able  DKA protocol  IVF     #Etoh dependance and w/d  CIWA     #Chronic pancreatitis with pancreatic pseudocyst     #Chronic splenic andrés occlusion     #Etoh hepatic steatosis     #Anxiety/depression/ADHD  Hold stimulants, benzos, trazodone while on CIWA        All diagnosis' and recommendations discussed with patient and/or family in detail.        11/11 PULMONARY CONSULT NOTE    History Of Present Illness:  Blanca Coats is a 44 year old female with PMHx significant for DM1, ETOH use disorder, pancreatitis who presented to the ED with c/o SOB, abdominal pain, nausea and vomiting stating she is withdrawing from ETOH. States her last drink was last night where she drank 1 pint of vodka. She denies history of etoh withdrawal seizures. Labs were notable for DKA, glucose 320, bicarb 15, Cr 1.23, PH 7.26 suggestive of DKA. Her omnipod ran out last night and she got behind on her insulin dosing. She was started on an insulin drip and ICU was called for further management of DKA, STEVEN and etoh withdrawal.       OBJECTIVE  Vitals:  BP (!) 125/93   Pulse 112   Temp 98.1 °F (36.7 °C) (Temporal)   Resp (!) 29   Ht 167.6 cm (5' 6\")   Wt 165 lb (74.8 kg)   SpO2 99%   BMI 26.63 kg/m²     Labs:        Lab Results   Component Value Date     WBC 13.9 11/11/2024     HGB 17.9 11/11/2024     HCT 51.6 11/11/2024     .0 11/11/2024     CREATSERUM 1.23 11/11/2024     BUN 8 11/11/2024      11/11/2024     K 3.9 11/11/2024     CL 90 11/11/2024     CO2 15.0 11/11/2024      11/11/2024     CA 9.4 11/11/2024     ALB 5.0 11/11/2024     ALKPHO 111 11/11/2024     BILT 0.5 11/11/2024     TP 8.5 11/11/2024     AST 30 11/11/2024     ALT 14 11/11/2024               Assessment/Plan:  DKA  - IVF  - Insulin drip per DKA protocol  - Accuchecks Q1h  - BMP, mg, phos q4h  - NPO  - DM APRN to see     ETOH intoxication  ETOH use disorder  ETOH withdrawal  - Last drink 11/11, ethyl alcohol 37 on admission  - CIWA protocol, PRN ativan  - Will start PO librium taper  - MV/Thiamine/Folic acid  - Seizure precautions  - Psych to see     STEVEN  - IVF  - Avoid nephrotoxins  - Strict I/O  - Daily BMP     Hyponatremia  - Corrected sodium for hyperglycemia ~133  - IVF     Leukocytosis  - Likely reactive 2/2 to above  - Check procal  - Check UA     Abdominal pain  Nausea  Vomiting  - Lipase  normal  - CT A/P pending with findings suggestive of chronic pancreatitis no evidence of acute inflammation  - Pain management  - Antiemetics PRN     History of DM1  - A1c pending  - PTA lantus, humalog on hold  - DM APRN to see     History of breast ca  - s/p bilateral mastectomy with reconstruction, last chemo 2017     History of anxiety/depression  - PTA meds     HTN  SVT  - BB     F/E/N  - IVF  - Replete electrolytes per protocol  - NPO     Ppx  - subcutaneous lovenox  - SCDs     Dispo  - Full code  - ICU monitoring      11/12 PULMONARY NOTE       Subjective:  Transitioned off insulin drip overnight, remains on PO librium taper for ETOH withdrawal. Max CIWA 6 overnight, no ativan requirements.          /65   Pulse 82   Temp 99 °F (37.2 °C) (Tympanic)   Resp 16   Ht 167.6 cm (5' 6\")   Wt 139 lb 1.8 oz (63.1 kg)   SpO2 94%   BMI 22.45 kg/m²      Physical Exam:              General Appearance: Alert, cooperative, no distress, appears stated age  Neck: No JVD, neck supple, no adenopathy, trachea midline  Lungs: Clear to auscultation bilaterally, respirations unlabored  Heart: Regular rate and rhythm, S1 and S2 normal, no murmur, rub or gallop  Abdomen: Soft, non-tender, bowel sounds active all four quadrants, no masses, no organomegaly  Extremities: Extremities normal, atraumatic, no cyanosis or edema,capillary refill <3 sec.    Pulses: 2+ and symmetric all extremities  Skin: Skin color, texture, turgor normal for ethnicity, no rashes or lesions, warm and dry             Recent Labs   Lab 11/11/24  1210 11/12/24  0442   RBC 5.66* 4.22   HGB 17.9* 13.2   HCT 51.6* 37.0   MCV 91.2 87.7   MCH 31.6 31.3   MCHC 34.7 35.7   RDW 14.6 14.5   NEPRELIM 11.72*  --    WBC 13.9* 8.7   .0 135.0*              Recent Labs   Lab 11/11/24  1210 11/11/24  1616 11/11/24  2004 11/12/24  0040 11/12/24  0442   *   < > 150* 148* 147*   BUN 8*   < > 6* <5* <5*   CREATSERUM 1.23*   < > 0.78 0.72 0.76   CA 9.4   < >  8.9 8.8 8.5*   ALB 5.0*  --   --   --  3.6   *   < > 129* 131* 132*   K 3.9   < > 3.8 3.4* 3.5  3.5   CL 90*   < > 96* 98 100   CO2 15.0*   < > 25.0 26.0 27.0   ALKPHO 111*  --   --   --  76   AST 30  --   --   --  22   ALT 14  --   --   --  9*   BILT 0.5  --   --   --  0.8   TP 8.5*  --   --   --  6.0          Assessment/Plan:  DKA- resolved  - IVF  - Insulin drip transitioned off overnight  - ADAT  - Daily BMP     ETOH use disorder  ETOH withdrawal  - Last drink 11/11, ethyl alcohol 37 on admission  - CIWA protocol, PRN ativan- none required overnight  - Continue PO librium taper  - MV/Thiamine/Folic acid  - Seizure precautions  - Psych to see     Hypoxia  - Requiring 2L/NC, congested cough  - Check chest xray today  - Check RVP     STEVEN  - Avoid nephrotoxins  - Strict I/O- ~1100cc output overnight  - Daily BMP     Abdominal pain  Nausea  Vomiting  - Lipase normal  - CT A/P pending with findings suggestive of chronic pancreatitis no evidence of acute inflammation  - Pain management  - Antiemetics PRN     History of DM1  - A1c 10.1  - Degludec, SSI  - DM APRN to see     History of breast ca  - s/p bilateral mastectomy with reconstruction, last chemo 2017     History of anxiety/depression  - PTA meds     HTN  SVT  - BB     F/E/N  - Replete electrolytes per protocol  - ADAT     Ppx  - subcutaneous lovenox  - SCDs     Dispo:   - Full code  - Stable to transfer to general medicine floor      11/12 HOSPITALIST NOTE    Subjective:  Patient feels better today     Vital signs:  Temp:  [96.8 °F (36 °C)-99.1 °F (37.3 °C)] 98.3 °F (36.8 °C)  Pulse:  [] 77  Resp:  [9-29] 14  BP: ()/(45-98) 100/68  SpO2:  [90 %-100 %] 92 %         Assessment & Plan:  #DKA, uncontrolled DM2  Insulin drip->subcutaneous insulin     #Etoh dependance and w/d  CIWA  Librium taper     #Cough with mild hypoxia/resp insuff  CXR noted  Start IS and monitor  RVP pending     #Chronic pancreatitis with pancreatic pseudocyst     #Chronic  splenic andrés occlusion     #Etoh hepatic steatosis     #Anxiety/depression/ADHD  Hold stimulants, benzos, trazodone while on CIWA     Dispo: better today.  Discussed with intesnsivist. Transfer out of ICU.   might be able to bring DM supplies for home.  CXR independently reviewed: atelectasis.      11/12 ENDOCRINOLOGY NOTE    Reason for Consult:   Management recommendations in setting of T1DM w/ DKA           Assessment/Recommendations:  Upon interview today, patient states that during her last ETOH binge, she didn't realize her OmniPod insulin pump ran out of insulin and she never replaced it; when she noticed ~24 later, she tried self-dosing insulin via pen, but was not able to correct glucose at home. Patient states she was started on OmniPod in July and admits to multiple episodes of forgetting to replace the device when it runs out of insulin. 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 Finally, patient states she eats multiple small meals per day d/t continuous nausea, especially after high-carb meals (I.e. pizza and chinese food); explained to patient likely component of gastroparesis and recommended to continue multiple small meals, but focus on low-carb options.      Explained to patient choice between restarting OmniPod insulin pump versus reverting back to multiple daily injections of basal/bolus insulin. Patient states she thinks OmniPod is a better option as it will provide more routine insulin infusion; states she often forgets to inject insulin throughout the day, especially when drinking. Patient did state she has seen multiple days of high glucose readings with recent OmniPod. Explained could be bad site versus need for change in infusion settings. Patient does not have OmniPod reader or new cartridges at bedside; states her fiance will need to pick supplies up from pharmacy.  Explained to patient we can help her with restarting and switching settings if he is able to bring in supplies prior to discharge; reiterated need to restart pump 24 hours after last long-acting insulin injection. If patient without supplies prior to discharge, will need to restart pump once home. Finally, recommended patient seek ETOH abuse assistance; patient declines help and states she will be able to stop drinking on her own. Patient aware of plan of care and endorses willingness to participate in plan.        Plan:  Inpatient recommendations -   Degludec = recommend continuing at 28u every day  Per outpatient endo, pump backup plan includes basal insulin at 30u every day  Aspart = recommend initiating at outpatient endo's pump backup plan recommendations - 1:40>140 & 1:10gm CHO  Discharge recommendations -   When able, restart OmniPod pump 24 hours after last degludec dose  If not able to restart inpatient, will need to see outpatient endo for setting changes

## 2024-11-12 NOTE — PLAN OF CARE
Assumed pt care this morning at 0730.  Pt is A&Ox4, following commands.   Pt on room air, VSS.  NSR  Pt reports moderate abd pain. Dilaudid given per MAR  Pt is SBA  Pt on carb control diet. Tolerating diet.   L forearm SL.  K+ replaced.   R arm precautions. CGM on R arm, not in use.  CIWAs, currently not scoring. Szr precautions in place.   QID accuchecks. IS being practiced, instructed by RT.  Bed in lowest position, call light in reach.          Problem: RESPIRATORY - ADULT  Goal: Achieves optimal ventilation and oxygenation  Description: INTERVENTIONS:  - Assess for changes in respiratory status  - Assess for changes in mentation and behavior  - Position to facilitate oxygenation and minimize respiratory effort  - Oxygen supplementation based on oxygen saturation or ABGs  - Provide Smoking Cessation handout, if applicable  - Encourage broncho-pulmonary hygiene including cough, deep breathe, Incentive Spirometry  - Assess the need for suctioning and perform as needed  - Assess and instruct to report SOB or any respiratory difficulty  - Respiratory Therapy support as indicated  - Manage/alleviate anxiety  - Monitor for signs/symptoms of CO2 retention  Outcome: Progressing     Problem: GASTROINTESTINAL - ADULT  Goal: Minimal or absence of nausea and vomiting  Description: INTERVENTIONS:  - Maintain adequate hydration with IV or PO as ordered and tolerated  - Nasogastric tube to low intermittent suction as ordered  - Evaluate effectiveness of ordered antiemetic medications  - Provide nonpharmacologic comfort measures as appropriate  - Advance diet as tolerated, if ordered  - Obtain nutritional consult as needed  - Evaluate fluid balance  Outcome: Progressing     Problem: METABOLIC/FLUID AND ELECTROLYTES - ADULT  Goal: Glucose maintained within prescribed range  Description: INTERVENTIONS:  - Monitor Blood Glucose as ordered  - Assess for signs and symptoms of hyperglycemia and hypoglycemia  - Administer ordered  medications to maintain glucose within target range  - Assess barriers to adequate nutritional intake and initiate nutrition consult as needed  - Instruct patient on self management of diabetes  Outcome: Progressing  Goal: Electrolytes maintained within normal limits  Description: INTERVENTIONS:  - Monitor labs and rhythm and assess patient for signs and symptoms of electrolyte imbalances  - Administer electrolyte replacement as ordered  - Monitor response to electrolyte replacements, including rhythm and repeat lab results as appropriate  - Fluid restriction as ordered  - Instruct patient on fluid and nutrition restrictions as appropriate  Outcome: Progressing     Problem: NEUROLOGICAL - ADULT  Goal: Absence of seizures  Description: INTERVENTIONS  - Monitor for seizure activity  - Administer anti-seizure medications as ordered  - Monitor neurological status  Outcome: Progressing     Problem: ANXIETY  Goal: Will report anxiety at manageable levels  Description: INTERVENTIONS:  - Administer medication as ordered  - Teach and rehearse alternative coping skills  - Provide emotional support with 1:1 interaction with staff  Outcome: Progressing

## 2024-11-12 NOTE — PHYSICAL THERAPY NOTE
PHYSICAL THERAPY EVALUATION - INPATIENT     Room Number: 452/452-A  Evaluation Date: 11/12/2024  Type of Evaluation: Initial  Physician Order: PT Eval and Treat    Presenting Problem: Type I DM with ketoacidosis, ETOH withdrawal  Co-Morbidities : ETOH use disorder, DMI  Reason for Therapy: Mobility Dysfunction and Discharge Planning    PHYSICAL THERAPY ASSESSMENT   Patient is a 44 year old female who presented to the ED on 11/11/2024 for abdominal pain, nausea and vomiting after heavily drinking for the past 4 days.  The pt was admitted with Type I DM with ketoacidosis without coma and ETOH abuse.  Patient is currently functioning at baseline with bed mobility, transfers, and gait. Prior to admission, patient's baseline is independent.       PLAN  Patient has been evaluated and presents with no inpatient physical therapy needs.  Patient discharged from inpatient physical therapy services at this time.   Please reconsult if there is a change in status that would necessitate PT services.           GOALS  Patient was able to achieve the following goals ...    Patient was able to transfer At previous, functional level   Patient able to ambulate on level surfaces At previous, functional level     HOME SITUATION  Type of Home: [facility] (3rd floor)  Home Layout: One level;Elevator                     Lives With:  (Vasu Leung)    Drives: Yes   Patient Regularly Uses: Reading glasses     Prior Level of [place]: The pt is typically independent with ambulation and I/ADL's.  Pt works as a caregiver for the elderly.  Pt reports she falls frequently, ~once per month, which she attributes to being, \"klutzy,\" but admits her falls are associated with drinking.    SUBJECTIVE  \"I'm a little sore.\"    OBJECTIVE  Precautions: Bed/chair alarm;Seizure  Fall Risk: Standard fall risk    WEIGHT BEARING RESTRICTION     PAIN ASSESSMENT  Rating:  (\"sore\")  Location: \"all over\"  Management Techniques: Activity  promotion;Repositioning    COGNITION  Overall Cognitive Status:  WFL - within functional limits    RANGE OF MOTION AND STRENGTH ASSESSMENT  Upper extremity ROM and strength are within functional limits     Lower extremity ROM is within functional limits     Lower extremity strength is within functional limits     BALANCE  Static Sitting: Good  Dynamic Sitting: Good  Static Standing: Good  Dynamic Standing: Fair +           ACTIVITY TOLERANCE  Pulse: 77 at rest, 87 bpm with activity  Heart Rate Source: Monitor  Resp: 14                O2 WALK  Oxygen Therapy  SPO2% on Room Air at Rest: 92  SPO2% Ambulation on Oxygen: 92      AM-PAC '6-Clicks' INPATIENT SHORT FORM - BASIC MOBILITY  How much difficulty does the patient currently have...  Patient Difficulty: Turning over in bed (including adjusting bedclothes, sheets and blankets)?: None   Patient Difficulty: Sitting down on and standing up from a chair with arms (e.g., wheelchair, bedside commode, etc.): None   Patient Difficulty: Moving from lying on back to sitting on the side of the bed?: None   How much help from another person does the patient currently need...   Help from Another: Moving to and from a bed to a chair (including a wheelchair)?: None   Help from Another: Need to walk in hospital room?: None   Help from Another: Climbing 3-5 steps with a railing?: A Little       AM-PAC Score:  Raw Score: 23   Approx Degree of Impairment: 11.2%   Standardized Score (AM-PAC Scale): 56.93   CMS Modifier (G-Code): CI    FUNCTIONAL ABILITY STATUS  Gait Assessment   Functional Mobility/Gait Assessment  Gait Assistance: Modified independent  Distance (ft): 300  Assistive Device: None  Pattern:  (step through gait pattern)    Skilled Therapy Provided     Bed Mobility:  Rolling: Independent  Supine to sit: Independent   Sit to supine: NT     Transfer Mobility:  Sit to stand: Independent   Stand to sit: Mod I  Gait = Mod I      Patient End of Session: Up in chair;Needs  met;Call light within reach;RN aware of session/findings;All patient questions and concerns addressed;Hospital anti-slip socks;Alarm set    Patient Evaluation Complexity Level:  History Moderate - 1 or 2 personal factors and/or co-morbidities   Examination of body systems Low -  addressing 1-2 elements   Clinical Presentation Low- Stable   Clinical Decision Making Low Complexity       PT Session Time: 30 minutes

## 2024-11-12 NOTE — CM/SW NOTE
11/12/24 1100   CM/SW Referral Data   Referral Source    Reason for Referral Discharge planning   Informant EMR     HOME SITUATION  Type of Home: Condo (3rd floor)  Home Layout: One level;Elevator      Lives With:  (Vasu Leung)    Drives: Yes   Patient Regularly Uses: Reading glasses      Prior Level of [place]: The pt is typically independent with ambulation and I/ADL's.  Pt works as a caregiver for the elderly.  Pt reports she falls frequently, ~once per month, which she attributes to being, \"klutzy,\" but admits her falls are associated with drinking.    PT=Home.      Pt is type 1 diabetic, pt reports that she allowed her insulin pump to run out but gave herself 15 Units of Humalog prior to admission. Pt follows with Dr. [facility] in [place].     Venecia Fournier, MSN RN, CM

## 2024-11-13 VITALS
RESPIRATION RATE: 18 BRPM | DIASTOLIC BLOOD PRESSURE: 72 MMHG | HEART RATE: 75 BPM | TEMPERATURE: 98 F | BODY MASS INDEX: 22.36 KG/M2 | OXYGEN SATURATION: 95 % | WEIGHT: 139.13 LBS | SYSTOLIC BLOOD PRESSURE: 93 MMHG | HEIGHT: 66 IN

## 2024-11-13 LAB
GLUCOSE BLD-MCNC: 128 MG/DL (ref 70–99)
GLUCOSE BLD-MCNC: 153 MG/DL (ref 70–99)
PHOSPHATE SERPL-MCNC: 2.1 MG/DL (ref 2.4–5.1)
POTASSIUM SERPL-SCNC: 3.4 MMOL/L (ref 3.5–5.1)

## 2024-11-13 PROCEDURE — 99239 HOSP IP/OBS DSCHRG MGMT >30: CPT | Performed by: INTERNAL MEDICINE

## 2024-11-13 PROCEDURE — 99233 SBSQ HOSP IP/OBS HIGH 50: CPT | Performed by: CLINICAL NURSE SPECIALIST

## 2024-11-13 RX ORDER — CHLORDIAZEPOXIDE HYDROCHLORIDE 25 MG/1
CAPSULE, GELATIN COATED ORAL
Qty: 3 CAPSULE | Refills: 0 | Status: SHIPPED | OUTPATIENT
Start: 2024-11-13 | End: 2024-11-15

## 2024-11-13 RX ORDER — MELATONIN
100 DAILY
Qty: 30 TABLET | Refills: 0 | Status: SHIPPED | OUTPATIENT
Start: 2024-11-14

## 2024-11-13 RX ORDER — TRAMADOL HYDROCHLORIDE 50 MG/1
TABLET ORAL EVERY 4 HOURS PRN
Qty: 8 TABLET | Refills: 0 | Status: SHIPPED | OUTPATIENT
Start: 2024-11-13

## 2024-11-13 RX ORDER — FOLIC ACID 1 MG/1
1 TABLET ORAL DAILY
Qty: 30 TABLET | Refills: 0 | Status: SHIPPED | OUTPATIENT
Start: 2024-11-14

## 2024-11-13 NOTE — PLAN OF CARE
Assumed care at 1930. CIWA protocol, seizure precautions in place. Librium given per MAR. A&Ox4. RA. IS at the bedside. NSR on tele. Carb controlled diet, QID accuchecks. R Arm precautions. PIV L Forearm SL. Lovenox for VTE. CGM to R Arm. Cardiac electrolyte protocol. Pt c/o abdominal pain, PRN Dilaudid given per MAR. Pt c/o CP, states it has been going on for about a week. Per pt she fell at home and hit her chest against a chair. Safety precautions in place, call light within reach. Will continue with plan of care.    0019 BP 84/69, re-check 81/59. Pt asymptomatic. MD notified. Per MD, as long as MAP >65, continue to monitor.      Problem: Patient/Family Goals  Goal: Patient/Family Long Term Goal  Description: Patient's Long Term Goal: discharge    Interventions:  - follow plan of care  - See additional Care Plan goals for specific interventions  Outcome: Progressing  Goal: Patient/Family Short Term Goal  Description: Patient's Short Term Goal: pain management    Interventions:   - take PRN pain medication  - See additional Care Plan goals for specific interventions  Outcome: Progressing     Problem: RESPIRATORY - ADULT  Goal: Achieves optimal ventilation and oxygenation  Description: INTERVENTIONS:  - Assess for changes in respiratory status  - Assess for changes in mentation and behavior  - Position to facilitate oxygenation and minimize respiratory effort  - Oxygen supplementation based on oxygen saturation or ABGs  - Provide Smoking Cessation handout, if applicable  - Encourage broncho-pulmonary hygiene including cough, deep breathe, Incentive Spirometry  - Assess the need for suctioning and perform as needed  - Assess and instruct to report SOB or any respiratory difficulty  - Respiratory Therapy support as indicated  - Manage/alleviate anxiety  - Monitor for signs/symptoms of CO2 retention  Outcome: Progressing     Problem: GASTROINTESTINAL - ADULT  Goal: Minimal or absence of nausea and  vomiting  Description: INTERVENTIONS:  - Maintain adequate hydration with IV or PO as ordered and tolerated  - Nasogastric tube to low intermittent suction as ordered  - Evaluate effectiveness of ordered antiemetic medications  - Provide nonpharmacologic comfort measures as appropriate  - Advance diet as tolerated, if ordered  - Obtain nutritional consult as needed  - Evaluate fluid balance  Outcome: Progressing     Problem: METABOLIC/FLUID AND ELECTROLYTES - ADULT  Goal: Glucose maintained within prescribed range  Description: INTERVENTIONS:  - Monitor Blood Glucose as ordered  - Assess for signs and symptoms of hyperglycemia and hypoglycemia  - Administer ordered medications to maintain glucose within target range  - Assess barriers to adequate nutritional intake and initiate nutrition consult as needed  - Instruct patient on self management of diabetes  Outcome: Progressing  Goal: Electrolytes maintained within normal limits  Description: INTERVENTIONS:  - Monitor labs and rhythm and assess patient for signs and symptoms of electrolyte imbalances  - Administer electrolyte replacement as ordered  - Monitor response to electrolyte replacements, including rhythm and repeat lab results as appropriate  - Fluid restriction as ordered  - Instruct patient on fluid and nutrition restrictions as appropriate  Outcome: Progressing     Problem: NEUROLOGICAL - ADULT  Goal: Absence of seizures  Description: INTERVENTIONS  - Monitor for seizure activity  - Administer anti-seizure medications as ordered  - Monitor neurological status  Outcome: Progressing     Problem: ANXIETY  Goal: Will report anxiety at manageable levels  Description: INTERVENTIONS:  - Administer medication as ordered  - Teach and rehearse alternative coping skills  - Provide emotional support with 1:1 interaction with staff  Outcome: Progressing

## 2024-11-13 NOTE — PROGRESS NOTES
[facility]   part of [facility]     Hospitalist Progress Note     Blanca Coats Patient Status:  Inpatient    1980 MRN [id number]   [facility] 3NE-A Attending Nj Ashley MD   Hosp Day # 2 PCP Liya Velez PA-C     Chief Complaint: hyperglycemia    Subjective:     Patient without acute events overnight. FEels better today.     Objective:    Review of Systems:   ROS completed; pertinent positive and negatives stated in subjective.    Vital signs:  Temp:  [97.1 °F (36.2 °C)-98.2 °F (36.8 °C)] 98.2 °F (36.8 °C)  Pulse:  [63-75] 75  Resp:  [15-18] 18  BP: ()/() 93/72  SpO2:  [90 %-95 %] 95 %    Physical Exam:    General: No acute distress  Respiratory: Clear to auscultation bilaterally  Cardiovascular: S1, S2.  Abdomen: Soft  Neuro: No new focal deficits      Diagnostic Data:    Labs:  Recent Labs   Lab 24  1210 24  0442   WBC 13.9* 8.7   HGB 17.9* 13.2   MCV 91.2 87.7   .0 135.0*       Recent Labs   Lab 24  1210 24  1616 24  0040 24  0442 24  0636   *   < > 150* 148* 147*  --    BUN 8*   < > 6* <5* <5*  --    CREATSERUM 1.23*   < > 0.78 0.72 0.76  --    CA 9.4   < > 8.9 8.8 8.5*  --    ALB 5.0*  --   --   --  3.6  --    *   < > 129* 131* 132*  --    K 3.9   < > 3.8 3.4* 3.5  3.5 3.4*   CL 90*   < > 96* 98 100  --    CO2 15.0*   < > 25.0 26.0 27.0  --    ALKPHO 111*  --   --   --  76  --    AST 30  --   --   --  22  --    ALT 14  --   --   --  9*  --    BILT 0.5  --   --   --  0.8  --    TP 8.5*  --   --   --  6.0  --     < > = values in this interval not displayed.       Estimated Creatinine Clearance: 90.3 mL/min (based on SCr of 0.76 mg/dL).     No results for input(s): \"TROP\", \"TROPHS\", \"CK\" in the last 168 hours.    No results for input(s): \"PTP\", \"INR\" in the last 168 hours.           Imaging: Imaging data reviewed in Epic.    Medications:    insulin degludec  28 Units Subcutaneous  Nightly    insulin aspart  1-68 Units Subcutaneous TID AC&HS    chlordiazePOXIDE  25 mg Oral q12h    Followed by    [START ON 11/14/2024] chlordiazePOXIDE  25 mg Oral Q24H    gabapentin  300 mg Oral BID    busPIRone  20 mg Oral BID    metoprolol succinate ER  100 mg Oral BID    montelukast  10 mg Oral Nightly    pantoprazole  40 mg Oral QAM AC    sertraline  200 mg Oral Daily    traZODone  200 mg Oral Nightly    enoxaparin  40 mg Subcutaneous Daily    thiamine  100 mg Oral Daily    multivitamin  1 tablet Oral Daily    folic acid  1 mg Oral Daily    insulin aspart  1-68 Units Subcutaneous TID CC    haloperidol  5 mg Oral Nightly       Assessment & Plan:     #DKA, uncontrolled DM2  Insulin drip->subcutaneous insulin  DM education on DC     #Etoh dependance and w/d  CIWA  Librium taper    #Cough with mild hypoxia/resp insuff  CXR noted  Start IS and monitor  RVP negative     #Chronic pancreatitis with pancreatic pseudocyst     #Chronic splenic andrés occlusion     #Etoh hepatic steatosis     #Anxiety/depression/ADHD  Hold stimulants, benzos, trazodone while on CIWA     Rm Rolon MD      Supplementary Documentation:   P(1) + D(C1+C2+C3)  Quality:    DVT Mechanical Prophylaxis:   SCDs, Early ambuation  DVT Pharmacologic Prophylaxis   Medication    enoxaparin (Lovenox) 40 MG/0.4ML SUBQ injection 40 mg                Code Status: Full Code  Muro: No urinary catheter in place  Muro Duration (in days):   Central line:    MARK:       Discharge is dependent on: clinical stability  At this point Ms. Coats is expected to be discharge to: home    The 21st Century Cures Act makes medical notes like these available to patients in the interest of transparency. Please be advised this is a medical document. Medical documents are intended to carry relevant information, facts as evident, and the clinical opinion of the practitioner. The medical note is intended as peer to peer communication and may appear blunt or direct. It is  written in medical language and may contain abbreviations or verbiage that are unfamiliar.

## 2024-11-13 NOTE — DISCHARGE SUMMARY
[place] HOSPITALIST  DISCHARGE SUMMARY     Blanca Coats Patient Status:  Inpatient    1980 MRN [id number]   Location [facility] 3NE-A Attending Rm Rolon MD   Hosp Day # 2 PCP Liya Velez PA-C     Date of Admission: 2024  Date of Discharge:   2024      Discharge Disposition: Home or Self Care    Discharge Diagnosis:  Uncontrolled DM  ETOH dependance  Chronic pancreatitis  Anxiety    History of Present Illness: Blanca Coats is a 44 year old female with a past medical history of etoh abuse, pancreatitis, ani=xiety, DM1.  She comes to the ED now due to dyspnea, abdominal pain and nausea with nonbilious, nonbloody vomiting.  She states that she has been drinking more  alcohol.  She has had alcohol withdrawal in the past and has had increasing anxiety and tremors.  In the emergency room she was noted to be in DKA and was started on insulin drip.  She states that her Omnipod ran out of insulin.     Brief Synopsis: Pt was admitted and initailly treated on insulin gtt but was able to transitioned to subcutaneous insulin. Pt received DM education prior to DC. Pt was treated for ETOH withdrawal prior to DC.     Lace+ Score: 51  59-90 High Risk  29-58 Medium Risk  0-28   Low Risk       TCM Follow-Up Recommendation:  LACE 29-58: Moderate Risk of readmission after discharge from the hospital.      Procedures during hospitalization:   none    Incidental or significant findings and recommendations (brief descriptions):  none    Lab/Test results pending at Discharge:   none    Consultants:  [facility]    Discharge Medication List:     Discharge Medications        START taking these medications        Instructions Prescription details   folic acid 1 MG Tabs  Commonly known as: Folvite      Take 1 tablet (1 mg total) by mouth daily.   Quantity: 30 tablet  Refills: 0     Lantus SoloStar 100 UNIT/ML Sopn  Generic drug: insulin glargine      Inject 12 units in the morning & inject 14 units at night    Refills: 0     Naloxone HCl 4 MG/0.1ML Liqd      4 mg by Nasal route as needed. If patient remains unresponsive, repeat dose in other nostril 2-5 minutes after first dose.   Quantity: 1 kit  Refills: 0     thiamine 100 MG Tabs  Commonly known as: Vitamin B1      Take 1 tablet (100 mg total) by mouth daily.   Quantity: 30 tablet  Refills: 0     traMADol 50 MG Tabs  Commonly known as: Ultram      Take 1-2 tablets ( mg total) by mouth every 4 (four) hours as needed for Pain.   Quantity: 8 tablet  Refills: 0            CONTINUE taking these medications        Instructions Prescription details   ALPRAZolam 0.25 MG Tabs  Commonly known as: Xanax      Take 1 tablet (0.25 mg total) by mouth 3 (three) times daily as needed.   Refills: 0     amphetamine-dextroamphetamine ER 30 MG Cp24  Commonly known as: Adderall XR      Take 1 capsule (30 mg total) by mouth every morning.   Refills: 0     amphetamine-dextroamphetamine ER 10 MG Cp24  Commonly known as: Adderall XR      Take 1 capsule (10 mg total) by mouth every morning.   Refills: 0     busPIRone 10 MG Tabs  Commonly known as: Buspar      Take 2 tablets (20 mg total) by mouth 2 (two) times daily.   Refills: 0     gabapentin 300 MG Caps  Commonly known as: Neurontin      Take 1 capsule (300 mg total) by mouth in the morning and 1 capsule (300 mg total) before bedtime.   Refills: 0     haloperidol 5 MG Tabs  Commonly known as: Haldol      Take 1 tablet (5 mg total) by mouth at bedtime.   Refills: 0     HumaLOG KwikPen 100 UNIT/ML Sopn  Generic drug: Insulin Lispro (1 Unit Dial)      Inject 25 units per sliding scale four times a day by subcutaneous route   Refills: 0     metoprolol succinate  MG Tb24  Commonly known as: Toprol XL      Take 1 tablet (100 mg total) by mouth 2 (two) times daily. Take half tablet if blood pressure less than 130/80   Refills: 0     montelukast 10 MG Tabs  Commonly known as: Singulair      Take 1 tablet (10 mg total) by mouth daily.    Refills: 0     ondansetron 4 MG Tbdp  Commonly known as: Zofran-ODT      Take 1 tablet (4 mg total) by mouth every 8 (eight) hours as needed for Nausea.   Quantity: 20 tablet  Refills: 0     OneTouch Verio Strp      Check blood sugar two times day, before breakfast and before dinner.  (may substitute any brand covered by insurance)   Quantity: 50 strip  Refills: 0     pantoprazole 40 MG Tbec  Commonly known as: Protonix      Take 1 tablet (40 mg total) by mouth every morning before breakfast.   Refills: 0     sertraline 100 MG Tabs  Commonly known as: Zoloft      Take 2 tablets (200 mg total) by mouth daily.   Refills: 0     traZODone 100 MG Tabs  Commonly known as: Desyrel      Take 2 tablets (200 mg total) by mouth nightly.   Refills: 0            STOP taking these medications      HYDROcodone-acetaminophen 5-325 MG Tabs  Commonly known as: Norco               ASK your doctor about these medications        Instructions Prescription details   chlordiazePOXIDE 25 MG Caps  Commonly known as: Librium  Start taking on: November 13, 2024  Ask about: Should I take this medication?      Take 1 capsule (25 mg total) by mouth every 12 (twelve) hours for 1 day, THEN 1 capsule (25 mg total) daily for 1 day.   Stop taking on: November 15, 2024  Quantity: 3 capsule  Refills: 0     Sennosides 17.2 MG Tabs      Take 1 tablet (17.2 mg total) by mouth nightly as needed (constipation, as needed if no bowel movement that day).   Quantity: 10 tablet  Refills: 0               Where to Get Your Medications        These medications were sent to [id number] - [place] - [address] [phone], [phone]  [facility] [place] [address]      Phone: [phone]   chlordiazePOXIDE 25 MG Caps  folic acid 1 MG Tabs  Naloxone HCl 4 MG/0.1ML Liqd  thiamine 100 MG Tabs  traMADol 50 MG Tabs         [place]    Follow-up appointment:   Liya Velez PA-C  [address] [place]  [address]  [phone]    Follow up in 1 month(s)      Whit Frankel APRN  [address] [facility] [address]  [phone]    Follow up in 2 week(s)      Appointments for Next 30 Days 2024 - 2024      None            Vital signs:           -----------------------------------------------------------------------------------------------  PATIENT DISCHARGE INSTRUCTIONS: See electronic chart    Rm Rolon MD    Total time spent on discharge plannin minutes     The  Cures Act makes medical notes like these available to patients in the interest of transparency. Please be advised this is a medical document. Medical documents are intended to carry relevant information, facts as evident, and the clinical opinion of the practitioner. The medical note is intended as peer to peer communication and may appear blunt or direct. It is written in medical language and may contain abbreviations or verbiage that are unfamiliar.

## 2024-11-13 NOTE — BH PROGRESS NOTE
Pt evaluated by this writer on 11/13/24 for elevated CIWA score of 7 on 11/11/24. Upon chart review, Pt's history indicates chronic alcohol abuse. This writer discussed substance abuse treatment and possible mental health treatment although Pt declined resources. Pt reports that she \"has a psychiatrist and multiple doctors.\" Pt reports she has a support system. Writer informed Pt that if she were to want additional resources for substance abuse during her stay at the hospital, to reach out to RN. Pt verbalized understanding. Pt denied SI/HI/AVH.

## 2024-11-13 NOTE — CONSULTS
[facility]  Diabetes Consult Note    Blanca Coats Patient Status:  Inpatient    1980 MRN [id number]   Location [facility] 3NE-A Attending Nj Ashley MD   Hosp Day # 2 PCP Liya Velez PA-C     Reason for Consult:   Management recommendations in setting of T1DM w/ DKA      Provider Requesting Consult:  Ashley Young (ICU APN)      Diagnosis:  Patient Active Problem List   Diagnosis    Community acquired pneumonia    Alcohol-induced chronic pancreatitis (HCC)    Pseudocyst of pancreas (HCC)    Splenic vein thrombosis    Pneumonia due to infectious organism    Alcoholic intoxication with complication (HCC)    Pancreas cyst (HCC)    Pancreatic necrosis (HCC)    Acute pancreatitis (HCC)    Hyponatremia    Acute pancreatitis, unspecified complication status, unspecified pancreatitis type (HCC)    Hypokalemia    Thrombocytopenia (HCC)    Hyperglycemia    Alcohol-induced acute pancreatitis without infection or necrosis (HCC)    SVT (supraventricular tachycardia) (HCC)    Alcohol abuse    Alcohol-induced acute pancreatitis, unspecified complication status (HCC)    Abdominal pain, acute    Primary hypertension    Elevated liver enzymes    Anxiety and depression    Pancreatic cyst (HCC)    Metabolic acidosis    Dyspnea, unspecified type    Diabetic ketoacidosis without coma associated with diabetes mellitus due to underlying condition (HCC)    Tachycardia    Acute respiratory failure with hypoxia (HCC)    Oral thrush    Colitis due to Escherichia coli    Diarrhea of infectious origin    Tobacco dependence    New onset type 2 diabetes mellitus (HCC)    Lung nodule    Atrial fibrillation with rapid ventricular response (HCC)    Type 1 diabetes mellitus with ketoacidosis without coma (HCC)         Medical History:  Past Medical History:    Anxiety    Arrhythmia    SVT    Breast CA (HCC)    Depression    High blood pressure    OTHER DISEASES    seasonal allergies    Pancreatitis (HCC)    Pneumonia  due to organism    SVT (supraventricular tachycardia) (HCC)     Past Surgical History:   Procedure Laterality Date    Breast surgery      Implant left      Implant right      Mastectomy left      Mastectomy right      Ndsc ablation & rcnstj atria lmtd w/o bypass       Family History   Problem Relation Age of Onset    Heart Disorder Father     Diabetes Father     Hypertension Mother     Cancer Mother         breast         Diabetes history:  Type:  T1DM  Onset: 1/2023 w/ DKA admission  Family history of DM: father and brother w/ T1DM      Allergies: Allergies[1]      Medications: Complete list reviewed. Active diabetes medications include degludec and aspart.      Labs:  Recent Labs   Lab 11/11/24  2305 11/12/24  1134 11/12/24  1606 11/12/24  2114 11/13/24  0504   PGLU 148* 348* 210* 336* 128*     Recent Labs     11/11/24  1210 11/11/24  1325 11/11/24  1616 11/11/24  1654 11/11/24  2004 11/11/24  2103 11/12/24  0040 11/12/24  0442 11/12/24  1134   *  --  128*  --  129*  --  131* 132*  --    CL 90*  --  96*  --  96*  --  98 100  --    CO2 15.0*  --  19.0*  --  25.0  --  26.0 27.0  --    BUN 8*  --  6*  --  6*  --  <5* <5*  --    CREATSERUM 1.23*  --  0.93  --  0.78  --  0.72 0.76  --    A1C 10.2*  --   --   --   --   --   --   --   --    PGLU  --    < >  --    < >  --    < >  --   --  348*   CA 9.4  --  8.5*  --  8.9  --  8.8 8.5*  --    ALB 5.0*  --   --   --   --   --   --  3.6  --     < > = values in this interval not displayed.                    History of Present Illness: Blanca Coats is a 44 year old female with a PMH of T1DM c/b DKA (1/2023), chronic pancreatitis w/ pancreatic pseudocyst, ETOH abuse, breast CA s/p b/l mastectomy w/ chemo (2017), anxiety, depression, HTN, SVT and ETOH hepatic steatosis admitted 11/11/2024 with complaints of abdominal pain and nausea/vomiting. ED evaluation revealed ETOH detox, as well as DKA (, CO2 15, pH 7.26, bicarb 15, Cr 1.23, A1C 10.2%).          Assessment/Recommendations:  Upon interview today, patient states she is feeling much better today; state she feels her speech and mind are clearer. Again, explained to patient choice between restarting OmniPod insulin pump versus reverting back to multiple daily injections of basal/bolus insulin. Patient states she thinks OmniPod is a better option as it will provide more routine insulin infusion; states she often forgets to inject insulin throughout the day, especially when drinking. However, patient does not have OmniPod reader or new cartridges at bedside; states her fiance was not able to pick supplies up from pharmacy. Explained to patient we can help her with restarting and switching settings if he is able to bring in supplies prior to discharge; reiterated need to restart pump 24 hours after last long-acting insulin injection. If patient without supplies prior to discharge, will need to restart pump once home. Patient aware of plan of care and endorses willingness to participate in plan.      Plan:  Inpatient recommendations -   Degludec = given within range fasted AM glucose, recommend continuing at 28u every day  Per outpatient endo, pump backup plan includes basal insulin at 30u every day  Aspart = given elevated AC/HS glucose values yesterday, recommend increasing to 1:8gm CHO & 1:30>140  Discharge recommendations -   When able, restart OmniPod pump 24 hours after last degludec dose  If not able to restart inpatient, will need to see outpatient endo for setting changes  If not able to discharge with OmniPod in place, continue basal/bolus insulin at discharge:  Lantus = 28u QD  Humalog = 1:30>140 & 1:8gm CHO      Prescription Recommendations:  [employer] - [place]  Insulin: Humalog, Levemir, Lantus   Supplies:  BD pen needles (Kasia)  Glucometer:  One Touch Ultra ([employer])  CGM:  Dexcom G6 & 7, Freestyle Fernando 3   Insulin Pump:  OmniPod       Provider F/U Recommendations:  PCP - Liya  MEGAN Velez (Phorm)  Endocrinology - Dr. Schoenberger ([facility]orm)      A total of 60 minutes were spent with the patient, 100% was spent counseling and coordinating care for T1 diabetes self-management including nutrition, exercise, blood glucose monitoring, insulin administration, medications, treatment options, follow-up coordination and resources.    Whit Frankel, APRN  11/13/2024  8:10 AM         [1]   Allergies  Allergen Reactions    Bee Venom ANAPHYLAXIS    Morphine HIVES    Fentanyl RASH

## 2024-11-13 NOTE — PROGRESS NOTES
Assumed care at 7:30 am  Alert and oriented x4  Able to ambulate independently  States she was having pain in AM- IV dilaudid given  Able to tolerate diet, overall  Per Endocrinologist, okay to go home. Patient aware of follow up.   Aware to  meds.  All safety precautions in place. Will continue to monitor     Taken down to North entrance, states [employer] will pick her up.

## 2024-11-14 ENCOUNTER — PATIENT OUTREACH (OUTPATIENT)
Dept: CASE MANAGEMENT | Age: 44
End: 2024-11-14

## 2024-11-14 NOTE — PROGRESS NOTES
Hospital follow up.    Last A1C Value: 10.2% Date: [11/11/2024]    Dr. Jennifer L. Schoenberger DO  Endocrinology, Diabetes & Metabolism  Monday 12/16 @10:40  Existing appointment.    MEGAN Bang  [place]  [address] [place] [address]  [phone]    Attempt #1:  Left message on voicemail for patient to call transitions specialist back to schedule follow up appointments. Provided Transitions specialist scheduling phone number [phone].

## 2024-11-14 NOTE — PAYOR COMM NOTE
--------------  DISCHARGE REVIEW    Payor: [employer]  Subscriber #:  [id number]  Authorization Number: [id number]    Admit date: 11/11/24  Admit time:   3:03 PM  Discharge Date: 11/13/2024  1:58 PM     Admitting Physician: Katia Rivera DO  Attending Physician:  No att. providers found  Primary Care Physician: Liya Velez PA-C       Discharge Summary Notes    No notes of this type exist for this encounter.             Subjective:  Patient without acute events overnight. FEels better today.         Objective:  Review of Systems:   ROS completed; pertinent positive and negatives stated in subjective.     Vital signs:  Temp:  [97.1 °F (36.2 °C)-98.2 °F (36.8 °C)] 98.2 °F (36.8 °C)  Pulse:  [63-75] 75  Resp:  [15-18] 18  BP: ()/() 93/72  SpO2:  [90 %-95 %] 95 %     Physical Exam:    General: No acute distress  Respiratory: Clear to auscultation bilaterally  Cardiovascular: S1, S2.  Abdomen: Soft  Neuro: No new focal deficits              Recent Labs   Lab 11/11/24  1210 11/11/24  1616 11/11/24  2004 11/12/24  0040 11/12/24  0442 11/13/24  0636   *   < > 150* 148* 147*  --    BUN 8*   < > 6* <5* <5*  --    CREATSERUM 1.23*   < > 0.78 0.72 0.76  --    CA 9.4   < > 8.9 8.8 8.5*  --    ALB 5.0*  --   --   --  3.6  --    *   < > 129* 131* 132*  --    K 3.9   < > 3.8 3.4* 3.5  3.5 3.4*   CL 90*   < > 96* 98 100  --    CO2 15.0*   < > 25.0 26.0 27.0  --    ALKPHO 111*  --   --   --  76  --    AST 30  --   --   --  22  --    ALT 14  --   --   --  9*  --    BILT 0.5  --   --   --  0.8  --    TP 8.5*  --   --   --  6.0  --           Assessment & Plan:  #DKA, uncontrolled DM2  Insulin drip->subcutaneous insulin  DM education on DC     #Etoh dependance and w/d  CIWA  Librium taper     #Cough with mild hypoxia/resp insuff  CXR noted  Start IS and monitor  RVP negative     #Chronic pancreatitis with pancreatic pseudocyst     #Chronic splenic andrés occlusion     #Etoh hepatic steatosis      #Anxiety/depression/ADHD  Hold stimulants, benzos, trazodone while on MARYAM Rolon MD

## 2024-11-15 NOTE — PROGRESS NOTES
Hospital follow up.    Last A1C Value: 10.2% Date: [11/11/2024]    Dr. Jennifer L. Schoenberger DO  Endocrinology, Diabetes & Metabolism  Monday 12/16 @10:40  Existing appointment.    ME[place]  [address] [address] [address] [phone]    Patient will contact the office to schedule.  Confirmed with patient.    Closing encounter.

## 2024-11-23 ENCOUNTER — HOSPITAL ENCOUNTER (OUTPATIENT)
Facility: HOSPITAL | Age: 44
Setting detail: OBSERVATION
Discharge: HOME | End: 2024-11-25
Attending: EMERGENCY MEDICINE | Admitting: HOSPITALIST
Payer: MEDICAID

## 2024-11-23 DIAGNOSIS — K86.1 CHRONIC PANCREATITIS, UNSPECIFIED PANCREATITIS TYPE (HCC): Primary | ICD-10-CM

## 2024-11-23 LAB
ALBUMIN SERPL-MCNC: 3.9 G/DL (ref 3.2–4.8)
ALBUMIN/GLOB SERPL: 1 {RATIO} (ref 1–2)
ALP LIVER SERPL-CCNC: 172 U/L
ALT SERPL-CCNC: 11 U/L
ANION GAP SERPL CALC-SCNC: 18 MMOL/L (ref 0–18)
AST SERPL-CCNC: 20 U/L (ref ?–34)
BASOPHILS # BLD AUTO: 0.09 X10(3) UL (ref 0–0.2)
BASOPHILS NFR BLD AUTO: 1 %
BILIRUB SERPL-MCNC: 0.3 MG/DL (ref 0.3–1.2)
BUN BLD-MCNC: 6 MG/DL (ref 9–23)
CALCIUM BLD-MCNC: 9.6 MG/DL (ref 8.7–10.4)
CHLORIDE SERPL-SCNC: 101 MMOL/L (ref 98–112)
CO2 SERPL-SCNC: 19 MMOL/L (ref 21–32)
CREAT BLD-MCNC: 0.66 MG/DL
EGFRCR SERPLBLD CKD-EPI 2021: 112 ML/MIN/1.73M2 (ref 60–?)
EOSINOPHIL # BLD AUTO: 0.01 X10(3) UL (ref 0–0.7)
EOSINOPHIL NFR BLD AUTO: 0.1 %
ERYTHROCYTE [DISTWIDTH] IN BLOOD BY AUTOMATED COUNT: 15.3 %
GLOBULIN PLAS-MCNC: 3.8 G/DL (ref 2–3.5)
GLUCOSE BLD-MCNC: 123 MG/DL (ref 70–99)
HCT VFR BLD AUTO: 47.7 %
HGB BLD-MCNC: 16.3 G/DL
IMM GRANULOCYTES # BLD AUTO: 0.03 X10(3) UL (ref 0–1)
IMM GRANULOCYTES NFR BLD: 0.3 %
LIPASE SERPL-CCNC: 24 U/L (ref 12–53)
LYMPHOCYTES # BLD AUTO: 2.63 X10(3) UL (ref 1–4)
LYMPHOCYTES NFR BLD AUTO: 30.4 %
MCH RBC QN AUTO: 31.4 PG (ref 26–34)
MCHC RBC AUTO-ENTMCNC: 34.2 G/DL (ref 31–37)
MCV RBC AUTO: 91.9 FL
MONOCYTES # BLD AUTO: 0.56 X10(3) UL (ref 0.1–1)
MONOCYTES NFR BLD AUTO: 6.5 %
NEUTROPHILS # BLD AUTO: 5.33 X10 (3) UL (ref 1.5–7.7)
NEUTROPHILS # BLD AUTO: 5.33 X10(3) UL (ref 1.5–7.7)
NEUTROPHILS NFR BLD AUTO: 61.7 %
OSMOLALITY SERPL CALC.SUM OF ELEC: 285 MOSM/KG (ref 275–295)
PLATELET # BLD AUTO: 318 10(3)UL (ref 150–450)
POTASSIUM SERPL-SCNC: 3.9 MMOL/L (ref 3.5–5.1)
PROT SERPL-MCNC: 7.7 G/DL (ref 5.7–8.2)
RBC # BLD AUTO: 5.19 X10(6)UL
SODIUM SERPL-SCNC: 138 MMOL/L (ref 136–145)
WBC # BLD AUTO: 8.7 X10(3) UL (ref 4–11)

## 2024-11-23 RX ORDER — HYDROMORPHONE HYDROCHLORIDE 1 MG/ML
0.5 INJECTION, SOLUTION INTRAMUSCULAR; INTRAVENOUS; SUBCUTANEOUS EVERY 30 MIN PRN
Status: COMPLETED | OUTPATIENT
Start: 2024-11-23 | End: 2024-11-24

## 2024-11-23 RX ORDER — ONDANSETRON 2 MG/ML
4 INJECTION INTRAMUSCULAR; INTRAVENOUS ONCE
Status: COMPLETED | OUTPATIENT
Start: 2024-11-23 | End: 2024-11-23

## 2024-11-23 RX ORDER — KETOROLAC TROMETHAMINE 15 MG/ML
15 INJECTION, SOLUTION INTRAMUSCULAR; INTRAVENOUS ONCE
Status: DISCONTINUED | OUTPATIENT
Start: 2024-11-23 | End: 2024-11-24

## 2024-11-24 ENCOUNTER — APPOINTMENT (OUTPATIENT)
Dept: CT IMAGING | Facility: HOSPITAL | Age: 44
End: 2024-11-24
Attending: EMERGENCY MEDICINE
Payer: MEDICAID

## 2024-11-24 PROBLEM — K86.1 CHRONIC PANCREATITIS, UNSPECIFIED PANCREATITIS TYPE (HCC): Status: ACTIVE | Noted: 2024-11-24

## 2024-11-24 LAB
ATRIAL RATE: 73 BPM
ETHANOL SERPL-MCNC: 282 MG/DL (ref ?–3)
GLUCOSE BLD-MCNC: 166 MG/DL (ref 70–99)
GLUCOSE BLD-MCNC: 207 MG/DL (ref 70–99)
GLUCOSE BLD-MCNC: 256 MG/DL (ref 70–99)
GLUCOSE BLD-MCNC: 259 MG/DL (ref 70–99)
GLUCOSE BLD-MCNC: 312 MG/DL (ref 70–99)
GLUCOSE BLD-MCNC: 71 MG/DL (ref 70–99)
P AXIS: 53 DEGREES
P-R INTERVAL: 138 MS
Q-T INTERVAL: 440 MS
QRS DURATION: 70 MS
QTC CALCULATION (BEZET): 484 MS
R AXIS: 72 DEGREES
T AXIS: 55 DEGREES
VENTRICULAR RATE: 73 BPM

## 2024-11-24 PROCEDURE — 99223 1ST HOSP IP/OBS HIGH 75: CPT | Performed by: HOSPITALIST

## 2024-11-24 PROCEDURE — 74177 CT ABD & PELVIS W/CONTRAST: CPT | Performed by: EMERGENCY MEDICINE

## 2024-11-24 RX ORDER — DEXTROSE MONOHYDRATE 25 G/50ML
50 INJECTION, SOLUTION INTRAVENOUS
Status: DISCONTINUED | OUTPATIENT
Start: 2024-11-24 | End: 2024-11-25

## 2024-11-24 RX ORDER — INSULIN DEGLUDEC 100 U/ML
15 INJECTION, SOLUTION SUBCUTANEOUS DAILY
Status: DISCONTINUED | OUTPATIENT
Start: 2024-11-24 | End: 2024-11-24

## 2024-11-24 RX ORDER — HYDROMORPHONE HYDROCHLORIDE 1 MG/ML
0.2 INJECTION, SOLUTION INTRAMUSCULAR; INTRAVENOUS; SUBCUTANEOUS EVERY 4 HOURS PRN
Status: DISCONTINUED | OUTPATIENT
Start: 2024-11-24 | End: 2024-11-24

## 2024-11-24 RX ORDER — METOPROLOL TARTRATE 1 MG/ML
5 INJECTION, SOLUTION INTRAVENOUS EVERY 4 HOURS PRN
Status: DISCONTINUED | OUTPATIENT
Start: 2024-11-24 | End: 2024-11-24

## 2024-11-24 RX ORDER — SODIUM CHLORIDE, SODIUM LACTATE, POTASSIUM CHLORIDE, CALCIUM CHLORIDE 600; 310; 30; 20 MG/100ML; MG/100ML; MG/100ML; MG/100ML
INJECTION, SOLUTION INTRAVENOUS CONTINUOUS
Status: DISCONTINUED | OUTPATIENT
Start: 2024-11-24 | End: 2024-11-25

## 2024-11-24 RX ORDER — SODIUM CHLORIDE 9 MG/ML
INJECTION, SOLUTION INTRAVENOUS CONTINUOUS
Status: DISCONTINUED | OUTPATIENT
Start: 2024-11-24 | End: 2024-11-24

## 2024-11-24 RX ORDER — THIAMINE HYDROCHLORIDE 100 MG/ML
100 INJECTION, SOLUTION INTRAMUSCULAR; INTRAVENOUS DAILY
Status: DISCONTINUED | OUTPATIENT
Start: 2024-11-25 | End: 2024-11-24

## 2024-11-24 RX ORDER — TRAMADOL HYDROCHLORIDE 50 MG/1
50 TABLET ORAL EVERY 4 HOURS PRN
Status: DISCONTINUED | OUTPATIENT
Start: 2024-11-24 | End: 2024-11-25

## 2024-11-24 RX ORDER — HYDROMORPHONE HYDROCHLORIDE 1 MG/ML
0.2 INJECTION, SOLUTION INTRAMUSCULAR; INTRAVENOUS; SUBCUTANEOUS EVERY 2 HOUR PRN
Status: DISCONTINUED | OUTPATIENT
Start: 2024-11-24 | End: 2024-11-24

## 2024-11-24 RX ORDER — METOPROLOL SUCCINATE 50 MG/1
100 TABLET, EXTENDED RELEASE ORAL 2 TIMES DAILY
Status: DISCONTINUED | OUTPATIENT
Start: 2024-11-24 | End: 2024-11-25

## 2024-11-24 RX ORDER — LORAZEPAM 1 MG/1
2 TABLET ORAL
Status: DISCONTINUED | OUTPATIENT
Start: 2024-11-24 | End: 2024-11-25

## 2024-11-24 RX ORDER — LORAZEPAM 1 MG/1
1 TABLET ORAL
Status: DISCONTINUED | OUTPATIENT
Start: 2024-11-24 | End: 2024-11-25

## 2024-11-24 RX ORDER — THIAMINE HYDROCHLORIDE 100 MG/ML
100 INJECTION, SOLUTION INTRAMUSCULAR; INTRAVENOUS ONCE
Status: COMPLETED | OUTPATIENT
Start: 2024-11-24 | End: 2024-11-24

## 2024-11-24 RX ORDER — HYDROMORPHONE HYDROCHLORIDE 1 MG/ML
0.4 INJECTION, SOLUTION INTRAMUSCULAR; INTRAVENOUS; SUBCUTANEOUS EVERY 2 HOUR PRN
Status: DISCONTINUED | OUTPATIENT
Start: 2024-11-24 | End: 2024-11-24

## 2024-11-24 RX ORDER — DIPHENHYDRAMINE HYDROCHLORIDE 50 MG/ML
25 INJECTION INTRAMUSCULAR; INTRAVENOUS EVERY 6 HOURS PRN
Status: DISCONTINUED | OUTPATIENT
Start: 2024-11-24 | End: 2024-11-25

## 2024-11-24 RX ORDER — FOLIC ACID 1 MG/1
1 TABLET ORAL DAILY
Status: DISCONTINUED | OUTPATIENT
Start: 2024-11-24 | End: 2024-11-25

## 2024-11-24 RX ORDER — DOXEPIN HYDROCHLORIDE 50 MG/1
1 CAPSULE ORAL DAILY
Status: DISCONTINUED | OUTPATIENT
Start: 2024-11-24 | End: 2024-11-25

## 2024-11-24 RX ORDER — MONTELUKAST SODIUM 10 MG/1
10 TABLET ORAL DAILY
Status: DISCONTINUED | OUTPATIENT
Start: 2024-11-24 | End: 2024-11-25

## 2024-11-24 RX ORDER — PANTOPRAZOLE SODIUM 40 MG/1
40 TABLET, DELAYED RELEASE ORAL
Status: DISCONTINUED | OUTPATIENT
Start: 2024-11-24 | End: 2024-11-25

## 2024-11-24 RX ORDER — NICOTINE POLACRILEX 4 MG
15 LOZENGE BUCCAL
Status: DISCONTINUED | OUTPATIENT
Start: 2024-11-24 | End: 2024-11-25

## 2024-11-24 RX ORDER — ONDANSETRON 2 MG/ML
4 INJECTION INTRAMUSCULAR; INTRAVENOUS EVERY 6 HOURS PRN
Status: DISCONTINUED | OUTPATIENT
Start: 2024-11-24 | End: 2024-11-25

## 2024-11-24 RX ORDER — BUSPIRONE HYDROCHLORIDE 10 MG/1
20 TABLET ORAL 2 TIMES DAILY
Status: DISCONTINUED | OUTPATIENT
Start: 2024-11-24 | End: 2024-11-25

## 2024-11-24 RX ORDER — HYDROCODONE BITARTRATE AND ACETAMINOPHEN 5; 325 MG/1; MG/1
1 TABLET ORAL EVERY 4 HOURS PRN
Status: DISCONTINUED | OUTPATIENT
Start: 2024-11-24 | End: 2024-11-25

## 2024-11-24 RX ORDER — ENOXAPARIN SODIUM 100 MG/ML
40 INJECTION SUBCUTANEOUS DAILY
Status: DISCONTINUED | OUTPATIENT
Start: 2024-11-24 | End: 2024-11-25

## 2024-11-24 RX ORDER — HALOPERIDOL 5 MG/1
5 TABLET ORAL NIGHTLY
Status: DISCONTINUED | OUTPATIENT
Start: 2024-11-24 | End: 2024-11-25

## 2024-11-24 RX ORDER — NICOTINE POLACRILEX 4 MG
30 LOZENGE BUCCAL
Status: DISCONTINUED | OUTPATIENT
Start: 2024-11-24 | End: 2024-11-25

## 2024-11-24 RX ORDER — PROCHLORPERAZINE EDISYLATE 5 MG/ML
5 INJECTION INTRAMUSCULAR; INTRAVENOUS EVERY 8 HOURS PRN
Status: DISCONTINUED | OUTPATIENT
Start: 2024-11-24 | End: 2024-11-25

## 2024-11-24 RX ORDER — HYDROCODONE BITARTRATE AND ACETAMINOPHEN 5; 325 MG/1; MG/1
2 TABLET ORAL EVERY 4 HOURS PRN
Status: DISCONTINUED | OUTPATIENT
Start: 2024-11-24 | End: 2024-11-25

## 2024-11-24 RX ORDER — HYDROMORPHONE HYDROCHLORIDE 1 MG/ML
0.8 INJECTION, SOLUTION INTRAMUSCULAR; INTRAVENOUS; SUBCUTANEOUS EVERY 2 HOUR PRN
Status: DISCONTINUED | OUTPATIENT
Start: 2024-11-24 | End: 2024-11-24

## 2024-11-24 RX ORDER — MELATONIN
100 DAILY
Status: DISCONTINUED | OUTPATIENT
Start: 2024-11-24 | End: 2024-11-25

## 2024-11-24 RX ORDER — INSULIN DEGLUDEC 100 U/ML
25 INJECTION, SOLUTION SUBCUTANEOUS DAILY
Status: DISCONTINUED | OUTPATIENT
Start: 2024-11-24 | End: 2024-11-25

## 2024-11-24 RX ORDER — HYDROMORPHONE HYDROCHLORIDE 1 MG/ML
0.5 INJECTION, SOLUTION INTRAMUSCULAR; INTRAVENOUS; SUBCUTANEOUS EVERY 4 HOURS PRN
Status: DISCONTINUED | OUTPATIENT
Start: 2024-11-24 | End: 2024-11-25

## 2024-11-24 RX ORDER — GABAPENTIN 300 MG/1
300 CAPSULE ORAL 2 TIMES DAILY
Status: DISCONTINUED | OUTPATIENT
Start: 2024-11-24 | End: 2024-11-25

## 2024-11-24 RX ORDER — TRAZODONE HYDROCHLORIDE 100 MG/1
200 TABLET ORAL NIGHTLY
Status: DISCONTINUED | OUTPATIENT
Start: 2024-11-24 | End: 2024-11-25

## 2024-11-24 NOTE — PLAN OF CARE
Assumed pt care this morning at 0730.  Pt is A&Ox4, following commands.   Pt on room air, VSS.  NSR  Pt reports mod to severe epigastric pain. Analgesics given per mar. PO norco added. Pt complains of itching, IV benadryl given. Occasional nausea, prn Zofran given.  Pt up with SBA  Pt currently tolerating diet advancement  L AC LR at 100 mL/hr.  CIWA and szr precautions.  QID accucheck. R arm precaution  Bed in lowest position, call light in reach.          Problem: PAIN - ADULT  Goal: Verbalizes/displays adequate comfort level or patient's stated pain goal  Description: INTERVENTIONS:  - Encourage pt to monitor pain and request assistance  - Assess pain using appropriate pain scale  - Administer analgesics based on type and severity of pain and evaluate response  - Implement non-pharmacological measures as appropriate and evaluate response  - Consider cultural and social influences on pain and pain management  - Manage/alleviate anxiety  - Utilize distraction and/or relaxation techniques  - Monitor for opioid side effects  - Notify MD/LIP if interventions unsuccessful or patient reports new pain  - Anticipate increased pain with activity and pre-medicate as appropriate  Outcome: Progressing     Problem: NEUROLOGICAL - ADULT  Goal: Achieves stable or improved neurological status  Description: INTERVENTIONS  - Assess for and report changes in neurological status  - Initiate measures to prevent increased intracranial pressure  - Maintain blood pressure and fluid volume within ordered parameters to optimize cerebral perfusion and minimize risk of hemorrhage  - Monitor temperature, glucose, and sodium. Initiate appropriate interventions as ordered  Outcome: Progressing  Goal: Absence of seizures  Description: INTERVENTIONS  - Monitor for seizure activity  - Administer anti-seizure medications as ordered  - Monitor neurological status  Outcome: Progressing  Goal: Remains free of injury related to seizure  activity  Description: INTERVENTIONS:  - Maintain airway, patient safety  and administer oxygen as ordered  - Monitor patient for seizure activity, document and report duration and description of seizure to MD/LIP  - If seizure occurs, turn patient to side and suction secretions as needed  - Reorient patient post seizure  - Seizure pads on all 4 side rails  - Instruct patient/family to notify RN of any seizure activity  - Instruct patient/family to call for assistance with activity based on assessment  Outcome: Progressing  Goal: Achieves maximal functionality and self care  Description: INTERVENTIONS  - Monitor swallowing and airway patency with patient fatigue and changes in neurological status  - Encourage and assist patient to increase activity and self care with guidance from PT/OT  - Encourage visually impaired, hearing impaired and aphasic patients to use assistive/communication devices  Outcome: Progressing

## 2024-11-24 NOTE — PROGRESS NOTES
Pt. Seen and examined.  CT noted.  Lipase Nl. CLD and ADAT.  Wean pain meds.  Insulin adjusted, recently here for DKA.  Cont. IVF.    Nj Ashley MD

## 2024-11-24 NOTE — ED PROVIDER NOTES
Patient Seen in: [facility] Emergency Department      History     Chief Complaint   Patient presents with    Eval-D    Abdomen/Flank Pain     Stated Complaint: Etoh withdrawl    Subjective:   HPI      44-year-old female with a history of alcoholism SVT, chronic pancreatitis presents for multiple complaints.  Last drink was 11 PM last night.  Complains of withdrawal symptoms of anxiety.  Also epigastric pain which been constant.  Nausea but no vomiting diarrhea no blood per orifice.  No chest pain shortness breath fever chills.  No other complaints.    Objective:     Past Medical History:    Anxiety    Arrhythmia    SVT    Attention deficit disorder    Breast CA (HCC)    Depression    Esophageal reflux    High blood pressure    OTHER DISEASES    seasonal allergies    Pancreatitis (HCC)    Pneumonia due to organism    SVT (supraventricular tachycardia) (HCC)    Type 1 diabetes mellitus (HCC)              Past Surgical History:   Procedure Laterality Date    Breast surgery      Implant left      Implant right      Mastectomy left      Mastectomy right      Ndsc ablation & rcnstj atria lmtd w/o bypass                  Social History     Socioeconomic History    Marital status: Single   Tobacco Use    Smoking status: Every Day     Current packs/day: 0.50     Types: Cigarettes    Smokeless tobacco: Never    Tobacco comments:     1/2ppd x 8 years   Vaping Use    Vaping status: Never Used   Substance and Sexual Activity    Alcohol use: Yes     Alcohol/week: 7.0 standard drinks of alcohol     Types: 7 Shots of liquor per week     Comment: 3-4x/wk    Drug use: Yes     Frequency: 7.0 times per week     Types: Cannabis     Comment: use daily   Other Topics Concern    Exercise Yes     Comment: MODERATE    Seat Belt Yes    Self-Exams Yes     Social Drivers of Health     Financial Resource Strain: Low Risk  (1/23/2023)    [facility]    Financial Resource Strain     In the  past year, have you or any family members you live with been unable to get any of the following when it was really needed? Check all that apply.: None   Food Insecurity: No Food Insecurity (11/24/2024)    Food Insecurity     Food Insecurity: Never true   Transportation Needs: No Transportation Needs (11/24/2024)    Transportation Needs     Lack of Transportation: No   Social Connections: Socially Integrated (1/23/2023)    Received from [facility]    Social Connections     How often do you see or talk to people that you care about and feel close to? (For example: talking to friends on the phone, visiting friends or family, going to Buddhist or club meetings): 5 or more times a week   Housing Stability: Low Risk  (11/24/2024)    Housing Stability     Housing Instability: No                  Physical Exam     ED Triage Vitals   BP 11/23/24 2303 130/80   Pulse 11/23/24 2302 75   Resp 11/23/24 2302 25   Temp 11/23/24 2302 98.2 °F (36.8 °C)   Temp src 11/23/24 2302 Temporal   SpO2 11/23/24 2302 100 %   O2 Device 11/23/24 2302 None (Room air)       Current Vitals:   Vital Signs  BP: 116/89  Pulse: 56  Resp: 18  Temp: 98.8 °F (37.1 °C)  Temp src: Oral  MAP (mmHg): 99    Oxygen Therapy  SpO2: 95 %  O2 Device: None (Room air)  O2 Flow Rate (L/min): 0 L/min  Pulse Oximetry Type: Continuous  Oximetry Probe Site Changed: No  Pulse Ox Probe Location: Right hand        Physical Exam  Vitals and nursing note reviewed.   Constitutional:       General: She is not in acute distress.     Appearance: She is well-developed. She is not toxic-appearing.   HENT:      Head: Normocephalic and atraumatic.   Eyes:      General: No scleral icterus.     Conjunctiva/sclera: Conjunctivae normal.   Cardiovascular:      Rate and Rhythm: Normal rate.   Pulmonary:      Effort: Pulmonary effort is normal. No respiratory distress.   Abdominal:      General: There is no distension.      Tenderness: There is abdominal  tenderness in the epigastric area. There is no guarding or rebound.   Musculoskeletal:         General: No tenderness. Normal range of motion.      Cervical back: Normal range of motion and neck supple.   Skin:     General: Skin is warm and dry.      Findings: No rash.   Neurological:      General: No focal deficit present.      Mental Status: She is alert and oriented to person, place, and time.      Motor: No abnormal muscle tone.      Coordination: Coordination normal.   Psychiatric:         Behavior: Behavior normal.            ED Course     Labs Reviewed   CBC WITH DIFFERENTIAL WITH PLATELET - Abnormal; Notable for the following components:       Result Value    HGB 16.3 (*)     All other components within normal limits   COMP METABOLIC PANEL (14) - Abnormal; Notable for the following components:    Glucose 123 (*)     CO2 19.0 (*)     BUN 6 (*)     Alkaline Phosphatase 172 (*)     Globulin  3.8 (*)     All other components within normal limits   ETHYL ALCOHOL - Abnormal; Notable for the following components:    Ethyl Alcohol 282 (*)     All other components within normal limits   POCT GLUCOSE - Abnormal; Notable for the following components:    POC Glucose 166 (*)     All other components within normal limits   POCT GLUCOSE - Abnormal; Notable for the following components:    POC Glucose 207 (*)     All other components within normal limits   POCT GLUCOSE - Abnormal; Notable for the following components:    POC Glucose 256 (*)     All other components within normal limits   POCT GLUCOSE - Abnormal; Notable for the following components:    POC Glucose 312 (*)     All other components within normal limits   POCT GLUCOSE - Abnormal; Notable for the following components:    POC Glucose 259 (*)     All other components within normal limits   LIPASE - Normal   POCT GLUCOSE - Normal   RAINBOW DRAW LAVENDER   RAINBOW DRAW LIGHT GREEN   RAINBOW DRAW GOLD     EKG    Rate, intervals and axes as noted on EKG  Report.  Rate: 73  Rhythm: Sinus Rhythm  Reading: Normal sinus rhythm.  Prolonged QT, baseline artifact                        MDM                -Comorbidities did add complexity to the management are mentioned in the HPI above        -I personally reviewed the prior external notes and the medical record to obtain additional history reviewed discharge summary from a admission mid November 2024.  Patient was admitted for hyperglycemia and chronic pancreatitis        -DDX: Includes but not limited to acute pancreatitis, chronic pancreatitis, and withdrawal from alcohol which is a medical condition that can pose a threat to life/function               -I personally reviewed the CT findings and it shows pancreatic calcifications and pseudocyst  Please refer to radiology report for official interpretation         IMPRESSION:  no acute findings     No clear signs of acute pancreatitis    Redemonstration of changes of chronic pancreatitis:  -coarse calcifications in the pancreatic head and uncinate process  -Suspected large bilobed pseudocyst replacing the remainder of the pancreas(measuring 9.5 x 4.5 cm)    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    Admission disposition: 11/24/2024  1:21 AM         Labs Reviewed   CBC WITH DIFFERENTIAL WITH PLATELET - Abnormal; Notable for the following components:       Result Value    HGB 16.3 (*)     All other components within normal limits   COMP METABOLIC PANEL (14) - Abnormal; Notable for the following components:    Glucose 123 (*)     CO2 19.0 (*)     BUN 6 (*)     Alkaline Phosphatase 172 (*)     Globulin  3.8 (*)     All other components within normal limits   ETHYL ALCOHOL - Abnormal; Notable for the following components:    Ethyl Alcohol 282 (*)     All other components within normal limits   POCT GLUCOSE - Abnormal; Notable for the following components:    POC Glucose 166 (*)     All other components within normal limits   POCT GLUCOSE - Abnormal;  Notable for the following components:    POC Glucose 207 (*)     All other components within normal limits   POCT GLUCOSE - Abnormal; Notable for the following components:    POC Glucose 256 (*)     All other components within normal limits   POCT GLUCOSE - Abnormal; Notable for the following components:    POC Glucose 312 (*)     All other components within normal limits   POCT GLUCOSE - Abnormal; Notable for the following components:    POC Glucose 259 (*)     All other components within normal limits   LIPASE - Normal   POCT GLUCOSE - Normal   RAINBOW DRAW LAVENDER   RAINBOW DRAW LIGHT GREEN   RAINBOW DRAW GOLD     Labs reviewed hemoglobin 16.  Glucose 123 bicarb 19 anion gap is normal alcohol is 282.     Patient with ongoing pain will be admitted for pain control.  Discussed with the hospitalist service for admission.      Medical Decision Making      Disposition and Plan     Clinical Impression:  1. Chronic pancreatitis, unspecified pancreatitis type (HCC)         Disposition:  Admit  11/24/2024  1:21 am    Follow-up:  No follow-up provider specified.        Medications Prescribed:  Current Discharge Medication List              Supplementary Documentation:         Hospital Problems       Present on Admission  Date Reviewed: 1/29/2023            ICD-10-CM Noted POA    Alcohol-induced chronic pancreatitis (HCC) K86.0 12/2/2021 Yes    SVT (supraventricular tachycardia) (HCC) I47.10 Unknown Yes    Type 1 diabetes mellitus with ketoacidosis without coma (HCC) E10.10 11/11/2024 Yes

## 2024-11-24 NOTE — H&P
[facility]     Blanca Coats Patient Status:  Observation    1980 MRN [id number]   Location [facility] 3NE-A Attending Nj Ashley MD   Hosp Day # 0 PCP Liya Velez PA-C     Chief Complaint: abdominal pain    Subjective:    History of Present Illness:     Blanca Coats is a 44 year old female with history of alcohol abuse, SVT chronic pancreatitis, type 2 diabetes, GERD, depression presents emergency room with epigastric pain sharp in nature constant.  Patient has associated nausea but no vomiting.  No diarrhea or constipation.  No hematochezia, melena, hematuria.  Patient does abuse alcohol and last drink was 11:00 PM.  No fevers, chills.  No chest pain, shortness of breath.    History/Other:    Past Medical History:  Past Medical History:    Anxiety    Arrhythmia    SVT    Attention deficit disorder    Breast CA (HCC)    Depression    Esophageal reflux    High blood pressure    OTHER DISEASES    seasonal allergies    Pancreatitis (HCC)    Pneumonia due to organism    SVT (supraventricular tachycardia) (HCC)    Type 1 diabetes mellitus (HCC)     Past Surgical History:   Past Surgical History:   Procedure Laterality Date    Breast surgery      Implant left      Implant right      Mastectomy left      Mastectomy right      Ndsc ablation & rcnstj atria lmtd w/o bypass        Family History:   Family History   Problem Relation Age of Onset    Heart Disorder Father     Diabetes Father     Hypertension Mother     Cancer Mother         breast     Social History:    reports that she has been smoking cigarettes. She has never used smokeless tobacco. She reports current alcohol use of about 7.0 standard drinks of alcohol per week. She reports current drug use. Frequency: 7.00 times per week. Drug: Cannabis.     Allergies: Allergies[1]    Medications:  Medications Ordered Prior to Encounter[2]    Review of Systems:   A comprehensive review of systems was completed.     Pertinent positives and negatives noted in the HPI.    Objective:   Physical Exam:    /72 (BP Location: Left arm)   Pulse 75   Temp 98.9 °F (37.2 °C) (Oral)   Resp 20   Wt 143 lb 8 oz (65.1 kg)   SpO2 91%   BMI 23.16 kg/m²   General: No acute distress, Alert  Respiratory: No rhonchi, no wheezes  Cardiovascular: S1, S2. Regular rate and rhythm  Abdomen: Soft, Non-tender, non-distended, positive bowel sounds  Neuro: No new focal deficits  Extremities: No edema      Results:    Labs:      Labs Last 24 Hours:    Recent Labs   Lab 11/23/24  2313   RBC 5.19   HGB 16.3*   HCT 47.7   MCV 91.9   MCH 31.4   MCHC 34.2   RDW 15.3   NEPRELIM 5.33   WBC 8.7   .0       Recent Labs   Lab 11/23/24  2313   *   BUN 6*   CREATSERUM 0.66   EGFRCR 112   CA 9.6   ALB 3.9      K 3.9      CO2 19.0*   ALKPHO 172*   AST 20   ALT 11   BILT 0.3   TP 7.7       No results found for: \"PT\", \"INR\"    No results for input(s): \"TROP\", \"TROPHS\", \"CK\" in the last 168 hours.    No results for input(s): \"TROP\", \"PBNP\" in the last 168 hours.    No results for input(s): \"PCT\" in the last 168 hours.    Imaging: Imaging data reviewed in Epic.    Assessment & Plan:      # Acute on chronic pancreatitis  - secondary to ETOH  - Will keep NPO  - IVF    # ETOH abuse  - Will  place on CIWA protocol.    # Type 2 diabetes  - will place on hyperglycemia protocol with correction factor insulin. Will add long acting once tolerating diet.    # Hx SVT  - Will hold home meds while NPO  - Will order prn metoprolol.      Plan of care discussed with patient at bedside    Sylvester Gonzales DO    Supplementary Documentation:     The 21st Century Cures Act makes medical notes like these available to patients in the interest of transparency. Please be advised this is a medical document. Medical documents are intended to carry relevant information, facts as evident, and the clinical opinion of the practitioner. The medical note is  intended as peer to peer communication and may appear blunt or direct. It is written in medical language and may contain abbreviations or verbiage that are unfamiliar.                                     [1]   Allergies  Allergen Reactions    Bee Venom ANAPHYLAXIS    Morphine HIVES    Fentanyl RASH   [2]   Current Facility-Administered Medications on File Prior to Encounter   Medication Dose Route Frequency Provider Last Rate Last Admin    [COMPLETED] potassium phosphate dibasic 15 mmol in sodium chloride 0.9% 250 mL IVPB  15 mmol Intravenous Once Jaimie Gray APRN   Stopped at 24 1042    Followed by    [COMPLETED] potassium chloride 20 mEq/100mL IVPB premix 20 mEq  20 mEq Intravenous Once Jaimie Gray APRN 50 mL/hr at 24 1129 20 mEq at 24 1129    [COMPLETED] chlordiazePOXIDE (Librium) cap 25 mg  25 mg Oral Q8H Ashley Young APRN   25 mg at 24 0803    [COMPLETED] sodium chloride 0.9 % IV bolus 1,000 mL  1,000 mL Intravenous Once Ezra Ritchie MD   Stopped at 24 1339    [COMPLETED] ondansetron (Zofran) 4 MG/2ML injection 4 mg  4 mg Intravenous Once Ezra Ritchie MD   4 mg at 24 1234    [COMPLETED] HYDROmorphone (Dilaudid) 1 MG/ML injection 0.5 mg  0.5 mg Intravenous Q30 Min PRN Ezra Ritchie MD   0.5 mg at 24 1513    [] sodium chloride 0.9% infusion   Intravenous Continuous Ezra Ritchie MD        [] ondansetron (Zofran) 4 MG/2ML injection 4 mg  4 mg Intravenous Q4H PRN Ezra Ritchie MD        [COMPLETED] iopamidol 76% (ISOVUE-370) injection for power injector  80 mL Intravenous ONCE PRN Ezra Ritchie MD   80 mL at 24 1400    [COMPLETED] dextrose 5%-sodium chloride 0.45% infusion   Intravenous Once Ezra Ritchie  mL/hr at 24 1445 New Bag at 24 1445    [COMPLETED] thiamine 100 mg/mL injection 100 mg  100 mg Intravenous Once Ashley Young APRN   100 mg at 24 1733    [COMPLETED] potassium phosphate dibasic 15 mmol in sodium  chloride 0.9% 250 mL IVPB  15 mmol Intravenous Once Ashley Young APRN 62.5 mL/hr at 11/11/24 1752 15 mmol at 11/11/24 1752    Followed by    [COMPLETED] sodium phosphate 15 mmol in 0.9% NaCl 100mL IVPB premix  15 mmol Intravenous Once Ashley Young APRN   15 mmol at 11/11/24 2159    [COMPLETED] magnesium sulfate 4 g/100mL IVPB premix 4 g  4 g Intravenous Once Ashley Young APRN 50 mL/hr at 11/11/24 1757 4 g at 11/11/24 1757     Current Outpatient Medications on File Prior to Encounter   Medication Sig Dispense Refill    traMADol 50 MG Oral Tab Take 1-2 tablets ( mg total) by mouth every 4 (four) hours as needed for Pain. 8 tablet 0    ondansetron 4 MG Oral Tablet Dispersible Take 1 tablet (4 mg total) by mouth every 8 (eight) hours as needed for Nausea. 20 tablet 0    metoprolol succinate 100 MG Oral Tablet 24 Hr Take 1 tablet (100 mg total) by mouth 2 (two) times daily. Take half tablet if blood pressure less than 130/80  0    haloperidol 5 MG Oral Tab Take 1 tablet (5 mg total) by mouth at bedtime.      Amphetamine-Dextroamphet ER 10 MG Oral Capsule SR 24 Hr Take 1 capsule (10 mg total) by mouth every morning.      pantoprazole 40 MG Oral Tab EC Take 1 tablet (40 mg total) by mouth every morning before breakfast.      Amphetamine-Dextroamphet ER 30 MG Oral Capsule SR 24 Hr Take 1 capsule (30 mg total) by mouth every morning.      ALPRAZolam 0.25 MG Oral Tab Take 1 tablet (0.25 mg total) by mouth 3 (three) times daily as needed.      gabapentin 300 MG Oral Cap Take 1 capsule (300 mg total) by mouth in the morning and 1 capsule (300 mg total) before bedtime.      Montelukast Sodium 10 MG Oral Tab Take 1 tablet (10 mg total) by mouth daily.      Sertraline HCl 100 MG Oral Tab Take 2 tablets (200 mg total) by mouth daily.      busPIRone 10 MG Oral Tab Take 2 tablets (20 mg total) by mouth 2 (two) times daily.      traZODone HCl 100 MG Oral Tab Take 2 tablets (200 mg total) by mouth nightly.       [] chlordiazePOXIDE 25 MG Oral Cap Take 1 capsule (25 mg total) by mouth every 12 (twelve) hours for 1 day, THEN 1 capsule (25 mg total) daily for 1 day. 3 capsule 0    folic acid 1 MG Oral Tab Take 1 tablet (1 mg total) by mouth daily. 30 tablet 0    thiamine 100 MG Oral Tab Take 1 tablet (100 mg total) by mouth daily. 30 tablet 0    Naloxone HCl 4 MG/0.1ML Nasal Liquid 4 mg by Nasal route as needed. If patient remains unresponsive, repeat dose in other nostril 2-5 minutes after first dose. 1 kit 0    sennosides 17.2 MG Oral Tab Take 1 tablet (17.2 mg total) by mouth nightly as needed (constipation, as needed if no bowel movement that day). (Patient not taking: Reported on 2024) 10 tablet 0    LANTUS SOLOSTAR 100 UNIT/ML Subcutaneous Solution Pen-injector Inject 12 units in the morning & inject 14 units at night      HUMALOG KWIKPEN 100 UNIT/ML Subcutaneous Solution Pen-injector Inject 25 units per sliding scale four times a day by subcutaneous route      Glucose Blood (ONETOUCH VERIO) In Vitro Strip Check blood sugar two times day, before breakfast and before dinner.  (may substitute any brand covered by insurance) 50 strip 0

## 2024-11-24 NOTE — PROGRESS NOTES
NURSING ADMISSION NOTE      Patient admitted via Cart  Oriented to room.  Safety precautions initiated.  Bed in low position.  Call light in reach.    Pt here for abd pain and nausea.   Pt A&O x4.   VSS- NSR on tele.   RA. Lungs clear.   Pt c/o severe abd pain- MD notified. Obtained order for PRN dilaudid.   IVF- 0.9NS @125mL/hr infusing to LAC PIV.   Strict NPO.   CIWA monitoring in place.   Seizure precautions.   Pt up with SBA.

## 2024-11-24 NOTE — ED QUICK NOTES
Orders for admission, patient is aware of plan and ready to go upstairs. Any questions, please call ED RN Micaela at extension [phone].     Patient Covid vaccination status: Fully vaccinated     COVID Test Ordered in ED: None    COVID Suspicion at Admission: N/A    Running Infusions:  None    Mental Status/LOC at time of transport: a/ox3    Other pertinent information:   CIWA score: 3   NIH score:  N/A

## 2024-11-24 NOTE — ED INITIAL ASSESSMENT (HPI)
Pt to ED from for ETOH withdrawal symptoms that started about an hour ago. Pt states that her drink was 2200 last night. Pt states that she is in pain and that she has pancreatitis.   
walking/standing

## 2024-11-25 VITALS
TEMPERATURE: 99 F | HEART RATE: 51 BPM | OXYGEN SATURATION: 93 % | WEIGHT: 143.5 LBS | DIASTOLIC BLOOD PRESSURE: 82 MMHG | RESPIRATION RATE: 18 BRPM | SYSTOLIC BLOOD PRESSURE: 124 MMHG | BODY MASS INDEX: 23 KG/M2

## 2024-11-25 LAB
ANION GAP SERPL CALC-SCNC: 4 MMOL/L (ref 0–18)
BASOPHILS # BLD AUTO: 0.02 X10(3) UL (ref 0–0.2)
BASOPHILS NFR BLD AUTO: 0.6 %
BUN BLD-MCNC: <5 MG/DL (ref 9–23)
CALCIUM BLD-MCNC: 8.5 MG/DL (ref 8.7–10.4)
CHLORIDE SERPL-SCNC: 106 MMOL/L (ref 98–112)
CO2 SERPL-SCNC: 28 MMOL/L (ref 21–32)
CREAT BLD-MCNC: 0.57 MG/DL
EGFRCR SERPLBLD CKD-EPI 2021: 116 ML/MIN/1.73M2 (ref 60–?)
EOSINOPHIL # BLD AUTO: 0.05 X10(3) UL (ref 0–0.7)
EOSINOPHIL NFR BLD AUTO: 1.6 %
ERYTHROCYTE [DISTWIDTH] IN BLOOD BY AUTOMATED COUNT: 14.7 %
GLUCOSE BLD-MCNC: 137 MG/DL (ref 70–99)
GLUCOSE BLD-MCNC: 162 MG/DL (ref 70–99)
GLUCOSE BLD-MCNC: 68 MG/DL (ref 70–99)
GLUCOSE BLD-MCNC: 78 MG/DL (ref 70–99)
HCT VFR BLD AUTO: 36.6 %
HGB BLD-MCNC: 12.5 G/DL
IMM GRANULOCYTES # BLD AUTO: 0 X10(3) UL (ref 0–1)
IMM GRANULOCYTES NFR BLD: 0 %
LYMPHOCYTES # BLD AUTO: 1.32 X10(3) UL (ref 1–4)
LYMPHOCYTES NFR BLD AUTO: 41.1 %
MCH RBC QN AUTO: 31.7 PG (ref 26–34)
MCHC RBC AUTO-ENTMCNC: 34.2 G/DL (ref 31–37)
MCV RBC AUTO: 92.9 FL
MONOCYTES # BLD AUTO: 0.34 X10(3) UL (ref 0.1–1)
MONOCYTES NFR BLD AUTO: 10.6 %
NEUTROPHILS # BLD AUTO: 1.48 X10 (3) UL (ref 1.5–7.7)
NEUTROPHILS # BLD AUTO: 1.48 X10(3) UL (ref 1.5–7.7)
NEUTROPHILS NFR BLD AUTO: 46.1 %
PLATELET # BLD AUTO: 151 10(3)UL (ref 150–450)
POTASSIUM SERPL-SCNC: 3.6 MMOL/L (ref 3.5–5.1)
RBC # BLD AUTO: 3.94 X10(6)UL
SODIUM SERPL-SCNC: 138 MMOL/L (ref 136–145)
WBC # BLD AUTO: 3.2 X10(3) UL (ref 4–11)

## 2024-11-25 PROCEDURE — 99239 HOSP IP/OBS DSCHRG MGMT >30: CPT | Performed by: HOSPITALIST

## 2024-11-25 NOTE — PROGRESS NOTES
Patient seen and examined.  Feels great.  No pain.  Tolerating PO.  Off CIEWA    Gen: NAD  Resp: CTA  CVS: s1s2  Abd: soft, +bowel sounds  Neck: trachea is midline    A/P:      # Acute on chronic pancreatitis  Resolved, tolerating PO     # ETOH abuse  Currently no signs of withdrawal     # Type 2 diabetes, A1c: 10.5; uncontorlled  Insulin      # Hx SVT    Total minutes spent on discharge plannin      Nj Ashley MD

## 2024-11-25 NOTE — PLAN OF CARE
Assumed care at 1930. A/Ox4, on room air, NSR/SB on tele. Carb controlled diet, accuchecks. Q2 CIWA protocol, seizure precautions in place. Ambulates, voids. Lovenox for VTE. PRN norco given per mar. PIV infusing LR at 100 ml/hr. Patient updated with plan of care. All needs met at this time.     Problem: PAIN - ADULT  Goal: Verbalizes/displays adequate comfort level or patient's stated pain goal  Description: INTERVENTIONS:  - Encourage pt to monitor pain and request assistance  - Assess pain using appropriate pain scale  - Administer analgesics based on type and severity of pain and evaluate response  - Implement non-pharmacological measures as appropriate and evaluate response  - Consider cultural and social influences on pain and pain management  - Manage/alleviate anxiety  - Utilize distraction and/or relaxation techniques  - Monitor for opioid side effects  - Notify MD/LIP if interventions unsuccessful or patient reports new pain  - Anticipate increased pain with activity and pre-medicate as appropriate  Outcome: Progressing     Problem: NEUROLOGICAL - ADULT  Goal: Achieves stable or improved neurological status  Description: INTERVENTIONS  - Assess for and report changes in neurological status  - Initiate measures to prevent increased intracranial pressure  - Maintain blood pressure and fluid volume within ordered parameters to optimize cerebral perfusion and minimize risk of hemorrhage  - Monitor temperature, glucose, and sodium. Initiate appropriate interventions as ordered  Outcome: Progressing     Problem: NEUROLOGICAL - ADULT  Goal: Absence of seizures  Description: INTERVENTIONS  - Monitor for seizure activity  - Administer anti-seizure medications as ordered  - Monitor neurological status  Outcome: Progressing     Problem: NEUROLOGICAL - ADULT  Goal: Remains free of injury related to seizure activity  Description: INTERVENTIONS:  - Maintain airway, patient safety  and administer oxygen as ordered  -  Monitor patient for seizure activity, document and report duration and description of seizure to MD/LIP  - If seizure occurs, turn patient to side and suction secretions as needed  - Reorient patient post seizure  - Seizure pads on all 4 side rails  - Instruct patient/family to notify RN of any seizure activity  - Instruct patient/family to call for assistance with activity based on assessment  Outcome: Progressing     Problem: NEUROLOGICAL - ADULT  Goal: Achieves maximal functionality and self care  Description: INTERVENTIONS  - Monitor swallowing and airway patency with patient fatigue and changes in neurological status  - Encourage and assist patient to increase activity and self care with guidance from PT/OT  - Encourage visually impaired, hearing impaired and aphasic patients to use assistive/communication devices  Outcome: Progressing

## 2024-11-25 NOTE — DISCHARGE PLANNING
NURSING DISCHARGE NOTE    Discharged Home via Ambulatory.  Accompanied by RN  Belongings Taken by patient/family.  IV and tele removed.

## 2024-11-25 NOTE — PLAN OF CARE
Assumed patient care at 0730. A/Ox4. CIWA Q2. Pt tolerating regular diet. Plans for discharge today. All safety precautions are in place and will continue with plan of care.    Problem: NEUROLOGICAL - ADULT  Goal: Achieves stable or improved neurological status  Description: INTERVENTIONS  - Assess for and report changes in neurological status  - Initiate measures to prevent increased intracranial pressure  - Maintain blood pressure and fluid volume within ordered parameters to optimize cerebral perfusion and minimize risk of hemorrhage  - Monitor temperature, glucose, and sodium. Initiate appropriate interventions as ordered  Outcome: Progressing  Goal: Absence of seizures  Description: INTERVENTIONS  - Monitor for seizure activity  - Administer anti-seizure medications as ordered  - Monitor neurological status  Outcome: Progressing  Goal: Remains free of injury related to seizure activity  Description: INTERVENTIONS:  - Maintain airway, patient safety  and administer oxygen as ordered  - Monitor patient for seizure activity, document and report duration and description of seizure to MD/LIP  - If seizure occurs, turn patient to side and suction secretions as needed  - Reorient patient post seizure  - Seizure pads on all 4 side rails  - Instruct patient/family to notify RN of any seizure activity  - Instruct patient/family to call for assistance with activity based on assessment  Outcome: Progressing  Goal: Achieves maximal functionality and self care  Description: INTERVENTIONS  - Monitor swallowing and airway patency with patient fatigue and changes in neurological status  - Encourage and assist patient to increase activity and self care with guidance from PT/OT  - Encourage visually impaired, hearing impaired and aphasic patients to use assistive/communication devices  Outcome: Progressing

## 2024-12-02 NOTE — DISCHARGE SUMMARY
[place] HOSPITAL[facility]  DISCHARGE SUMMARY     Blanca Coats Patient Status:  Observation    1980 MRN [id number]   Location [facility] 3NE-A Attending No att. providers found   Hosp Day # 0 PCP Liya Velez PA-C     Date of Admission:  2024  Date of Discharge:   2024    Discharge Disposition: Home or Self Care    Discharge Diagnosis:    # Acute on chronic pancreatitis     # ETOH abuse     # Type 2 diabetes, A1c: 10.5;     # Hx SVT    History of Present Illness:    Blanca Coats is a 44 year old female with history of alcohol abuse, SVT chronic pancreatitis, type 2 diabetes, GERD, depression presents emergency room with epigastric pain sharp in nature constant.  Patient has associated nausea but no vomiting.  No diarrhea or constipation.  No hematochezia, melena, hematuria.  Patient does abuse alcohol and last drink was 11:00 PM.  No fevers, chills.  No chest pain, shortness of breath.     Brief Synopsis:    The patient was admitted due to acute on chronic pancreatitis.  Her symptoms improved with fluid hydration and pain medications and bowel rest.  Her diet is able to be advanced which she tolerated.  She has significant uncontrolled type 2 diabetes.  She was been stable for discharge with plans to follow with her primary care doctor.  Close monitoring of her diabetes with endocrine follow-up was discussed with the patient.    All diagnosis' and recommendations discussed with patient and/or family in detail.      Lace+ Score: 54  59-90 High Risk  29-58 Medium Risk  0-28   Low Risk       TCM Follow-Up Recommendation:  LACE > 58: High Risk of readmission after discharge from the hospital.  ts:      Discharge Medication List:     Discharge Medications        CONTINUE taking these medications        Instructions Prescription details   ALPRAZolam 0.25 MG Tabs  Commonly known as: Xanax      Take 1 tablet (0.25 mg total) by mouth 3 (three) times daily as needed.   Refills: 0      amphetamine-dextroamphetamine ER 30 MG Cp24  Commonly known as: Adderall XR      Take 1 capsule (30 mg total) by mouth every morning.   Refills: 0     amphetamine-dextroamphetamine ER 10 MG Cp24  Commonly known as: Adderall XR      Take 1 capsule (10 mg total) by mouth every morning.   Refills: 0     busPIRone 10 MG Tabs  Commonly known as: Buspar      Take 2 tablets (20 mg total) by mouth 2 (two) times daily.   Refills: 0     folic acid 1 MG Tabs  Commonly known as: Folvite      Take 1 tablet (1 mg total) by mouth daily.   Quantity: 30 tablet  Refills: 0     gabapentin 300 MG Caps  Commonly known as: Neurontin      Take 1 capsule (300 mg total) by mouth in the morning and 1 capsule (300 mg total) before bedtime.   Refills: 0     haloperidol 5 MG Tabs  Commonly known as: Haldol      Take 1 tablet (5 mg total) by mouth at bedtime.   Refills: 0     HumaLOG KwikPen 100 UNIT/ML Sopn  Generic drug: Insulin Lispro (1 Unit Dial)      Inject 25 units per sliding scale four times a day by subcutaneous route   Refills: 0     Lantus SoloStar 100 UNIT/ML Sopn  Generic drug: insulin glargine      Inject 12 units in the morning & inject 14 units at night   Refills: 0     metoprolol succinate  MG Tb24  Commonly known as: Toprol XL      Take 1 tablet (100 mg total) by mouth 2 (two) times daily. Take half tablet if blood pressure less than 130/80   Refills: 0     montelukast 10 MG Tabs  Commonly known as: Singulair      Take 1 tablet (10 mg total) by mouth daily.   Refills: 0     Naloxone HCl 4 MG/0.1ML Liqd      4 mg by Nasal route as needed. If patient remains unresponsive, repeat dose in other nostril 2-5 minutes after first dose.   Quantity: 1 kit  Refills: 0     ondansetron 4 MG Tbdp  Commonly known as: Zofran-ODT      Take 1 tablet (4 mg total) by mouth every 8 (eight) hours as needed for Nausea.   Quantity: 20 tablet  Refills: 0     OneTouch Verio Strp      Check blood sugar two times day, before breakfast and before  dinner.  (may substitute any brand covered by insurance)   Quantity: 50 strip  Refills: 0     pantoprazole 40 MG Tbec  Commonly known as: Protonix      Take 1 tablet (40 mg total) by mouth every morning before breakfast.   Refills: 0     sertraline 100 MG Tabs  Commonly known as: Zoloft      Take 2 tablets (200 mg total) by mouth daily.   Refills: 0     thiamine 100 MG Tabs  Commonly known as: Vitamin B1      Take 1 tablet (100 mg total) by mouth daily.   Quantity: 30 tablet  Refills: 0     traMADol 50 MG Tabs  Commonly known as: Ultram      Take 1-2 tablets ( mg total) by mouth every 4 (four) hours as needed for Pain.   Quantity: 8 tablet  Refills: 0     traZODone 100 MG Tabs  Commonly known as: Desyrel      Take 2 tablets (200 mg total) by mouth nightly.   Refills: 0            STOP taking these medications      chlordiazePOXIDE 25 MG Caps  Commonly known as: Librium        Sennosides 17.2 MG Tabs                 [facility]    Follow-up appointment:   Liya Velez PA-C  [address]  [facility] [id number] [phone]    Schedule an appointment as soon as possible for a visit in 1 week(s)          -----------------------------------------------------------------------------------------------  PATIENT DISCHARGE INSTRUCTIONS: See electronic chart    Nj Ashley MD    Total minutes spent on discharge plannin      The  [employer] Act makes medical notes like these available to patients in the interest of transparency. Please be advised this is a medical document. Medical documents are intended to carry relevant information, facts as evident, and the clinical opinion of the practitioner. The medical note is intended as peer to peer communication and may appear blunt or direct. It is written in medical language and may contain abbreviations or verbiage that are unfamiliar.

## 2024-12-17 ENCOUNTER — APPOINTMENT (OUTPATIENT)
Dept: CT IMAGING | Facility: HOSPITAL | Age: 44
End: 2024-12-17
Attending: EMERGENCY MEDICINE
Payer: MEDICAID

## 2024-12-17 ENCOUNTER — HOSPITAL ENCOUNTER (INPATIENT)
Facility: HOSPITAL | Age: 44
LOS: 3 days | Discharge: HOME | End: 2024-12-20
Attending: EMERGENCY MEDICINE | Admitting: STUDENT IN AN ORGANIZED HEALTH CARE EDUCATION/TRAINING PROGRAM
Payer: MEDICAID

## 2024-12-17 DIAGNOSIS — E87.20 ACIDOSIS: ICD-10-CM

## 2024-12-17 DIAGNOSIS — I48.91 ATRIAL FIBRILLATION WITH RAPID VENTRICULAR RESPONSE (HCC): Primary | ICD-10-CM

## 2024-12-17 DIAGNOSIS — E87.29 ALCOHOLIC KETOACIDOSIS: ICD-10-CM

## 2024-12-17 DIAGNOSIS — R93.3 ABNORMAL FINDING ON GI TRACT IMAGING: ICD-10-CM

## 2024-12-17 DIAGNOSIS — K86.0 ALCOHOL-INDUCED CHRONIC PANCREATITIS (HCC): ICD-10-CM

## 2024-12-17 DIAGNOSIS — F10.939 ALCOHOL WITHDRAWAL SYNDROME WITH COMPLICATION (HCC): ICD-10-CM

## 2024-12-17 DIAGNOSIS — R11.0 NAUSEA: ICD-10-CM

## 2024-12-17 DIAGNOSIS — E10.65 TYPE 1 DIABETES MELLITUS WITH HYPERGLYCEMIA (HCC): ICD-10-CM

## 2024-12-17 LAB
ALBUMIN SERPL-MCNC: 4.7 G/DL (ref 3.2–4.8)
ALBUMIN/GLOB SERPL: 1.3 {RATIO} (ref 1–2)
ALP LIVER SERPL-CCNC: 114 U/L
ALT SERPL-CCNC: 14 U/L
ANION GAP SERPL CALC-SCNC: 30 MMOL/L (ref 0–18)
AST SERPL-CCNC: 27 U/L (ref ?–34)
BASOPHILS # BLD AUTO: 0.04 X10(3) UL (ref 0–0.2)
BASOPHILS NFR BLD AUTO: 0.3 %
BILIRUB SERPL-MCNC: 1.1 MG/DL (ref 0.3–1.2)
BUN BLD-MCNC: 8 MG/DL (ref 9–23)
CALCIUM BLD-MCNC: 9.8 MG/DL (ref 8.7–10.4)
CHLORIDE SERPL-SCNC: 94 MMOL/L (ref 98–112)
CHOLEST SERPL-MCNC: 204 MG/DL (ref ?–200)
CO2 SERPL-SCNC: 11 MMOL/L (ref 21–32)
CREAT BLD-MCNC: 0.85 MG/DL
EGFRCR SERPLBLD CKD-EPI 2021: 88 ML/MIN/1.73M2 (ref 60–?)
EOSINOPHIL # BLD AUTO: 0.08 X10(3) UL (ref 0–0.7)
EOSINOPHIL NFR BLD AUTO: 0.6 %
ERYTHROCYTE [DISTWIDTH] IN BLOOD BY AUTOMATED COUNT: 14.2 %
ETHANOL SERPL-MCNC: 73 MG/DL (ref ?–3)
GLOBULIN PLAS-MCNC: 3.6 G/DL (ref 2–3.5)
GLUCOSE BLD-MCNC: 148 MG/DL (ref 70–99)
GLUCOSE BLD-MCNC: 186 MG/DL (ref 70–99)
GLUCOSE BLD-MCNC: 214 MG/DL (ref 70–99)
GLUCOSE BLD-MCNC: 217 MG/DL (ref 70–99)
GLUCOSE BLD-MCNC: 245 MG/DL (ref 70–99)
GLUCOSE BLD-MCNC: 266 MG/DL (ref 70–99)
HCT VFR BLD AUTO: 48.6 %
HDLC SERPL-MCNC: 60 MG/DL (ref 40–59)
HGB BLD-MCNC: 16.9 G/DL
IMM GRANULOCYTES # BLD AUTO: 0.06 X10(3) UL (ref 0–1)
IMM GRANULOCYTES NFR BLD: 0.5 %
LDLC SERPL CALC-MCNC: 97 MG/DL (ref ?–100)
LIPASE SERPL-CCNC: 30 U/L (ref 12–53)
LYMPHOCYTES # BLD AUTO: 1.53 X10(3) UL (ref 1–4)
LYMPHOCYTES NFR BLD AUTO: 11.8 %
MAGNESIUM SERPL-MCNC: 1.1 MG/DL (ref 1.6–2.6)
MCH RBC QN AUTO: 31.8 PG (ref 26–34)
MCHC RBC AUTO-ENTMCNC: 34.8 G/DL (ref 31–37)
MCV RBC AUTO: 91.4 FL
MONOCYTES # BLD AUTO: 0.84 X10(3) UL (ref 0.1–1)
MONOCYTES NFR BLD AUTO: 6.5 %
NEUTROPHILS # BLD AUTO: 10.43 X10 (3) UL (ref 1.5–7.7)
NEUTROPHILS # BLD AUTO: 10.43 X10(3) UL (ref 1.5–7.7)
NEUTROPHILS NFR BLD AUTO: 80.3 %
NONHDLC SERPL-MCNC: 144 MG/DL (ref ?–130)
OSMOLALITY SERPL CALC.SUM OF ELEC: 286 MOSM/KG (ref 275–295)
PHOSPHATE SERPL-MCNC: 3 MG/DL (ref 2.4–5.1)
PLATELET # BLD AUTO: 265 10(3)UL (ref 150–450)
POTASSIUM SERPL-SCNC: 4.3 MMOL/L (ref 3.5–5.1)
PROCALCITONIN SERPL-MCNC: 0.2 NG/ML (ref ?–0.05)
PROT SERPL-MCNC: 8.3 G/DL (ref 5.7–8.2)
RBC # BLD AUTO: 5.32 X10(6)UL
SODIUM SERPL-SCNC: 135 MMOL/L (ref 136–145)
T3FREE SERPL-MCNC: 2.16 PG/ML (ref 2.4–4.2)
T4 FREE SERPL-MCNC: 1 NG/DL (ref 0.8–1.7)
TRIGL SERPL-MCNC: 281 MG/DL (ref 30–149)
TROPONIN I SERPL HS-MCNC: 99 NG/L
TSI SER-ACNC: 0.28 UIU/ML (ref 0.55–4.78)
VLDLC SERPL CALC-MCNC: 47 MG/DL (ref 0–30)
WBC # BLD AUTO: 13 X10(3) UL (ref 4–11)

## 2024-12-17 PROCEDURE — HZ2ZZZZ DETOXIFICATION SERVICES FOR SUBSTANCE ABUSE TREATMENT: ICD-10-PCS | Performed by: EMERGENCY MEDICINE

## 2024-12-17 PROCEDURE — 74177 CT ABD & PELVIS W/CONTRAST: CPT | Performed by: EMERGENCY MEDICINE

## 2024-12-17 PROCEDURE — 71275 CT ANGIOGRAPHY CHEST: CPT | Performed by: EMERGENCY MEDICINE

## 2024-12-17 RX ORDER — PANTOPRAZOLE SODIUM 40 MG/1
40 TABLET, DELAYED RELEASE ORAL
Status: DISCONTINUED | OUTPATIENT
Start: 2024-12-18 | End: 2024-12-18

## 2024-12-17 RX ORDER — LORAZEPAM 1 MG/1
2 TABLET ORAL
Status: CANCELLED | OUTPATIENT
Start: 2024-12-17

## 2024-12-17 RX ORDER — ONDANSETRON 2 MG/ML
4 INJECTION INTRAMUSCULAR; INTRAVENOUS EVERY 4 HOURS PRN
Status: ACTIVE | OUTPATIENT
Start: 2024-12-17 | End: 2024-12-18

## 2024-12-17 RX ORDER — BUSPIRONE HYDROCHLORIDE 10 MG/1
20 TABLET ORAL 2 TIMES DAILY
Status: DISCONTINUED | OUTPATIENT
Start: 2024-12-17 | End: 2024-12-20

## 2024-12-17 RX ORDER — ENOXAPARIN SODIUM 100 MG/ML
40 INJECTION SUBCUTANEOUS DAILY
Status: DISCONTINUED | OUTPATIENT
Start: 2024-12-18 | End: 2024-12-20

## 2024-12-17 RX ORDER — NICOTINE POLACRILEX 4 MG
30 LOZENGE BUCCAL
Status: CANCELLED | OUTPATIENT
Start: 2024-12-17

## 2024-12-17 RX ORDER — PROCHLORPERAZINE EDISYLATE 5 MG/ML
5 INJECTION INTRAMUSCULAR; INTRAVENOUS EVERY 8 HOURS PRN
Status: DISCONTINUED | OUTPATIENT
Start: 2024-12-17 | End: 2024-12-20

## 2024-12-17 RX ORDER — PHENOBARBITAL SODIUM 65 MG/ML
97.5 INJECTION, SOLUTION INTRAMUSCULAR; INTRAVENOUS EVERY 8 HOURS
Status: DISCONTINUED | OUTPATIENT
Start: 2024-12-17 | End: 2024-12-18

## 2024-12-17 RX ORDER — LORAZEPAM 2 MG/ML
3 INJECTION INTRAMUSCULAR
Status: DISCONTINUED | OUTPATIENT
Start: 2024-12-17 | End: 2024-12-18

## 2024-12-17 RX ORDER — HYDROMORPHONE HYDROCHLORIDE 1 MG/ML
0.5 INJECTION, SOLUTION INTRAMUSCULAR; INTRAVENOUS; SUBCUTANEOUS EVERY 30 MIN PRN
Status: ACTIVE | OUTPATIENT
Start: 2024-12-17 | End: 2024-12-18

## 2024-12-17 RX ORDER — METOPROLOL TARTRATE 1 MG/ML
5 INJECTION, SOLUTION INTRAVENOUS ONCE
Status: COMPLETED | OUTPATIENT
Start: 2024-12-17 | End: 2024-12-17

## 2024-12-17 RX ORDER — ALPRAZOLAM 0.25 MG/1
0.25 TABLET ORAL 3 TIMES DAILY PRN
Status: DISCONTINUED | OUTPATIENT
Start: 2024-12-17 | End: 2024-12-20

## 2024-12-17 RX ORDER — LORAZEPAM 2 MG/1
2 TABLET ORAL
Status: DISCONTINUED | OUTPATIENT
Start: 2024-12-17 | End: 2024-12-19

## 2024-12-17 RX ORDER — DEXTROSE MONOHYDRATE 25 G/50ML
50 INJECTION, SOLUTION INTRAVENOUS
Status: DISCONTINUED | OUTPATIENT
Start: 2024-12-17 | End: 2024-12-20

## 2024-12-17 RX ORDER — NICOTINE POLACRILEX 4 MG
15 LOZENGE BUCCAL
Status: CANCELLED | OUTPATIENT
Start: 2024-12-17

## 2024-12-17 RX ORDER — POLYETHYLENE GLYCOL 3350 17 G/17G
17 POWDER, FOR SOLUTION ORAL DAILY PRN
Status: DISCONTINUED | OUTPATIENT
Start: 2024-12-17 | End: 2024-12-20

## 2024-12-17 RX ORDER — ECHINACEA PURPUREA EXTRACT 125 MG
1 TABLET ORAL
Status: DISCONTINUED | OUTPATIENT
Start: 2024-12-17 | End: 2024-12-20

## 2024-12-17 RX ORDER — DOXEPIN HYDROCHLORIDE 50 MG/1
1 CAPSULE ORAL DAILY
Status: DISCONTINUED | OUTPATIENT
Start: 2024-12-17 | End: 2024-12-20

## 2024-12-17 RX ORDER — SODIUM CHLORIDE 9 MG/ML
INJECTION, SOLUTION INTRAVENOUS CONTINUOUS
Status: CANCELLED | OUTPATIENT
Start: 2024-12-17

## 2024-12-17 RX ORDER — GABAPENTIN 300 MG/1
300 CAPSULE ORAL 2 TIMES DAILY
Status: DISCONTINUED | OUTPATIENT
Start: 2024-12-17 | End: 2024-12-20

## 2024-12-17 RX ORDER — HYDROMORPHONE HYDROCHLORIDE 1 MG/ML
0.4 INJECTION, SOLUTION INTRAMUSCULAR; INTRAVENOUS; SUBCUTANEOUS EVERY 2 HOUR PRN
Status: DISCONTINUED | OUTPATIENT
Start: 2024-12-17 | End: 2024-12-20

## 2024-12-17 RX ORDER — TRAZODONE HYDROCHLORIDE 50 MG/1
200 TABLET, FILM COATED ORAL NIGHTLY
Status: DISCONTINUED | OUTPATIENT
Start: 2024-12-17 | End: 2024-12-20

## 2024-12-17 RX ORDER — PHENOBARBITAL SODIUM 65 MG/ML
65 INJECTION, SOLUTION INTRAMUSCULAR; INTRAVENOUS EVERY 8 HOURS
Status: DISCONTINUED | OUTPATIENT
Start: 2024-12-19 | End: 2024-12-18

## 2024-12-17 RX ORDER — HALOPERIDOL 5 MG/ML
2 INJECTION INTRAMUSCULAR EVERY 8 HOURS PRN
Status: DISCONTINUED | OUTPATIENT
Start: 2024-12-17 | End: 2024-12-20

## 2024-12-17 RX ORDER — ATORVASTATIN CALCIUM 10 MG/1
10 TABLET, FILM COATED ORAL DAILY
Status: DISCONTINUED | OUTPATIENT
Start: 2024-12-17 | End: 2024-12-20

## 2024-12-17 RX ORDER — ACETAMINOPHEN 500 MG
1000 TABLET ORAL EVERY 4 HOURS PRN
Status: DISCONTINUED | OUTPATIENT
Start: 2024-12-17 | End: 2024-12-20

## 2024-12-17 RX ORDER — DEXTROSE MONOHYDRATE AND SODIUM CHLORIDE 5; .45 G/100ML; G/100ML
100 INJECTION, SOLUTION INTRAVENOUS CONTINUOUS PRN
Status: DISCONTINUED | OUTPATIENT
Start: 2024-12-17 | End: 2024-12-18

## 2024-12-17 RX ORDER — NICOTINE POLACRILEX 4 MG
15 LOZENGE BUCCAL
Status: DISCONTINUED | OUTPATIENT
Start: 2024-12-17 | End: 2024-12-20

## 2024-12-17 RX ORDER — DEXTROSE MONOHYDRATE 25 G/50ML
50 INJECTION, SOLUTION INTRAVENOUS
Status: CANCELLED | OUTPATIENT
Start: 2024-12-17

## 2024-12-17 RX ORDER — LORAZEPAM 2 MG/ML
2 INJECTION INTRAMUSCULAR ONCE
Status: COMPLETED | OUTPATIENT
Start: 2024-12-17 | End: 2024-12-17

## 2024-12-17 RX ORDER — THIAMINE HYDROCHLORIDE 100 MG/ML
100 INJECTION, SOLUTION INTRAMUSCULAR; INTRAVENOUS ONCE
Status: COMPLETED | OUTPATIENT
Start: 2024-12-17 | End: 2024-12-17

## 2024-12-17 RX ORDER — SERTRALINE HYDROCHLORIDE 100 MG/1
200 TABLET, FILM COATED ORAL NIGHTLY
Status: DISCONTINUED | OUTPATIENT
Start: 2024-12-17 | End: 2024-12-20

## 2024-12-17 RX ORDER — LORAZEPAM 2 MG/ML
2 INJECTION INTRAMUSCULAR
Status: DISCONTINUED | OUTPATIENT
Start: 2024-12-17 | End: 2024-12-18

## 2024-12-17 RX ORDER — LORAZEPAM 2 MG/ML
6 INJECTION INTRAMUSCULAR EVERY 10 MIN PRN
Status: DISCONTINUED | OUTPATIENT
Start: 2024-12-17 | End: 2024-12-18

## 2024-12-17 RX ORDER — ONDANSETRON 2 MG/ML
4 INJECTION INTRAMUSCULAR; INTRAVENOUS ONCE
Status: COMPLETED | OUTPATIENT
Start: 2024-12-17 | End: 2024-12-17

## 2024-12-17 RX ORDER — DILTIAZEM HYDROCHLORIDE 5 MG/ML
10 INJECTION INTRAVENOUS ONCE
Status: COMPLETED | OUTPATIENT
Start: 2024-12-17 | End: 2024-12-17

## 2024-12-17 RX ORDER — NICOTINE POLACRILEX 4 MG
30 LOZENGE BUCCAL
Status: DISCONTINUED | OUTPATIENT
Start: 2024-12-17 | End: 2024-12-20

## 2024-12-17 RX ORDER — METOPROLOL SUCCINATE 100 MG/1
100 TABLET, EXTENDED RELEASE ORAL
Status: DISCONTINUED | OUTPATIENT
Start: 2024-12-18 | End: 2024-12-20

## 2024-12-17 RX ORDER — SENNOSIDES 8.6 MG
17.2 TABLET ORAL NIGHTLY PRN
Status: DISCONTINUED | OUTPATIENT
Start: 2024-12-17 | End: 2024-12-20

## 2024-12-17 RX ORDER — INSULIN DEGLUDEC 100 U/ML
14 INJECTION, SOLUTION SUBCUTANEOUS DAILY
Status: CANCELLED | OUTPATIENT
Start: 2024-12-17

## 2024-12-17 RX ORDER — SODIUM PHOSPHATE, DIBASIC AND SODIUM PHOSPHATE, MONOBASIC 7; 19 G/230ML; G/230ML
1 ENEMA RECTAL ONCE AS NEEDED
Status: DISCONTINUED | OUTPATIENT
Start: 2024-12-17 | End: 2024-12-20

## 2024-12-17 RX ORDER — LORAZEPAM 2 MG/ML
2 INJECTION INTRAMUSCULAR ONCE
Status: DISCONTINUED | OUTPATIENT
Start: 2024-12-17 | End: 2024-12-17

## 2024-12-17 RX ORDER — LORAZEPAM 1 MG/1
1 TABLET ORAL
Status: CANCELLED | OUTPATIENT
Start: 2024-12-17

## 2024-12-17 RX ORDER — DEXTROSE MONOHYDRATE AND SODIUM CHLORIDE 5; .9 G/100ML; G/100ML
INJECTION, SOLUTION INTRAVENOUS ONCE
Status: COMPLETED | OUTPATIENT
Start: 2024-12-17 | End: 2024-12-17

## 2024-12-17 RX ORDER — HYDROMORPHONE HYDROCHLORIDE 1 MG/ML
0.1 INJECTION, SOLUTION INTRAMUSCULAR; INTRAVENOUS; SUBCUTANEOUS EVERY 2 HOUR PRN
Status: DISCONTINUED | OUTPATIENT
Start: 2024-12-17 | End: 2024-12-20

## 2024-12-17 RX ORDER — PHENOBARBITAL SODIUM 65 MG/ML
32.5 INJECTION, SOLUTION INTRAMUSCULAR; INTRAVENOUS EVERY 8 HOURS
Status: DISCONTINUED | OUTPATIENT
Start: 2024-12-21 | End: 2024-12-18

## 2024-12-17 RX ORDER — SODIUM CHLORIDE 9 MG/ML
INJECTION, SOLUTION INTRAVENOUS CONTINUOUS
Status: ACTIVE | OUTPATIENT
Start: 2024-12-17 | End: 2024-12-18

## 2024-12-17 RX ORDER — SODIUM CHLORIDE, SODIUM LACTATE, POTASSIUM CHLORIDE, CALCIUM CHLORIDE 600; 310; 30; 20 MG/100ML; MG/100ML; MG/100ML; MG/100ML
INJECTION, SOLUTION INTRAVENOUS CONTINUOUS
Status: DISCONTINUED | OUTPATIENT
Start: 2024-12-17 | End: 2024-12-18

## 2024-12-17 RX ORDER — LORAZEPAM 2 MG/ML
4 INJECTION INTRAMUSCULAR EVERY 30 MIN PRN
Status: DISCONTINUED | OUTPATIENT
Start: 2024-12-17 | End: 2024-12-18

## 2024-12-17 RX ORDER — HYDROMORPHONE HYDROCHLORIDE 1 MG/ML
0.2 INJECTION, SOLUTION INTRAMUSCULAR; INTRAVENOUS; SUBCUTANEOUS EVERY 2 HOUR PRN
Status: DISCONTINUED | OUTPATIENT
Start: 2024-12-17 | End: 2024-12-20

## 2024-12-17 RX ORDER — HYDROMORPHONE HYDROCHLORIDE 1 MG/ML
1 INJECTION, SOLUTION INTRAMUSCULAR; INTRAVENOUS; SUBCUTANEOUS ONCE
Status: COMPLETED | OUTPATIENT
Start: 2024-12-17 | End: 2024-12-17

## 2024-12-17 RX ORDER — ATORVASTATIN CALCIUM 10 MG/1
1 TABLET, FILM COATED ORAL DAILY
COMMUNITY
Start: 2024-11-18

## 2024-12-17 RX ORDER — BISACODYL 10 MG
10 SUPPOSITORY, RECTAL RECTAL
Status: DISCONTINUED | OUTPATIENT
Start: 2024-12-17 | End: 2024-12-20

## 2024-12-17 RX ORDER — LORAZEPAM 2 MG/ML
5 INJECTION INTRAMUSCULAR
Status: DISCONTINUED | OUTPATIENT
Start: 2024-12-17 | End: 2024-12-18

## 2024-12-17 RX ORDER — LORAZEPAM 1 MG/1
1 TABLET ORAL
Status: DISCONTINUED | OUTPATIENT
Start: 2024-12-17 | End: 2024-12-19

## 2024-12-17 RX ORDER — LORAZEPAM 2 MG/ML
1 INJECTION INTRAMUSCULAR
Status: DISCONTINUED | OUTPATIENT
Start: 2024-12-17 | End: 2024-12-18

## 2024-12-17 RX ORDER — THIAMINE HYDROCHLORIDE 100 MG/ML
100 INJECTION, SOLUTION INTRAMUSCULAR; INTRAVENOUS DAILY
Status: DISCONTINUED | OUTPATIENT
Start: 2024-12-18 | End: 2024-12-19

## 2024-12-17 RX ORDER — ONDANSETRON 2 MG/ML
4 INJECTION INTRAMUSCULAR; INTRAVENOUS EVERY 6 HOURS PRN
Status: DISCONTINUED | OUTPATIENT
Start: 2024-12-17 | End: 2024-12-20

## 2024-12-18 ENCOUNTER — APPOINTMENT (OUTPATIENT)
Dept: CV DIAGNOSTICS | Facility: HOSPITAL | Age: 44
End: 2024-12-18
Attending: NURSE PRACTITIONER
Payer: MEDICAID

## 2024-12-18 ENCOUNTER — ANESTHESIA EVENT (OUTPATIENT)
Dept: ENDOSCOPY | Facility: HOSPITAL | Age: 44
End: 2024-12-18
Payer: MEDICAID

## 2024-12-18 PROBLEM — I48.91 ATRIAL FIBRILLATION WITH RVR (HCC): Status: ACTIVE | Noted: 2023-08-01

## 2024-12-18 LAB
AMPHET UR QL SCN: NEGATIVE
ANION GAP SERPL CALC-SCNC: 10 MMOL/L (ref 0–18)
ANION GAP SERPL CALC-SCNC: 10 MMOL/L (ref 0–18)
ANION GAP SERPL CALC-SCNC: 5 MMOL/L (ref 0–18)
ANION GAP SERPL CALC-SCNC: 6 MMOL/L (ref 0–18)
ATRIAL RATE: 78 BPM
BASOPHILS # BLD AUTO: 0.02 X10(3) UL (ref 0–0.2)
BASOPHILS NFR BLD AUTO: 0.3 %
BILIRUB UR QL STRIP.AUTO: NEGATIVE
BUN BLD-MCNC: 6 MG/DL (ref 9–23)
BUN BLD-MCNC: <5 MG/DL (ref 9–23)
CALCIUM BLD-MCNC: 8.3 MG/DL (ref 8.7–10.4)
CALCIUM BLD-MCNC: 8.4 MG/DL (ref 8.7–10.4)
CALCIUM BLD-MCNC: 8.5 MG/DL (ref 8.7–10.4)
CALCIUM BLD-MCNC: 8.6 MG/DL (ref 8.7–10.4)
CANNABINOIDS UR QL SCN: NEGATIVE
CHLORIDE SERPL-SCNC: 100 MMOL/L (ref 98–112)
CHLORIDE SERPL-SCNC: 100 MMOL/L (ref 98–112)
CHLORIDE SERPL-SCNC: 101 MMOL/L (ref 98–112)
CHLORIDE SERPL-SCNC: 97 MMOL/L (ref 98–112)
CLARITY UR REFRACT.AUTO: CLEAR
CO2 SERPL-SCNC: 24 MMOL/L (ref 21–32)
CO2 SERPL-SCNC: 26 MMOL/L (ref 21–32)
CO2 SERPL-SCNC: 28 MMOL/L (ref 21–32)
CO2 SERPL-SCNC: 29 MMOL/L (ref 21–32)
COCAINE UR QL: NEGATIVE
CREAT BLD-MCNC: 0.59 MG/DL
CREAT BLD-MCNC: 0.6 MG/DL
CREAT BLD-MCNC: 0.61 MG/DL
CREAT BLD-MCNC: 0.63 MG/DL
CREAT UR-SCNC: 45.7 MG/DL
EGFRCR SERPLBLD CKD-EPI 2021: 114 ML/MIN/1.73M2 (ref 60–?)
EGFRCR SERPLBLD CKD-EPI 2021: 114 ML/MIN/1.73M2 (ref 60–?)
EGFRCR SERPLBLD CKD-EPI 2021: 115 ML/MIN/1.73M2 (ref 60–?)
EGFRCR SERPLBLD CKD-EPI 2021: 115 ML/MIN/1.73M2 (ref 60–?)
EOSINOPHIL # BLD AUTO: 0.03 X10(3) UL (ref 0–0.7)
EOSINOPHIL NFR BLD AUTO: 0.5 %
ERYTHROCYTE [DISTWIDTH] IN BLOOD BY AUTOMATED COUNT: 14.2 %
FENTANYL UR QL SCN: NEGATIVE
GLUCOSE BLD-MCNC: 120 MG/DL (ref 70–99)
GLUCOSE BLD-MCNC: 122 MG/DL (ref 70–99)
GLUCOSE BLD-MCNC: 124 MG/DL (ref 70–99)
GLUCOSE BLD-MCNC: 125 MG/DL (ref 70–99)
GLUCOSE BLD-MCNC: 125 MG/DL (ref 70–99)
GLUCOSE BLD-MCNC: 127 MG/DL (ref 70–99)
GLUCOSE BLD-MCNC: 129 MG/DL (ref 70–99)
GLUCOSE BLD-MCNC: 131 MG/DL (ref 70–99)
GLUCOSE BLD-MCNC: 135 MG/DL (ref 70–99)
GLUCOSE BLD-MCNC: 138 MG/DL (ref 70–99)
GLUCOSE BLD-MCNC: 138 MG/DL (ref 70–99)
GLUCOSE BLD-MCNC: 142 MG/DL (ref 70–99)
GLUCOSE BLD-MCNC: 145 MG/DL (ref 70–99)
GLUCOSE BLD-MCNC: 146 MG/DL (ref 70–99)
GLUCOSE BLD-MCNC: 167 MG/DL (ref 70–99)
GLUCOSE BLD-MCNC: 179 MG/DL (ref 70–99)
GLUCOSE BLD-MCNC: 188 MG/DL (ref 70–99)
GLUCOSE BLD-MCNC: 96 MG/DL (ref 70–99)
GLUCOSE UR STRIP.AUTO-MCNC: 70 MG/DL
HCT VFR BLD AUTO: 38 %
HGB BLD-MCNC: 13 G/DL
IMM GRANULOCYTES # BLD AUTO: 0.02 X10(3) UL (ref 0–1)
IMM GRANULOCYTES NFR BLD: 0.3 %
KETONES UR STRIP.AUTO-MCNC: >150 MG/DL
LEUKOCYTE ESTERASE UR QL STRIP.AUTO: NEGATIVE
LYMPHOCYTES # BLD AUTO: 1.74 X10(3) UL (ref 1–4)
LYMPHOCYTES NFR BLD AUTO: 28.8 %
MAGNESIUM SERPL-MCNC: 0.9 MG/DL (ref 1.6–2.6)
MAGNESIUM SERPL-MCNC: 2 MG/DL (ref 1.6–2.6)
MAGNESIUM SERPL-MCNC: 2.3 MG/DL (ref 1.6–2.6)
MAGNESIUM SERPL-MCNC: 2.6 MG/DL (ref 1.6–2.6)
MCH RBC QN AUTO: 31.4 PG (ref 26–34)
MCHC RBC AUTO-ENTMCNC: 34.2 G/DL (ref 31–37)
MCV RBC AUTO: 91.8 FL
MDMA UR QL SCN: NEGATIVE
MONOCYTES # BLD AUTO: 0.65 X10(3) UL (ref 0.1–1)
MONOCYTES NFR BLD AUTO: 10.8 %
MRSA DNA SPEC QL NAA+PROBE: NEGATIVE
NEUTROPHILS # BLD AUTO: 3.58 X10 (3) UL (ref 1.5–7.7)
NEUTROPHILS # BLD AUTO: 3.58 X10(3) UL (ref 1.5–7.7)
NEUTROPHILS NFR BLD AUTO: 59.3 %
NITRITE UR QL STRIP.AUTO: NEGATIVE
OSMOLALITY SERPL CALC.SUM OF ELEC: 271 MOSM/KG (ref 275–295)
OXYCODONE UR QL SCN: NEGATIVE
P AXIS: 55 DEGREES
P-R INTERVAL: 162 MS
PH UR STRIP.AUTO: 6 [PH] (ref 5–8)
PHOSPHATE SERPL-MCNC: 1.1 MG/DL (ref 2.4–5.1)
PHOSPHATE SERPL-MCNC: 1.9 MG/DL (ref 2.4–5.1)
PHOSPHATE SERPL-MCNC: 3 MG/DL (ref 2.4–5.1)
PHOSPHATE SERPL-MCNC: 3.5 MG/DL (ref 2.4–5.1)
PLATELET # BLD AUTO: 126 10(3)UL (ref 150–450)
POTASSIUM SERPL-SCNC: 3.1 MMOL/L (ref 3.5–5.1)
POTASSIUM SERPL-SCNC: 3.4 MMOL/L (ref 3.5–5.1)
POTASSIUM SERPL-SCNC: 3.7 MMOL/L (ref 3.5–5.1)
POTASSIUM SERPL-SCNC: 4.1 MMOL/L (ref 3.5–5.1)
POTASSIUM SERPL-SCNC: 4.3 MMOL/L (ref 3.5–5.1)
PROT UR STRIP.AUTO-MCNC: 30 MG/DL
Q-T INTERVAL: 192 MS
Q-T INTERVAL: 422 MS
QRS DURATION: 64 MS
QRS DURATION: 70 MS
QTC CALCULATION (BEZET): 359 MS
QTC CALCULATION (BEZET): 481 MS
R AXIS: 79 DEGREES
R AXIS: 82 DEGREES
RBC # BLD AUTO: 4.14 X10(6)UL
RBC UR QL AUTO: NEGATIVE
SODIUM SERPL-SCNC: 131 MMOL/L (ref 136–145)
SODIUM SERPL-SCNC: 134 MMOL/L (ref 136–145)
SODIUM SERPL-SCNC: 135 MMOL/L (ref 136–145)
SODIUM SERPL-SCNC: 136 MMOL/L (ref 136–145)
SP GR UR STRIP.AUTO: >1.03 (ref 1–1.03)
T AXIS: -67 DEGREES
T AXIS: 95 DEGREES
TROPONIN I SERPL HS-MCNC: 158 NG/L
TROPONIN I SERPL HS-MCNC: 227 NG/L
TROPONIN I SERPL HS-MCNC: 89 NG/L
UROBILINOGEN UR STRIP.AUTO-MCNC: NORMAL MG/DL
VENTRICULAR RATE: 210 BPM
VENTRICULAR RATE: 78 BPM
WBC # BLD AUTO: 6 X10(3) UL (ref 4–11)

## 2024-12-18 PROCEDURE — 93306 TTE W/DOPPLER COMPLETE: CPT | Performed by: NURSE PRACTITIONER

## 2024-12-18 PROCEDURE — 99233 SBSQ HOSP IP/OBS HIGH 50: CPT | Performed by: EMERGENCY MEDICINE

## 2024-12-18 PROCEDURE — 99222 1ST HOSP IP/OBS MODERATE 55: CPT | Performed by: CLINICAL NURSE SPECIALIST

## 2024-12-18 RX ORDER — PHENOBARBITAL 32.4 MG/1
32.4 TABLET ORAL EVERY 8 HOURS SCHEDULED
Status: DISCONTINUED | OUTPATIENT
Start: 2024-12-21 | End: 2024-12-19

## 2024-12-18 RX ORDER — PHENOBARBITAL 32.4 MG/1
97.2 TABLET ORAL EVERY 8 HOURS
Status: DISCONTINUED | OUTPATIENT
Start: 2024-12-18 | End: 2024-12-19

## 2024-12-18 RX ORDER — POTASSIUM CHLORIDE 1500 MG/1
40 TABLET, EXTENDED RELEASE ORAL EVERY 4 HOURS
Status: COMPLETED | OUTPATIENT
Start: 2024-12-18 | End: 2024-12-18

## 2024-12-18 RX ORDER — INSULIN DEGLUDEC 100 U/ML
28 INJECTION, SOLUTION SUBCUTANEOUS DAILY
Status: DISCONTINUED | OUTPATIENT
Start: 2024-12-18 | End: 2024-12-20

## 2024-12-18 RX ORDER — PHENOBARBITAL 32.4 MG/1
64.8 TABLET ORAL EVERY 8 HOURS SCHEDULED
Status: DISCONTINUED | OUTPATIENT
Start: 2024-12-19 | End: 2024-12-19

## 2024-12-18 RX ORDER — MONTELUKAST SODIUM 10 MG/1
10 TABLET ORAL DAILY
Status: DISCONTINUED | OUTPATIENT
Start: 2024-12-18 | End: 2024-12-20

## 2024-12-18 NOTE — PAYOR COMM NOTE
--------------  ADMISSION REVIEW     Payor: TIA  Subscriber #:  [id number]  Authorization Number: [id number]    Admit date: 12/17/24  Admit time:  9:50 PM         Patient Seen in: [facility] Emergency Department    Stated Complaint: Eval-D and SVT    Patient is a 44-year-old woman with a history of alcohol abuse who continues to drink.  History of SVT as well as A-fib.  Comes in by medics with a high heart rate.t.  Says she wants to quit drinking.  Heart rates are in the 200s.  She was treated with several doses of adenosine and route per discussions with paramedics without relief.  She complains of epigastric pain consistent with her pancreatitis.  She is a diabetic.  Says she does get withdrawal symptoms though not DTs.      ED Triage Vitals   BP 12/17/24 1817 (!) 112/96   Pulse 12/17/24 1817 (!) 219   Resp 12/17/24 1817 (!) 27   Temp 12/17/24 1832 97.4 °F (36.3 °C)   Temp src 12/17/24 1832 Temporal   SpO2 12/17/24 1817 100 %   O2 Device 12/17/24 1817 None (Room air)       Current Vitals:   Vital Signs  BP: 109/72  Pulse: 98  Resp: 21  Temp: 97.4 °F (36.3 °C)  Temp src: Temporal  MAP (mmHg): 84    Oxygen Therapy  SpO2: 97 %  O2 Device: None (Room air)        Physical Exam  General: Patient is anxious, somewhat tremulous 44-year-old  HEENT: Normal cephalic atraumatic.      Labs Reviewed   COMP METABOLIC PANEL (14) - Abnormal; Notable for the following components:       Result Value    Glucose 245 (*)     Sodium 135 (*)     Chloride 94 (*)     CO2 11.0 (*)     Anion Gap 30 (*)     BUN 8 (*)     Alkaline Phosphatase 114 (*)     Total Protein 8.3 (*)     Globulin  3.6 (*)     All other components within normal limits   CBC WITH DIFFERENTIAL WITH PLATELET - Abnormal; Notable for the following components:    WBC 13.0 (*)     RBC 5.32 (*)     HGB 16.9 (*)     HCT 48.6 (*)     Neutrophil Absolute Prelim 10.43 (*)     Neutrophil Absolute 10.43 (*)     All other components within normal limits   TSH W REFLEX TO FREE  T4 - Abnormal; Notable for the following components:    TSH 0.275 (*)     All other components within normal limits   ETHYL ALCOHOL - Abnormal; Notable for the following components:    Ethyl Alcohol 73 (*)     All other components within normal limits   TROPONIN I HIGH SENSITIVITY - Abnormal; Notable for the following components:    Troponin I (High Sensitivity) 99 (*)     All other components within normal limits   LIPID PANEL - Abnormal; Notable for the following components:    Cholesterol, Total 204 (*)     HDL Cholesterol 60 (*)     Triglycerides 281 (*)     VLDL 47 (*)     Non HDL Chol 144 (*)     All other components within normal limits   FREE T3 (TRIIODOTHYRONINE) - Abnormal; Notable for the following components:    T3 Free 2.16 (*)     All other components within normal limits   POCT GLUCOSE - Abnormal; Notable for the following components:    POC Glucose 266 (*)     All other components within normal limits   POCT GLUCOSE - Abnormal; Notable for the following components:    POC Glucose 217 (*)     All other components within normal limits   LIPASE - Normal   T4, FREE (S) - Normal   URINALYSIS WITH CULTURE REFLEX   TROPONIN I HIGH SENSITIVITY     White count 13.  Hemoglobin 16.9.    Glucose 266.  MDM      Patient presents with rapid A-fib as well as alcohol withdrawal and acute pancreatitis.  He is a diabetic.  Glucose in the 200s.  IV was placed, she was given thiamine along with IV fluids.  She was given 2 mg Ativan and placed on CIWA.  She was hydrated.  She was started on diltiazem with a drip and bolus.  She was rebolus and the drip increased.  She still tacky in the 160s to 170s.  Will add a dose of Lopressor.        Of note, patient's troponin mildly elevated.  Likely demand ischemia for the tachycardia.  Feels much better with a heart rate improved control.  Bicarb 11 with a gap of 30.  Glucose is 245.  Suspect an element of AKA.  Will start D5 normal saline.  Will start low insulin drip.  Frequent  Accu-Cheks.  Will switch to the ICU.  CTA of the chest abdomen pelvis: Negative for PE.  Thickening of the distal esophagus consistent with esophagitis.  Gastric wall thickening consistent with gastritis.  Chronic pancreatitis.      Clinical Impression:  1. Atrial fibrillation with rapid ventricular response (HCC)    2. Alcohol withdrawal syndrome with complication (HCC)    3. Alcohol-induced chronic pancreatitis (HCC)    4. Type 1 diabetes mellitus with hyperglycemia (HCC)    5. Acidosis    6. Alcoholic ketoacidosis          H & P    Chief Complaint: NAusea, malaise      #AGMA suspect due to euglycemic DKA  Insulin drip  IVF  Monitor BMP, Anion gap  #intractable N, V due to etoh, gastritis and DKA  IVF  Anti emetics PRN   #Etoh abuse   CIWA  #h/o pancreatitis- chronic   IVF  # Afib RVR likely due to above  Monitor K, Mg  IVF  Cardizem drip   Trend troponin  Cards on CS   #chronic splenic vein occlusion seen on CT    CRITICAL CARE NOTE       Combined AGMA with metabolic alkalosis 2/2 euglycemic DKA, n/v  -Last A1C 10.4 in November 2024  -UA pending  -DKA protocol - insulin gtt, IVF, q1h accuchecks, BMP/Mag/Phos q4h  -Diabetic APRN consult     Afib RVR likely 2/2 alcohol withdrawal  -Trop 99, trend  -s/p adenosine, cardizem bolus + gtt, metoprolol IVP  -Continue cardizem gtt  -Echo ordered  -EKG  -CTA chest negative for PE  -Check electrolytes and replace per protocol  -Cardiology consulted     Alcohol withdrawal  -Ethyl alcohol 73 on presentation, last drink reported to be 12/16 @ 9pm  -Monitor CIWA  -Start phenobarb IV taper without bolus  -UDS pending     Acute on chronic pancreatitis  -Lipase 30  -Triglycerides 281  -CT abd/pel with stable pseudocysts and chronic pancreatitis  -IVF  -NPO, low fat diet once able to tolerate PO intake  -Pain control    ADMITTED TO ICU           MEDICATIONS ADMINISTERED IN LAST 1 DAY:  atorvastatin (Lipitor) tab 10 mg       Date Action Dose Route User    12/18/2024 0855 Given 10 mg  Oral Imani Padilla RN    12/17/2024 2356 Given 10 mg Oral Fernie Lamas RN          busPIRone (Buspar) tab 20 mg       Date Action Dose Route User    12/18/2024 1004 Given 20 mg Oral Imani Padilla RN    12/17/2024 2355 Given 20 mg Oral Fernie Lamas RN          dextrose 5%-sodium chloride 0.45% infusion       Date Action Dose Route User    12/17/2024 2326 New Bag 100 mL/hr Intravenous Fernie Lamas RN          dextrose 5%-sodium chloride 0.9% infusion       Date Action Dose Route User    12/17/2024 2035 New Bag (none) Intravenous Basia Molina RN          dilTIAZem (cardIZEM) 100 mg in sodium chloride 0.9% 100 mL IVPB-ADDV       Date Action Dose Route User    12/18/2024 0000 Rate/Dose Change 2.5 mg/hr Intravenous Fernie Lamas RN    12/17/2024 2245 Rate/Dose Change 5 mg/hr Intravenous Fernie Lamas RN    12/17/2024 1823 New Bag 10 mg/hr Intravenous Basia Molina RN          dilTIAZem (cardIZEM) 25 mg/5mL injection 10 mg       Date Action Dose Route User    12/17/2024 1824 Given 10 mg Intravenous Basia Molina RN          dilTIAZem (cardIZEM) 25 mg/5mL injection 10 mg       Date Action Dose Route User    12/17/2024 1845 Given 10 mg Intravenous Basia Molina RN          enoxaparin (Lovenox) 40 MG/0.4ML SUBQ injection 40 mg       Date Action Dose Route User    12/18/2024 0855 Given 40 mg Subcutaneous (Left Lower Abdomen) Imani Padilla RN          folic acid (Folvite) 1 mg in sodium chloride 0.9% 50 mL IVPB       Date Action Dose Route User    12/18/2024 0856 New Bag 1 mg Intravenous Imani Padilla RN    12/18/2024 0559 New Bag 1 mg Intravenous Fernie Lamas RN          gabapentin (Neurontin) cap 300 mg       Date Action Dose Route User    12/18/2024 0854 Given 300 mg Oral Imani Padilla RN    12/17/2024 7146 Given 300 mg Oral Fernie Lamas RN          HYDROmorphone (Dilaudid) 1 MG/ML injection 1 mg       Date Action Dose Route User    12/17/2024 1851 Given 1 mg  Intravenous Basia Molina RN          HYDROmorphone (Dilaudid) 1 MG/ML injection 0.2 mg       Date Action Dose Route User    12/18/2024 0417 Given 0.2 mg Intravenous Fernie Lamas RN    12/17/2024 2254 Given 0.2 mg Intravenous Fernie Lamas RN          insulin aspart (NovoLOG) 100 Units/mL FlexPen 1-68 Units       Date Action Dose Route User    12/18/2024 1005 Given 1 Units Subcutaneous (Left Upper Arm) Imnai Padilla RN          insulin degludec (Tresiba) 100 units/mL flextouch 28 Units       Date Action Dose Route User    12/18/2024 1006 Given 28 Units Subcutaneous (Right Upper Arm) Imani Padilla RN          insulin regular human (Novolin R, Humulin R) 100 Units in sodium chloride 0.9% 100 mL standard infusion (100 mL)       Date Action Dose Route User    12/17/2024 2035 New Bag 3 Units/hr Intravenous Basia Moilna RN          insulin regular human (Novolin R, Humulin R) 100 Units in sodium chloride 0.9% 100 mL standard infusion (100 mL)       Date Action Dose Route User    12/17/2024 2303 Rate/Dose Change 1 Units/hr Intravenous Fernie Lamas RN    12/17/2024 2230 Restarted 2 Units/hr Intravenous Fernie Lamas RN          iopamidol 76% (ISOVUE-370) injection for power injector       Date Action Dose Route User    12/17/2024 2026 Given 100 mL Intravenous Magolan, Rachael A          lactated ringers IV bolus 1,000 mL       Date Action Dose Route User    12/17/2024 2314 New Bag 1,000 mL Intravenous Fernie Lamas RN          LORazepam (Ativan) 2 mg/mL injection 2 mg       Date Action Dose Route User    12/17/2024 1820 Given 2 mg Intravenous Basia Molina RN          magnesium sulfate 4 g/100mL IVPB premix 4 g       Date Action Dose Route User    12/18/2024 0137 New Bag 4 g Intravenous Fernie Lamas RN          metoprolol succinate ER (Toprol XL) 24 hr tab 100 mg       Date Action Dose Route User    12/18/2024 0558 Given 100 mg Oral Fernie Lamas, RN          metoprolol (Lopressor) 5  mg/5mL injection 5 mg       Date Action Dose Route User    12/17/2024 1902 Given 5 mg Intravenous Basia Molina RN          metoprolol (Lopressor) 5 mg/5mL injection 5 mg       Date Action Dose Route User    12/17/2024 2008 Given 5 mg Intravenous Arlette Jean RN          ondansetron (Zofran) 4 MG/2ML injection 4 mg       Date Action Dose Route User    12/17/2024 1855 Given 4 mg Intravenous Basia Molina RN          ondansetron (Zofran) 4 MG/2ML injection 4 mg       Date Action Dose Route User    12/18/2024 1011 Given 4 mg Intravenous Imani Padilla RN          pantoprazole (Protonix) DR tab 40 mg       Date Action Dose Route User    12/18/2024 0558 Given 40 mg Oral Fernie Lamas RN          PHENobarbital (Luminal) 65 mg/mL injection 97.5 mg       Date Action Dose Route User    12/18/2024 0558 Given 97.5 mg Intravenous Fernie Lamas RN    12/17/2024 2101 Given 97.5 mg Intravenous Fernie Lamas RN          potassium chloride (Klor-Con M20) tab 40 mEq       Date Action Dose Route User    12/18/2024 1011 Given 40 mEq Oral Imani Padilla RN          sertraline (Zoloft) tab 200 mg       Date Action Dose Route User    12/17/2024 2356 Given 200 mg Oral Fernie Lamas RN          sodium chloride 0.9 % IV bolus 1,000 mL       Date Action Dose Route User    12/17/2024 1820 New Bag 1,000 mL Intravenous Basia Molina RN          sodium phosphate 30 mmol in sodium chloride 0.9% 150 mL IVPB       Date Action Dose Route User    12/18/2024 0144 New Bag 30 mmol Intravenous Fernie Lamas RN          multivitamin (Tab-A-Toni/Beta Carotene) tab 1 tablet       Date Action Dose Route User    12/18/2024 0854 Given 1 tablet Oral Imani Padilla RN    12/17/2024 2101 Given 1 tablet Oral Fernie Lamas RN          thiamine 100 mg/mL injection 100 mg       Date Action Dose Route User    12/17/2024 1825 Given 100 mg Intravenous Basia Molina RN          thiamine 100 mg/mL injection 100 mg       Date  Action Dose Route User    12/18/2024 0856 Given 100 mg Intravenous Imani Padilla, JAMAAL          traZODone (Desyrel) tab 200 mg       Date Action Dose Route User    12/17/2024 2355 Given 200 mg Oral Fernie Lamas, RN            Vitals (last day)       Date/Time Temp Pulse Resp BP SpO2 Weight O2 Device O2 Flow Rate (L/min) Who    12/18/24 0900 -- 61 12 104/64 96 % -- -- -- MG    12/18/24 0800 97.8 °F (36.6 °C) 61 11 -- 94 % -- -- -- MG    12/18/24 0700 -- 59 11 106/60 92 % -- -- -- MG    12/18/24 0600 -- 60 16 106/55 94 % -- -- -- ZP    12/18/24 0500 -- 68 14 112/60 95 % -- -- -- ZP    12/18/24 0400 97.9 °F (36.6 °C) 74 14 115/66 95 % -- None (Room air) -- ZP    12/18/24 0300 -- 65 12 -- 93 % -- -- -- ZP    12/18/24 0200 -- 69 14 90/52 92 % -- -- -- ZP    12/18/24 0100 -- 76 13 104/71 93 % -- -- -- ZP    12/18/24 0000 97.9 °F (36.6 °C) 82 14 110/83 96 % 135 lb 9.3 oz (61.5 kg) None (Room air) -- ZP    12/17/24 2300 -- 78 17 125/74 95 % -- -- -- ZP    12/17/24 2245 -- 78 -- 127/80 -- -- -- -- ZP    12/17/24 2221 98.6 °F (37 °C) 73 24 114/68 96 % 135 lb 9.3 oz (61.5 kg) None (Room air) -- ZP    12/17/24 2200 -- 79 20 111/74 95 % -- -- -- ZP    12/17/24 2138 -- 79 26 -- 96 % -- -- -- ET    12/17/24 2130 -- 71 20 107/95 97 % -- -- -- ET    12/17/24 2031 -- 98 21 109/72 97 % -- -- -- ET    12/17/24 2030 -- 95 22 111/76 96 % -- -- -- ET    12/17/24 2015 -- 93 15 104/79 96 % -- -- -- ET    12/17/24 1943 -- 116 19 -- 97 % -- -- -- ET    12/17/24 1932 -- -- -- -- -- 150 lb (68 kg) -- -- ET    12/17/24 1915 -- 95 22 119/87 98 % -- None (Room air) -- ET    12/17/24 1832 97.4 °F (36.3 °C) -- -- -- -- -- -- -- ET    12/17/24 1817 -- 219 27 112/96 100 % -- None (Room air) -- ET          CIWA Scores (since admission)       Date/Time CIWA-Ar Total Who    12/18/24 0400 0 ZP    12/18/24 0000 0 ZP    12/17/24 2245 1 ZP    12/17/24 2130 3 ET    12/17/24 1906 3 ET    12/17/24 1839 15 ET

## 2024-12-18 NOTE — BH PROGRESS NOTE
Writer met with Pt at bedside for CIWA consult. Pt reports, \"I know what I need to do\" regarding drinking. Writer encouraged Pt to try substance abuse treatment although Pt politely declined. Writer offered for Pt to speak with Substance Abuse Specialist and Pt declined.    Pt denies SI/HI/AVH.

## 2024-12-18 NOTE — CONSULTS
[facility]  Diabetes Consult Note    Blanca Coats Patient Status:  Inpatient    1980 MRN [id number]   Location [facility] 4SW-A Attending Simone Samson, DO   Hosp Day # 1 PCP Liya Velez PA-C     Reason for Consult:   Management recommendations in setting of euglycemic DKA      Provider Requesting Consult:  Xenia Castañeda (ICU APN)      Diagnosis:  Patient Active Problem List   Diagnosis    Community acquired pneumonia    Alcohol-induced chronic pancreatitis (HCC)    Pseudocyst of pancreas (HCC)    Splenic vein thrombosis    Pneumonia due to infectious organism    Alcoholic intoxication with complication (HCC)    Pancreas cyst (HCC)    Pancreatic necrosis (HCC)    Acute pancreatitis (HCC)    Hyponatremia    Acute pancreatitis, unspecified complication status, unspecified pancreatitis type (HCC)    Hypokalemia    Thrombocytopenia (HCC)    Hyperglycemia    Alcohol-induced acute pancreatitis without infection or necrosis (HCC)    SVT (supraventricular tachycardia) (HCC)    Alcohol abuse    Alcohol-induced acute pancreatitis, unspecified complication status (HCC)    Abdominal pain, acute    Primary hypertension    Elevated liver enzymes    Anxiety and depression    Pancreatic cyst (HCC)    Metabolic acidosis    Dyspnea, unspecified type    Diabetic ketoacidosis without coma associated with diabetes mellitus due to underlying condition (HCC)    Tachycardia    Acute respiratory failure with hypoxia (HCC)    Oral thrush    Colitis due to Escherichia coli    Diarrhea of infectious origin    Tobacco dependence    New onset type 2 diabetes mellitus (HCC)    Lung nodule    Atrial fibrillation with rapid ventricular response (HCC)    Type 1 diabetes mellitus with ketoacidosis without coma (HCC)    Chronic pancreatitis, unspecified pancreatitis type (HCC)    Alcohol withdrawal syndrome with complication (HCC)    Type 1 diabetes mellitus with hyperglycemia (HCC)    Acidosis    Alcoholic ketoacidosis     A-fib (HCC)         Medical History:  Past Medical History:    Anxiety    Arrhythmia    SVT    Attention deficit disorder    Breast CA (HCC)    Depression    Esophageal reflux    High blood pressure    OTHER DISEASES    seasonal allergies    Pancreatitis (HCC)    Pneumonia due to organism    SVT (supraventricular tachycardia) (HCC)    Type 1 diabetes mellitus (HCC)     Past Surgical History:   Procedure Laterality Date    Breast surgery      Implant left      Implant right      Mastectomy left      Mastectomy right      Ndsc ablation & rcnstj atria lmtd w/o bypass       Family History   Problem Relation Age of Onset    Heart Disorder Father     Diabetes Father     Hypertension Mother     Cancer Mother         breast         Diabetes history:  Type:  T1DM  Onset: 1/2023 w/ DKA admission  Family history of DM: father and brother w/ T1DM      Allergies: Allergies[1]      Medications: Complete list reviewed. Active diabetes medications include degludec and aspart.      Labs:  Recent Labs   Lab 12/18/24  0702 12/18/24  0808 12/18/24  0921 12/18/24  1025 12/18/24  1200   PGLU 127* 138* 138* 179* 188*     Recent Labs     12/17/24  1825 12/17/24  2007 12/18/24  0006 12/18/24  0116 12/18/24  0457 12/18/24  0608 12/18/24  0748 12/18/24  0808 12/18/24  1200   *  --  131*  --  136  --  135*  --   --    CL 94*  --  97*  --  100  --  101  --   --    CO2 11.0*  --  24.0  --  26.0  --  28.0  --   --    BUN 8*  --  6*  --  <5*  --  <5*  --   --    CREATSERUM 0.85  --  0.63  --  0.60  --  0.61  --   --    PGLU  --    < >  --    < >  --    < >  --    < > 188*   CA 9.8  --  8.6*  --  8.4*  --  8.5*  --   --    ALB 4.7  --   --   --   --   --   --   --   --     < > = values in this interval not displayed.                    History of Present Illness:  Blanca Coats is a 44 year old female with a PMH of T1DM c/b DKA (1/2023), chronic pancreatitis w/ pancreatic pseudocyst, ETOH abuse, breast CA s/p b/l mastectomy w/ chemo (2017),  anxiety, depression, HTN, SVT and ETOH hepatic steatosis admitted 12/18/2024 with complaints of heart racing and need for ETOH detox. ED evaluation revealed a-fib w/ RVR, as well as DKA (, bicarb 11, anion gap 30, A1C 10.2%).          Assessment/Recommendations:   Upon interview today, patient states she has been drinking more than usual this past week; states she finished two vodka bottles over the past week. Patient states that, since last admission, she has not had any issues with OmniPod insulin pump; states her sugars are reading higher than normal for her (states 200s frequently), but does not have pump reader at bedside to evaluate. Explained to patient need for continuous insulin infusion until labs stabilize with plan to restart insulin pump vs transition to subcutaneous insulin. However, patient does not have OmniPod reader or new cartridges at bedside; states her fiance was not able to pick supplies up from pharmacy. Explained to patient we can help her with restarting and switching settings if he is able to bring in supplies prior to discharge; reiterated need to restart pump 24 hours after last long-acting insulin injection. If patient without supplies prior to discharge, will need to restart pump once home. Patient aware of plan of care and endorses willingness to participate in plan.       Plan:  Inpatient recommendations -   Degludec = recommend initiating at 28u every day  Aspart = recommend initiating at 1:8gm CHO & 1:30>140  Discharge recommendations -   When able, restart OmniPod pump 24 hours after last degludec dose  If not able to discharge with OmniPod in place, continue basal/bolus insulin at discharge:  Lantus = 28u QD  Humalog = 1:30>140 & 1:8gm CHO        Prescription Recommendations:  [employer] - [place]  Insulin: Humalog, Levemir, Lantus   Supplies:  BD pen needles ([employer])  Glucometer:  One Touch Ultra ([employer])  CGM:  Dexcom G6 & 7, Freestyle Fernando 3   Insulin Pump:   OmniPod         Provider F/U Recommendations:  PCP - MEGAN Best ([facility])  Endocrinology - Dr. Schoenberger ([facility])      A total of 60 minutes were spent with the patient, 100% was spent counseling and coordinating care for T2 diabetes self-management including nutrition, exercise, blood glucose monitoring, insulin administration, medications, treatment options, follow-up coordination and resources.    Whit Frankel, APRN  12/18/2024  12:44 PM       [1]   Allergies  Allergen Reactions    Bee Venom ANAPHYLAXIS    Morphine HIVES    Fentanyl RASH

## 2024-12-18 NOTE — PROGRESS NOTES
[facility]   part of [facility]     Hospitalist Progress Note     Blanca Coats Patient Status:  Inpatient    1980 MRN [id number]   Location [facility] 4SW-A Attending Simone Samson,    Hosp Day # 1 PCP Liya Velez PA-C     Chief Complaint: Nausea    Subjective:     Patient seen and examined. Developed nausea and worsening pain with food this morning.     Objective:    Review of Systems:   A comprehensive review of systems was completed; pertinent positive and negatives stated in subjective.    Vital signs:  Temp:  [97.4 °F (36.3 °C)-98.6 °F (37 °C)] 98 °F (36.7 °C)  Pulse:  [] 65  Resp:  [10-27] 10  BP: ()/(52-96) 104/67  SpO2:  [89 %-100 %] 94 %    Physical Exam:    General: No acute distress  Respiratory: No wheezes, no rhonchi  Cardiovascular: S1, S2, regular rate and rhythm  Abdomen: Soft, LUQ tenderness, non-distended, positive bowel sounds  Neuro: No new focal deficits.   Extremities: No edema    Diagnostic Data:    Labs:  Recent Labs   Lab 24  1825 24  0457   WBC 13.0* 6.0   HGB 16.9* 13.0   MCV 91.4 91.8   .0 126.0*       Recent Labs   Lab 24  1825 24  0006 24  0457 24  0748 24  1222   *   < > 129* 135* 167*   BUN 8*   < > <5* <5* <5*   CREATSERUM 0.85   < > 0.60 0.61 0.59   CA 9.8   < > 8.4* 8.5* 8.3*   ALB 4.7  --   --   --   --    *   < > 136 135* 134*   K 4.3   < > 3.4* 3.1* 3.7   CL 94*   < > 100 101 100   CO2 11.0*   < > 26.0 28.0 29.0   ALKPHO 114*  --   --   --   --    AST 27  --   --   --   --    ALT 14  --   --   --   --    BILT 1.1  --   --   --   --    TP 8.3*  --   --   --   --     < > = values in this interval not displayed.       Estimated Creatinine Clearance: 116.3 mL/min (based on SCr of 0.59 mg/dL).    Recent Labs   Lab 24  0006 24  0457 24  1024   TROPHS 227* 158* 89*       No results for input(s): \"PTP\", \"INR\" in the last 168 hours.    Lab Results   Component Value  Date    TSH 0.275 12/17/2024    T4F 1.0 12/17/2024    T3F 2.16 12/17/2024             Microbiology    No results found for this visit on 12/17/24.      Imaging: Reviewed in Epic.    Medications:    insulin aspart  1-68 Units Subcutaneous TID CC    insulin degludec  28 Units Subcutaneous Daily    insulin aspart  1-50 Units Subcutaneous TID AC and HS    pantoprazole  40 mg Intravenous Q12H    montelukast  10 mg Oral Daily    PHENobarbital  97.2 mg Oral Q8H    Followed by    [START ON 12/19/2024] PHENobarbital  64.8 mg Oral Q8H TAMMI    Followed by    [START ON 12/21/2024] PHENobarbital  32.4 mg Oral Q8H TAMMI    enoxaparin  40 mg Subcutaneous Daily    busPIRone  20 mg Oral BID    atorvastatin  10 mg Oral Daily    gabapentin  300 mg Oral BID    metoprolol succinate ER  100 mg Oral 2x Daily(Beta Blocker)    sertraline  200 mg Oral Nightly    traZODone  200 mg Oral Nightly    thiamine  100 mg Intravenous Daily    folic acid  1 mg Intravenous Daily    multivitamin  1 tablet Oral Daily       Assessment & Plan:      #Euglycemic DKA  -Weaning insulin gtt to subcutaneous insulin  -Anion gap closed  -Seen by diabetes APN    #Suspected gastritis/esophagitis  -BID PPI    #Pancreatic pseudocyst  #Acute on chronic pancreatitis due to alcohol abuse  -CLD, IVF, anti-emetics, pain control   -GI to see    #Alcohol abuse with withdrawal  -CIWA protocol  -Phenobarbital taper started     #Afib with RVR  -Converted to NSR  -Cardizem gtt stopped  -Echo reviewed  -Cardiology following    #Essential HTN  -BB    #DL  -Statin    Simone Samson DO    Supplementary Documentation:     Quality:  DVT Mechanical Prophylaxis:   SCDs,    DVT Pharmacologic Prophylaxis   Medication    enoxaparin (Lovenox) 40 MG/0.4ML SUBQ injection 40 mg                Code Status: Full Code  Muro: No urinary catheter in place  Muro Duration (in days):   Central line:    MARK:     Discharge is dependent on: clinical progress  At this point Ms. Coats is expected to be  discharge to: home    The 21st Century Cures Act makes medical notes like these available to patients in the interest of transparency. Please be advised this is a medical document. Medical documents are intended to carry relevant information, facts as evident, and the clinical opinion of the practitioner. The medical note is intended as peer to peer communication and may appear blunt or direct. It is written in medical language and may contain abbreviations or verbiage that are unfamiliar.

## 2024-12-18 NOTE — H&P
[facility]     Blanca Coats Patient Status:  Inpatient    1980 MRN [id number]   Location [facility] 4SW-A Attending Luz Long MD   Hosp Day # 0 PCP Liya Velez PA-C     Chief Complaint: NAusea, malaise     Subjective:    History of Present Illness:     Blanca Coats is a 44 year old female with  h/o etoh abuse, h/o breast ca, h/o SVT, h/o pancreatitis. Pt is presenting from home with ongoing nausea, vomiting- last ate yesterday  today attempted to have an orange but subsequently had ongoing emesis. She feels lightheaded, feels her heart is racing. Notes RUQ abdominal pain. No fever, no chills. Drinks 2 bottles vodka/day last etoh consumption per pt was last night    History/Other:    Past Medical History:  Past Medical History:    Anxiety    Arrhythmia    SVT    Attention deficit disorder    Breast CA (HCC)    Depression    Esophageal reflux    High blood pressure    OTHER DISEASES    seasonal allergies    Pancreatitis (HCC)    Pneumonia due to organism    SVT (supraventricular tachycardia) (HCC)    Type 1 diabetes mellitus (HCC)     Past Surgical History:   Past Surgical History:   Procedure Laterality Date    Breast surgery      Implant left      Implant right      Mastectomy left      Mastectomy right      Ndsc ablation & rcnstj atria lmtd w/o bypass        Family History:   Family History   Problem Relation Age of Onset    Heart Disorder Father     Diabetes Father     Hypertension Mother     Cancer Mother         breast     Social History:    reports that she has been smoking cigarettes. She has never used smokeless tobacco. She reports current alcohol use of about 7.0 standard drinks of alcohol per week. She reports current drug use. Frequency: 7.00 times per week. Drug: Cannabis.     Allergies: Allergies[1]    Medications:  Medications Ordered Prior to Encounter[2]    Review of Systems:   A comprehensive review of systems was completed.    Pertinent  positives and negatives noted in the HPI.    Objective:   Physical Exam:    BP (!) 107/95   Pulse 79   Temp 97.4 °F (36.3 °C) (Temporal)   Resp 26   Wt 135 lb 9.3 oz (61.5 kg)   SpO2 96%   BMI 21.88 kg/m²   General: No acute distress, Alert  Respiratory: No rhonchi, no wheezes  Cardiovascular: S1, S2. Regular rate and rhythm  Abdomen: Soft, Non-tender, non-distended, positive bowel sounds  Neuro: No new focal deficits  Extremities: No edema      Results:    Labs:      Labs Last 24 Hours:    Recent Labs   Lab 12/17/24  1825   RBC 5.32*   HGB 16.9*   HCT 48.6*   MCV 91.4   MCH 31.8   MCHC 34.8   RDW 14.2   NEPRELIM 10.43*   WBC 13.0*   .0       Recent Labs   Lab 12/17/24  1825   *   BUN 8*   CREATSERUM 0.85   EGFRCR 88   CA 9.8   ALB 4.7   *   K 4.3   CL 94*   CO2 11.0*   ALKPHO 114*   AST 27   ALT 14   BILT 1.1   TP 8.3*       No results found for: \"PT\", \"INR\"    Recent Labs   Lab 12/17/24  1825   TROPHS 99*       No results for input(s): \"TROP\", \"PBNP\" in the last 168 hours.    Recent Labs   Lab 12/17/24  1825   PCT 0.20*       Imaging: Imaging data reviewed in Epic.    Assessment & Plan:      #AGMA suspect due to euglycemic DKA  Insulin drip  IVF  Monitor BMP, Anion gap  #intractable N, V due to etoh, gastritis and DKA  IVF  Anti emetics PRN   #Etoh abuse   CIWA  #h/o pancreatitis- chronic   IVF  # Afib RVR likely due to above  Monitor K, Mg  IVF  Cardizem drip   Trend troponin  Cards on CS   #chronic splenic vein occlusion seen on CT  #Leucocytosis suspect reactive      Plan of care discussed with pt  30 min critical care time     Luz Long MD    Supplementary Documentation:     The 21st Century Cures Act makes medical notes like these available to patients in the interest of transparency. Please be advised this is a medical document. Medical documents are intended to carry relevant information, facts as evident, and the clinical opinion of the practitioner. The medical note is  intended as peer to peer communication and may appear blunt or direct. It is written in medical language and may contain abbreviations or verbiage that are unfamiliar.                                     [1]   Allergies  Allergen Reactions    Bee Venom ANAPHYLAXIS    Morphine HIVES    Fentanyl RASH   [2]   Current Facility-Administered Medications on File Prior to Encounter   Medication Dose Route Frequency Provider Last Rate Last Admin    [COMPLETED] iopamidol 76% (ISOVUE-370) injection for power injector  80 mL Intravenous ONCE PRN Ren Puente MD   80 mL at 11/24/24 0011    [COMPLETED] thiamine 100 mg/mL injection 100 mg  100 mg Intravenous Once Sylvester Gonzales DO   100 mg at 11/24/24 0819    [COMPLETED] sodium chloride 0.9 % IV bolus 1,000 mL  1,000 mL Intravenous Once Ren Puente MD   Stopped at 11/24/24 0018    [COMPLETED] ondansetron (Zofran) 4 MG/2ML injection 4 mg  4 mg Intravenous Once Ren Puente MD   4 mg at 11/23/24 2317    [COMPLETED] HYDROmorphone (Dilaudid) 1 MG/ML injection 0.5 mg  0.5 mg Intravenous Q30 Min PRN Ren Puente MD   0.5 mg at 11/24/24 0252    [COMPLETED] potassium phosphate dibasic 15 mmol in sodium chloride 0.9% 250 mL IVPB  15 mmol Intravenous Once Jaimie Gray APRN   Stopped at 11/12/24 1042    Followed by    [COMPLETED] potassium chloride 20 mEq/100mL IVPB premix 20 mEq  20 mEq Intravenous Once Jaimie Gray APRN 50 mL/hr at 11/12/24 1129 20 mEq at 11/12/24 1129    [COMPLETED] chlordiazePOXIDE (Librium) cap 25 mg  25 mg Oral Q8H Ashley Young APRN   25 mg at 11/13/24 0803    [COMPLETED] sodium chloride 0.9 % IV bolus 1,000 mL  1,000 mL Intravenous Once Ezra Ritchie MD   Stopped at 11/11/24 1339    [COMPLETED] ondansetron (Zofran) 4 MG/2ML injection 4 mg  4 mg Intravenous Once Ezra Ritchie MD   4 mg at 11/11/24 1234    [COMPLETED] HYDROmorphone (Dilaudid) 1 MG/ML injection 0.5 mg  0.5 mg Intravenous Q30 Min PRN Ezra Ritchie MD   0.5 mg at  24 1513    [] sodium chloride 0.9% infusion   Intravenous Continuous Ezra Ritchie MD        [] ondansetron (Zofran) 4 MG/2ML injection 4 mg  4 mg Intravenous Q4H PRN Ezra Ritchie MD        [COMPLETED] iopamidol 76% (ISOVUE-370) injection for power injector  80 mL Intravenous ONCE PRN Ezra Ritchie MD   80 mL at 24 1400    [COMPLETED] dextrose 5%-sodium chloride 0.45% infusion   Intravenous Once Ezra Ritchie  mL/hr at 24 1445 New Bag at 24 1445    [COMPLETED] thiamine 100 mg/mL injection 100 mg  100 mg Intravenous Once Ashley Young APRN   100 mg at 24 1733    [COMPLETED] potassium phosphate dibasic 15 mmol in sodium chloride 0.9% 250 mL IVPB  15 mmol Intravenous Once Ashley Young APRN 62.5 mL/hr at 24 1752 15 mmol at 24 1752    Followed by    [COMPLETED] sodium phosphate 15 mmol in 0.9% NaCl 100mL IVPB premix  15 mmol Intravenous Once Ashley Young APRN   15 mmol at 24 2159    [COMPLETED] magnesium sulfate 4 g/100mL IVPB premix 4 g  4 g Intravenous Once Ashley Young APRN 50 mL/hr at 24 1757 4 g at 24 1757     Current Outpatient Medications on File Prior to Encounter   Medication Sig Dispense Refill    atorvastatin 10 MG Oral Tab Take 1 tablet (10 mg total) by mouth daily.      folic acid 1 MG Oral Tab Take 1 tablet (1 mg total) by mouth daily. 30 tablet 0    thiamine 100 MG Oral Tab Take 1 tablet (100 mg total) by mouth daily. 30 tablet 0    traMADol 50 MG Oral Tab Take 1-2 tablets ( mg total) by mouth every 4 (four) hours as needed for Pain. 8 tablet 0    Naloxone HCl 4 MG/0.1ML Nasal Liquid 4 mg by Nasal route as needed. If patient remains unresponsive, repeat dose in other nostril 2-5 minutes after first dose. 1 kit 0    LANTUS SOLOSTAR 100 UNIT/ML Subcutaneous Solution Pen-injector Inject 12 units in the morning & inject 14 units at night      HUMALOG KWIKPEN 100 UNIT/ML Subcutaneous Solution Pen-injector Inject 25  units per sliding scale four times a day by subcutaneous route      Glucose Blood (ONETOUCH VERIO) In Vitro Strip Check blood sugar two times day, before breakfast and before dinner.  (may substitute any brand covered by insurance) 50 strip 0    ondansetron 4 MG Oral Tablet Dispersible Take 1 tablet (4 mg total) by mouth every 8 (eight) hours as needed for Nausea. 20 tablet 0    metoprolol succinate 100 MG Oral Tablet 24 Hr Take 1 tablet (100 mg total) by mouth 2 (two) times daily. Take half tablet if blood pressure less than 130/80  0    haloperidol 5 MG Oral Tab Take 1 tablet (5 mg total) by mouth at bedtime.      Amphetamine-Dextroamphet ER 10 MG Oral Capsule SR 24 Hr Take 1 capsule (10 mg total) by mouth every morning.      pantoprazole 40 MG Oral Tab EC Take 1 tablet (40 mg total) by mouth every morning before breakfast.      Amphetamine-Dextroamphet ER 30 MG Oral Capsule SR 24 Hr Take 1 capsule (30 mg total) by mouth every morning.      ALPRAZolam 0.25 MG Oral Tab Take 1 tablet (0.25 mg total) by mouth 3 (three) times daily as needed.      gabapentin 300 MG Oral Cap Take 1 capsule (300 mg total) by mouth in the morning and 1 capsule (300 mg total) before bedtime.      Montelukast Sodium 10 MG Oral Tab Take 1 tablet (10 mg total) by mouth daily.      Sertraline HCl 100 MG Oral Tab Take 2 tablets (200 mg total) by mouth daily.      busPIRone 10 MG Oral Tab Take 2 tablets (20 mg total) by mouth 2 (two) times daily.      traZODone HCl 100 MG Oral Tab Take 2 tablets (200 mg total) by mouth nightly.

## 2024-12-18 NOTE — PLAN OF CARE
Trops 99 > 227 on lovenox - ED poss pancreatitis Renal fxn normal  EKG now NSR w/pacs no acute changes non specific twave mag is low currently Mg 0.9 being repleted mangesium per protocol.

## 2024-12-18 NOTE — PLAN OF CARE
Pt received from ED. BG elevated, started on DKA protocol, insulin gtt started (see MAR). A&O x4. Afib converted to NSR, diltiazem gtt titrated off to protocol. Afebrile. VSS. CIWA maxed at 1. Pt updated on plan of care, see MAR and flowsheets for more.

## 2024-12-18 NOTE — PROGRESS NOTES
ICU  Critical Care APRN Progress Note    NAME: Blanca Coats - ROOM: [facility] - MRN: [id number] - Age: 44 year old - :1980    Subjective:  No acute events overnight.  Patient able to come off Cardizem drip.  Patient notes she is feeling better this am and looking forward to eating breakfast.  Patient notes that she has no desire to stop ETOH.    Current Facility-Administered Medications   Medication Dose Route Frequency    dilTIAZem (cardIZEM) 100 mg in sodium chloride 0.9% 100 mL IVPB-ADDV  5 mg/hr Intravenous Continuous    acetaminophen (Tylenol Extra Strength) tab 1,000 mg  1,000 mg Oral Q4H PRN    melatonin tab 3 mg  3 mg Oral Nightly PRN    glycerin-hypromellose- (Artificial Tears) 0.2-0.2-1 % ophthalmic solution 1 drop  1 drop Both Eyes QID PRN    sodium chloride (Saline Mist) 0.65 % nasal solution 1 spray  1 spray Each Nare Q3H PRN    enoxaparin (Lovenox) 40 MG/0.4ML SUBQ injection 40 mg  40 mg Subcutaneous Daily    polyethylene glycol (PEG 3350) (Miralax) 17 g oral packet 17 g  17 g Oral Daily PRN    sennosides (Senokot) tab 17.2 mg  17.2 mg Oral Nightly PRN    bisacodyl (Dulcolax) 10 MG rectal suppository 10 mg  10 mg Rectal Daily PRN    fleet enema (Fleet) rectal enema 133 mL  1 enema Rectal Once PRN    ondansetron (Zofran) 4 MG/2ML injection 4 mg  4 mg Intravenous Q6H PRN    prochlorperazine (Compazine) 10 MG/2ML injection 5 mg  5 mg Intravenous Q8H PRN    ALPRAZolam (Xanax) tab 0.25 mg  0.25 mg Oral TID PRN    busPIRone (Buspar) tab 20 mg  20 mg Oral BID    pantoprazole (Protonix) DR tab 40 mg  40 mg Oral QAM AC    atorvastatin (Lipitor) tab 10 mg  10 mg Oral Daily    gabapentin (Neurontin) cap 300 mg  300 mg Oral BID    metoprolol succinate ER (Toprol XL) 24 hr tab 100 mg  100 mg Oral 2x Daily(Beta Blocker)    sertraline (Zoloft) tab 200 mg  200 mg Oral Nightly    traZODone (Desyrel) tab 200 mg  200 mg Oral Nightly    LORazepam (Ativan) tab 1 mg  1 mg Oral Q1H PRN    Or    LORazepam (Ativan)  2 mg/mL injection 1 mg  1 mg Intravenous Q1H PRN    LORazepam (Ativan) tab 2 mg  2 mg Oral Q1H PRN    Or    LORazepam (Ativan) 2 mg/mL injection 2 mg  2 mg Intravenous Q1H PRN    LORazepam (Ativan) tab 3 mg  3 mg Oral Q1H PRN    Or    LORazepam (Ativan) 2 mg/mL injection 3 mg  3 mg Intravenous Q1H PRN    LORazepam (Ativan) 2 mg/mL injection 4 mg  4 mg Intravenous Q30 Min PRN    LORazepam (Ativan) 2 mg/mL injection 5 mg  5 mg Intravenous Q15 Min PRN    LORazepam (Ativan) 2 mg/mL injection 6 mg  6 mg Intravenous Q10 Min PRN    haloperidol lactate (Haldol) 5 MG/ML injection 2 mg  2 mg Intravenous Q8H PRN    PHENobarbital (Luminal) 65 mg/mL injection 97.5 mg  97.5 mg Intravenous Q8H    Followed by    [START ON 12/19/2024] PHENobarbital (Luminal) 65 mg/mL injection 65 mg  65 mg Intravenous Q8H    Followed by    [START ON 12/21/2024] PHENobarbital (Luminal) 65 mg/mL injection 32.5 mg  32.5 mg Intravenous Q8H    thiamine 100 mg/mL injection 100 mg  100 mg Intravenous Daily    folic acid (Folvite) 1 mg in sodium chloride 0.9% 50 mL IVPB  1 mg Intravenous Daily    multivitamin (Tab-A-Toni/Beta Carotene) tab 1 tablet  1 tablet Oral Daily    glucose (Dex4) 15 GM/59ML oral liquid 15 g  15 g Oral Q15 Min PRN    Or    glucose (Glutose) 40% oral gel 15 g  15 g Oral Q15 Min PRN    Or    glucose-vitamin C (Dex-4) chewable tab 4 tablet  4 tablet Oral Q15 Min PRN    Or    dextrose 50% injection 50 mL  50 mL Intravenous Q15 Min PRN    Or    glucose (Dex4) 15 GM/59ML oral liquid 30 g  30 g Oral Q15 Min PRN    Or    glucose (Glutose) 40% oral gel 30 g  30 g Oral Q15 Min PRN    Or    glucose-vitamin C (Dex-4) chewable tab 8 tablet  8 tablet Oral Q15 Min PRN    insulin regular human (Novolin R, Humulin R) 100 Units in sodium chloride 0.9% 100 mL standard infusion (100 mL)  2-52 Units/hr Intravenous Continuous    dextrose 5%-sodium chloride 0.45% infusion  100 mL/hr Intravenous Continuous PRN    lactated ringers infusion   Intravenous  Continuous    HYDROmorphone (Dilaudid) 1 MG/ML injection 0.1 mg  0.1 mg Intravenous Q2H PRN    Or    HYDROmorphone (Dilaudid) 1 MG/ML injection 0.2 mg  0.2 mg Intravenous Q2H PRN    Or    HYDROmorphone (Dilaudid) 1 MG/ML injection 0.4 mg  0.4 mg Intravenous Q2H PRN     OBJECTIVE  Vitals:  /55   Pulse 60   Temp 97.9 °F (36.6 °C) (Temporal)   Resp 16   Wt 135 lb 9.3 oz (61.5 kg)   SpO2 94%   BMI 21.88 kg/m²               Physical Exam:    General Appearance: Alert, cooperative, no acute distress, appears stated age, tremulous  Neck: No JVD, no adenopathy, trachea midline  Lungs: Clear to auscultation bilaterally, respirations unlabored, tachypneic  Heart: Regular rate and rhythm, S1 and S2 normal, no murmur, rub or gallop  Abdomen: Soft, tender to RUQ, bowel sounds active all four quadrants, no masses, no organomegaly  Extremities: Atraumatic, no cyanosis or edema, capillary refill <3 sec.    Pulses: 2+ and symmetric all extremities  Skin: Skin color, texture, turgor normal for ethnicity, no rashes or lesions, warm and dry  Neurologic: Normal strength, denies auditory or visual hallucinations    Data this admission:  CT ABDOMEN+PELVIS(CONTRAST ONLY)(CPT=74177)    Result Date: 11/24/2024  CONCLUSION:   1. Multiple pseudocysts involving the pancreas is stable.  2. Acute inflammatory process involving the bowel is not identified.  3. Left upper quadrant varices are noted.  The splenic vein is difficult to delineate.  Agree with preliminary radiology report from [facility].     LOCATION:  [place] [id number]   Dictated by (CST): Doni Cruz MD on 11/24/2024 at 8:07 AM     Finalized by (CST): Doni Cruz MD on 11/24/2024 at 8:17 AM         Labs:  Lab Results   Component Value Date    WBC 6.0 12/18/2024    HGB 13.0 12/18/2024    HCT 38.0 12/18/2024    .0 12/18/2024    CREATSERUM 0.60 12/18/2024    BUN <5 12/18/2024     12/18/2024    K 3.4 12/18/2024     12/18/2024    CO2 26.0  12/18/2024     12/18/2024    CA 8.4 12/18/2024    ALB 4.7 12/17/2024    ALKPHO 114 12/17/2024    BILT 1.1 12/17/2024    TP 8.3 12/17/2024    AST 27 12/17/2024    ALT 14 12/17/2024    MG 2.6 12/18/2024    PHOS 3.5 12/18/2024       Assessment/Plan:    Combined AGMA with metabolic alkalosis 2/2 euglycemic DKA, n/v  -Last A1C 10.4 in November 2024  -UA positive ketones  -DKA protocol - transition off drip to subcutaneous   -Diabetic APRN consult    Afib RVR likely 2/2 alcohol withdrawal  -Trop 99, trend  -s/p adenosine, cardizem bolus + gtt, metoprolol IVP  -Cardizem gtt stopped  -Echo pending  -EKG  -CTA chest negative for PE  -Check electrolytes and replace per protocol  -Cardiology consulted    Alcohol withdrawal  -Ethyl alcohol 73 on presentation, last drink reported to be 12/16 @ 9pm  -Monitor CIWA  -Start phenobarb IV taper without bolus  -UDS negative    Acute on chronic pancreatitis  -Lipase 30  -Triglycerides 281  -CT abd/pel with stable pseudocysts and chronic pancreatitis  -IVF  -low fat diet once able to tolerate PO intake  -Pain control    Leukocytosis unlikely active infectious process - may be 2/2 DKA, n/v  -WBC 13  -Denies fever  -CT C/A/P with esophagitis, gastritis, stable pseudocysts and chronic pancreatitis  -Check PCT  -Monitor off antibiotics at this time    Low TSH/T3 - may be reactive to acute illness  -TSH 0.275 (note from endocrinology on 11/18/24 documented last level in June 2024 WNL)  -T3 level 2.16  -Follow up with endocrinology outpatient    Hx HTN, HLD  -Statin  -Metoprolol    F/E/N:    -IVF  -Follow lytes and replete PRN  -ADA low fat diet    Proph:  -SQ lovenox  -SCD    Dispo:   -Full code  -ICU for close monitoring until off insulin.  Would transfer to floor vs discharge home once off insulin drip    Plan of care discussed with intensivist on-call, Dr. Cali Currie, [facility]  Critical Care NP  Phone [phone]    Agree with assessment and plan as above    In brief  44-year-old female with frequent recidivism from alcoholism who presents with either DKA or alcoholic ketoacidosis either way to patient got better with the sugar source as well as insulin drip anion gap now closed we will continue on her insulin pump    Alcohol no signs of alcohol withdrawal but high risk over the next 48 hours can potentially continue phenobarbital taper high recidivism back to the intensive care unit possible given previous history    A-fib with RVR known history of atrial fibrillation noncompliant currently now rate controlled cardiology following due to high risk for anticoagulation echo preserved EF  Place potassium aggressively check magnesium level    Thrombocytopenia well below baseline need to continue to monitor this this may be alcohol suppression related    Likely transfer to floor     35 minutes follow-up care time spent with this patient with respect to management of alcohol withdrawal euglycemic DKA versus starvation ketoacidosis versus alcoholic ketoacidosis

## 2024-12-18 NOTE — ED QUICK NOTES
Orders for admission, patient is aware of plan and ready to go upstairs. Any questions, please call ED JAMAAL Flor at extension [phone].     Patient Covid vaccination status: Fully vaccinated     COVID Test Ordered in ED: None    COVID Suspicion at Admission: N/A    Running Infusions:    dilTIAZem 10 mg/hr (12/17/24 6397)        Mental Status/LOC at time of transport: x4    Other pertinent information:   CIWA score: 15   NIH score:  N/A

## 2024-12-18 NOTE — CONSULTS
[facility] Cardiology  Report of Consultation    Blanca Coats Patient Status:  Inpatient    1980 MRN [id number]   [facility] 4SW-A Attending Simone Samson, DO   Hosp Day # 1 PCP Liya Velez PA-C     Reason for Consultation:  Afib    History of Present Illness:  Blanca Coats is a a(n) 44 year old female with history of chronic alcohol abuse, chronic pancreatitis, paroxysmal atrial fibrillation, supraventricular tachycardia, diabetes mellitus who presented to the hospital complaining of tachycardia.  Patient was sent by EMS and started to have heart rates in the 200 range.  She apparently had been drinking quite a bit.  She over the last 2 to 3 days had had 2 bottles of vodka.  Today, she felt like she was probably withdrawing and had hence had symptoms.  She was having epigastric pain.  Patient was given fluids and rate controlling agents and has converted back to sinus.  This morning, patient has no major complaints.  She feels that she is hungry and would like to go home.    Patient has had similar admissions here including 1 approximately year ago.  Workup at the time was negative and echo revealed normal LV function.    Patient's initial troponins have been mildly elevated.  CTA of the chest abdomen and pelvis revealed a thickened distal esophagus consistent with esophagitis and gastritis and chronic pancreatitis.    History:  Past Medical History:    Anxiety    Arrhythmia    SVT    Attention deficit disorder    Breast CA (HCC)    Depression    Esophageal reflux    High blood pressure    OTHER DISEASES    seasonal allergies    Pancreatitis (HCC)    Pneumonia due to organism    SVT (supraventricular tachycardia) (HCC)    Type 1 diabetes mellitus (HCC)     Past Surgical History:   Procedure Laterality Date    Breast surgery      Implant left      Implant right      Mastectomy left      Mastectomy right      Ndsc ablation & rcnstj atria lmtd w/o bypass       Family History   Problem Relation  Age of Onset    Heart Disorder Father     Diabetes Father     Hypertension Mother     Cancer Mother         breast      reports that she has been smoking cigarettes. She has never used smokeless tobacco. She reports current alcohol use of about 7.0 standard drinks of alcohol per week. She reports current drug use. Frequency: 7.00 times per week. Drug: Cannabis.    Allergies:  Allergies[1]    Medications:    Current Facility-Administered Medications:     dilTIAZem (cardIZEM) 100 mg in sodium chloride 0.9% 100 mL IVPB-ADDV, 5 mg/hr, Intravenous, Continuous    acetaminophen (Tylenol Extra Strength) tab 1,000 mg, 1,000 mg, Oral, Q4H PRN    melatonin tab 3 mg, 3 mg, Oral, Nightly PRN    glycerin-hypromellose- (Artificial Tears) 0.2-0.2-1 % ophthalmic solution 1 drop, 1 drop, Both Eyes, QID PRN    sodium chloride (Saline Mist) 0.65 % nasal solution 1 spray, 1 spray, Each Nare, Q3H PRN    enoxaparin (Lovenox) 40 MG/0.4ML SUBQ injection 40 mg, 40 mg, Subcutaneous, Daily    polyethylene glycol (PEG 3350) (Miralax) 17 g oral packet 17 g, 17 g, Oral, Daily PRN    sennosides (Senokot) tab 17.2 mg, 17.2 mg, Oral, Nightly PRN    bisacodyl (Dulcolax) 10 MG rectal suppository 10 mg, 10 mg, Rectal, Daily PRN    fleet enema (Fleet) rectal enema 133 mL, 1 enema, Rectal, Once PRN    ondansetron (Zofran) 4 MG/2ML injection 4 mg, 4 mg, Intravenous, Q6H PRN    prochlorperazine (Compazine) 10 MG/2ML injection 5 mg, 5 mg, Intravenous, Q8H PRN    ALPRAZolam (Xanax) tab 0.25 mg, 0.25 mg, Oral, TID PRN    busPIRone (Buspar) tab 20 mg, 20 mg, Oral, BID    pantoprazole (Protonix) DR tab 40 mg, 40 mg, Oral, QAM AC    atorvastatin (Lipitor) tab 10 mg, 10 mg, Oral, Daily    gabapentin (Neurontin) cap 300 mg, 300 mg, Oral, BID    metoprolol succinate ER (Toprol XL) 24 hr tab 100 mg, 100 mg, Oral, 2x Daily(Beta Blocker)    sertraline (Zoloft) tab 200 mg, 200 mg, Oral, Nightly    traZODone (Desyrel) tab 200 mg, 200 mg, Oral, Nightly     LORazepam (Ativan) tab 1 mg, 1 mg, Oral, Q1H PRN **OR** LORazepam (Ativan) 2 mg/mL injection 1 mg, 1 mg, Intravenous, Q1H PRN    LORazepam (Ativan) tab 2 mg, 2 mg, Oral, Q1H PRN **OR** LORazepam (Ativan) 2 mg/mL injection 2 mg, 2 mg, Intravenous, Q1H PRN    LORazepam (Ativan) tab 3 mg, 3 mg, Oral, Q1H PRN **OR** LORazepam (Ativan) 2 mg/mL injection 3 mg, 3 mg, Intravenous, Q1H PRN    LORazepam (Ativan) 2 mg/mL injection 4 mg, 4 mg, Intravenous, Q30 Min PRN    LORazepam (Ativan) 2 mg/mL injection 5 mg, 5 mg, Intravenous, Q15 Min PRN    LORazepam (Ativan) 2 mg/mL injection 6 mg, 6 mg, Intravenous, Q10 Min PRN    haloperidol lactate (Haldol) 5 MG/ML injection 2 mg, 2 mg, Intravenous, Q8H PRN    PHENobarbital (Luminal) 65 mg/mL injection 97.5 mg, 97.5 mg, Intravenous, Q8H **FOLLOWED BY** [START ON 12/19/2024] PHENobarbital (Luminal) 65 mg/mL injection 65 mg, 65 mg, Intravenous, Q8H **FOLLOWED BY** [START ON 12/21/2024] PHENobarbital (Luminal) 65 mg/mL injection 32.5 mg, 32.5 mg, Intravenous, Q8H    thiamine 100 mg/mL injection 100 mg, 100 mg, Intravenous, Daily    folic acid (Folvite) 1 mg in sodium chloride 0.9% 50 mL IVPB, 1 mg, Intravenous, Daily    multivitamin (Tab-A-Toni/Beta Carotene) tab 1 tablet, 1 tablet, Oral, Daily    glucose (Dex4) 15 GM/59ML oral liquid 15 g, 15 g, Oral, Q15 Min PRN **OR** glucose (Glutose) 40% oral gel 15 g, 15 g, Oral, Q15 Min PRN **OR** glucose-vitamin C (Dex-4) chewable tab 4 tablet, 4 tablet, Oral, Q15 Min PRN **OR** dextrose 50% injection 50 mL, 50 mL, Intravenous, Q15 Min PRN **OR** glucose (Dex4) 15 GM/59ML oral liquid 30 g, 30 g, Oral, Q15 Min PRN **OR** glucose (Glutose) 40% oral gel 30 g, 30 g, Oral, Q15 Min PRN **OR** glucose-vitamin C (Dex-4) chewable tab 8 tablet, 8 tablet, Oral, Q15 Min PRN    insulin regular human (Novolin R, Humulin R) 100 Units in sodium chloride 0.9% 100 mL standard infusion (100 mL), 2-52 Units/hr, Intravenous, Continuous    dextrose 5%-sodium chloride  0.45% infusion, 100 mL/hr, Intravenous, Continuous PRN    lactated ringers infusion, , Intravenous, Continuous    HYDROmorphone (Dilaudid) 1 MG/ML injection 0.1 mg, 0.1 mg, Intravenous, Q2H PRN **OR** HYDROmorphone (Dilaudid) 1 MG/ML injection 0.2 mg, 0.2 mg, Intravenous, Q2H PRN **OR** HYDROmorphone (Dilaudid) 1 MG/ML injection 0.4 mg, 0.4 mg, Intravenous, Q2H PRN    Review of Systems:  A comprehensive 10 point review of systems was completed.  Pertinent positives and negatives noted in the the HPI.     Physical Exam:  Blood pressure 106/55, pulse 60, temperature 97.9 °F (36.6 °C), temperature source Temporal, resp. rate 16, weight 135 lb 9.3 oz (61.5 kg), SpO2 94%, not currently breastfeeding.  Temp (24hrs), Av °F (36.7 °C), Min:97.4 °F (36.3 °C), Max:98.6 °F (37 °C)    Wt Readings from Last 3 Encounters:   24 135 lb 9.3 oz (61.5 kg)   24 143 lb 8 oz (65.1 kg)   24 139 lb 1.8 oz (63.1 kg)       Telemetry: Sinus  General: Alert and oriented in no apparent distress.  HEENT: EOMI, no scleral icterus, mucosa moist  Neck: Supple, carotids 2+   Cardiac: Regular rate and rhythm   Lungs: Clear   Abdomen: Soft, non-tender.   Extremities: Without clubbing, cyanosis or edema  Neurologic: Alert and oriented, normal affect; speech is a bit slurred  Skin: Warm and dry.     Laboratories and Data:  Diagnostics:    Labs:   No results for input(s): \"TROP\", \"CK\" in the last 168 hours.   Recent Labs   Lab 24  1825 24  0457   RBC 5.32* 4.14   HGB 16.9* 13.0   HCT 48.6* 38.0   MCV 91.4 91.8   MCH 31.8 31.4   MCHC 34.8 34.2   RDW 14.2 14.2   NEPRELIM 10.43* 3.58   WBC 13.0* 6.0   .0 126.0*     Recent Labs   Lab 24  1825 24  0006 24  0457 24  0748   * 125* 129* 135*   BUN 8* 6* <5* <5*   CREATSERUM 0.85 0.63 0.60 0.61   CA 9.8 8.6* 8.4* 8.5*   ALB 4.7  --   --   --    * 131* 136 135*   K 4.3 4.1 3.4* 3.1*   CL 94* 97* 100 101   CO2 11.0* 24.0 26.0 28.0   ALKPHO  114*  --   --   --    AST 27  --   --   --    ALT 14  --   --   --    BILT 1.1  --   --   --    TP 8.3*  --   --   --       No results found for: \"PT\", \"INR\"     Assessment/Plan:    CV -patient presented with atrial fibrillation and has converted back to sinus.  Atrial fibrillation is secondary to her ongoing alcohol abuse and poor nutrition with marked metabolic abnormalities. Resting EKGs revealed nonspecific changes similar to what was seen previously.  Troponins are up very mildly yet, I do not feel that these are related to an acute coronary syndrome.  Bedside echo which is currently being done reveals normal LV function.  At the moment, I would continue with her usual dose of metoprolol.  Her [id number] score appears to be close to 2.  Unfortunately with her ongoing alcohol abuse, gastritis and chronic pancreatitis, she is a very poor candidate for anticoagulation.  Patient does not need any other inpatient cardiac workup.  Alcohol abuse -patient has a history of withdrawals.  This will need to be monitored closely  Uncontrolled diabetes mellitus      Ozzy Sidhu MD  12/18/2024  8:35 AM  C5       [1]   Allergies  Allergen Reactions    Bee Venom ANAPHYLAXIS    Morphine HIVES    Fentanyl RASH

## 2024-12-18 NOTE — CONSULTS
ICU  Critical Care APRN Consult Note    NAME: Blanca Coats - ROOM: A/A - MRN: [id number] - Age: 44 year old - :1980    History Of Present Illness:  Blanca Coats is a 44 year old female with PMHx significant for alcohol abuse, pancreatitis, anxiety, depression, HTN, DM1, Breast CA s/p chemo (last reported chemo was 8 years ago), GERD. Patient presents to the ED by EMS for alcohol withdrawal and elevated heart rate. In the ED, patient was given adenosine for her HR in the 200s which showed afib, and then started on a cardizem drip + bolus. She also received 5mg metoprolol x2 doses. ICU team was called to evaluate the patient. In the ED, patient was a/ox4, cooperative with interview but tremulous. She states she was at home with nausea, vomiting and diarrhea which began today and poor PO intake x2 days. She was feeling tremulous at home and was unable to drive herself to the ED, which prompted her to call 911. She denies any fever, chills, sick contacts. She was short of breath when her heart rate was elevated but states that is now subsided. Patient states she regularly drinks \"2 bottles of vodka in a week\", last drink was last night at 8pm. She states she has gone through alcohol withdrawal in the past, but never had seizures. She admits to smoking cigarettes and marijuana but denies any other drug use. Patient regularly follow endocrinology at [facility] and uses an insulin pump, but admits that her glucose is not very well controlled, stating her glucose is usually around 200s.     Lab work in the ED was notable for glucose 245, bicarb 11, anion gap 30, alk phos 114, troponin 99, TSH 0.275, T3 2.16, Triglycerides 281 and WBC 13. Ethyl alcohol level was 73. Cardiology was consulted in the ED. Per chart review, patient has had two admissions in the last month, one for elevated blood sugars requiring insulin gtt and alcohol withdrawal, and the second for acute on chronic pancreatitis.     Patient  will be admitted to the ICU for continued management.     Past Medical History:  Past Medical History:    Anxiety    Arrhythmia    SVT    Attention deficit disorder    Breast CA (HCC)    Depression    Esophageal reflux    High blood pressure    OTHER DISEASES    seasonal allergies    Pancreatitis (HCC)    Pneumonia due to organism    SVT (supraventricular tachycardia) (HCC)    Type 1 diabetes mellitus (HCC)     Past Surgical History:   Past Surgical History:   Procedure Laterality Date    Breast surgery      Implant left      Implant right      Mastectomy left      Mastectomy right      Ndsc ablation & rcnstj [facility] w/o bypass        Family History:   Family History   Problem Relation Age of Onset    Heart Disorder Father     Diabetes Father     Hypertension Mother     Cancer Mother         breast     Social History:    reports that she has been smoking cigarettes. She has never used smokeless tobacco. She reports current alcohol use of about 7.0 standard drinks of alcohol per week. She reports current drug use. Frequency: 7.00 times per week. Drug: Cannabis.     Review of Systems:   A comprehensive 10 point review of systems was completed.  Pertinent positives and negatives noted in the HPI.    Current Facility-Administered Medications   Medication Dose Route Frequency    dilTIAZem (cardIZEM) 100 mg in sodium chloride 0.9% 100 mL IVPB-ADDV  5 mg/hr Intravenous Continuous    insulin regular human (Novolin R, Humulin R) 100 Units in sodium chloride 0.9% 100 mL standard infusion (100 mL)  0.5-9 Units/hr Intravenous Continuous    dextrose 5%-sodium chloride 0.9% infusion   Intravenous Once    metoprolol (Lopressor) 5 mg/5mL injection 5 mg  5 mg Intravenous Once     OBJECTIVE  Vitals:  /87   Pulse 116   Temp 97.4 °F (36.3 °C) (Temporal)   Resp 19   Wt 150 lb (68 kg)   SpO2 97%   BMI 24.21 kg/m²               Physical Exam:    General Appearance: Alert, cooperative, no acute distress, appears stated age,  tremulous  Neck: No JVD, no adenopathy, trachea midline  Lungs: Clear to auscultation bilaterally, respirations unlabored, tachypneic  Heart: Regular rate and rhythm, S1 and S2 normal, no murmur, rub or gallop  Abdomen: Soft, tender to RUQ, bowel sounds active all four quadrants, no masses, no organomegaly  Extremities: Atraumatic, no cyanosis or edema, capillary refill <3 sec.    Pulses: 2+ and symmetric all extremities  Skin: Skin color, texture, turgor normal for ethnicity, no rashes or lesions, warm and dry  Neurologic: Normal strength, denies auditory or visual hallucinations    Data this admission:  CT ABDOMEN+PELVIS(CONTRAST ONLY)(CPT=74177)    Result Date: 11/24/2024  CONCLUSION:   1. Multiple pseudocysts involving the pancreas is stable.  2. Acute inflammatory process involving the bowel is not identified.  3. Left upper quadrant varices are noted.  The splenic vein is difficult to delineate.  Agree with preliminary radiology report from [facility].     LOCATION:  [place] [id number]   Dictated by (CST): Doni Cruz MD on 11/24/2024 at 8:07 AM     Finalized by (CST): Doni Cruz MD on 11/24/2024 at 8:17 AM         Labs:  Lab Results   Component Value Date    WBC 13.0 12/17/2024    HGB 16.9 12/17/2024    HCT 48.6 12/17/2024    .0 12/17/2024    CREATSERUM 0.85 12/17/2024    BUN 8 12/17/2024     12/17/2024    K 4.3 12/17/2024    CL 94 12/17/2024    CO2 11.0 12/17/2024     12/17/2024    CA 9.8 12/17/2024    ALB 4.7 12/17/2024    ALKPHO 114 12/17/2024    BILT 1.1 12/17/2024    TP 8.3 12/17/2024    AST 27 12/17/2024    ALT 14 12/17/2024       Assessment/Plan:    Combined AGMA with metabolic alkalosis 2/2 euglycemic DKA, n/v  -Last A1C 10.4 in November 2024  -UA pending  -DKA protocol - insulin gtt, IVF, q1h accuchecks, BMP/Mag/Phos q4h  -Diabetic APRN consult    Afib RVR likely 2/2 alcohol withdrawal  -Trop 99, trend  -s/p adenosine, cardizem bolus + gtt, metoprolol IVP  -Continue  cardizem gtt  -Echo ordered  -EKG  -CTA chest negative for PE  -Check electrolytes and replace per protocol  -Cardiology consulted    Alcohol withdrawal  -Ethyl alcohol 73 on presentation, last drink reported to be 12/16 @ 9pm  -Monitor CIWA  -Start phenobarb IV taper without bolus  -UDS pending    Acute on chronic pancreatitis  -Lipase 30  -Triglycerides 281  -CT abd/pel with stable pseudocysts and chronic pancreatitis  -IVF  -NPO, low fat diet once able to tolerate PO intake  -Pain control    Leukocytosis unlikely active infectious process - may be 2/2 DKA, n/v  -WBC 13  -Denies fever  -CT C/A/P with esophagitis, gastritis, stable pseudocysts and chronic pancreatitis  -Check PCT  -Monitor off antibiotics at this time    Low TSH/T3 - may be reactive to acute illness  -TSH 0.275 (note from endocrinology on 11/18/24 documented last level in June 2024 WNL)  -T3 level 2.16  -Follow up with endocrinology outpatient    Hx HTN, HLD  -Statin  -Metoprolol    F/E/N:    -IVF  -Follow lytes and replete PRN  -NPO    Proph:  -SQ lovenox  -SCD    Dispo:   -Full code  -ICU for close monitoring    Plan of care discussed with intensivist on-call, Dr. Parham.    KENDALL Delcid-BC  Critical Care  Attestation:     I have seen and evaluated the patient.  I have reviewed the nurse practioner's note and agree with findings and plan as documented. Changes/additions if necessary are listed as below.     A total of  36 minutes were spent providing critical care excluding any billable procedure.  The patient is critically ill and at high risk of organ deterioration    Kimmie Hernandez MD

## 2024-12-18 NOTE — CONSULTS
Consultation Note        Blanca Coats Patient Status:  Inpatient    1980 MRN [id number]   [facility] 4SW-A Attending Simone Samson, DO   Hosp Day # 1 PCP Liya Velez PA-C       Reason for Consultation   Pancreas pseudocyst, thickening of esophagus and gastritis on CT imaging        History of Present Illness     Blanca Coats is a 44 year old female with history of chronic alcohol abuse, chronic pancreatitis with known pseudocyst, atrial fibrillation, SVT, DM who called EMS due to complaint of tachycardia, nausea, and vomiting. She reports she was drinking heavily prior to calling EMS, tends to drink up to 2 bottles of vodka per day. She was found to have euglycemic DKA and afib RVR, both now resolved. She had outpatient work-up of pseudocyst in  with needle aspiration but path report not available.  On CTA chest, CT A/P (IV) pseudocyst stable in size at 8x5cm within the body segment and 2x2cm within the tail segment. Extensive atrophy and calcification of the head and uncinate process also noted. The CT also noted moderate concentric thickening of the distal esophagus extending to the GE junction along with gastric wall thickening. Hgb within normal limits, no elevation of AST or ALT. . Mild leukocytosis on arrival, now resolved. No fevers. She has ongoing, intermittent upper abdominal pain. She attempted to eat lunch today with return of nausea. No vomiting today. No hematemesis. No hematochezia or melena. She has the desire to want to quit drinking alcohol, however, she is not interested in rehab programs.        PMH/MEDS/ALLERGIES/SH/FH:     Past Medical History:    Anxiety    Arrhythmia    SVT    Attention deficit disorder    Breast CA (HCC)    Depression    Esophageal reflux    High blood pressure    OTHER DISEASES    seasonal allergies    Pancreatitis (HCC)    Pneumonia due to organism    SVT (supraventricular tachycardia) (HCC)    Type 1 diabetes mellitus  (HCC)      Past Surgical History:   Procedure Laterality Date    Breast surgery      Implant left      Implant right      Mastectomy left      Mastectomy right      Ndsc ablation & rcnstj [facility]ria [facility] w/o bypass          No recently discontinued medications to reconcile   Medications Ordered Prior to Encounter[1]    Current Allergies: Allergies[2]    Social History     Occupational History    Not on file   Tobacco Use    Smoking status: Every Day     Current packs/day: 0.50     Types: Cigarettes    Smokeless tobacco: Never    Tobacco comments:     1/2ppd x 8 years   Vaping Use    Vaping status: Never Used   Substance and Sexual Activity    Alcohol use: Yes     Alcohol/week: 7.0 standard drinks of alcohol     Types: 7 Shots of liquor per week     Comment: 3-4x/wk    Drug use: Yes     Frequency: 7.0 times per week     Types: Cannabis     Comment: use daily    Sexual activity: Not on file             Family History   Problem Relation Age of Onset    Heart Disorder Father     Diabetes Father     Hypertension Mother     Cancer Mother         breast              MEDICATIONS      [COMPLETED] sodium phosphate 30 mmol in sodium chloride 0.9% 150 mL IVPB  30 mmol Intravenous Once    [COMPLETED] magnesium sulfate 4 g/100mL IVPB premix 4 g  4 g Intravenous Once    insulin aspart (NovoLOG) 100 Units/mL FlexPen 1-68 Units  1-68 Units Subcutaneous TID CC    insulin degludec (Tresiba) 100 units/mL flextouch 28 Units  28 Units Subcutaneous Daily    insulin aspart (NovoLOG) 100 Units/mL FlexPen 1-50 Units  1-50 Units Subcutaneous TID AC and HS    [COMPLETED] potassium chloride (Klor-Con M20) tab 40 mEq  40 mEq Oral Q4H    pantoprazole (Protonix) 40 mg in sodium chloride 0.9% PF 10 mL IV push  40 mg Intravenous Q12H    montelukast (Singulair) tab 10 mg  10 mg Oral Daily    PHENobarbital tab 97.2 mg  97.2 mg Oral Q8H    Followed by    [START ON 12/19/2024] PHENobarbital tab 64.8 mg  64.8 mg Oral Q8H TAMMI    Followed by    [START ON  12/21/2024] PHENobarbital tab 32.4 mg  32.4 mg Oral Q8H TAMMI    [COMPLETED] LORazepam (Ativan) 2 mg/mL injection 2 mg  2 mg Intravenous Once    [COMPLETED] sodium chloride 0.9 % IV bolus 1,000 mL  1,000 mL Intravenous Once    [COMPLETED] dilTIAZem (cardIZEM) 25 mg/5mL injection 10 mg  10 mg Intravenous Once    [COMPLETED] thiamine 100 mg/mL injection 100 mg  100 mg Intravenous Once    [COMPLETED] dilTIAZem (cardIZEM) 25 mg/5mL injection 10 mg  10 mg Intravenous Once    [COMPLETED] HYDROmorphone (Dilaudid) 1 MG/ML injection 1 mg  1 mg Intravenous Once    [COMPLETED] ondansetron (Zofran) 4 MG/2ML injection 4 mg  4 mg Intravenous Once    acetaminophen (Tylenol Extra Strength) tab 1,000 mg  1,000 mg Oral Q4H PRN    melatonin tab 3 mg  3 mg Oral Nightly PRN    glycerin-hypromellose- (Artificial Tears) 0.2-0.2-1 % ophthalmic solution 1 drop  1 drop Both Eyes QID PRN    sodium chloride (Saline Mist) 0.65 % nasal solution 1 spray  1 spray Each Nare Q3H PRN    enoxaparin (Lovenox) 40 MG/0.4ML SUBQ injection 40 mg  40 mg Subcutaneous Daily    polyethylene glycol (PEG 3350) (Miralax) 17 g oral packet 17 g  17 g Oral Daily PRN    sennosides (Senokot) tab 17.2 mg  17.2 mg Oral Nightly PRN    bisacodyl (Dulcolax) 10 MG rectal suppository 10 mg  10 mg Rectal Daily PRN    fleet enema (Fleet) rectal enema 133 mL  1 enema Rectal Once PRN    ondansetron (Zofran) 4 MG/2ML injection 4 mg  4 mg Intravenous Q6H PRN    prochlorperazine (Compazine) 10 MG/2ML injection 5 mg  5 mg Intravenous Q8H PRN    [COMPLETED] metoprolol (Lopressor) 5 mg/5mL injection 5 mg  5 mg Intravenous Once    ALPRAZolam (Xanax) tab 0.25 mg  0.25 mg Oral TID PRN    busPIRone (Buspar) tab 20 mg  20 mg Oral BID    atorvastatin (Lipitor) tab 10 mg  10 mg Oral Daily    gabapentin (Neurontin) cap 300 mg  300 mg Oral BID    metoprolol succinate ER (Toprol XL) 24 hr tab 100 mg  100 mg Oral 2x Daily(Beta Blocker)    sertraline (Zoloft) tab 200 mg  200 mg Oral Nightly     traZODone (Desyrel) tab 200 mg  200 mg Oral Nightly    [] sodium chloride 0.9% infusion   Intravenous Continuous    [] HYDROmorphone (Dilaudid) 1 MG/ML injection 0.5 mg  0.5 mg Intravenous Q30 Min PRN    [] ondansetron (Zofran) 4 MG/2ML injection 4 mg  4 mg Intravenous Q4H PRN    [COMPLETED] dextrose 5%-sodium chloride 0.9% infusion   Intravenous Once    [COMPLETED] metoprolol (Lopressor) 5 mg/5mL injection 5 mg  5 mg Intravenous Once    [COMPLETED] iopamidol 76% (ISOVUE-370) injection for power injector  100 mL Intravenous ONCE PRN    LORazepam (Ativan) tab 1 mg  1 mg Oral Q1H PRN    LORazepam (Ativan) tab 2 mg  2 mg Oral Q1H PRN    LORazepam (Ativan) tab 3 mg  3 mg Oral Q1H PRN    haloperidol lactate (Haldol) 5 MG/ML injection 2 mg  2 mg Intravenous Q8H PRN    thiamine 100 mg/mL injection 100 mg  100 mg Intravenous Daily    folic acid (Folvite) 1 mg in sodium chloride 0.9% 50 mL IVPB  1 mg Intravenous Daily    multivitamin (Tab-A-Toni/Beta Carotene) tab 1 tablet  1 tablet Oral Daily    glucose (Dex4) 15 GM/59ML oral liquid 15 g  15 g Oral Q15 Min PRN    Or    glucose (Glutose) 40% oral gel 15 g  15 g Oral Q15 Min PRN    Or    glucose-vitamin C (Dex-4) chewable tab 4 tablet  4 tablet Oral Q15 Min PRN    Or    dextrose 50% injection 50 mL  50 mL Intravenous Q15 Min PRN    Or    glucose (Dex4) 15 GM/59ML oral liquid 30 g  30 g Oral Q15 Min PRN    Or    glucose (Glutose) 40% oral gel 30 g  30 g Oral Q15 Min PRN    Or    glucose-vitamin C (Dex-4) chewable tab 8 tablet  8 tablet Oral Q15 Min PRN    [COMPLETED] lactated ringers IV bolus 1,000 mL  1,000 mL Intravenous Once    HYDROmorphone (Dilaudid) 1 MG/ML injection 0.1 mg  0.1 mg Intravenous Q2H PRN    Or    HYDROmorphone (Dilaudid) 1 MG/ML injection 0.2 mg  0.2 mg Intravenous Q2H PRN    Or    HYDROmorphone (Dilaudid) 1 MG/ML injection 0.4 mg  0.4 mg Intravenous Q2H PRN              ROS:     A comprehensive 14 point review of systems was  completed.  Pertinent positives and negatives noted in the the HPI.          Physical Exam     Vital signs:  /63   Pulse 64   Temp 98 °F (36.7 °C) (Temporal)   Resp 13   Wt 135 lb 9.3 oz (61.5 kg)   SpO2 94%   BMI 21.88 kg/m²         Physical Exam        General: Appears alert, oriented x 3 and in no acute distress.  HEENT: Normal. No scleral icterus.   CV: S1 and S2 normal.   LUNGS: Clear to auscultation.  ABDOMEN: Soft and non-distended. Mild upper abdominal tenderness. No masses. Bowel sounds are present.  SKIN: Warm and dry.         IMAGING/LABS       Labs:   Lab Results   Component Value Date    WBC 6.0 12/18/2024    HGB 13.0 12/18/2024    HCT 38.0 12/18/2024    .0 12/18/2024    CREATSERUM 0.59 12/18/2024    BUN <5 12/18/2024     12/18/2024    K 3.7 12/18/2024     12/18/2024    CO2 29.0 12/18/2024     12/18/2024    CA 8.3 12/18/2024    ALB 4.7 12/17/2024    ALKPHO 114 12/17/2024    BILT 1.1 12/17/2024    AST 27 12/17/2024    ALT 14 12/17/2024    LIP 30 12/17/2024    MG 2.0 12/18/2024    PHOS 1.9 12/18/2024     Recent Labs   Lab 12/18/24  0457 12/18/24  0748 12/18/24  1222   * 135* 167*   BUN <5* <5* <5*   CREATSERUM 0.60 0.61 0.59   CA 8.4* 8.5* 8.3*    135* 134*   K 3.4* 3.1* 3.7    101 100   CO2 26.0 28.0 29.0     Recent Labs   Lab 12/18/24 0457   RBC 4.14   HGB 13.0   HCT 38.0   MCV 91.8   MCH 31.4   MCHC 34.2   RDW 14.2   NEPRELIM 3.58   WBC 6.0   .0*       Recent Labs   Lab 12/17/24  1825   ALT 14   AST 27       Imaging:      Narrative   PROCEDURE:  CTA CHEST + CT ABD (W) + CT PEL (W) SH(CPT=71275/62179)     COMPARISON:  [facility] , CT, CT ABDOMEN+PELVIS(CONTRAST ONLY)(CPT=74177), 11/24/2024, 0:02 AM.     INDICATIONS:  Chest pain, abdominal pain     TECHNIQUE:  CT of the chest (with CTA imaging), abdomen, and pelvis were obtained post- injection of non-ionic intravenous contrast material. Multi-planar reformatted/3-D images were created to  optimize visualization of vascular anatomy.  Dose reduction  techniques were used. Dose information is transmitted to the ACR (American College of Radiology) NRDR (National Radiology Data Registry) which includes the Dose Index Registry.     PATIENT STATED HISTORY:(As transcribed by Technologist)  Patient with chest pains and  bilateral upper abdominal pain.      CONTRAST USED:  100cc of Isovue 370     FINDINGS:       CHEST:    LUNGS:  No visible pulmonary disease.    MEDIASTINUM:  No mass or adenopathy.  Moderate concentric thickening of the distal esophagus extending to the GE junction.  SHASHANK:  No mass or adenopathy.    CARDIAC:  No enlargement, pericardial thickening, or significant coronary artery calcification.  PLEURA:  No mass or effusion.    CHEST WALL:  No mass or axillary adenopathy.  Bilateral breast implants noted.  AORTA:  No aneurysm or dissection.    VASCULATURE:  No visible pulmonary arterial thrombus or attenuation.       ABDOMEN/PELVIS:  LIVER:  Normal morphology with moderate steatosis.  BILIARY:  No visible dilatation or calcification.    PANCREAS:  Parenchyma of the body and tail segments largely replaced by intrapancreatic pseudocyst measuring approximately 8 x 5 cm within the body segment and 2 x 2 cm within the tail segment, stable.  Extensive atrophy and calcification of the head and   uncinate process, similar to prior.  No imaging features of active inflammation.  SPLEEN:  No enlargement or focal lesion.    KIDNEYS:  No mass, obstruction, or calcification.    ADRENALS:  No mass or enlargement.    AORTA:  No aneurysm or dissection.  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  RETROPERITONEUM:  No mass or adenopathy.    BOWEL/MESENTERY:  Marked fluid distention of the stomach with associated gastric wall thickening and hyperemia..  Normal caliber of the small bowel and colon.  Normal appendix.  ABDOMINAL WALL:  No mass or hernia.    URINARY BLADDER:   No visible focal wall thickening, lesion, or calculus.    PELVIC NODES:  No adenopathy.    PELVIC ORGANS:  No visible mass.  Pelvic organs appropriate for patient age.    BONES:  No bony lesion or fracture.                   Impression   CONCLUSION:       1. Negative for pulmonary embolism or other acute cardiopulmonary process.     2. Moderate circumferential thickening of the distal esophagus indicating esophagitis.  This is new compared to 11/24/2024.     3. Gastric wall thickening and hyperemia suggesting a component of gastritis.  The stomach is markedly distended with fluid, though without obstructing process of the gastric outlet.  This may reflect gastroparesis.     4. Features of chronic pancreatitis with stable appearance of pseudocysts of the pancreatic body and tail segments, dimensions as above.     5. 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        LOCATION:  [facility]        Dictated by (CST): Glenny Quezada MD on 12/17/2024 at 8:34 PM      Finalized by (CST): Glenny Quezada MD on 12/17/2024 at 8:47 PM       Narrative    Gastroenterology  Patient Name: Blanca Coats  MRN: [id number]  Account Number: [id number]  YOB: 1980  Admit Type: Outpatient  Age: 44  Note Status: Finalized  Attending MD: Anitha Rowe Do, DO, [id number]  Procedure Date No Time: 6/24/2024  Instrument Name: [id number]  Procedure:             Upper EUS  Providers:       Anitah Rowe Do, DO (Doctor)  Referring MD:          Anitha Rowe Do, DO  Indication(s):       Pancreatic cyst on CT scan  Impression(s):       - Normal esophagus.       - Normal stomach.       - Normal examined duodenum.       - There was no sign of significant pathology.       - Endosonographic images of the stomach were unremarkable.       - There was no sign of significant pathology in the examined duodenum.       - There was no sign of significant pathology in the ampulla.       - A 45 mm by 40 mm pseudocyst was  seen in the pancreatic tail. The       diagnosis is a pancreatic pseudocyst. Fine needle aspiration for fluid       performed.  Recommendation:       - Discharge patient to home.       IMPRESSION:     Patient is a 44 year old female known to our service and previously seen for Etoh pancreatitis who is being consulted for pancreas pseudocyst on imaging, and abnormal imaging of GI tract suggesting thickening of esophagus and gastric wall.   Abnormal CT imaging w/ wall thickening of esophagus and gastric wall: likely esophagitis and gastritis. Rule out PUD, Cuellar's, vs neoplastic process  Pancreas pseudocyst: stable in size from prior imaging. Will look to obtain records from OSH needle aspiration in June 2024  Alcohol abuse disorder  Chronic etoh pancreatitis  Euglycemic DKA-now resolved  A fib RVR-now resolved           PLAN:     NPO at midnight  EGD tomorrow with Dr Chavez  PPI IV BID  No further work-up needed at this time for pseudocyst as it is stable on imaging and no suggestion of active pancreatitis. Will look to obtain path records from recent EUS w/ FNA  IV fluids, antiemetics, and analgesics per primary team  Alcohol abuse counseling and rehab program recommendations-social work consult  DVT prophylaxis          PRINCE Odell  5:34 PM  12/18/2024  [facility]  [phone]        Physician Addendum  This patient was seen and examined independently, then discussed with ANIVAL Odell.  The plan was discussed with ANIVAL and her note above was reviewed.  In summary, pt with ongoing EtOH abuse.  CT concerning for gastritis/ulcer.  Has known pancreatic pseudo-cyst, recently drained (?).  Need more records and information.  Abd exam reveals mild abd tenderness.  For now, advance diet as tolerated, continue PPI BID, EGD tomorrow AM, review records when available.     Blair Chavez MD       [1]   No current facility-administered medications on file prior to encounter.     Current  Outpatient Medications on File Prior to Encounter   Medication Sig Dispense Refill    atorvastatin 10 MG Oral Tab Take 1 tablet (10 mg total) by mouth daily.      thiamine 100 MG Oral Tab Take 1 tablet (100 mg total) by mouth daily. 30 tablet 0    traMADol 50 MG Oral Tab Take 1-2 tablets ( mg total) by mouth every 4 (four) hours as needed for Pain. 8 tablet 0    LANTUS SOLOSTAR 100 UNIT/ML Subcutaneous Solution Pen-injector Inject 12 units in the morning & inject 14 units at night      HUMALOG KWIKPEN 100 UNIT/ML Subcutaneous Solution Pen-injector Inject 25 units per sliding scale four times a day by subcutaneous route      Glucose Blood (ONETOUCH VERIO) In Vitro Strip Check blood sugar two times day, before breakfast and before dinner.  (may substitute any brand covered by insurance) 50 strip 0    ondansetron 4 MG Oral Tablet Dispersible Take 1 tablet (4 mg total) by mouth every 8 (eight) hours as needed for Nausea. 20 tablet 0    metoprolol succinate 100 MG Oral Tablet 24 Hr Take 1 tablet (100 mg total) by mouth 2 (two) times daily. Take half tablet if blood pressure less than 130/80  0    haloperidol 5 MG Oral Tab Take 1 tablet (5 mg total) by mouth at bedtime.      ALPRAZolam 0.25 MG Oral Tab Take 1 tablet (0.25 mg total) by mouth 3 (three) times daily as needed.      gabapentin 300 MG Oral Cap Take 1 capsule (300 mg total) by mouth in the morning and 1 capsule (300 mg total) before bedtime.      Montelukast Sodium 10 MG Oral Tab Take 1 tablet (10 mg total) by mouth daily.      Sertraline HCl 100 MG Oral Tab Take 2 tablets (200 mg total) by mouth daily.      busPIRone 10 MG Oral Tab Take 2 tablets (20 mg total) by mouth 2 (two) times daily.      traZODone HCl 100 MG Oral Tab Take 2 tablets (200 mg total) by mouth nightly.      folic acid 1 MG Oral Tab Take 1 tablet (1 mg total) by mouth daily. 30 tablet 0    Naloxone HCl 4 MG/0.1ML Nasal Liquid 4 mg by Nasal route as needed. If patient remains  unresponsive, repeat dose in other nostril 2-5 minutes after first dose. 1 kit 0    Amphetamine-Dextroamphet ER 10 MG Oral Capsule SR 24 Hr Take 1 capsule (10 mg total) by mouth every morning.      pantoprazole 40 MG Oral Tab EC Take 1 tablet (40 mg total) by mouth every morning before breakfast.      Amphetamine-Dextroamphet ER 30 MG Oral Capsule SR 24 Hr Take 1 capsule (30 mg total) by mouth every morning.     [2]   Allergies  Allergen Reactions    Bee Venom ANAPHYLAXIS    Morphine HIVES    Fentanyl RASH

## 2024-12-18 NOTE — PLAN OF CARE
Received alert and oriented x 4, on room air, Insulin drip ongoing for DKA following Base Algorithm column 3.   Blood sugar checked every hour, while on Insulin drip,  watched out for hypoglycemia. On CIWA, CIWA score is 3. Seen by cardiologist, Echocardiogram done at bedside. ICU rounding done, plan of care discussed. Patient had an episode of nausea after breakfast was ordered, no vomiting noted, gave Zofran IVP with good response, informed NP of nausea, also seen by Dr. Samson, ordered GI consult. Gave Tresiba subcutaneous at around 1100H and discontinued Insulin drip and IVF 1300H. GI seen patient, diet changed to clear liquid diet. Gave Hydromorphone IVP as PRN for pain in abdomen with good response. Assisted in the bathroom to void, voiding freely.  Plan is for EGD in the morning, NPO post midnight ordered, patient is aware.

## 2024-12-18 NOTE — ED INITIAL ASSESSMENT (HPI)
Patient A&Ox4 here via EMS for ETOH detox. Last drink was last night. States she drinks \"two small bottles\". Denies seizures from detox. Patient told EMS she has a hx SVT when detoxing.   Adenosine 6mg/12mg/12mg given without change.

## 2024-12-18 NOTE — PLAN OF CARE
Blanca Coats Patient Status:  Emergency    1980 MRN [id number]   [facility] EMERGENCY DEPARTMENT Attending Samy Jackson MD   Hosp Day # 0 PCP Liya Velez PA-C     Cardiology Nocturnal APN Note    Briefly: (Documentation from chart review)     Blanca Coats is a 44 F  who presented with afib rvr  and has a PMH/PSH of:  Htn dm c/o epigastric pain consistent w/pancreatitis per ED note        Past Medical History:    Anxiety    Arrhythmia    SVT    Attention deficit disorder    Breast CA (HCC)    Depression    Esophageal reflux    High blood pressure    OTHER DISEASES    seasonal allergies    Pancreatitis (HCC)    Pneumonia due to organism    SVT (supraventricular tachycardia) (HCC)    Type 1 diabetes mellitus (HCC)       Primary Cardiologist None     Vital Signs:       2024     7:32 PM 2024     7:43 PM   Vitals History   Pulse  116   Resp  19   SpO2  97 %   Weight 150 lbs    BMI 24.21 kg/m2         Labs:   Lab Results   Component Value Date    WBC 13.0 2024    HGB 16.9 2024    HCT 48.6 2024    .0 2024    CREATSERUM 0.85 2024    BUN 8 2024     2024    K 4.3 2024    CL 94 2024    CO2 11.0 2024     2024    CA 9.8 2024    ALB 4.7 2024    ALKPHO 114 2024    BILT 1.1 2024    TP 8.3 2024    AST 27 2024    ALT 14 2024    T4F 1.0 2024    TSH 0.275 2024    LIP 30 2024    TROPHS 99 2024    ETOH 73 2024       Diagnostics:   No results found.    Allergies:  Allergies[1]    Medications:    dilTIAZem    insulin regular    dextrose 5%-sodium chloride 0.9%    Assessment:   EKG shows Afib rvr rate 210  Patient rec'd adenosine x 2 per EMD  On diltiazem gtt in ED  Trop 99 tsh 0.275 T3 free 2.16   WBC 13.0 H&H 16.9/48.6 plts 265  K 4.3 creat 0.85 glucose 245   ETOH hx of abuse per ED record  Concern for pancreatitis per ED  note  Deferred ac CHADSVASC 2  CT of chest/abd per dw ED doctor negative     Plan:  Continue diltiazem gtt per protocol  Defer decision on ac  Trend trops per ED and EKG if elevated as dw ED   Echo pending in am   - Continue to monitor overnight  - Formal Cardiology consult to follow in AM.       Porsha Wolfe NP  [facility]  12/17/2024  8:00 PM         [1]   Allergies  Allergen Reactions    Bee Venom ANAPHYLAXIS    Morphine HIVES    Fentanyl RASH

## 2024-12-18 NOTE — ED PROVIDER NOTES
Patient Seen in: [facility] Emergency Department      History     Chief Complaint   Patient presents with    Eval-D    Arrythmia/Palpitations     Stated Complaint: Eval-D and SVT    Subjective:   HPI      Patient is a 44-year-old woman with a history of alcohol abuse who continues to drink.  History of SVT as well as A-fib.  Comes in by medics with a high heart rate.  Says she drank heavily last night.  Says she wants to quit drinking.  Heart rates are in the 200s.  She was treated with several doses of adenosine and route per discussions with paramedics without relief.  She complains of epigastric pain consistent with her pancreatitis.  She is a diabetic.  No other specific complaints.  Patient is otherwise at her medical baseline.  Says she does get withdrawal symptoms though not DTs.    Objective:     Past Medical History:    Anxiety    Arrhythmia    SVT    Attention deficit disorder    Breast CA (HCC)    Depression    Esophageal reflux    High blood pressure    OTHER DISEASES    seasonal allergies    Pancreatitis (HCC)    Pneumonia due to organism    SVT (supraventricular tachycardia) (HCC)    Type 1 diabetes mellitus (HCC)              Past Surgical History:   Procedure Laterality Date    Breast surgery      Implant left      Implant right      Mastectomy left      Mastectomy right      Ndsc ablation & rcnstj atria lmtd w/o bypass                  Social History     Socioeconomic History    Marital status: Single   Tobacco Use    Smoking status: Every Day     Current packs/day: 0.50     Types: Cigarettes    Smokeless tobacco: Never    Tobacco comments:     1/2ppd x 8 years   Vaping Use    Vaping status: Never Used   Substance and Sexual Activity    Alcohol use: Yes     Alcohol/week: 7.0 standard drinks of alcohol     Types: 7 Shots of liquor per week     Comment: 3-4x/wk    Drug use: Yes     Frequency: 7.0 times per week     Types: Cannabis     Comment: use daily   Other Topics Concern    Exercise Yes      Comment: MODERATE    Seat Belt Yes    Self-Exams Yes     Social Drivers of Health     Financial Resource Strain: Low Risk  (1/23/2023)    Received from [facility], [facility]    Financial Resource Strain     In the past year, have you or any family members you live with been unable to get any of the following when it was really needed? Check all that apply.: None   Food Insecurity: No Food Insecurity (11/24/2024)    Food Insecurity     Food Insecurity: Never true   Transportation Needs: No Transportation Needs (11/24/2024)    Transportation Needs     Lack of Transportation: No   Social Connections: Socially Integrated (1/23/2023)    Received from [facility], [facility]    Social Connections     How often do you see or talk to people that you care about and feel close to? (For example: talking to friends on the phone, visiting friends or family, going to Islam or club meetings): 5 or more times a week   Housing Stability: Low Risk  (11/24/2024)    Housing Stability     Housing Instability: No                  Physical Exam     ED Triage Vitals   BP 12/17/24 1817 (!) 112/96   Pulse 12/17/24 1817 (!) 219   Resp 12/17/24 1817 (!) 27   Temp 12/17/24 1832 97.4 °F (36.3 °C)   Temp src 12/17/24 1832 Temporal   SpO2 12/17/24 1817 100 %   O2 Device 12/17/24 1817 None (Room air)       Current Vitals:   Vital Signs  BP: 109/72  Pulse: 98  Resp: 21  Temp: 97.4 °F (36.3 °C)  Temp src: Temporal  MAP (mmHg): 84    Oxygen Therapy  SpO2: 97 %  O2 Device: None (Room air)        Physical Exam  General: Patient is anxious, somewhat tremulous 44-year-old  HEENT: Normal cephalic atraumatic.  Nonicteric sclera.  Moist mucous membranes.  No meningismus.  No adenopathy  Lungs: No tachypnea.  Lungs clear to auscultation bilaterally without rales/rhonchi.  Equal breath sounds bilaterally  Cardiac: Tachycardic and irregular  Abdomen: Mild epigastric tenderness  Extremities: No  clubbing/cyanosis/edema.  Skin: No rashes, no pallor  Neuro: Awake oriented ×3.  Nonfocal.  Good strength throughout    ED Course     Labs Reviewed   COMP METABOLIC PANEL (14) - Abnormal; Notable for the following components:       Result Value    Glucose 245 (*)     Sodium 135 (*)     Chloride 94 (*)     CO2 11.0 (*)     Anion Gap 30 (*)     BUN 8 (*)     Alkaline Phosphatase 114 (*)     Total Protein 8.3 (*)     Globulin  3.6 (*)     All other components within normal limits   CBC WITH DIFFERENTIAL WITH PLATELET - Abnormal; Notable for the following components:    WBC 13.0 (*)     RBC 5.32 (*)     HGB 16.9 (*)     HCT 48.6 (*)     Neutrophil Absolute Prelim 10.43 (*)     Neutrophil Absolute 10.43 (*)     All other components within normal limits   TSH W REFLEX TO FREE T4 - Abnormal; Notable for the following components:    TSH 0.275 (*)     All other components within normal limits   ETHYL ALCOHOL - Abnormal; Notable for the following components:    Ethyl Alcohol 73 (*)     All other components within normal limits   TROPONIN I HIGH SENSITIVITY - Abnormal; Notable for the following components:    Troponin I (High Sensitivity) 99 (*)     All other components within normal limits   LIPID PANEL - Abnormal; Notable for the following components:    Cholesterol, Total 204 (*)     HDL Cholesterol 60 (*)     Triglycerides 281 (*)     VLDL 47 (*)     Non HDL Chol 144 (*)     All other components within normal limits   FREE T3 (TRIIODOTHYRONINE) - Abnormal; Notable for the following components:    T3 Free 2.16 (*)     All other components within normal limits   POCT GLUCOSE - Abnormal; Notable for the following components:    POC Glucose 266 (*)     All other components within normal limits   POCT GLUCOSE - Abnormal; Notable for the following components:    POC Glucose 217 (*)     All other components within normal limits   LIPASE - Normal   T4, FREE (S) - Normal   URINALYSIS WITH CULTURE REFLEX   TROPONIN I HIGH  SENSITIVITY            White count 13.  Hemoglobin 16.9.     Glucose 266.  MDM      Patient presents with rapid A-fib as well as alcohol withdrawal and acute pancreatitis.  He is a diabetic.  Glucose in the 200s.  IV was placed, she was given thiamine along with IV fluids.  She was given 2 mg Ativan and placed on CIWA.  She was hydrated.  She was started on diltiazem with a drip and bolus.  She was rebolus and the drip increased.  She still tacky in the 160s to 170s.  Will add a dose of Lopressor.  Will admit to the CTU.  [facility]  Will consult [facility] cardiology given the rapid A-fib.    Admission disposition: 12/17/2024  8:55 PM       Of note, patient's troponin mildly elevated.  Likely demand ischemia for the tachycardia.  Feels much better with a heart rate improved control.  Bicarb 11 with a gap of 30.  Glucose is 245.  Suspect an element of AKA.  Will start D5 normal saline.  Will start low insulin drip.  Frequent Accu-Cheks.  Will switch to the ICU.    Critical care evaluate the patient in the ER.  Asked for CTA of the chest abdomen pelvis which was performed.    CTA of the chest abdomen pelvis: Negative for PE.  Thickening of the distal esophagus consistent with esophagitis.  Gastric wall thickening consistent with gastritis.  Chronic pancreatitis.    Medical Decision Making      Disposition and Plan     Clinical Impression:  1. Atrial fibrillation with rapid ventricular response (HCC)    2. Alcohol withdrawal syndrome with complication (HCC)    3. Alcohol-induced chronic pancreatitis (HCC)    4. Type 1 diabetes mellitus with hyperglycemia (HCC)    5. Acidosis    6. Alcoholic ketoacidosis         Disposition:  Admit  12/17/2024  8:55 pm    Follow-up:  No follow-up provider specified.        Medications Prescribed:  Current Discharge Medication List              Supplementary Documentation:         Hospital Problems       Present on Admission  Date Reviewed: 1/29/2023             ICD-10-CM Noted POA    * (Principal) Atrial fibrillation with rapid ventricular response (HCC) I48.91 8/1/2023 Unknown                       A total of 45 minutes of critical care time (exclusive of billable procedures) was administered to manage the patient's unstable vital signs due to @HIS@tachycardia to 210 associate with A-fib, pancreatitis, withdrawal..  This involved direct patient intervention, complex decision making, and/or extensive discussions with the patient, family, and clinical staff.  Patient was treated with IV diltiazem, IV Lopressor, IV Ativan, IV pain medications.  Multiple consultants.

## 2024-12-18 NOTE — ANESTHESIA PREPROCEDURE EVALUATION
PRE-OP EVALUATION    Patient Name: Blanca Coats    Admit Diagnosis: Acidosis [E87.20]  Alcoholic ketoacidosis [E87.29]  Alcohol-induced chronic pancreatitis (HCC) [K86.0]  Atrial fibrillation with rapid ventricular response (HCC) [I48.91]  Type 1 diabetes mellitus with hyperglycemia (HCC) [E10.65]  Alcohol withdrawal syndrome with complication (HCC) [F10.939]    Pre-op Diagnosis: Nausea [R11.0]  Abnormal finding on GI tract imaging [R93.3]    ESOPHAGOGASTRODUODENOSCOPY (EGD)    Anesthesia Procedure: ESOPHAGOGASTRODUODENOSCOPY (EGD)    Surgeons and Role:     * Blair Chavez MD - Primary    Pre-op vitals reviewed.  Temp: 98 °F (36.7 °C)  Pulse: 64  Resp: 13  BP: 104/63  SpO2: 94 %  Body mass index is 21.88 kg/m².    Current medications reviewed.  Hospital Medications:   [COMPLETED] sodium phosphate 30 mmol in sodium chloride 0.9% 150 mL IVPB  30 mmol Intravenous Once    [COMPLETED] magnesium sulfate 4 g/100mL IVPB premix 4 g  4 g Intravenous Once    insulin aspart (NovoLOG) 100 Units/mL FlexPen 1-68 Units  1-68 Units Subcutaneous TID CC    insulin degludec (Tresiba) 100 units/mL flextouch 28 Units  28 Units Subcutaneous Daily    insulin aspart (NovoLOG) 100 Units/mL FlexPen 1-50 Units  1-50 Units Subcutaneous TID AC and HS    [COMPLETED] potassium chloride (Klor-Con M20) tab 40 mEq  40 mEq Oral Q4H    pantoprazole (Protonix) 40 mg in sodium chloride 0.9% PF 10 mL IV push  40 mg Intravenous Q12H    montelukast (Singulair) tab 10 mg  10 mg Oral Daily    PHENobarbital tab 97.2 mg  97.2 mg Oral Q8H    Followed by    [START ON 12/19/2024] PHENobarbital tab 64.8 mg  64.8 mg Oral Q8H TAMMI    Followed by    [START ON 12/21/2024] PHENobarbital tab 32.4 mg  32.4 mg Oral Q8H TAMMI    [COMPLETED] LORazepam (Ativan) 2 mg/mL injection 2 mg  2 mg Intravenous Once    [COMPLETED] sodium chloride 0.9 % IV bolus 1,000 mL  1,000 mL Intravenous Once    [COMPLETED] dilTIAZem (cardIZEM) 25 mg/5mL injection 10 mg  10 mg Intravenous Once     [COMPLETED] thiamine 100 mg/mL injection 100 mg  100 mg Intravenous Once    [COMPLETED] dilTIAZem (cardIZEM) 25 mg/5mL injection 10 mg  10 mg Intravenous Once    [COMPLETED] HYDROmorphone (Dilaudid) 1 MG/ML injection 1 mg  1 mg Intravenous Once    [COMPLETED] ondansetron (Zofran) 4 MG/2ML injection 4 mg  4 mg Intravenous Once    acetaminophen (Tylenol Extra Strength) tab 1,000 mg  1,000 mg Oral Q4H PRN    melatonin tab 3 mg  3 mg Oral Nightly PRN    glycerin-hypromellose- (Artificial Tears) 0.2-0.2-1 % ophthalmic solution 1 drop  1 drop Both Eyes QID PRN    sodium chloride (Saline Mist) 0.65 % nasal solution 1 spray  1 spray Each Nare Q3H PRN    enoxaparin (Lovenox) 40 MG/0.4ML SUBQ injection 40 mg  40 mg Subcutaneous Daily    polyethylene glycol (PEG 3350) (Miralax) 17 g oral packet 17 g  17 g Oral Daily PRN    sennosides (Senokot) tab 17.2 mg  17.2 mg Oral Nightly PRN    bisacodyl (Dulcolax) 10 MG rectal suppository 10 mg  10 mg Rectal Daily PRN    fleet enema (Fleet) rectal enema 133 mL  1 enema Rectal Once PRN    ondansetron (Zofran) 4 MG/2ML injection 4 mg  4 mg Intravenous Q6H PRN    prochlorperazine (Compazine) 10 MG/2ML injection 5 mg  5 mg Intravenous Q8H PRN    [COMPLETED] metoprolol (Lopressor) 5 mg/5mL injection 5 mg  5 mg Intravenous Once    ALPRAZolam (Xanax) tab 0.25 mg  0.25 mg Oral TID PRN    busPIRone (Buspar) tab 20 mg  20 mg Oral BID    atorvastatin (Lipitor) tab 10 mg  10 mg Oral Daily    gabapentin (Neurontin) cap 300 mg  300 mg Oral BID    metoprolol succinate ER (Toprol XL) 24 hr tab 100 mg  100 mg Oral 2x Daily(Beta Blocker)    sertraline (Zoloft) tab 200 mg  200 mg Oral Nightly    traZODone (Desyrel) tab 200 mg  200 mg Oral Nightly    [] sodium chloride 0.9% infusion   Intravenous Continuous    [] HYDROmorphone (Dilaudid) 1 MG/ML injection 0.5 mg  0.5 mg Intravenous Q30 Min PRN    [] ondansetron (Zofran) 4 MG/2ML injection 4 mg  4 mg Intravenous Q4H PRN     [COMPLETED] dextrose 5%-sodium chloride 0.9% infusion   Intravenous Once    [COMPLETED] metoprolol (Lopressor) 5 mg/5mL injection 5 mg  5 mg Intravenous Once    [COMPLETED] iopamidol 76% (ISOVUE-370) injection for power injector  100 mL Intravenous ONCE PRN    LORazepam (Ativan) tab 1 mg  1 mg Oral Q1H PRN    LORazepam (Ativan) tab 2 mg  2 mg Oral Q1H PRN    LORazepam (Ativan) tab 3 mg  3 mg Oral Q1H PRN    haloperidol lactate (Haldol) 5 MG/ML injection 2 mg  2 mg Intravenous Q8H PRN    thiamine 100 mg/mL injection 100 mg  100 mg Intravenous Daily    folic acid (Folvite) 1 mg in sodium chloride 0.9% 50 mL IVPB  1 mg Intravenous Daily    multivitamin (Tab-A-Toni/Beta Carotene) tab 1 tablet  1 tablet Oral Daily    glucose (Dex4) 15 GM/59ML oral liquid 15 g  15 g Oral Q15 Min PRN    Or    glucose (Glutose) 40% oral gel 15 g  15 g Oral Q15 Min PRN    Or    glucose-vitamin C (Dex-4) chewable tab 4 tablet  4 tablet Oral Q15 Min PRN    Or    dextrose 50% injection 50 mL  50 mL Intravenous Q15 Min PRN    Or    glucose (Dex4) 15 GM/59ML oral liquid 30 g  30 g Oral Q15 Min PRN    Or    glucose (Glutose) 40% oral gel 30 g  30 g Oral Q15 Min PRN    Or    glucose-vitamin C (Dex-4) chewable tab 8 tablet  8 tablet Oral Q15 Min PRN    [COMPLETED] lactated ringers IV bolus 1,000 mL  1,000 mL Intravenous Once    HYDROmorphone (Dilaudid) 1 MG/ML injection 0.1 mg  0.1 mg Intravenous Q2H PRN    Or    HYDROmorphone (Dilaudid) 1 MG/ML injection 0.2 mg  0.2 mg Intravenous Q2H PRN    Or    HYDROmorphone (Dilaudid) 1 MG/ML injection 0.4 mg  0.4 mg Intravenous Q2H PRN       Outpatient Medications:   Prescriptions Prior to Admission[1]    Allergies: Bee venom, Morphine, and Fentanyl      Anesthesia Evaluation    Patient summary reviewed.    Anesthetic Complications  (-) history of anesthetic complications         GI/Hepatic/Renal      (+) GERD          (+) liver disease                 Cardiovascular  Comment: Echo    1. Left ventricle: The  cavity size was normal. Wall thickness was normal.      Systolic function was normal. The estimated ejection fraction was 60-65%,      by visual assessment. No diagnostic evidence for regional wall motion      abnormalities. Left ventricular diastolic function parameters were      normal.   2. Right ventricle: Systolic function was normal.   3. Left atrium: The left atrial volume was mildly increased.   4. Pulmonary arteries: Systolic pressure was within the normal range,      estimated to be 28mm Hg.   Impressions:  This study is compared with previous dated 01/15/2023: No   significant change since prior study.       ECG reviewed.      MET: >4      (+) hypertension                  (+) dysrhythmias (Afib w rvr converted to NSR) and atrial fibrillation and SVT                  Endo/Other      (+) diabetes   using insulin              (+) thrombocytopenia           Pulmonary        (+) COPD     (+) pneumonia  (+) shortness of breath            Neuro/Psych      (+) depression  (+) anxiety                      Alcohol abuse  - now with alcohol induced pancreatitis/DKA on insulin gtt and gap closed    Breast cancer        Procedure Laterality Date    Breast surgery      Implant left      Implant right      Mastectomy left      Mastectomy right      Ndsc ablation & rcnstj atria lmtd w/o bypass       Social History     Socioeconomic History    Marital status: Single   Tobacco Use    Smoking status: Every Day     Current packs/day: 0.50     Types: Cigarettes    Smokeless tobacco: Never    Tobacco comments:     1/2ppd x 8 years   Vaping Use    Vaping status: Never Used   Substance and Sexual Activity    Alcohol use: Yes     Alcohol/week: 7.0 standard drinks of alcohol     Types: 7 Shots of liquor per week     Comment: 3-4x/wk    Drug use: Yes     Frequency: 7.0 times per week     Types: Cannabis     Comment: use daily   Other Topics Concern    Exercise Yes     Comment: MODERATE    Seat Belt Yes    Self-Exams Yes      History   Drug Use    Frequency: 7.0 times per week    Types: Cannabis     Comment: use daily     Available pre-op labs reviewed.  Lab Results   Component Value Date    WBC 6.0 12/18/2024    RBC 4.14 12/18/2024    HGB 13.0 12/18/2024    HCT 38.0 12/18/2024    MCV 91.8 12/18/2024    MCH 31.4 12/18/2024    MCHC 34.2 12/18/2024    RDW 14.2 12/18/2024    .0 (L) 12/18/2024     Lab Results   Component Value Date     (L) 12/18/2024    K 3.7 12/18/2024     12/18/2024    CO2 29.0 12/18/2024    BUN <5 (L) 12/18/2024    CREATSERUM 0.59 12/18/2024     (H) 12/18/2024    CA 8.3 (L) 12/18/2024            Airway      Mallampati: I  Mouth opening: >3 FB  TM distance: > 6 cm   Cardiovascular    Cardiovascular exam normal.         Dental    Dentition appears grossly intact         Pulmonary    Pulmonary exam normal.                 Other findings              ASA: 3   Plan: MAC  NPO status verified and         Comment: This is a limited chart review prior to patient encounter in anticipation of anesthesia for a scheduled procedure.    Plan/risks discussed with: patient              Present on Admission:  **None**           [1]   Medications Prior to Admission   Medication Sig Dispense Refill Last Dose/Taking    atorvastatin 10 MG Oral Tab Take 1 tablet (10 mg total) by mouth daily.   12/16/2024 Morning    thiamine 100 MG Oral Tab Take 1 tablet (100 mg total) by mouth daily. 30 tablet 0 12/17/2024 Evening    traMADol 50 MG Oral Tab Take 1-2 tablets ( mg total) by mouth every 4 (four) hours as needed for Pain. 8 tablet 0 12/17/2024 at 10:00 AM    LANTUS SOLOSTAR 100 UNIT/ML Subcutaneous Solution Pen-injector Inject 12 units in the morning & inject 14 units at night   12/17/2024 Morning    HUMALOG KWIKPEN 100 UNIT/ML Subcutaneous Solution Pen-injector Inject 25 units per sliding scale four times a day by subcutaneous route   12/17/2024 Morning    Glucose Blood (ONETOUCH VERIO) In Vitro Strip Check  blood sugar two times day, before breakfast and before dinner.  (may substitute any brand covered by insurance) 50 strip 0 12/17/2024 Evening    ondansetron 4 MG Oral Tablet Dispersible Take 1 tablet (4 mg total) by mouth every 8 (eight) hours as needed for Nausea. 20 tablet 0 12/17/2024 Noon    metoprolol succinate 100 MG Oral Tablet 24 Hr Take 1 tablet (100 mg total) by mouth 2 (two) times daily. Take half tablet if blood pressure less than 130/80  0 12/17/2024 Evening    haloperidol 5 MG Oral Tab Take 1 tablet (5 mg total) by mouth at bedtime.   Past Week    ALPRAZolam 0.25 MG Oral Tab Take 1 tablet (0.25 mg total) by mouth 3 (three) times daily as needed.   12/17/2024 at 12:00 PM    gabapentin 300 MG Oral Cap Take 1 capsule (300 mg total) by mouth in the morning and 1 capsule (300 mg total) before bedtime.   12/17/2024 Morning    Montelukast Sodium 10 MG Oral Tab Take 1 tablet (10 mg total) by mouth daily.   12/16/2024 Morning    Sertraline HCl 100 MG Oral Tab Take 2 tablets (200 mg total) by mouth daily.   12/16/2024 Bedtime    busPIRone 10 MG Oral Tab Take 2 tablets (20 mg total) by mouth 2 (two) times daily.   12/16/2024 Morning    traZODone HCl 100 MG Oral Tab Take 2 tablets (200 mg total) by mouth nightly.   12/17/2024 Morning    folic acid 1 MG Oral Tab Take 1 tablet (1 mg total) by mouth daily. 30 tablet 0 Unknown    Naloxone HCl 4 MG/0.1ML Nasal Liquid 4 mg by Nasal route as needed. If patient remains unresponsive, repeat dose in other nostril 2-5 minutes after first dose. 1 kit 0 Unknown    Amphetamine-Dextroamphet ER 10 MG Oral Capsule SR 24 Hr Take 1 capsule (10 mg total) by mouth every morning.   Unknown    pantoprazole 40 MG Oral Tab EC Take 1 tablet (40 mg total) by mouth every morning before breakfast.   Unknown    Amphetamine-Dextroamphet ER 30 MG Oral Capsule SR 24 Hr Take 1 capsule (30 mg total) by mouth every morning.   Unknown

## 2024-12-19 ENCOUNTER — ANESTHESIA (OUTPATIENT)
Dept: ENDOSCOPY | Facility: HOSPITAL | Age: 44
End: 2024-12-19
Payer: MEDICAID

## 2024-12-19 LAB
ALBUMIN SERPL-MCNC: 3.5 G/DL (ref 3.2–4.8)
ALBUMIN/GLOB SERPL: 1.3 {RATIO} (ref 1–2)
ALP LIVER SERPL-CCNC: 82 U/L
ALT SERPL-CCNC: 8 U/L
ANION GAP SERPL CALC-SCNC: 7 MMOL/L (ref 0–18)
AST SERPL-CCNC: 10 U/L (ref ?–34)
BASOPHILS # BLD AUTO: 0.01 X10(3) UL (ref 0–0.2)
BASOPHILS NFR BLD AUTO: 0.2 %
BILIRUB SERPL-MCNC: 0.9 MG/DL (ref 0.3–1.2)
BUN BLD-MCNC: <5 MG/DL (ref 9–23)
CALCIUM BLD-MCNC: 8.7 MG/DL (ref 8.7–10.4)
CHLORIDE SERPL-SCNC: 99 MMOL/L (ref 98–112)
CO2 SERPL-SCNC: 26 MMOL/L (ref 21–32)
CREAT BLD-MCNC: 0.67 MG/DL
EGFRCR SERPLBLD CKD-EPI 2021: 112 ML/MIN/1.73M2 (ref 60–?)
EOSINOPHIL # BLD AUTO: 0.03 X10(3) UL (ref 0–0.7)
EOSINOPHIL NFR BLD AUTO: 0.7 %
ERYTHROCYTE [DISTWIDTH] IN BLOOD BY AUTOMATED COUNT: 14 %
GLOBULIN PLAS-MCNC: 2.7 G/DL (ref 2–3.5)
GLUCOSE BLD-MCNC: 143 MG/DL (ref 70–99)
GLUCOSE BLD-MCNC: 185 MG/DL (ref 70–99)
GLUCOSE BLD-MCNC: 196 MG/DL (ref 70–99)
GLUCOSE BLD-MCNC: 266 MG/DL (ref 70–99)
GLUCOSE BLD-MCNC: 51 MG/DL (ref 70–99)
GLUCOSE BLD-MCNC: 58 MG/DL (ref 70–99)
GLUCOSE BLD-MCNC: 69 MG/DL (ref 70–99)
GLUCOSE BLD-MCNC: 88 MG/DL (ref 70–99)
GLUCOSE BLD-MCNC: 93 MG/DL (ref 70–99)
HCG SERPL QL: NEGATIVE
HCT VFR BLD AUTO: 37.4 %
HGB BLD-MCNC: 12.8 G/DL
IMM GRANULOCYTES # BLD AUTO: 0 X10(3) UL (ref 0–1)
IMM GRANULOCYTES NFR BLD: 0 %
LYMPHOCYTES # BLD AUTO: 1.07 X10(3) UL (ref 1–4)
LYMPHOCYTES NFR BLD AUTO: 24.9 %
MAGNESIUM SERPL-MCNC: 1.5 MG/DL (ref 1.6–2.6)
MCH RBC QN AUTO: 32.2 PG (ref 26–34)
MCHC RBC AUTO-ENTMCNC: 34.2 G/DL (ref 31–37)
MCV RBC AUTO: 94 FL
MONOCYTES # BLD AUTO: 0.44 X10(3) UL (ref 0.1–1)
MONOCYTES NFR BLD AUTO: 10.2 %
NEUTROPHILS # BLD AUTO: 2.75 X10 (3) UL (ref 1.5–7.7)
NEUTROPHILS # BLD AUTO: 2.75 X10(3) UL (ref 1.5–7.7)
NEUTROPHILS NFR BLD AUTO: 64 %
PHOSPHATE SERPL-MCNC: 2.1 MG/DL (ref 2.4–5.1)
PLATELET # BLD AUTO: 96 10(3)UL (ref 150–450)
PLATELETS.RETICULATED NFR BLD AUTO: 3.9 % (ref 0–7)
POTASSIUM SERPL-SCNC: 4 MMOL/L (ref 3.5–5.1)
PROT SERPL-MCNC: 6.2 G/DL (ref 5.7–8.2)
RBC # BLD AUTO: 3.98 X10(6)UL
SODIUM SERPL-SCNC: 132 MMOL/L (ref 136–145)
WBC # BLD AUTO: 4.3 X10(3) UL (ref 4–11)

## 2024-12-19 PROCEDURE — 99233 SBSQ HOSP IP/OBS HIGH 50: CPT | Performed by: CLINICAL NURSE SPECIALIST

## 2024-12-19 PROCEDURE — 99233 SBSQ HOSP IP/OBS HIGH 50: CPT | Performed by: EMERGENCY MEDICINE

## 2024-12-19 PROCEDURE — 0DJ08ZZ INSPECTION OF UPPER INTESTINAL TRACT, VIA NATURAL OR ARTIFICIAL OPENING ENDOSCOPIC: ICD-10-PCS | Performed by: STUDENT IN AN ORGANIZED HEALTH CARE EDUCATION/TRAINING PROGRAM

## 2024-12-19 RX ORDER — HYDROCODONE BITARTRATE AND ACETAMINOPHEN 5; 325 MG/1; MG/1
1 TABLET ORAL EVERY 4 HOURS PRN
Status: DISCONTINUED | OUTPATIENT
Start: 2024-12-19 | End: 2024-12-20

## 2024-12-19 RX ORDER — MAGNESIUM OXIDE 400 MG/1
800 TABLET ORAL ONCE
Status: COMPLETED | OUTPATIENT
Start: 2024-12-19 | End: 2024-12-19

## 2024-12-19 RX ORDER — MAGNESIUM SULFATE HEPTAHYDRATE 40 MG/ML
2 INJECTION, SOLUTION INTRAVENOUS ONCE
Status: COMPLETED | OUTPATIENT
Start: 2024-12-19 | End: 2024-12-19

## 2024-12-19 RX ORDER — PANTOPRAZOLE SODIUM 40 MG/1
40 TABLET, DELAYED RELEASE ORAL
Status: DISCONTINUED | OUTPATIENT
Start: 2024-12-19 | End: 2024-12-20

## 2024-12-19 RX ORDER — FOLIC ACID 1 MG/1
1 TABLET ORAL DAILY
Status: DISCONTINUED | OUTPATIENT
Start: 2024-12-19 | End: 2024-12-20

## 2024-12-19 RX ORDER — HYDROCODONE BITARTRATE AND ACETAMINOPHEN 5; 325 MG/1; MG/1
2 TABLET ORAL EVERY 4 HOURS PRN
Status: DISCONTINUED | OUTPATIENT
Start: 2024-12-19 | End: 2024-12-20

## 2024-12-19 RX ORDER — DEXTROSE MONOHYDRATE AND SODIUM CHLORIDE 5; .45 G/100ML; G/100ML
INJECTION, SOLUTION INTRAVENOUS CONTINUOUS
Status: DISCONTINUED | OUTPATIENT
Start: 2024-12-19 | End: 2024-12-20

## 2024-12-19 RX ORDER — NALOXONE HYDROCHLORIDE 0.4 MG/ML
0.08 INJECTION, SOLUTION INTRAMUSCULAR; INTRAVENOUS; SUBCUTANEOUS ONCE AS NEEDED
Status: ACTIVE | OUTPATIENT
Start: 2024-12-19 | End: 2024-12-19

## 2024-12-19 RX ORDER — MELATONIN
100 DAILY
Status: DISCONTINUED | OUTPATIENT
Start: 2024-12-19 | End: 2024-12-20

## 2024-12-19 RX ORDER — SODIUM CHLORIDE, SODIUM LACTATE, POTASSIUM CHLORIDE, CALCIUM CHLORIDE 600; 310; 30; 20 MG/100ML; MG/100ML; MG/100ML; MG/100ML
INJECTION, SOLUTION INTRAVENOUS CONTINUOUS
Status: DISCONTINUED | OUTPATIENT
Start: 2024-12-19 | End: 2024-12-19

## 2024-12-19 RX ADMIN — SODIUM CHLORIDE: 9 INJECTION, SOLUTION INTRAVENOUS at 10:17:00

## 2024-12-19 NOTE — PROGRESS NOTES
[facility]   part of [facility]     Hospitalist Progress Note     Blanca Coats Patient Status:  Inpatient    1980 MRN [id number]   Location [facility] 4SW-A Attending Simone Samson,    Hosp Day # 2 PCP Liya Velez PA-C     Chief Complaint: Nausea    Subjective:     Patient feels about the same.      Objective:    Review of Systems:   A comprehensive review of systems was completed; pertinent positive and negatives stated in subjective.    Vital signs:  Temp:  [97.8 °F (36.6 °C)-99.8 °F (37.7 °C)] 98.5 °F (36.9 °C)  Pulse:  [47-71] 59  Resp:  [10-19] 16  BP: ()/(59-77) 116/77  SpO2:  [91 %-100 %] 94 %    Physical Exam:    General: No acute distress  Respiratory: No wheezes, no rhonchi  Cardiovascular: S1, S2, regular rate and rhythm  Abdomen: Soft, LUQ tenderness, non-distended, positive bowel sounds  Neuro: No new focal deficits.   Extremities: No edema    Diagnostic Data:    Labs:  Recent Labs   Lab 24  1825 24  0457 24  1206   WBC 13.0* 6.0 4.3   HGB 16.9* 13.0 12.8   MCV 91.4 91.8 94.0   .0 126.0* 96.0*       Recent Labs   Lab 24  1825 24  0006 24  0748 24  1222 24  1733 24  1207   *   < > 135* 167*  --  266*   BUN 8*   < > <5* <5*  --  <5*   CREATSERUM 0.85   < > 0.61 0.59  --  0.67   CA 9.8   < > 8.5* 8.3*  --  8.7   ALB 4.7  --   --   --   --  3.5   *   < > 135* 134*  --  132*   K 4.3   < > 3.1* 3.7 4.3 4.0   CL 94*   < > 101 100  --  99   CO2 11.0*   < > 28.0 29.0  --  26.0   ALKPHO 114*  --   --   --   --  82   AST 27  --   --   --   --  10   ALT 14  --   --   --   --  8*   BILT 1.1  --   --   --   --  0.9   TP 8.3*  --   --   --   --  6.2    < > = values in this interval not displayed.       Estimated Creatinine Clearance: 102.4 mL/min (based on SCr of 0.67 mg/dL).    Recent Labs   Lab 24  0006 24  0457 24  1024   TROPHS 227* 158* 89*       No results for input(s): \"PTP\", \"INR\"  in the last 168 hours.                 Microbiology    No results found for this visit on 12/17/24.      Imaging: Reviewed in Epic.    Medications:    pantoprazole  40 mg Oral BID AC    folic acid  1 mg Oral Daily    thiamine  100 mg Oral Daily    magnesium sulfate  2 g Intravenous Once    insulin aspart  1-68 Units Subcutaneous TID CC    insulin degludec  28 Units Subcutaneous Daily    insulin aspart  1-50 Units Subcutaneous TID AC and HS    montelukast  10 mg Oral Daily    enoxaparin  40 mg Subcutaneous Daily    busPIRone  20 mg Oral BID    atorvastatin  10 mg Oral Daily    gabapentin  300 mg Oral BID    metoprolol succinate ER  100 mg Oral 2x Daily(Beta Blocker)    sertraline  200 mg Oral Nightly    traZODone  200 mg Oral Nightly    multivitamin  1 tablet Oral Daily       Assessment & Plan:      #Euglycemic DKA  -transitioned to subcutaneous insulin, cont. To adjust     #Esophagitis/gastritis  EGD today  Cot. PPI    #Pancreatic pseudocyst  #Acute on chronic pancreatitis due to alcohol abuse  Diet as tolerated    #Alcohol abuse   No w/d  DC CIWA and phenobarb    #Afib  -Converted to NSR  -cont rate control, on BB  Given high bleeding risk and discussion with pt, decision made to hold off on AC for now. Pt agreeable    #DM neuropathy  Cont. Gabapentin    #Depression  Cont. Home meds    #Essential HTN  -BB    #DL  -Statin    Nj Ashley MD      Supplementary Documentation:     Quality:  DVT Mechanical Prophylaxis:   SCDs,    DVT Pharmacologic Prophylaxis   Medication    enoxaparin (Lovenox) 40 MG/0.4ML SUBQ injection 40 mg                Code Status: Full Code  Muro: No urinary catheter in place  Muro Duration (in days):   Central line:    MARK:     Discharge is dependent on: clinical progress  At this point Ms. Coats is expected to be discharge to: home    The 21st Century Cures Act makes medical notes like these available to patients in the interest of transparency. Please be advised this is a medical  document. Medical documents are intended to carry relevant information, facts as evident, and the clinical opinion of the practitioner. The medical note is intended as peer to peer communication and may appear blunt or direct. It is written in medical language and may contain abbreviations or verbiage that are unfamiliar.

## 2024-12-19 NOTE — PHYSICAL THERAPY NOTE
Order received for PT eval and chart reviewed. Pt currently off floor for EGD. PT will continue to follow.

## 2024-12-19 NOTE — PROGRESS NOTES
Patient was stable overnight anxious to try to be discharged in the next day or so.  She went for an EGD this morning which did not demonstrate active signs of bleeding just showed some moderate gastritis and ulcerative esophagitis with no gastric ulcers no esophageal varices.    On exam she was awake alert oriented in no distress.  She received her Tresiba at noon which is likely why her sugars were more elevated today as she was down getting her EGD when she was due for her long-acting insulin    Her labs show a sugar of 266 a sodium of 132 a potassium of 4 a bicarb of 26 AST and ALT were unremarkable.  Her albumin was actually normal at 3.5.  White blood cell count was 4 hemoglobin was 12.8 which is stable overall and her platelet count was not recorded.  Yesterday's platelet count was 126 which is below baseline although she has had multiple platelet counts that have been low with a history of alcohol abuse in the past.    In summary 44-year-old female presents to us with alcoholic ketoacidosis versus diabetic ketoacidosis with euglycemic DKA.    Problems  DKA  A-fib with RVR  Alcohol withdrawal  Acute on chronic pancreatitis  L abnormal thyroid studies  Chronic pancreatitis  Hyperlipidemia  Hypertension    Plan    Neuro  The patient not having any alcohol withdrawal symptoms I have discontinued her CIWA protocol and have discontinued her phenobarbital    Cardiovascular  Has A-fib  Currently rate controlled on metoprolol 100 mg daily  Will continue to follow  Keep K greater than 4 mag greater than 2    We have held off on anticoagulation as she is back in sinus rhythm and does have a history of GI bleeding's we have not continue this per cardiology recommendations.    Respiratory  No active respiratory issues at this time  Breathing comfortably    GI  Abdomen is soft nontender  Endoscopy shows no active signs of bleeding does have gastritis  Will keep her on Protonix when she is discharged 40 mg twice daily and  then follow-up GI recommendations otherwise    Renal  Creatinine is currently stable    Bicarb is currently stable    Endo  She remains on insulin long-acting as well as sliding scale  She missed her long-acting dose this morning as she was down on EGD probably where she has of hypoglycemia she received Tresiba 28 units around 11:00 when she returned.  She will still get her subcutaneous insulin throughout the day we will follow-up her blood sugars this afternoon.    Diabetic neuropathy on gabapentin 300 mg 2 times a day depression  Remains on sertraline and BuSpar    Prophylaxis remains on Lovenox and Protonix as above    Her disposition she can transfer to the floor  I would consider the patient being able to be discharged tomorrow if she is no longer having signs of alcohol withdrawal or delirium and she seems to be improving overall probably can go home    Will discuss with the hospitalist team about the timing and potential discharge for this patient    Critical care attending    36 minutes of critical care time were spent at the bedside performing history, physical, complex data interpretation, radiographic interpretation, discussion with consultants, and creating a diagnostic and therapeutic treatment plan for this critically ill patient. Time spent was excluding time on procedures and resident teaching.    Date of Service    December 19, 2024

## 2024-12-19 NOTE — ANESTHESIA POSTPROCEDURE EVALUATION
[facility]    Blanca Coats Patient Status:  Inpatient   Age/Gender 44 year old female MRN [id number]   Location [facility] ENDOSCOPY [facility]IN [facility] Attending Nj Ashley MD   Hosp Day # 2 PCP Liya Velez PA-C       Anesthesia Post-op Note    ESOPHAGOGASTRODUODENOSCOPY (EGD)    Procedure Summary       Date: 12/19/24 Room / Location:  ENDOSCOPY 02 /  ENDOSCOPY    Anesthesia Start: 1003 Anesthesia Stop: 1017    Procedure: ESOPHAGOGASTRODUODENOSCOPY (EGD) Diagnosis:       Nausea      Abnormal finding on GI tract imaging      (ulcerative  esophagitis, gastritis)    Surgeons: Blair Chavez MD Anesthesiologist: Reymundo Wayne MD    Anesthesia Type: MAC ASA Status: 3            Anesthesia Type: MAC    Vitals Value Taken Time   BP 99/59 12/19/24 1018   Temp  12/19/24 1018   Pulse 65 12/19/24 1018   Resp 16 12/19/24 1017   SpO2 96 % 12/19/24 1018   Vitals shown include unfiled device data.    Patient Location: Endoscopy    Anesthesia Type: MAC    Airway Patency: patent    Postop Pain Control: adequate    Mental Status: mildly sedated but able to meaningfully participate in the post-anesthesia evaluation    Nausea/Vomiting: none    Cardiopulmonary/Hydration status: stable euvolemic    Complications: no apparent anesthesia related complications    Postop vital signs: stable    Dental Exam: Unchanged from Preop    Patient to be discharged from PACU when criteria met.

## 2024-12-19 NOTE — PROGRESS NOTES
Progress Note  Blanca Coats Patient Status:  Inpatient    1980 MRN [id number]   [facility] 4SW-A Attending Nj Ashley MD   Hosp Day # 2 PCP Liya Velez PA-C     Subjective:  She is resting comfortably in bed. She spontaneously converted out of AFIB yesterday and has been maintaining NSR today. Denies anginal quality chest pain. Denies shortness of breath. Pain in central chest only associated with eating meals.    Objective:  BP 99/69   Pulse 67   Temp 99 °F (37.2 °C) (Temporal)   Resp 16   Wt 138 lb 0.1 oz (62.6 kg)   SpO2 100%   BMI 22.28 kg/m²     Intake/Output:    Intake/Output Summary (Last 24 hours) at 2024 1226  Last data filed at 2024 1050  Gross per 24 hour   Intake 2214.82 ml   Output --   Net 2214.82 ml       Last 3 Weights   24 0530 138 lb 0.1 oz (62.6 kg)   24 0000 135 lb 9.3 oz (61.5 kg)   24 2221 135 lb 9.3 oz (61.5 kg)   24 1932 150 lb (68 kg)   24 0254 143 lb 8 oz (65.1 kg)   24 0239 139 lb 4.8 oz (63.2 kg)   24 2302 138 lb 14.2 oz (63 kg)   24 0400 139 lb 1.8 oz (63.1 kg)   24 1630 139 lb 8.8 oz (63.3 kg)   24 1500 139 lb 8.8 oz (63.3 kg)   24 1207 165 lb (74.8 kg)       Labs:  Recent Labs   Lab 24  0457 24  0748 24  1222 24  1733   * 135* 167*  --    BUN <5* <5* <5*  --    CREATSERUM 0.60 0.61 0.59  --    EGFRCR 115 114 115  --    CA 8.4* 8.5* 8.3*  --     135* 134*  --    K 3.4* 3.1* 3.7 4.3    101 100  --    CO2 26.0 28.0 29.0  --      Recent Labs   Lab 24  1825 24  0457   RBC 5.32* 4.14   HGB 16.9* 13.0   HCT 48.6* 38.0   MCV 91.4 91.8   MCH 31.8 31.4   MCHC 34.8 34.2   RDW 14.2 14.2   NEPRELIM 10.43* 3.58   WBC 13.0* 6.0   .0 126.0*         Recent Labs   Lab 24  0006 24  0457 24  1024   TROPHS 227* 158* 89*       Diagnostics:   Telemetry: NSR    TTE   1. Left ventricle: The cavity size was  normal. Wall thickness was normal.      Systolic function was normal. The estimated ejection fraction was 60-65%,      by visual assessment. No diagnostic evidence for regional wall motion      abnormalities. Left ventricular diastolic function parameters were      normal.   2. Right ventricle: Systolic function was normal.   3. Left atrium: The left atrial volume was mildly increased.   4. Pulmonary arteries: Systolic pressure was within the normal range,      estimated to be 28mm Hg.   Impressions:  This study is compared with previous dated 01/15/2023: No   significant change since prior study.   *     Review of Systems   Cardiovascular:  Negative for palpitations.   Respiratory:  Negative for shortness of breath.        Physical Exam:  General: Alert and oriented in no apparent distress.  HEENT: Pupils equal. Mucous membranes moist.   Neck: No JVD  Cardiac:  Normal S1 S2, Regular. No murmur  Lungs: Clear without wheezes or crackles    Abdomen: Soft, non-tender, ND  Extremities: Without clubbing, cyanosis or edema.    Neurologic: No focal deficits. Normal affect.  Skin: Warm and dry,    Medications:   pantoprazole  40 mg Oral BID AC    folic acid  1 mg Oral Daily    thiamine  100 mg Oral Daily    insulin aspart  1-68 Units Subcutaneous TID CC    insulin degludec  28 Units Subcutaneous Daily    insulin aspart  1-50 Units Subcutaneous TID AC and HS    montelukast  10 mg Oral Daily    enoxaparin  40 mg Subcutaneous Daily    busPIRone  20 mg Oral BID    atorvastatin  10 mg Oral Daily    gabapentin  300 mg Oral BID    metoprolol succinate ER  100 mg Oral 2x Daily(Beta Blocker)    sertraline  200 mg Oral Nightly    traZODone  200 mg Oral Nightly    multivitamin  1 tablet Oral Daily      lactated ringers 100 mL/hr at 12/19/24 1215       Assessment:    DKA - endo managing  Alcohol abuse - strongly advise complete cessation  Ulcerative esophagitis, mild non-erosive antral gastritis  GI following. S/p EGD 12/19 today. IV  PPI.   Paroxysmal atrial fibrillation   Secondary to ongoing alcohol abuse and poor nutrition with marked metabolic abnormalities.  Converted back to NSR spontaneously  Continue metoprolol- mg BID  CHADSVASC score 2. Unfortunately with her ongoing alcohol abuse, esophagitis/gastritis, chronic pancreatitis, she is a very poor candidate for anticoagulation at this time  Troponin elevation  Denies angina. HsTrop peaked at 227 and down-trended. Not ACS. Suspect demand with tachyarrythmia. EKG no acute ST/T changes compared to prior. TTE reassuring with preserved LVEF 60-65% without WMA. No plan for further inpatient work-up  History of SVT s/p typical AVNRT ablation 2/2019  Chronic pancreatitis  T1DM  HTN- controlled  HLD- statin    Plan:    She has been maintaining sinus rhythm. Continue Toprol- mg BID. No anticoagulation at this time as risks outweigh benefits.  Denies anginal quality chest pain. Troponin elevation likely demand. Reassuring echocardiogram.   Recommend follow-up with her primary cardiologist Dr Rivera at [facility]. Cardiology will sign off. Please call with further questions.    Plan of care discussed with patient, RN.    MEGAN Benedict  12/19/2024  12:26 PM    As noted,    Case discussed with nursing staff.  Medical decision making is as below.    Patient is maintaining sinus.  She is status post GI assessment.  Cardiovascular status is compensated.  As noted above with her history of alcoholism and chronic pancreatitis she is not a great candidate for anticoagulation.  She will follow-up with her cardiologist at [facility].  We will sign off.    Ozzy Sidhu MD [facility]  L2

## 2024-12-19 NOTE — PLAN OF CARE
Received pt in bed, aox4. CIWA protocol discontinued. Pt complains of \"pancreas\" pain. MD notified. Prn dilaudid given, see mar. Up to bathroom with 1 assist.

## 2024-12-19 NOTE — BRIEF OP NOTE
Pre-Operative Diagnosis: Nausea [R11.0]  Abnormal finding on GI tract imaging [R93.3]     Post-Operative Diagnosis: ulcerative  esophagitis, gastritis      Procedure Performed:   ESOPHAGOGASTRODUODENOSCOPY (EGD)    Surgeons and Role:     * Blair Chavez MD - Primary    Assistant(s):        Surgical Findings: ulcerative esophagitis, no esophageal varices, no gastric ulcer, mild non-erosive antral gastritis     Specimen: None     Estimated Blood Loss: No data recorded    Dictation Number:  none    Blair Chavez MD  12/19/2024  10:52 AM

## 2024-12-19 NOTE — PROGRESS NOTES
Received patient at change of shift. Alert and oriented x4. CIWAs negative. NPO overnight for planned EGD this morning. Transfer orders in place.

## 2024-12-19 NOTE — DIETARY NOTE
[facility]   part of [facility]   CLINICAL NUTRITION    Blanca Coats admitted on 12/17 presents with EtOH withdrawal, nausea and Afib with RVR.    PMH: Anxiety, Attention deficit disorder, Breast CA, Depression, Esophageal reflux, High blood pressure, Pancreatitis, Type 1 diabetes mellitus     Admitting diagnosis:  Acidosis [E87.20]  Alcoholic ketoacidosis [E87.29]  Alcohol-induced chronic pancreatitis (HCC) [K86.0]  Atrial fibrillation with rapid ventricular response (HCC) [I48.91]  Type 1 diabetes mellitus with hyperglycemia (HCC) [E10.65]  Alcohol withdrawal syndrome with complication (HCC) [F10.939]    Ht:  5'6\"  Wt: 62.6 kg (138 lb 0.1 oz).   Body mass index is 22.28 kg/m².    Wt Readings from Last 6 Encounters:   12/19/24 62.6 kg (138 lb 0.1 oz)   11/24/24 65.1 kg (143 lb 8 oz)   11/12/24 63.1 kg (139 lb 1.8 oz)   08/01/23 74.8 kg (164 lb 14.5 oz)   01/20/23 74.8 kg (165 lb)   01/16/23 76.7 kg (169 lb)        Labs/Meds reviewed    Diet:       Procedures    Low Fiber/Soft diet Low Fiber/Soft; Calorie Restriction/Carb Controlled: 1800 kcal/60 grams; Is Patient on Accuchecks? Yes       Percent Meals Eaten (last 3 days)       Date/Time Percent Meals Eaten (%)    12/18/24 1100 10 %            Pt chart reviewed d/t elevated A1c 10.2%. Visited pt at bedside. Pt reports variable appetite/PO intake at baseline but is very hungry today d/t not eating the last couple days. Noted pt had EGD this AM which showed ulcerative esophagitis and mild non-erosive antral gastritis. Denies GI symptoms at this time with last BM couple days ago. No chewing difficulty but reports swallowing is uncomfortable d/t esophagitis. NKFA. She reports she has been intentionally losing wt over the past 6 months. Reports her UBW was ~170-180 lbs and believes her most recent wt was ~150 lbs. Reports she has been exercising and dieting.   Pt reports she has been educated on CHO counting by outpatient endocrinology office and uses apps  like [employer] and Cyril to help her with CHO counting at home. Offered to provide further information but pt politely declined at this time. All questions answered at this time.    Patient is at low nutrition risk at this time.    Please consult if patient status changes or nutrition issues arise.    Alexandra Root, RD, LDN, [facility]  Clinical Dietitian  Spectra: [phone]

## 2024-12-19 NOTE — PROGRESS NOTES
[facility]  Diabetes Consult Note    Blanca Coats Patient Status:  Inpatient    1980 MRN [id number]   Location [facility] ENDOSCOPY PAIN CENTER Attending Nj Ashley MD   Hosp Day # 2 PCP Liya Velez PA-C     Reason for Consult:   Management recommendations in setting of euglycemic DKA        Provider Requesting Consult:  Xenia Castañeda (ICU APN)      Diagnosis:  Patient Active Problem List   Diagnosis    Community acquired pneumonia    Alcohol-induced chronic pancreatitis (HCC)    Pseudocyst of pancreas (HCC)    Splenic vein thrombosis    Pneumonia due to infectious organism    Alcoholic intoxication with complication (HCC)    Pancreas cyst (HCC)    Pancreatic necrosis (HCC)    Acute pancreatitis (HCC)    Hyponatremia    Acute pancreatitis, unspecified complication status, unspecified pancreatitis type (HCC)    Hypokalemia    Thrombocytopenia (HCC)    Hyperglycemia    Alcohol-induced acute pancreatitis without infection or necrosis (HCC)    SVT (supraventricular tachycardia) (HCC)    Alcohol abuse    Alcohol-induced acute pancreatitis, unspecified complication status (HCC)    Abdominal pain, acute    Primary hypertension    Elevated liver enzymes    Anxiety and depression    Pancreatic cyst (HCC)    Metabolic acidosis    Dyspnea, unspecified type    Diabetic ketoacidosis without coma associated with diabetes mellitus due to underlying condition (HCC)    Tachycardia    Acute respiratory failure with hypoxia (HCC)    Oral thrush    Colitis due to Escherichia coli    Diarrhea of infectious origin    Tobacco dependence    New onset type 2 diabetes mellitus (HCC)    Lung nodule    Atrial fibrillation with rapid ventricular response (HCC)    Type 1 diabetes mellitus with ketoacidosis without coma (HCC)    Chronic pancreatitis, unspecified pancreatitis type (HCC)    Alcohol withdrawal syndrome with complication (HCC)    Type 1 diabetes mellitus with hyperglycemia (HCC)    Acidosis     Alcoholic ketoacidosis    A-fib (HCC)         Medical History:  Past Medical History:    Anxiety    Arrhythmia    SVT    Attention deficit disorder    Breast CA (HCC)    Depression    Esophageal reflux    High blood pressure    OTHER DISEASES    seasonal allergies    Pancreatitis (HCC)    Pneumonia due to organism    SVT (supraventricular tachycardia) (HCC)    Type 1 diabetes mellitus (HCC)     Past Surgical History:   Procedure Laterality Date    Breast surgery      Implant left      Implant right      Mastectomy left      Mastectomy right      Ndsc ablation & rcnstj atria lmtd w/o bypass       Family History   Problem Relation Age of Onset    Heart Disorder Father     Diabetes Father     Hypertension Mother     Cancer Mother         breast         Diabetes history:  Type:  T1DM  Onset: 1/2023 w/ DKA admission  Family history of DM: father and brother w/ T1DM      Allergies: Allergies[1]      Medications: Complete list reviewed. Active diabetes medications include degludec and aspart.      Labs:  Recent Labs   Lab 12/18/24  1025 12/18/24  1200 12/18/24  1744 12/18/24  2037 12/19/24  0758   PGLU 179* 188* 96 145* 88     Recent Labs     12/17/24  1825 12/17/24  2007 12/18/24  0006 12/18/24  0116 12/18/24  0457 12/18/24  0608 12/18/24  0748 12/18/24  0808 12/18/24  1222 12/18/24  1744 12/19/24  0758   *  --  131*  --  136  --  135*  --  134*  --   --    CL 94*  --  97*  --  100  --  101  --  100  --   --    CO2 11.0*  --  24.0  --  26.0  --  28.0  --  29.0  --   --    BUN 8*  --  6*  --  <5*  --  <5*  --  <5*  --   --    CREATSERUM 0.85  --  0.63  --  0.60  --  0.61  --  0.59  --   --    PGLU  --    < >  --    < >  --    < >  --    < >  --    < > 88   CA 9.8  --  8.6*  --  8.4*  --  8.5*  --  8.3*  --   --    ALB 4.7  --   --   --   --   --   --   --   --   --   --     < > = values in this interval not displayed.                    History of Present Illness: Blanca Coats is a 44 year old female with a PMH of  T1DM c/b DKA (1/2023), chronic pancreatitis w/ pancreatic pseudocyst, ETOH abuse, breast CA s/p b/l mastectomy w/ chemo (2017), anxiety, depression, HTN, SVT and ETOH hepatic steatosis admitted 12/18/2024 with complaints of heart racing and need for ETOH detox. ED evaluation revealed a-fib w/ RVR, as well as DKA (, bicarb 11, anion gap 30, A1C 10.2%).       Assessment/Recommendations:  Upon interview today, patient states she is feeling better than yesterday; is awaiting results of EGD in hopes of going home today. Again, explained need for continued basal/bolus insulin, given lack of Omnipod insulin pump supplies at bedside. Explained to patient that, if discharged today, need to wait to restart pump 24 hours after last long-acting insulin injection at home. Patient aware of plan of care and endorses willingness to participate in plan.       Plan:  Inpatient recommendations -   Degludec = recommend continuing at 28u every day  Aspart = recommend continuing at 1:8gm CHO & 1:30>140  Discharge recommendations -   Continue basal/bolus insulin at discharge:  Lantus = 28u QD  Humalog = 1:30>140 & 1:8gm CHO  Restart OmniPod pump 24 hours after last degludec dose        Prescription Recommendations:  [place]  Insulin: Humalog, Levemir, Lantus   Supplies:  BD pen needles (Kasia)  Glucometer:  One Touch Ultra ([employer])  CGM:  Dexcom G6 & 7, Freestyle Fernando 3   Insulin Pump:  OmniPod         Provider F/U Recommendations:  PCP - MEGAN Best ([place])  Endocrinology - Dr. Schoenberger ([place])        A total of 60 minutes were spent with the patient, 100% was spent counseling and coordinating care for T2 diabetes self-management including nutrition, exercise, blood glucose monitoring, insulin administration, medications, treatment options, follow-up coordination and resources.     Whit Frankel, APRN  12/19/2024  9:24 AM         [1]   Allergies  Allergen Reactions    Bee Venom  ANAPHYLAXIS    Morphine HIVES    Fentanyl RASH

## 2024-12-20 VITALS
OXYGEN SATURATION: 95 % | HEART RATE: 95 BPM | DIASTOLIC BLOOD PRESSURE: 89 MMHG | BODY MASS INDEX: 23 KG/M2 | WEIGHT: 142.19 LBS | RESPIRATION RATE: 24 BRPM | TEMPERATURE: 99 F | SYSTOLIC BLOOD PRESSURE: 145 MMHG

## 2024-12-20 LAB
ANION GAP SERPL CALC-SCNC: 4 MMOL/L (ref 0–18)
ATRIAL RATE: 51 BPM
BUN BLD-MCNC: <5 MG/DL (ref 9–23)
CALCIUM BLD-MCNC: 8.5 MG/DL (ref 8.7–10.4)
CHLORIDE SERPL-SCNC: 99 MMOL/L (ref 98–112)
CO2 SERPL-SCNC: 30 MMOL/L (ref 21–32)
CREAT BLD-MCNC: 0.47 MG/DL
EGFRCR SERPLBLD CKD-EPI 2021: 122 ML/MIN/1.73M2 (ref 60–?)
ERYTHROCYTE [DISTWIDTH] IN BLOOD BY AUTOMATED COUNT: 13.6 %
GLUCOSE BLD-MCNC: 114 MG/DL (ref 70–99)
GLUCOSE BLD-MCNC: 130 MG/DL (ref 70–99)
GLUCOSE BLD-MCNC: 146 MG/DL (ref 70–99)
GLUCOSE BLD-MCNC: 154 MG/DL (ref 70–99)
GLUCOSE BLD-MCNC: 340 MG/DL (ref 70–99)
GLUCOSE BLD-MCNC: 64 MG/DL (ref 70–99)
GLUCOSE BLD-MCNC: 88 MG/DL (ref 70–99)
HCT VFR BLD AUTO: 35.7 %
HGB BLD-MCNC: 12.3 G/DL
MAGNESIUM SERPL-MCNC: 1.5 MG/DL (ref 1.6–2.6)
MCH RBC QN AUTO: 32.1 PG (ref 26–34)
MCHC RBC AUTO-ENTMCNC: 34.5 G/DL (ref 31–37)
MCV RBC AUTO: 93.2 FL
P AXIS: 51 DEGREES
P-R INTERVAL: 148 MS
PHOSPHATE SERPL-MCNC: 2.8 MG/DL (ref 2.4–5.1)
PLATELET # BLD AUTO: 94 10(3)UL (ref 150–450)
PLATELETS.RETICULATED NFR BLD AUTO: 7.4 % (ref 0–7)
POTASSIUM SERPL-SCNC: 4.1 MMOL/L (ref 3.5–5.1)
Q-T INTERVAL: 454 MS
QRS DURATION: 70 MS
QTC CALCULATION (BEZET): 418 MS
R AXIS: 80 DEGREES
RBC # BLD AUTO: 3.83 X10(6)UL
SODIUM SERPL-SCNC: 133 MMOL/L (ref 136–145)
T AXIS: 91 DEGREES
VENTRICULAR RATE: 51 BPM
WBC # BLD AUTO: 3.7 X10(3) UL (ref 4–11)

## 2024-12-20 PROCEDURE — 99233 SBSQ HOSP IP/OBS HIGH 50: CPT | Performed by: CLINICAL NURSE SPECIALIST

## 2024-12-20 RX ORDER — INSULIN DEGLUDEC 100 U/ML
15 INJECTION, SOLUTION SUBCUTANEOUS DAILY
Status: DISCONTINUED | OUTPATIENT
Start: 2024-12-20 | End: 2024-12-20

## 2024-12-20 RX ORDER — MAGNESIUM OXIDE 400 MG/1
800 TABLET ORAL ONCE
Status: COMPLETED | OUTPATIENT
Start: 2024-12-20 | End: 2024-12-20

## 2024-12-20 RX ORDER — INSULIN GLARGINE 100 [IU]/ML
15 INJECTION, SOLUTION SUBCUTANEOUS EVERY MORNING
Status: SHIPPED | COMMUNITY
Start: 2024-12-20

## 2024-12-20 RX ORDER — PANTOPRAZOLE SODIUM 40 MG/1
40 TABLET, DELAYED RELEASE ORAL
Qty: 60 TABLET | Refills: 0 | Status: SHIPPED | OUTPATIENT
Start: 2024-12-20

## 2024-12-20 NOTE — PROGRESS NOTES
[facility]  Report of GI Progress Note    Blanca Coats Patient Status:  Inpatient    1980 MRN [id number]   Location [facility] 4SW-A Attending No att. providers found   Hosp Day # 3 PCP Liya Velez PA-C     Date of Admission:  2024  PCP: Liya Velez PA-C    Reason for Visit:   Esophagitis, pancreatitis    History of Present Illness:   Patient is a 44 year old female who was admitted to the hospital for Atrial fibrillation with rapid ventricular response (HCC):    Pt still complains of pain, wanting pain meds for discharge, but is eating solid food without issue.   No fevers.  No nausea or vomiting.      Current Medications:  Current Facility-Administered Medications   Medication Dose Route Frequency    insulin degludec (Tresiba) 100 units/mL flextouch 15 Units  15 Units Subcutaneous Daily    pantoprazole (Protonix) DR tab 40 mg  40 mg Oral BID AC    folic acid (Folvite) tab 1 mg  1 mg Oral Daily    thiamine (Vitamin B1) tab 100 mg  100 mg Oral Daily    dextrose 5%-sodium chloride 0.45% infusion   Intravenous Continuous    insulin aspart (NovoLOG) 100 Units/mL FlexPen 1-68 Units  1-68 Units Subcutaneous TID CC    insulin aspart (NovoLOG) 100 Units/mL FlexPen 1-50 Units  1-50 Units Subcutaneous TID AC and HS    montelukast (Singulair) tab 10 mg  10 mg Oral Daily    acetaminophen (Tylenol Extra Strength) tab 1,000 mg  1,000 mg Oral Q4H PRN    melatonin tab 3 mg  3 mg Oral Nightly PRN    glycerin-hypromellose- (Artificial Tears) 0.2-0.2-1 % ophthalmic solution 1 drop  1 drop Both Eyes QID PRN    sodium chloride (Saline Mist) 0.65 % nasal solution 1 spray  1 spray Each Nare Q3H PRN    enoxaparin (Lovenox) 40 MG/0.4ML SUBQ injection 40 mg  40 mg Subcutaneous Daily    polyethylene glycol (PEG 3350) (Miralax) 17 g oral packet 17 g  17 g Oral Daily PRN    sennosides (Senokot) tab 17.2 mg  17.2 mg Oral Nightly PRN    bisacodyl (Dulcolax) 10 MG rectal suppository 10 mg  10 mg Rectal  Daily PRN    fleet enema (Fleet) rectal enema 133 mL  1 enema Rectal Once PRN    ondansetron (Zofran) 4 MG/2ML injection 4 mg  4 mg Intravenous Q6H PRN    prochlorperazine (Compazine) 10 MG/2ML injection 5 mg  5 mg Intravenous Q8H PRN    ALPRAZolam (Xanax) tab 0.25 mg  0.25 mg Oral TID PRN    busPIRone (Buspar) tab 20 mg  20 mg Oral BID    atorvastatin (Lipitor) tab 10 mg  10 mg Oral Daily    gabapentin (Neurontin) cap 300 mg  300 mg Oral BID    metoprolol succinate ER (Toprol XL) 24 hr tab 100 mg  100 mg Oral 2x Daily(Beta Blocker)    sertraline (Zoloft) tab 200 mg  200 mg Oral Nightly    traZODone (Desyrel) tab 200 mg  200 mg Oral Nightly    haloperidol lactate (Haldol) 5 MG/ML injection 2 mg  2 mg Intravenous Q8H PRN    multivitamin (Tab-A-Toni/Beta Carotene) tab 1 tablet  1 tablet Oral Daily    glucose (Dex4) 15 GM/59ML oral liquid 15 g  15 g Oral Q15 Min PRN    Or    glucose (Glutose) 40% oral gel 15 g  15 g Oral Q15 Min PRN    Or    glucose-vitamin C (Dex-4) chewable tab 4 tablet  4 tablet Oral Q15 Min PRN    Or    dextrose 50% injection 50 mL  50 mL Intravenous Q15 Min PRN    Or    glucose (Dex4) 15 GM/59ML oral liquid 30 g  30 g Oral Q15 Min PRN    Or    glucose (Glutose) 40% oral gel 30 g  30 g Oral Q15 Min PRN    Or    glucose-vitamin C (Dex-4) chewable tab 8 tablet  8 tablet Oral Q15 Min PRN       Allergies  Allergies[1]      Physical Exam:   Blood pressure 145/89, pulse 95, temperature 98.5 °F (36.9 °C), temperature source Temporal, resp. rate 24, weight 142 lb 3.2 oz (64.5 kg), SpO2 95%, not currently breastfeeding.    GENERAL: NAD, oriented x 3, sitting up eating cheeseburger and fries  ABD: S/ND, mildly tender in epigastrium    Results:     Laboratory Data:  Lab Results   Component Value Date    WBC 3.7 (L) 12/20/2024    HGB 12.3 12/20/2024    PLT 94.0 (L) 12/20/2024    CREATSERUM 0.47 (L) 12/20/2024    BUN <5 (L) 12/20/2024     (L) 12/20/2024    K 4.1 12/20/2024    CO2 30.0 12/20/2024    CA 8.5  (L) 12/20/2024    ALB 3.5 12/19/2024    ALKPHO 82 12/19/2024    TP 6.2 12/19/2024    AST 10 12/19/2024    ALT 8 (L) 12/19/2024    BILT 0.9 12/19/2024    TSH 0.275 (L) 12/17/2024    LIP 30 12/17/2024    MG 1.5 (L) 12/20/2024    PHOS 2.8 12/20/2024    TROP <0.045 12/08/2020    ETOH 73 (H) 12/17/2024         No results for input(s): \"URINE\", \"CULTI\", \"BLDSMR\" in the last 168 hours.    Recent Labs   Lab 12/17/24  1825 12/18/24  0457 12/19/24  1206 12/19/24  1207 12/20/24  0435   ALT 14  --   --  8*  --    AST 27  --   --  10  --    ALKPHO 114*  --   --  82  --    BILT 1.1  --   --  0.9  --    HGB 16.9* 13.0 12.8  --  12.3   WBC 13.0* 6.0 4.3  --  3.7*       Imaging:  No results found.       Impression:   Esophagitis  Pancreatitis, pseudocyst related to prior pancreatitis  EtOH abuse  Atrial fibrillation  Euglycemic DKA with underlying DM with neuropathy    Recommendations:  OK for discharge from GI standpoint  Recommend BID PPI on discharge (pt was on daily PPI prior to that)  She requested opiate to be taken prior to meal; this was denied, but offered to send Rx for pancreatic enzyme to be used with each meal  EtOH sobriety discussed and recommended  Advised she follow-up with her established GI physician in [place] for continuity of care, as he has prev evaluated and drained her pancreatic pseudocyst which is still present on current imaging  Also advised, she should have repeat EGD in 6-8 weeks with primary GI for her ulcerative esophagitis  She was in agreement with this plan    Blair Chavez MD   12/20/2024       [1]   Allergies  Allergen Reactions    Bee Venom ANAPHYLAXIS    Morphine HIVES    Fentanyl RASH

## 2024-12-20 NOTE — DISCHARGE INSTRUCTIONS
SLIDING SCALE INSULIN  1 unit of Novolog/aspart insulin is expected to decrease blood glucose by 40 mg/dL.    Give 1 unit if blood glucose 141-180 mg/dL  Give 2 units if blood glucose 181-220 mg/dL  Give 3 units if blood glucose 221-260 mg/dL  Give 4 units if blood glucose 261-300 mg/dL  Give 5 units if blood glucose 301-350 mg/dL    Follow up with Endocrinologist and Gastroenterologist as soon as possible.

## 2024-12-20 NOTE — OPERATIVE REPORT
EGD operative report  Patient Name: Blanca Coats  Date: 12/19/2024  Procedure: Esophagogastroduodenoscopy   Pre-Op Diagnosis: epigastric pain, abnormal CT scan  Post-Op Diagnosis: ulcerative esophagitis  Attending: Blair Chavez M.D.  Consent:  The risks, benefits, and alternatives were discussed with the patient / POA.  Risks included, but were not limited to, bleeding, perforation, medication effects, cardiac arrhythmias, and aspiration.  After all questions were answered to their satisfaction, a signed, informed, and witnessed consent was obtained.  Sedation: Monitored Anesthesia Care  Monitoring:  Pulsoximetry, pulse, respirations, and blood pressure were monitored throughout the entire procedure  Procedure: After achieving adequate sedation and placing the patient in the left lateral decubitus position, the lubricated upper endoscope was introduced into the mouth and advanced to the descending duodenum.  The endoscope was then withdrawn into the gastric antrum and placed in a retroflexed position.  The endoscope was then righted, and air was suctioned from the stomach.  The endoscope was then withdrawn from the patient, with careful visual inspection of the mucosa revealing no additional pathologic findings.  The patient tolerated the procedure without apparent procedural complications.  The patient left the procedure room in stable condition for recovery.  Findings:   Esophagus: There are three broad linear ulcers extending 10 cm from the GE junction into the mid esophagus.  There is no luminal narrowing or stricture.    GE junction: There is circumferential edema and erythema at the GE junction.  There is no hiatal hernia noted.    Stomach:  There is mild pachy edema in the gastric antrum.  The gastric body, fundus, cardia, and angularis were normal.   Duodenum: The duodenal bulb, post-bulbar duodenum, and descending duodenum were normal.    Impression: Findings as above  Recommendations:   PPI BID  OK for  soft diet  Sit upright for meals to prevent reflux  EtOH sobriety  Repeat EGD as outpatient in 6-8 weeks to assure healing    Blair Chavez MD

## 2024-12-20 NOTE — PROGRESS NOTES
"[facility] GI Pre-Procedure Physical Assessment    Monalisa Olguin MRN# [id number]   Age: 53 year old YOB: 1964      Date of Surgery: 12/27/2017  Location [facility]      Date of Exam 12/27/2017 Facility (Same day)       Primary care provider: Efren Bustos         Active problem list:   Patient Active Problem List   Diagnosis     Kidney donor     Esophageal reflux     Moderate Depression [296.32]     HYPERLIPIDEMIA LDL GOAL <100     Hypertension goal BP (blood pressure) < 130/80     Anxiety     Alcoholism in recovery (H)     Alcoholic hepatitis with ascites     Anemia     Thrombocytopenia (H)     Tobacco abuse     Portal hypertension (H)     Pulmonary nodules     Hyperthyroidism            Medications (include herbals and vitamins):   Any Plavix use in the last 7 days?  No     Current Facility-Administered Medications   Medication     lidocaine 1 % 1 mL     lidocaine (LMX4) kit     sodium chloride (PF) 0.9% PF flush 3 mL     sodium chloride (PF) 0.9% PF flush 3 mL     ondansetron (ZOFRAN) injection 4 mg     lidocaine 1 % 0.2 mL     lactated ringers infusion     lidocaine 1 % 1 mL     lidocaine (LMX4) kit     sodium chloride (PF) 0.9% PF flush 3 mL     sodium chloride (PF) 0.9% PF flush 3 mL     ondansetron (ZOFRAN) injection 4 mg             Allergies:      Allergies   Allergen Reactions     Albuterol      Tongue \"hardened and painful\"     Allergy to Latex?  No  Allergy to tape?    No          Social History:     Social History   Substance Use Topics     Smoking status: Current Every Day Smoker     Packs/day: 0.50     Years: 10.00     Smokeless tobacco: Never Used      Comment: pt quit 1992 after smoking for 8 yrs, started again in 2004     Alcohol use No      Comment: stopped drinking 8/17            Physical Exam:   All vitals have been reviewed  Blood pressure 108/62, resp. rate 14, height 1.549 m (5' 1\"), weight 58.2 kg (128 lb 6.4 oz), last menstrual period " Received patient at change of shift. Alert and oriented x4. Hypoglycemic x2 overnight, D5 1/2 started. Continued pain to abd / 'pancreas' - prn meds given. Placed on 2L O2 via nc due to desaturations when sleeping. Up to bathroom.   05/16/2012, SpO2 99 %, not currently breastfeeding.  Airway assessment:   Patient is able to stick out tongue      Lungs:   No increased work of breathing, good air exchange, clear to auscultation bilaterally, no crackles or wheezing      Cardiovascular:              Lab / Radiology Results:Normal apical impulse, regular rate and rhythm, normal S1 and S2, no S3 or S4, and no murmur noted   All laboratory data reviewed          Assessment:   Appropriately NPO  Chief complaint or anatomic assessment of involved area: Hx of GERD         Plan:   Moderate (conscious) sedation     Risks, benefits, alternatives to sedation and blood explained and consent obtained  Risks, benefits, alternatives to procedure explained and consent obtained  Orders and progress notes are in the chart  Discharge from Phase 1 and / or Phase 2 recovery when patient meets criteria    I have reviewed the history and physical, lab finding(s), diagnostic data, medicaitons, and the plan for sedation.  I have determined this patient to be an appropriate candidate for the planned sedation / procedure and have reassessed the patient immediately prior to sedation / procedure.    I have personally and medically directed the administration of medications used.    Sai Johnston MD, MD

## 2024-12-20 NOTE — PLAN OF CARE
Pt received after change of shift report. Pt alert and oriented x4. Following commands. Reports of pain. PRN medications offered. Ambulating. Pt on room air. Oxygen saturation stable. Afebrile. Sinus rhythm on telemetry monitor. Blood pressure stable. Soft diet in place. No BM's. Voiding in bathroom. Transfer orders in place. Discharge orders received. Discharge education attempted. Pt refusing discharge education. Pt discharged at 1510.

## 2024-12-20 NOTE — PHYSICAL THERAPY NOTE
Reviewed pt's chart and discuss pt with RN. RN reported that she was ambulatory in the hallways with no observation of balance or gait issues. PT will sign off at this time. Please re-order if needed.

## 2024-12-20 NOTE — PROGRESS NOTES
[facility]  Diabetes Consult Note    Blanca Coats Patient Status:  Inpatient    1980 MRN [id number]   Location [facility] Attending Nj Ashley MD   Hosp Day # 3 PCP Liya Velez PA-C     Reason for Consult:   Management recommendations in setting of euglycemic DKA        Provider Requesting Consult:  Xenia Castañeda (ICU APN)      Diagnosis:  Patient Active Problem List   Diagnosis    Community acquired pneumonia    Alcohol-induced chronic pancreatitis (HCC)    Pseudocyst of pancreas (HCC)    Splenic vein thrombosis    Pneumonia due to infectious organism    Alcoholic intoxication with complication (HCC)    Pancreas cyst (HCC)    Pancreatic necrosis (HCC)    Acute pancreatitis (HCC)    Hyponatremia    Acute pancreatitis, unspecified complication status, unspecified pancreatitis type (HCC)    Hypokalemia    Thrombocytopenia (HCC)    Hyperglycemia    Alcohol-induced acute pancreatitis without infection or necrosis (HCC)    SVT (supraventricular tachycardia) (HCC)    Alcohol abuse    Alcohol-induced acute pancreatitis, unspecified complication status (HCC)    Abdominal pain, acute    Primary hypertension    Elevated liver enzymes    Anxiety and depression    Pancreatic cyst (HCC)    Metabolic acidosis    Dyspnea, unspecified type    Diabetic ketoacidosis without coma associated with diabetes mellitus due to underlying condition (HCC)    Tachycardia    Acute respiratory failure with hypoxia (HCC)    Oral thrush    Colitis due to Escherichia coli    Diarrhea of infectious origin    Tobacco dependence    New onset type 2 diabetes mellitus (HCC)    Lung nodule    Atrial fibrillation with rapid ventricular response (HCC)    Type 1 diabetes mellitus with ketoacidosis without coma (HCC)    Chronic pancreatitis, unspecified pancreatitis type (HCC)    Alcohol withdrawal syndrome with complication (HCC)    Type 1 diabetes mellitus with hyperglycemia (HCC)    Acidosis     Alcoholic ketoacidosis    A-fib (HCC)         Medical History:  Past Medical History:    Anxiety    Arrhythmia    SVT    Attention deficit disorder    Breast CA (HCC)    Depression    Esophageal reflux    High blood pressure    OTHER DISEASES    seasonal allergies    Pancreatitis (HCC)    Pneumonia due to organism    SVT (supraventricular tachycardia) (HCC)    Type 1 diabetes mellitus (HCC)     Past Surgical History:   Procedure Laterality Date    Breast surgery      Implant left      Implant right      Mastectomy left      Mastectomy right      Ndsc ablation & rcnstj atria lmtd w/o bypass       Family History   Problem Relation Age of Onset    Heart Disorder Father     Diabetes Father     Hypertension Mother     Cancer Mother         breast         Diabetes history:  Type:  T1DM  Onset: 1/2023 w/ DKA admission  Family history of DM: father and brother w/ T1DM      Allergies: Allergies[1]      Medications: Complete list reviewed. Active diabetes medications include degludec and aspart.      Labs:  Recent Labs   Lab 12/19/24  2305 12/19/24  2330 12/20/24  0132 12/20/24  0334 12/20/24  0735   PGLU 58* 196* 146* 130* 88     Recent Results (from the past 24 hours)   POCT Glucose    Collection Time: 12/19/24 11:39 AM   Result Value Ref Range    POC Glucose 185 (H) 70 - 99 mg/dL   CBC With Differential With Platelet    Collection Time: 12/19/24 12:06 PM   Result Value Ref Range    WBC 4.3 4.0 - 11.0 x10(3) uL    RBC 3.98 3.80 - 5.30 x10(6)uL    HGB 12.8 12.0 - 16.0 g/dL    HCT 37.4 35.0 - 48.0 %    PLT 96.0 (L) 150.0 - 450.0 10(3)uL    Immature Platelet Fraction 3.9 0.0 - 7.0 %    MCV 94.0 80.0 - 100.0 fL    MCH 32.2 26.0 - 34.0 pg    MCHC 34.2 31.0 - 37.0 g/dL    RDW 14.0 %    Neutrophil Absolute Prelim 2.75 1.50 - 7.70 x10 (3) uL    Neutrophil Absolute 2.75 1.50 - 7.70 x10(3) uL    Lymphocyte Absolute 1.07 1.00 - 4.00 x10(3) uL    Monocyte Absolute 0.44 0.10 - 1.00 x10(3) uL    Eosinophil Absolute 0.03 0.00 - 0.70  x10(3) uL    Basophil Absolute 0.01 0.00 - 0.20 x10(3) uL    Immature Granulocyte Absolute 0.00 0.00 - 1.00 x10(3) uL    Neutrophil % 64.0 %    Lymphocyte % 24.9 %    Monocyte % 10.2 %    Eosinophil % 0.7 %    Basophil % 0.2 %    Immature Granulocyte % 0.0 %   Scan slide    Collection Time: 12/19/24 12:06 PM   Result Value Ref Range    Slide Review       Slide reviewed, normal WBC, RBC, and platelet morphology.   Comp Metabolic Panel (14)    Collection Time: 12/19/24 12:07 PM   Result Value Ref Range    Glucose 266 (H) 70 - 99 mg/dL    Sodium 132 (L) 136 - 145 mmol/L    Potassium 4.0 3.5 - 5.1 mmol/L    Chloride 99 98 - 112 mmol/L    CO2 26.0 21.0 - 32.0 mmol/L    Anion Gap 7 0 - 18 mmol/L    BUN <5 (L) 9 - 23 mg/dL    Creatinine 0.67 0.55 - 1.02 mg/dL    Calcium, Total 8.7 8.7 - 10.4 mg/dL    eGFR-Cr 112 >=60 mL/min/1.73m2    AST 10 <34 U/L    ALT 8 (L) 10 - 49 U/L    Alkaline Phosphatase 82 37 - 98 U/L    Bilirubin, Total 0.9 0.3 - 1.2 mg/dL    Total Protein 6.2 5.7 - 8.2 g/dL    Albumin 3.5 3.2 - 4.8 g/dL    Globulin  2.7 2.0 - 3.5 g/dL    A/G Ratio 1.3 1.0 - 2.0   Magnesium    Collection Time: 12/19/24 12:07 PM   Result Value Ref Range    Magnesium 1.5 (L) 1.6 - 2.6 mg/dL   POCT Glucose    Collection Time: 12/19/24  4:48 PM   Result Value Ref Range    POC Glucose 93 70 - 99 mg/dL   EKG 12 Lead    Collection Time: 12/19/24  5:01 PM   Result Value Ref Range    Ventricular rate 51 BPM    Atrial rate 51 BPM    P-R Interval 148 ms    QRS Duration 70 ms    Q-T Interval 454 ms    QTC Calculation (Bezet) 418 ms    P Axis 51 degrees    R Axis 80 degrees    T Axis 91 degrees   POCT Glucose    Collection Time: 12/19/24  8:36 PM   Result Value Ref Range    POC Glucose 69 (L) 70 - 99 mg/dL   POCT Glucose    Collection Time: 12/19/24  9:01 PM   Result Value Ref Range    POC Glucose 143 (H) 70 - 99 mg/dL   POCT Glucose    Collection Time: 12/19/24 11:02 PM   Result Value Ref Range    POC Glucose 51 (L) 70 - 99 mg/dL   POCT  Glucose    Collection Time: 12/19/24 11:05 PM   Result Value Ref Range    POC Glucose 58 (L) 70 - 99 mg/dL   POCT Glucose    Collection Time: 12/19/24 11:30 PM   Result Value Ref Range    POC Glucose 196 (H) 70 - 99 mg/dL   POCT Glucose    Collection Time: 12/20/24  1:32 AM   Result Value Ref Range    POC Glucose 146 (H) 70 - 99 mg/dL   POCT Glucose    Collection Time: 12/20/24  3:34 AM   Result Value Ref Range    POC Glucose 130 (H) 70 - 99 mg/dL   Phosphorus    Collection Time: 12/20/24  4:35 AM   Result Value Ref Range    Phosphorus 2.8 2.4 - 5.1 mg/dL   Magnesium    Collection Time: 12/20/24  4:35 AM   Result Value Ref Range    Magnesium 1.5 (L) 1.6 - 2.6 mg/dL   CBC, Platelet; No Differential    Collection Time: 12/20/24  4:35 AM   Result Value Ref Range    WBC 3.7 (L) 4.0 - 11.0 x10(3) uL    RBC 3.83 3.80 - 5.30 x10(6)uL    HGB 12.3 12.0 - 16.0 g/dL    HCT 35.7 35.0 - 48.0 %    PLT 94.0 (L) 150.0 - 450.0 10(3)uL    Immature Platelet Fraction 7.4 (H) 0.0 - 7.0 %    MCV 93.2 80.0 - 100.0 fL    MCH 32.1 26.0 - 34.0 pg    MCHC 34.5 31.0 - 37.0 g/dL    RDW 13.6 %   Basic Metabolic Panel (8)    Collection Time: 12/20/24  4:35 AM   Result Value Ref Range    Glucose 154 (H) 70 - 99 mg/dL    Sodium 133 (L) 136 - 145 mmol/L    Potassium 4.1 3.5 - 5.1 mmol/L    Chloride 99 98 - 112 mmol/L    CO2 30.0 21.0 - 32.0 mmol/L    Anion Gap 4 0 - 18 mmol/L    BUN <5 (L) 9 - 23 mg/dL    Creatinine 0.47 (L) 0.55 - 1.02 mg/dL    Calcium, Total 8.5 (L) 8.7 - 10.4 mg/dL    eGFR-Cr 122 >=60 mL/min/1.73m2   POCT Glucose    Collection Time: 12/20/24  7:35 AM   Result Value Ref Range    POC Glucose 88 70 - 99 mg/dL                      History of Present Illness: Blanca Coats is a 44 year old female with a PMH of T1DM c/b DKA (1/2023), chronic pancreatitis w/ pancreatic pseudocyst, ETOH abuse, breast CA s/p b/l mastectomy w/ chemo (2017), anxiety, depression, HTN, SVT and ETOH hepatic steatosis admitted 12/18/2024 with complaints of  heart racing and need for ETOH detox. ED evaluation revealed a-fib w/ RVR, as well as DKA (, bicarb 11, anion gap 30, A1C 10.2%).       Assessment/Recommendations:  Upon interview today, patient states she is feeling better than yesterday; is ready to go home. Patient with two hypoglycemia episodes overnight; per patient, she did not eat as much as usual due to \"pancreas pain\". Again, explained need for continued basal/bolus insulin at discharge, given lack of Omnipod insulin pump supplies at bedside. Explained to patient that, if discharged today, need to wait to restart pump 24 hours after last long-acting insulin injection at home. Patient aware of plan of care and endorses willingness to participate in plan.       Plan:  Inpatient recommendations -   Degludec = due to hypoglycemic episodes overnight, recommend decreasing to 15u every day   Aspart = recommend continuing at 1:8gm CHO & 1:30>140  Discharge recommendations -   Continue basal/bolus insulin at discharge:  Lantus = 15u QD  Humalog = low dose sliding scale  Restart OmniPod pump 24 hours after last degludec dose        Prescription Recommendations:  [employer] [place]  Insulin: Humalog, Levemir, Lantus   Supplies:  BD pen needles ([employer])  Glucometer:  One Touch Ultra ([employer])  CGM:  Dexcom G6 & 7, Freestyle Fernando 3   Insulin Pump:  OmniPod         Provider F/U Recommendations:  PCP - MEGAN Best ([place])  Endocrinology - Dr. Schoenberger ([place])        A total of 60 minutes were spent with the patient, 100% was spent counseling and coordinating care for T2 diabetes self-management including nutrition, exercise, blood glucose monitoring, insulin administration, medications, treatment options, follow-up coordination and resources.     Whit Frankel, APRN  12/20/2024  11:13 AM       [1]   Allergies  Allergen Reactions    Bee Venom ANAPHYLAXIS    Morphine HIVES    Fentanyl RASH

## 2024-12-21 LAB — BENZODIAZEPINES CLASS UR: NEGATIVE

## 2024-12-21 NOTE — DISCHARGE SUMMARY
[facility] HOSPITALIST  DISCHARGE SUMMARY     Blanca Coats Patient Status:  Inpatient    1980 MRN [id number]   Location [facility] 4SW-A Attending No att. providers found   Hosp Day # 3 PCP Liya Velez PA-C     Date of Admission:  2024  Date of Discharge:   2024    Discharge Disposition: Home or Self Care    Discharge Diagnosis:    #Euglycemic DKA  #Uncontrolled diabetes  #Esophagitis/gastritis   #Pancreatic pseudocyst  #Acute on chronic pancreatitis due to alcohol abuse  #Alcohol abuse    #Afib   #DM neuropathy  #Depression   #Essential HTN  #DL       History of Present Illness:    Blanca Coats is a 44 year old female with  h/o etoh abuse, h/o breast ca, h/o SVT, h/o pancreatitis. Pt is presenting from home with ongoing nausea, vomiting- last ate yesterday  today attempted to have an orange but subsequently had ongoing emesis. She feels lightheaded, feels her heart is racing. Notes RUQ abdominal pain. No fever, no chills. Drinks 2 bottles vodka/day last etoh consumption per pt was last night     Brief Synopsis:    The patient was admitted due to [facility] DKA.  She was started on insulin drip and eventually able to be transitioned to subcutaneous insulin.  She has signs of esophagitis/gastritis and started on PPI.  She had EGD that showed ulcerative esophagitis.  PPI was increased to twice daily.  Pancreatic enzymes were added secondary to her chronic pancreatitis.  She was eventually stable for discharge with plans to follow-up closely with her primary care doctor, gastroenterology and endocrinology.    All diagnosis' and recommendations discussed with patient and/or family in detail.      Lace+ Score: 58  59-90 High Risk  29-58 Medium Risk  0-28   Low Risk       TCM Follow-Up Recommendation:  LACE > 58: High Risk of readmission after discharge from the hospital.    Procedures during hospitalization:   EGD    Consultants:  GI  Endo  intensivist    Discharge Medication List:      Discharge Medications        START taking these medications        Instructions Prescription details   Pancrelipase (Lip-Prot-Amyl) 54970-342298 units Cpep      Take 1 capsule by mouth with breakfast, with lunch, and with evening meal.   Stop taking on: January 19, 2025  Quantity: 120 capsule  Refills: 0            CHANGE how you take these medications        Instructions Prescription details   Lantus SoloStar 100 UNIT/ML Sopn  Generic drug: insulin glargine  What changed: See the new instructions.      Inject 15 Units into the skin every morning.   Refills: 0     pantoprazole 40 MG Tbec  Commonly known as: Protonix  What changed: when to take this      Take 1 tablet (40 mg total) by mouth 2 (two) times daily before meals.   Quantity: 60 tablet  Refills: 0            CONTINUE taking these medications        Instructions Prescription details   ALPRAZolam 0.25 MG Tabs  Commonly known as: Xanax      Take 1 tablet (0.25 mg total) by mouth 3 (three) times daily as needed.   Refills: 0     amphetamine-dextroamphetamine ER 30 MG Cp24  Commonly known as: Adderall XR      Take 1 capsule (30 mg total) by mouth every morning.   Refills: 0     amphetamine-dextroamphetamine ER 10 MG Cp24  Commonly known as: Adderall XR      Take 1 capsule (10 mg total) by mouth every morning.   Refills: 0     atorvastatin 10 MG Tabs  Commonly known as: Lipitor      Take 1 tablet (10 mg total) by mouth daily.   Refills: 0     busPIRone 10 MG Tabs  Commonly known as: Buspar      Take 2 tablets (20 mg total) by mouth 2 (two) times daily.   Refills: 0     folic acid 1 MG Tabs  Commonly known as: Folvite      Take 1 tablet (1 mg total) by mouth daily.   Quantity: 30 tablet  Refills: 0     gabapentin 300 MG Caps  Commonly known as: Neurontin      Take 1 capsule (300 mg total) by mouth in the morning and 1 capsule (300 mg total) before bedtime.   Refills: 0     haloperidol 5 MG Tabs  Commonly known as: Haldol      Take 1 tablet (5 mg total) by mouth  at bedtime.   Refills: 0     HumaLOG KwikPen 100 UNIT/ML Sopn  Generic drug: Insulin Lispro (1 Unit Dial)      Inject 25 units per sliding scale four times a day by subcutaneous route   Refills: 0     metoprolol succinate  MG Tb24  Commonly known as: Toprol XL      Take 1 tablet (100 mg total) by mouth 2 (two) times daily. Take half tablet if blood pressure less than 130/80   Refills: 0     montelukast 10 MG Tabs  Commonly known as: Singulair      Take 1 tablet (10 mg total) by mouth daily.   Refills: 0     Naloxone HCl 4 MG/0.1ML Liqd      4 mg by Nasal route as needed. If patient remains unresponsive, repeat dose in other nostril 2-5 minutes after first dose.   Quantity: 1 kit  Refills: 0     ondansetron 4 MG Tbdp  Commonly known as: Zofran-ODT      Take 1 tablet (4 mg total) by mouth every 8 (eight) hours as needed for Nausea.   Quantity: 20 tablet  Refills: 0     OneTouch Verio Strp      Check blood sugar two times day, before breakfast and before dinner.  (may substitute any brand covered by insurance)   Quantity: 50 strip  Refills: 0     sertraline 100 MG Tabs  Commonly known as: Zoloft      Take 2 tablets (200 mg total) by mouth daily.   Refills: 0     thiamine 100 MG Tabs  Commonly known as: Vitamin B1      Take 1 tablet (100 mg total) by mouth daily.   Quantity: 30 tablet  Refills: 0     traZODone 100 MG Tabs  Commonly known as: Desyrel      Take 2 tablets (200 mg total) by mouth nightly.   Refills: 0            STOP taking these medications      traMADol 50 MG Tabs  Commonly known as: Ultram                  Where to Get Your Medications        These medications were [facility] to [id number] - [place] - [address] [phone], [phone]  [facility] [place] [address]      Phone: [phone]   Pancrelipase (Lip-Prot-Amyl) [id number] units Cpep  pantoprazole 40 MG Tbec         [place]    Follow-up appointment:   Cruz Rivera MD  [address]  [address]  [phone]    Schedule an appointment as soon as possible for a visit in 2 week(s)        Vital signs:  Temp:  [98.5 °F (36.9 °C)] 98.5 °F (36.9 °C)  Pulse:  [68-95] 95  Resp:  [13-27] 24  BP: (145)/(89) 145/89  SpO2:  [90 %-100 %] 95 %    Physical Exam:    General: No acute distress   Lungs: clear to auscultation  Cardiovascular: S1, S2  Abdomen: Soft      -----------------------------------------------------------------------------------------------  PATIENT DISCHARGE INSTRUCTIONS: See electronic chart    Nj Ashley MD    Total minutes spent on discharge plannin      The  Century Cures Act makes medical notes like these available to patients in the interest of transparency. Please be advised this is a medical document. Medical documents are intended to carry relevant information, facts as evident, and the clinical opinion of the practitioner. The medical note is intended as peer to peer communication and may appear blunt or direct. It is written in medical language and may contain abbreviations or verbiage that are unfamiliar.

## 2024-12-22 LAB
CODEINE UR: NEGATIVE
HYDROCODONE UR: NEGATIVE
HYDROMORPH CONF UR: 709 NG/ML
HYDROMORPH UR: POSITIVE
MORPHINE UR: NEGATIVE
OPIATES CLASS UR: POSITIVE NG/ML

## 2024-12-23 NOTE — PAYOR COMM NOTE
--------------  DISCHARGE REVIEW    Payor: TIA  Subscriber #:  [id number]  Authorization Number: [id number]    Admit date: 24  Admit time:   9:50 PM  Discharge Date: 2024  3:10 PM     Admitting Physician: Luz Long MD  Attending Physician:  No att. providers found  Primary Care Physician: Liya Velez PA-C          Discharge Summary Notes        Discharge Summary signed by Nj Ashley MD at 2024  9:00 AM       Author: Nj Ashley MD Specialty: HOSPITALIST, Internal Medicine Author Type: Physician    Filed: 2024  9:00 AM Date of Service: 2024  8:56 AM Status: Signed    : Nj Ashley MD (Physician)           [facility]  DISCHARGE SUMMARY     Blanca Coats Patient Status:  Inpatient    1980 MRN [id number]   Location [facility] 4SW-A Attending No att. providers found   Hosp Day # 3 PCP Liya Velez PA-C     Date of Admission:  2024  Date of Discharge:   2024    Discharge Disposition: Home or Self Care    Discharge Diagnosis:    #Euglycemic DKA  #Uncontrolled diabetes  #Esophagitis/gastritis   #Pancreatic pseudocyst  #Acute on chronic pancreatitis due to alcohol abuse  #Alcohol abuse    #Afib   #DM neuropathy  #Depression   #Essential HTN  #DL       History of Present Illness:    Blanca Coats is a 44 year old female with  h/o etoh abuse, h/o breast ca, h/o SVT, h/o pancreatitis. Pt is presenting from home with ongoing nausea, vomiting- last ate yesterday  today attempted to have an orange but subsequently had ongoing emesis. She feels lightheaded, feels her heart is racing. Notes RUQ abdominal pain. No fever, no chills. Drinks 2 bottles vodka/day last etoh consumption per pt was last night     Brief Synopsis:    The patient was admitted due to [facility] DKA.  She was started on insulin drip and eventually able to be transitioned to subcutaneous insulin.  She has signs of esophagitis/gastritis and started on PPI.  She had  EGD that showed ulcerative esophagitis.  PPI was increased to twice daily.  Pancreatic enzymes were added secondary to her chronic pancreatitis.  She was eventually stable for discharge with plans to follow-up closely with her primary care doctor, gastroenterology and endocrinology.    All diagnosis' and recommendations discussed with patient and/or family in detail.      Lace+ Score: 58  59-90 High Risk  29-58 Medium Risk  0-28   Low Risk       TCM Follow-Up Recommendation:  LACE > 58: High Risk of readmission after discharge from the hospital.    Procedures during hospitalization:   EGD    Consultants:  GI  Endo  intensivist    Discharge Medication List:     Discharge Medications        START taking these medications        Instructions Prescription details   Pancrelipase (Lip-Prot-Amyl) [id number] units Cpep      Take 1 capsule by mouth with breakfast, with lunch, and with evening meal.   Stop taking on: January 19, 2025  Quantity: 120 capsule  Refills: 0            CHANGE how you take these medications        Instructions Prescription details   Lantus SoloStar 100 UNIT/ML Sopn  Generic drug: insulin glargine  What changed: See the new instructions.      Inject 15 Units into the skin every morning.   Refills: 0     pantoprazole 40 MG Tbec  Commonly known as: Protonix  What changed: when to take this      Take 1 tablet (40 mg total) by mouth 2 (two) times daily before meals.   Quantity: 60 tablet  Refills: 0            CONTINUE taking these medications        Instructions Prescription details   ALPRAZolam 0.25 MG Tabs  Commonly known as: Xanax      Take 1 tablet (0.25 mg total) by mouth 3 (three) times daily as needed.   Refills: 0     amphetamine-dextroamphetamine ER 30 MG Cp24  Commonly known as: Adderall XR      Take 1 capsule (30 mg total) by mouth every morning.   Refills: 0     amphetamine-dextroamphetamine ER 10 MG Cp24  Commonly known as: Adderall XR      Take 1 capsule (10 mg total) by mouth every  morning.   Refills: 0     atorvastatin 10 MG Tabs  Commonly known as: Lipitor      Take 1 tablet (10 mg total) by mouth daily.   Refills: 0     busPIRone 10 MG Tabs  Commonly known as: Buspar      Take 2 tablets (20 mg total) by mouth 2 (two) times daily.   Refills: 0     folic acid 1 MG Tabs  Commonly known as: Folvite      Take 1 tablet (1 mg total) by mouth daily.   Quantity: 30 tablet  Refills: 0     gabapentin 300 MG Caps  Commonly known as: Neurontin      Take 1 capsule (300 mg total) by mouth in the morning and 1 capsule (300 mg total) before bedtime.   Refills: 0     haloperidol 5 MG Tabs  Commonly known as: Haldol      Take 1 tablet (5 mg total) by mouth at bedtime.   Refills: 0     HumaLOG KwikPen 100 UNIT/ML Sopn  Generic drug: Insulin Lispro (1 Unit Dial)      Inject 25 units per sliding scale four times a day by subcutaneous route   Refills: 0     metoprolol succinate  MG Tb24  Commonly known as: Toprol XL      Take 1 tablet (100 mg total) by mouth 2 (two) times daily. Take half tablet if blood pressure less than 130/80   Refills: 0     montelukast 10 MG Tabs  Commonly known as: Singulair      Take 1 tablet (10 mg total) by mouth daily.   Refills: 0     Naloxone HCl 4 MG/0.1ML Liqd      4 mg by Nasal route as needed. If patient remains unresponsive, repeat dose in other nostril 2-5 minutes after first dose.   Quantity: 1 kit  Refills: 0     ondansetron 4 MG Tbdp  Commonly known as: Zofran-ODT      Take 1 tablet (4 mg total) by mouth every 8 (eight) hours as needed for Nausea.   Quantity: 20 tablet  Refills: 0     OneTouch Verio Strp      Check blood sugar two times day, before breakfast and before dinner.  (may substitute any brand covered by insurance)   Quantity: 50 strip  Refills: 0     sertraline 100 MG Tabs  Commonly known as: Zoloft      Take 2 tablets (200 mg total) by mouth daily.   Refills: 0     thiamine 100 MG Tabs  Commonly known as: Vitamin B1      Take 1 tablet (100 mg total) by  mouth daily.   Quantity: 30 tablet  Refills: 0     traZODone 100 MG Tabs  Commonly known as: Desyrel      Take 2 tablets (200 mg total) by mouth nightly.   Refills: 0            STOP taking these medications      traMADol 50 MG Tabs  Commonly known as: Ultram                  Where to Get Your Medications        These medications were sent to [id number] - [place], [place] - [address] [phone], [phone]  [address] [place] [address]      Phone: [phone]   Pancrelipase (Lip-Prot-Amyl) [id number] units Cpep  pantoprazole 40 MG Tbec         [place]    Follow-up appointment:   Cruz Rivera MD  [facility] [address] [address] [phone]    Schedule an appointment as soon as possible for a visit in 2 week(s)        Vital signs:  Temp:  [98.5 °F (36.9 °C)] 98.5 °F (36.9 °C)  Pulse:  [68-95] 95  Resp:  [13-27] 24  BP: (145)/(89) 145/89  SpO2:  [90 %-100 %] 95 %    Physical Exam:    General: No acute distress   Lungs: clear to auscultation  Cardiovascular: S1, S2  Abdomen: Soft      -----------------------------------------------------------------------------------------------  PATIENT DISCHARGE INSTRUCTIONS: See electronic chart    Nj Ashley MD    Total minutes spent on discharge plannin      The  Century Cures Act makes medical notes like these available to patients in the interest of transparency. Please be advised this is a medical document. Medical documents are intended to carry relevant information, facts as evident, and the clinical opinion of the practitioner. The medical note is intended as peer to peer communication and may appear blunt or direct. It is written in medical language and may contain abbreviations or verbiage that are unfamiliar.       Electronically signed by Nj Ashley MD on 2024  9:00 AM         REVIEWER COMMENTS

## 2025-01-16 PROBLEM — N95.1 PERIMENOPAUSAL: Status: ACTIVE | Noted: 2025-01-16

## 2025-01-16 PROBLEM — L29.2 VULVAR ITCHING: Status: ACTIVE | Noted: 2025-01-16

## 2025-01-16 PROBLEM — Z86.19 HISTORY OF HPV INFECTION: Status: ACTIVE | Noted: 2025-01-16

## 2025-02-05 ENCOUNTER — APPOINTMENT (OUTPATIENT)
Dept: CT IMAGING | Facility: HOSPITAL | Age: 45
End: 2025-02-05
Attending: EMERGENCY MEDICINE
Payer: MEDICAID

## 2025-02-05 ENCOUNTER — HOSPITAL ENCOUNTER (INPATIENT)
Facility: HOSPITAL | Age: 45
LOS: 1 days | Discharge: HOME | End: 2025-02-06
Attending: EMERGENCY MEDICINE | Admitting: INTERNAL MEDICINE
Payer: MEDICAID

## 2025-02-05 DIAGNOSIS — E83.42 HYPOMAGNESEMIA: ICD-10-CM

## 2025-02-05 DIAGNOSIS — F10.939 ALCOHOL WITHDRAWAL SYNDROME WITH COMPLICATION (HCC): Primary | ICD-10-CM

## 2025-02-05 DIAGNOSIS — K86.0 ALCOHOL-INDUCED CHRONIC PANCREATITIS (HCC): ICD-10-CM

## 2025-02-05 DIAGNOSIS — E87.29 ALCOHOLIC KETOACIDOSIS: ICD-10-CM

## 2025-02-05 PROBLEM — E11.10 DIABETIC KETOACIDOSIS WITHOUT COMA ASSOCIATED WITH TYPE 2 DIABETES MELLITUS (HCC): Status: ACTIVE | Noted: 2023-01-14

## 2025-02-05 LAB
ACETONE: NEGATIVE
ALBUMIN SERPL-MCNC: 5.2 G/DL (ref 3.2–4.8)
ALBUMIN/GLOB SERPL: 1.5 {RATIO} (ref 1–2)
ALP LIVER SERPL-CCNC: 108 U/L
ALT SERPL-CCNC: 19 U/L
ANION GAP SERPL CALC-SCNC: 27 MMOL/L (ref 0–18)
APAP SERPL-MCNC: <2 UG/ML (ref 10–20)
ARTERIAL PATENCY WRIST A: POSITIVE
AST SERPL-CCNC: 24 U/L (ref ?–34)
BASE EXCESS BLDA CALC-SCNC: -2.5 MMOL/L (ref ?–2)
BASOPHILS # BLD AUTO: 0.03 X10(3) UL (ref 0–0.2)
BASOPHILS NFR BLD AUTO: 0.5 %
BILIRUB SERPL-MCNC: 0.8 MG/DL (ref 0.3–1.2)
BODY TEMPERATURE: 98.6 F
BUN BLD-MCNC: 7 MG/DL (ref 9–23)
CA-I BLD-SCNC: 1.14 MMOL/L (ref 0.95–1.32)
CALCIUM BLD-MCNC: 9.9 MG/DL (ref 8.7–10.6)
CHLORIDE SERPL-SCNC: 92 MMOL/L (ref 98–112)
CO2 SERPL-SCNC: 16 MMOL/L (ref 21–32)
COHGB MFR BLD: 2.5 % SAT (ref 0–3)
CREAT BLD-MCNC: 0.89 MG/DL
EGFRCR SERPLBLD CKD-EPI 2021: 83 ML/MIN/1.73M2 (ref 60–?)
EOSINOPHIL # BLD AUTO: 0.13 X10(3) UL (ref 0–0.7)
EOSINOPHIL NFR BLD AUTO: 2 %
ERYTHROCYTE [DISTWIDTH] IN BLOOD BY AUTOMATED COUNT: 12.3 %
ETHANOL SERPL-MCNC: 120 MG/DL (ref ?–3)
GLOBULIN PLAS-MCNC: 3.4 G/DL (ref 2–3.5)
GLUCOSE BLD-MCNC: 155 MG/DL (ref 70–99)
HCO3 BLDA-SCNC: 22.8 MEQ/L (ref 21–27)
HCT VFR BLD AUTO: 51.6 %
HGB BLD-MCNC: 15.2 G/DL
HGB BLD-MCNC: 18.1 G/DL
IMM GRANULOCYTES # BLD AUTO: 0.01 X10(3) UL (ref 0–1)
IMM GRANULOCYTES NFR BLD: 0.2 %
LACTATE BLD-SCNC: 4.4 MMOL/L (ref 0.5–2)
LACTATE SERPL-SCNC: 4.3 MMOL/L (ref 0.5–2)
LIPASE SERPL-CCNC: 26 U/L (ref 12–53)
LYMPHOCYTES # BLD AUTO: 2.02 X10(3) UL (ref 1–4)
LYMPHOCYTES NFR BLD AUTO: 31.4 %
MAGNESIUM SERPL-MCNC: 1.4 MG/DL (ref 1.6–2.6)
MCH RBC QN AUTO: 32.3 PG (ref 26–34)
MCHC RBC AUTO-ENTMCNC: 35.1 G/DL (ref 31–37)
MCV RBC AUTO: 92 FL
METHGB MFR BLD: 0.6 % SAT (ref 0.4–1.5)
MONOCYTES # BLD AUTO: 0.53 X10(3) UL (ref 0.1–1)
MONOCYTES NFR BLD AUTO: 8.2 %
NEUTROPHILS # BLD AUTO: 3.71 X10 (3) UL (ref 1.5–7.7)
NEUTROPHILS # BLD AUTO: 3.71 X10(3) UL (ref 1.5–7.7)
NEUTROPHILS NFR BLD AUTO: 57.7 %
OSMOLALITY SERPL CALC.SUM OF ELEC: 281 MOSM/KG (ref 275–295)
OXYHGB MFR BLDA: 91.9 % (ref 92–100)
PCO2 BLDA: 30 MM HG (ref 35–45)
PH BLDA: 7.44 [PH] (ref 7.35–7.45)
PLATELET # BLD AUTO: 228 10(3)UL (ref 150–450)
PO2 BLDA: 70 MM HG (ref 80–100)
POTASSIUM BLD-SCNC: 4 MMOL/L (ref 3.6–5.1)
POTASSIUM SERPL-SCNC: 3.7 MMOL/L (ref 3.5–5.1)
PROT SERPL-MCNC: 8.6 G/DL (ref 5.7–8.2)
RBC # BLD AUTO: 5.61 X10(6)UL
SALICYLATES SERPL-MCNC: <3 MG/DL (ref 3–20)
SODIUM BLD-SCNC: 130 MMOL/L (ref 135–145)
SODIUM SERPL-SCNC: 135 MMOL/L (ref 136–145)
TSI SER-ACNC: 1.19 UIU/ML (ref 0.55–4.78)
WBC # BLD AUTO: 6.4 X10(3) UL (ref 4–11)

## 2025-02-05 PROCEDURE — 74177 CT ABD & PELVIS W/CONTRAST: CPT | Performed by: EMERGENCY MEDICINE

## 2025-02-05 PROCEDURE — 99223 1ST HOSP IP/OBS HIGH 75: CPT | Performed by: INTERNAL MEDICINE

## 2025-02-05 RX ORDER — ENOXAPARIN SODIUM 100 MG/ML
40 INJECTION SUBCUTANEOUS DAILY
Status: DISCONTINUED | OUTPATIENT
Start: 2025-02-06 | End: 2025-02-06

## 2025-02-05 RX ORDER — DOXEPIN HYDROCHLORIDE 50 MG/1
1 CAPSULE ORAL DAILY
Status: DISCONTINUED | OUTPATIENT
Start: 2025-02-06 | End: 2025-02-06

## 2025-02-05 RX ORDER — SODIUM PHOSPHATE, DIBASIC AND SODIUM PHOSPHATE, MONOBASIC 7; 19 G/230ML; G/230ML
1 ENEMA RECTAL ONCE AS NEEDED
Status: CANCELLED | OUTPATIENT
Start: 2025-02-05

## 2025-02-05 RX ORDER — MAGNESIUM SULFATE 1 G/100ML
1 INJECTION INTRAVENOUS ONCE
Status: COMPLETED | OUTPATIENT
Start: 2025-02-05 | End: 2025-02-05

## 2025-02-05 RX ORDER — ONDANSETRON 2 MG/ML
4 INJECTION INTRAMUSCULAR; INTRAVENOUS EVERY 4 HOURS PRN
Status: DISCONTINUED | OUTPATIENT
Start: 2025-02-05 | End: 2025-02-05

## 2025-02-05 RX ORDER — THIAMINE HYDROCHLORIDE 100 MG/ML
250 INJECTION, SOLUTION INTRAMUSCULAR; INTRAVENOUS DAILY
Status: DISCONTINUED | OUTPATIENT
Start: 2025-02-09 | End: 2025-02-06

## 2025-02-05 RX ORDER — HYDROMORPHONE HYDROCHLORIDE 1 MG/ML
0.8 INJECTION, SOLUTION INTRAMUSCULAR; INTRAVENOUS; SUBCUTANEOUS EVERY 2 HOUR PRN
Status: DISCONTINUED | OUTPATIENT
Start: 2025-02-05 | End: 2025-02-06

## 2025-02-05 RX ORDER — SODIUM CHLORIDE 9 MG/ML
125 INJECTION, SOLUTION INTRAVENOUS CONTINUOUS
Status: DISCONTINUED | OUTPATIENT
Start: 2025-02-05 | End: 2025-02-05

## 2025-02-05 RX ORDER — SENNOSIDES 8.6 MG
17.2 TABLET ORAL NIGHTLY PRN
Status: CANCELLED | OUTPATIENT
Start: 2025-02-05

## 2025-02-05 RX ORDER — HYDROMORPHONE HYDROCHLORIDE 1 MG/ML
0.4 INJECTION, SOLUTION INTRAMUSCULAR; INTRAVENOUS; SUBCUTANEOUS EVERY 2 HOUR PRN
Status: DISCONTINUED | OUTPATIENT
Start: 2025-02-05 | End: 2025-02-06

## 2025-02-05 RX ORDER — LORAZEPAM 2 MG/ML
4 INJECTION INTRAMUSCULAR EVERY 30 MIN PRN
Status: DISCONTINUED | OUTPATIENT
Start: 2025-02-05 | End: 2025-02-05

## 2025-02-05 RX ORDER — PROCHLORPERAZINE EDISYLATE 5 MG/ML
5 INJECTION INTRAMUSCULAR; INTRAVENOUS EVERY 8 HOURS PRN
Status: DISCONTINUED | OUTPATIENT
Start: 2025-02-05 | End: 2025-02-06

## 2025-02-05 RX ORDER — LORAZEPAM 2 MG/ML
6 INJECTION INTRAMUSCULAR EVERY 10 MIN PRN
Status: DISCONTINUED | OUTPATIENT
Start: 2025-02-05 | End: 2025-02-05

## 2025-02-05 RX ORDER — THIAMINE HYDROCHLORIDE 100 MG/ML
250 INJECTION, SOLUTION INTRAMUSCULAR; INTRAVENOUS DAILY
Status: DISCONTINUED | OUTPATIENT
Start: 2025-02-09 | End: 2025-02-05

## 2025-02-05 RX ORDER — LORAZEPAM 2 MG/ML
2 INJECTION INTRAMUSCULAR
Status: DISCONTINUED | OUTPATIENT
Start: 2025-02-05 | End: 2025-02-05

## 2025-02-05 RX ORDER — THIAMINE HYDROCHLORIDE 100 MG/ML
100 INJECTION, SOLUTION INTRAMUSCULAR; INTRAVENOUS DAILY
Status: DISCONTINUED | OUTPATIENT
Start: 2025-02-12 | End: 2025-02-05

## 2025-02-05 RX ORDER — LORAZEPAM 1 MG/1
1 TABLET ORAL
Status: DISCONTINUED | OUTPATIENT
Start: 2025-02-05 | End: 2025-02-05

## 2025-02-05 RX ORDER — POLYETHYLENE GLYCOL 3350 17 G/17G
17 POWDER, FOR SOLUTION ORAL DAILY PRN
Status: CANCELLED | OUTPATIENT
Start: 2025-02-05

## 2025-02-05 RX ORDER — DOXEPIN HYDROCHLORIDE 50 MG/1
1 CAPSULE ORAL DAILY
Status: DISCONTINUED | OUTPATIENT
Start: 2025-02-05 | End: 2025-02-05

## 2025-02-05 RX ORDER — DEXTROSE, SODIUM CHLORIDE, SODIUM LACTATE, POTASSIUM CHLORIDE, AND CALCIUM CHLORIDE 5; .6; .31; .03; .02 G/100ML; G/100ML; G/100ML; G/100ML; G/100ML
INJECTION, SOLUTION INTRAVENOUS CONTINUOUS
Status: DISCONTINUED | OUTPATIENT
Start: 2025-02-05 | End: 2025-02-06

## 2025-02-05 RX ORDER — LORAZEPAM 2 MG/1
2 TABLET ORAL
Status: DISCONTINUED | OUTPATIENT
Start: 2025-02-05 | End: 2025-02-05

## 2025-02-05 RX ORDER — LORAZEPAM 2 MG/ML
1 INJECTION INTRAMUSCULAR ONCE
Status: DISCONTINUED | OUTPATIENT
Start: 2025-02-05 | End: 2025-02-05

## 2025-02-05 RX ORDER — THIAMINE HYDROCHLORIDE 100 MG/ML
500 INJECTION, SOLUTION INTRAMUSCULAR; INTRAVENOUS EVERY 8 HOURS
Status: DISCONTINUED | OUTPATIENT
Start: 2025-02-05 | End: 2025-02-05

## 2025-02-05 RX ORDER — LORAZEPAM 2 MG/ML
1 INJECTION INTRAMUSCULAR
Status: DISCONTINUED | OUTPATIENT
Start: 2025-02-05 | End: 2025-02-05

## 2025-02-05 RX ORDER — ACETAMINOPHEN 500 MG
500 TABLET ORAL EVERY 4 HOURS PRN
Status: CANCELLED | OUTPATIENT
Start: 2025-02-05

## 2025-02-05 RX ORDER — THIAMINE HYDROCHLORIDE 100 MG/ML
100 INJECTION, SOLUTION INTRAMUSCULAR; INTRAVENOUS DAILY
Status: DISCONTINUED | OUTPATIENT
Start: 2025-02-12 | End: 2025-02-06

## 2025-02-05 RX ORDER — HYDROMORPHONE HYDROCHLORIDE 1 MG/ML
0.2 INJECTION, SOLUTION INTRAMUSCULAR; INTRAVENOUS; SUBCUTANEOUS EVERY 2 HOUR PRN
Status: DISCONTINUED | OUTPATIENT
Start: 2025-02-05 | End: 2025-02-06

## 2025-02-05 RX ORDER — LORAZEPAM 2 MG/ML
5 INJECTION INTRAMUSCULAR
Status: DISCONTINUED | OUTPATIENT
Start: 2025-02-05 | End: 2025-02-05

## 2025-02-05 RX ORDER — THIAMINE HYDROCHLORIDE 100 MG/ML
500 INJECTION, SOLUTION INTRAMUSCULAR; INTRAVENOUS EVERY 8 HOURS
Status: DISCONTINUED | OUTPATIENT
Start: 2025-02-06 | End: 2025-02-06

## 2025-02-05 RX ORDER — ENOXAPARIN SODIUM 100 MG/ML
40 INJECTION SUBCUTANEOUS DAILY
Status: CANCELLED | OUTPATIENT
Start: 2025-02-06

## 2025-02-05 RX ORDER — ONDANSETRON 2 MG/ML
4 INJECTION INTRAMUSCULAR; INTRAVENOUS EVERY 6 HOURS PRN
Status: DISCONTINUED | OUTPATIENT
Start: 2025-02-05 | End: 2025-02-06

## 2025-02-05 RX ORDER — NICOTINE POLACRILEX 4 MG
30 LOZENGE BUCCAL
Status: DISCONTINUED | OUTPATIENT
Start: 2025-02-05 | End: 2025-02-06

## 2025-02-05 RX ORDER — DEXTROSE MONOHYDRATE 25 G/50ML
50 INJECTION, SOLUTION INTRAVENOUS
Status: DISCONTINUED | OUTPATIENT
Start: 2025-02-05 | End: 2025-02-06

## 2025-02-05 RX ORDER — LORAZEPAM 2 MG/ML
3 INJECTION INTRAMUSCULAR
Status: DISCONTINUED | OUTPATIENT
Start: 2025-02-05 | End: 2025-02-05

## 2025-02-05 RX ORDER — HYDROMORPHONE HYDROCHLORIDE 1 MG/ML
0.5 INJECTION, SOLUTION INTRAMUSCULAR; INTRAVENOUS; SUBCUTANEOUS ONCE
Status: COMPLETED | OUTPATIENT
Start: 2025-02-05 | End: 2025-02-05

## 2025-02-05 RX ORDER — THIAMINE HYDROCHLORIDE 100 MG/ML
100 INJECTION, SOLUTION INTRAMUSCULAR; INTRAVENOUS DAILY
Status: DISCONTINUED | OUTPATIENT
Start: 2025-02-05 | End: 2025-02-05

## 2025-02-05 RX ORDER — SODIUM CHLORIDE 9 MG/ML
INJECTION, SOLUTION INTRAVENOUS CONTINUOUS
Status: ACTIVE | OUTPATIENT
Start: 2025-02-05 | End: 2025-02-06

## 2025-02-05 RX ORDER — NICOTINE POLACRILEX 4 MG
15 LOZENGE BUCCAL
Status: DISCONTINUED | OUTPATIENT
Start: 2025-02-05 | End: 2025-02-06

## 2025-02-05 RX ORDER — THIAMINE HYDROCHLORIDE 100 MG/ML
100 INJECTION, SOLUTION INTRAMUSCULAR; INTRAVENOUS ONCE
Status: COMPLETED | OUTPATIENT
Start: 2025-02-05 | End: 2025-02-05

## 2025-02-05 RX ORDER — SODIUM CHLORIDE 9 MG/ML
INJECTION, SOLUTION INTRAVENOUS CONTINUOUS
Status: DISCONTINUED | OUTPATIENT
Start: 2025-02-05 | End: 2025-02-05

## 2025-02-05 RX ORDER — BISACODYL 10 MG
10 SUPPOSITORY, RECTAL RECTAL
Status: CANCELLED | OUTPATIENT
Start: 2025-02-05

## 2025-02-05 NOTE — ED INITIAL ASSESSMENT (HPI)
Pt in via EMS from home. Pt has pancreatitis and hx of a-fib. Pt says she is in alcohol withdrawal and her last drink was this morning. Pt reports nausea and difficulty breathing.

## 2025-02-06 VITALS
BODY MASS INDEX: 22.11 KG/M2 | OXYGEN SATURATION: 98 % | DIASTOLIC BLOOD PRESSURE: 86 MMHG | HEART RATE: 82 BPM | TEMPERATURE: 98 F | WEIGHT: 137.56 LBS | RESPIRATION RATE: 12 BRPM | HEIGHT: 66 IN | SYSTOLIC BLOOD PRESSURE: 124 MMHG

## 2025-02-06 LAB
ALBUMIN SERPL-MCNC: 3.6 G/DL (ref 3.2–4.8)
ALBUMIN/GLOB SERPL: 1.4 {RATIO} (ref 1–2)
ALP LIVER SERPL-CCNC: 74 U/L
ALT SERPL-CCNC: 8 U/L
AMPHET UR QL SCN: NEGATIVE
ANION GAP SERPL CALC-SCNC: 10 MMOL/L (ref 0–18)
ANION GAP SERPL CALC-SCNC: 10 MMOL/L (ref 0–18)
ANION GAP SERPL CALC-SCNC: 7 MMOL/L (ref 0–18)
AST SERPL-CCNC: 16 U/L (ref ?–34)
ATRIAL RATE: 134 BPM
BASOPHILS # BLD AUTO: 0.02 X10(3) UL (ref 0–0.2)
BASOPHILS NFR BLD AUTO: 0.5 %
BILIRUB SERPL-MCNC: 1 MG/DL (ref 0.3–1.2)
BILIRUB UR QL STRIP.AUTO: NEGATIVE
BUN BLD-MCNC: <5 MG/DL (ref 9–23)
CALCIUM BLD-MCNC: 8.6 MG/DL (ref 8.7–10.6)
CALCIUM BLD-MCNC: 8.6 MG/DL (ref 8.7–10.6)
CALCIUM BLD-MCNC: 8.9 MG/DL (ref 8.7–10.6)
CANNABINOIDS UR QL SCN: NEGATIVE
CHLORIDE SERPL-SCNC: 96 MMOL/L (ref 98–112)
CHLORIDE SERPL-SCNC: 96 MMOL/L (ref 98–112)
CHLORIDE SERPL-SCNC: 98 MMOL/L (ref 98–112)
CLARITY UR REFRACT.AUTO: CLEAR
CO2 SERPL-SCNC: 25 MMOL/L (ref 21–32)
CO2 SERPL-SCNC: 25 MMOL/L (ref 21–32)
CO2 SERPL-SCNC: 28 MMOL/L (ref 21–32)
COCAINE UR QL: NEGATIVE
COLOR UR AUTO: YELLOW
CREAT BLD-MCNC: 0.54 MG/DL
CREAT BLD-MCNC: 0.56 MG/DL
CREAT BLD-MCNC: 0.56 MG/DL
CREAT UR-SCNC: 69.8 MG/DL
EGFRCR SERPLBLD CKD-EPI 2021: 117 ML/MIN/1.73M2 (ref 60–?)
EGFRCR SERPLBLD CKD-EPI 2021: 117 ML/MIN/1.73M2 (ref 60–?)
EGFRCR SERPLBLD CKD-EPI 2021: 118 ML/MIN/1.73M2 (ref 60–?)
EOSINOPHIL # BLD AUTO: 0.03 X10(3) UL (ref 0–0.7)
EOSINOPHIL NFR BLD AUTO: 0.7 %
ERYTHROCYTE [DISTWIDTH] IN BLOOD BY AUTOMATED COUNT: 12.3 %
FENTANYL UR QL SCN: NEGATIVE
GLOBULIN PLAS-MCNC: 2.6 G/DL (ref 2–3.5)
GLUCOSE BLD-MCNC: 175 MG/DL (ref 70–99)
GLUCOSE BLD-MCNC: 192 MG/DL (ref 70–99)
GLUCOSE BLD-MCNC: 192 MG/DL (ref 70–99)
GLUCOSE BLD-MCNC: 242 MG/DL (ref 70–99)
GLUCOSE BLD-MCNC: 79 MG/DL (ref 70–99)
GLUCOSE BLD-MCNC: 95 MG/DL (ref 70–99)
GLUCOSE UR STRIP.AUTO-MCNC: NEGATIVE MG/DL
HCT VFR BLD AUTO: 37.1 %
HGB BLD-MCNC: 13.4 G/DL
IMM GRANULOCYTES # BLD AUTO: 0.01 X10(3) UL (ref 0–1)
IMM GRANULOCYTES NFR BLD: 0.2 %
KETONES UR STRIP.AUTO-MCNC: 40 MG/DL
LACTATE SERPL-SCNC: 1.3 MMOL/L (ref 0.5–2)
LEUKOCYTE ESTERASE UR QL STRIP.AUTO: NEGATIVE
LYMPHOCYTES # BLD AUTO: 1.77 X10(3) UL (ref 1–4)
LYMPHOCYTES NFR BLD AUTO: 41 %
MAGNESIUM SERPL-MCNC: 1.6 MG/DL (ref 1.6–2.6)
MCH RBC QN AUTO: 32.8 PG (ref 26–34)
MCHC RBC AUTO-ENTMCNC: 36.1 G/DL (ref 31–37)
MCV RBC AUTO: 90.9 FL
MDMA UR QL SCN: NEGATIVE
MONOCYTES # BLD AUTO: 0.63 X10(3) UL (ref 0.1–1)
MONOCYTES NFR BLD AUTO: 14.6 %
MRSA DNA SPEC QL NAA+PROBE: NEGATIVE
NEUTROPHILS # BLD AUTO: 1.86 X10 (3) UL (ref 1.5–7.7)
NEUTROPHILS # BLD AUTO: 1.86 X10(3) UL (ref 1.5–7.7)
NEUTROPHILS NFR BLD AUTO: 43 %
NITRITE UR QL STRIP.AUTO: NEGATIVE
OXYCODONE UR QL SCN: NEGATIVE
P AXIS: 77 DEGREES
P-R INTERVAL: 130 MS
PH UR STRIP.AUTO: 6 [PH] (ref 5–8)
PLATELET # BLD AUTO: 131 10(3)UL (ref 150–450)
POTASSIUM SERPL-SCNC: 3.7 MMOL/L (ref 3.5–5.1)
POTASSIUM SERPL-SCNC: 4 MMOL/L (ref 3.5–5.1)
POTASSIUM SERPL-SCNC: 4 MMOL/L (ref 3.5–5.1)
PROT SERPL-MCNC: 6.2 G/DL (ref 5.7–8.2)
Q-T INTERVAL: 306 MS
QRS DURATION: 68 MS
QTC CALCULATION (BEZET): 456 MS
R AXIS: 85 DEGREES
RBC # BLD AUTO: 4.08 X10(6)UL
RBC UR QL AUTO: NEGATIVE
SODIUM SERPL-SCNC: 131 MMOL/L (ref 136–145)
SODIUM SERPL-SCNC: 131 MMOL/L (ref 136–145)
SODIUM SERPL-SCNC: 133 MMOL/L (ref 136–145)
SP GR UR STRIP.AUTO: 1.01 (ref 1–1.03)
T AXIS: 66 DEGREES
UROBILINOGEN UR STRIP.AUTO-MCNC: 0.2 MG/DL
VENTRICULAR RATE: 134 BPM
WBC # BLD AUTO: 4.3 X10(3) UL (ref 4–11)

## 2025-02-06 PROCEDURE — 99232 SBSQ HOSP IP/OBS MODERATE 35: CPT | Performed by: INTERNAL MEDICINE

## 2025-02-06 PROCEDURE — 99239 HOSP IP/OBS DSCHRG MGMT >30: CPT | Performed by: INTERNAL MEDICINE

## 2025-02-06 RX ORDER — INSULIN DEGLUDEC 100 U/ML
15 INJECTION, SOLUTION SUBCUTANEOUS DAILY
Status: DISCONTINUED | OUTPATIENT
Start: 2025-02-06 | End: 2025-02-06

## 2025-02-06 RX ORDER — MAGNESIUM SULFATE HEPTAHYDRATE 40 MG/ML
2 INJECTION, SOLUTION INTRAVENOUS ONCE
Status: COMPLETED | OUTPATIENT
Start: 2025-02-06 | End: 2025-02-06

## 2025-02-06 RX ORDER — HYDROMORPHONE HYDROCHLORIDE 1 MG/ML
0.4 INJECTION, SOLUTION INTRAMUSCULAR; INTRAVENOUS; SUBCUTANEOUS ONCE
Status: COMPLETED | OUTPATIENT
Start: 2025-02-06 | End: 2025-02-06

## 2025-02-06 NOTE — BH PROGRESS NOTE
Psych liaison met with Pt at bedside for CIWA/alcohol follow-up. Pt reports she will be attending \"outpatient rehab in [place]\" at a place she believes is called [facility]. Pt denies SI/HI/AVH and declined additional resources.

## 2025-02-06 NOTE — PLAN OF CARE
Pt received after change of shift report. Pt alert and oriented x4. Following commands. CIWA Protocol. CIWA score 0. Pt on room air. Oxygen saturation stable. Afebrile. Sinus rhythm on telemetry monitor. Blood pressure stable. Carb control diet. No BM's. Accuchecks QID. Ambulating to bathroom. Transfer orders in place. Cleared by MD's for discharge. Pt discharged at 1115.

## 2025-02-06 NOTE — SPIRITUAL CARE NOTE
Spiritual Care Visit Note    Patient Name: Blanca Coats Date of Spiritual Care Visit: 25   : 1980 Primary Dx: Alcohol withdrawal syndrome with complication (HCC)       Referred By: Referral From: Nurse    Spiritual Care Taxonomy:    Intended Effects: Aligning care plan with patient's values    Methods: Collaborate with care team member         Visit Type/Summary:     - PoA: New PoAH Created: Visited patient in response to PoA for Healthcare consult/request. Created a new PoAH for Healthcare document that, per the patient, accurately reflects their wishes. Gave the patient the original PoAH document along with copies. Confirmed that the PoAH primary agent name and contact information have been entered into the Epic, Advance Directives section. A copy of the new PoAH document has been placed on the patient's paper chart.    Spiritual Care support can be requested via an [facility] consult. For urgent/immediate needs, please contact the On Call  at: Edward: ext [phone]    [facility]   Spiritual Care Department

## 2025-02-06 NOTE — ED QUICK NOTES
Orders for admission, patient is aware of plan and ready to go upstairs. Any questions, please call ED RN Will at extension [phone].     Patient Covid vaccination status: Fully vaccinated     COVID Test Ordered in ED: None    COVID Suspicion at Admission: N/A    Running Infusions:    dextrose in lactated ringers 125 mL/hr at 02/05/25 2121        Mental Status/LOC at time of transport: A&Ox4 bu somnolent    Other pertinent information:   CIWA score: 2   NIH score:  N/A

## 2025-02-06 NOTE — ED PROVIDER NOTES
Patient Seen in: [facility] Emergency Department      History     Chief Complaint   Patient presents with    Eval-D     alcohol withdrawl, last drink this AM   hx of a-fib, SVT        Stated Complaint: alcohol withdrawl, last drink this AM     Subjective:   HPI      This is a 44-year-old female with history of alcohol abuse, pancreatitis, diabetes mellitus, presents to the emergency room with chief complaint of alcohol withdrawal.  Patient states she drinks a handle of vodka daily, last drink was this morning.  Patient states that she has been feeling very anxious and shaky, denies any hallucinations.  Denies ever having withdrawal seizure.  States that she been having upper abdominal pain the last 24 hours as well, states it feels similar to previous pancreatitis.  Denies vomiting.  Denies diarrhea.  Denies melena or hematochezia.  Denies any fevers or chills.  Denies chest pain or shortness of breath.    Objective:     Past Medical History:    Anxiety    Arrhythmia    SVT    Attention deficit disorder    Breast CA (HCC)    Depression    Esophageal reflux    High blood pressure    OTHER DISEASES    seasonal allergies    Pancreatitis (HCC)    Pneumonia due to organism    SVT (supraventricular tachycardia) (HCC)    Type 1 diabetes mellitus (HCC)              Past Surgical History:   Procedure Laterality Date    Breast surgery      Implant left      Implant right      Mastectomy left      Mastectomy right      Ndsc ablation & rcnstj [facility]ria [facility] w/o bypass                  Social History     Socioeconomic History    Marital status: Single   Tobacco Use    Smoking status: Every Day     Current packs/day: 0.50     Types: Cigarettes    Smokeless tobacco: Never    Tobacco comments:     1/2ppd x 8 years   Vaping Use    Vaping status: Never Used   Substance and Sexual Activity    Alcohol use: Yes     Alcohol/week: 7.0 standard drinks of alcohol     Types: 7 Shots of liquor per week     Comment: 2 handles of vodka in  the last 2 days    Drug use: Yes     Frequency: 7.0 times per week     Types: Cannabis     Comment: use daily   Other Topics Concern    Exercise Yes     Comment: MODERATE    Seat Belt Yes    Self-Exams Yes     Social Drivers of Health     Food Insecurity: No Food Insecurity (12/17/2024)    Food Insecurity     Food Insecurity: Never true   Transportation Needs: No Transportation Needs (12/17/2024)    Transportation Needs     Lack of Transportation: No   Housing Stability: Low Risk  (12/17/2024)    Housing Stability     Housing Instability: No                  Physical Exam     ED Triage Vitals [02/05/25 1741]   /83   Pulse (!) 145   Resp 24   Temp 97.8 °F (36.6 °C)   Temp src Temporal   SpO2 96 %   O2 Device None (Room air)       Current Vitals:   Vital Signs  BP: 114/78  Pulse: 91  Resp: 17  Temp: 97.8 °F (36.6 °C)  Temp src: Temporal  MAP (mmHg): 89    Oxygen Therapy  SpO2: 91 %  O2 Device: None (Room air)        Physical Exam  GENERAL: Patient is awake, alert, tremulous  HEENT: Pupils equal round reactive to light, extraocular muscles are intact, there is no scleral icterus.  Mucous membranes are dry, oropharynx is clear, uvula midline.    Scalp is atraumatic.  NECK: Neck is supple, there is no nuchal rigidity.    HEART: Tachycardic rate regular rhythm   LUNGS: Clear to auscultation bilaterally.  No Rales, no rhonchi, no wheezing, no stridor.  ABDOMEN: Soft, nondistended, diffuse upper abdominal tender,  no rebound, no rigidity, no guarding.no pulsatile masses. No CVA tenderness  EXTREMITIES: No peripheral edema, no calf tenderness  NEUROLOGIC EXAM: Tongue midline, no facial drooping, no ptosis, moves all 4 extremities daily equally      ED Course     Labs Reviewed   COMP METABOLIC PANEL (14) - Abnormal; Notable for the following components:       Result Value    Glucose 155 (*)     Sodium 135 (*)     Chloride 92 (*)     CO2 16.0 (*)     Anion Gap 27 (*)     BUN 7 (*)     Alkaline Phosphatase 108 (*)      Total Protein 8.6 (*)     Albumin 5.2 (*)     All other components within normal limits   CBC WITH DIFFERENTIAL WITH PLATELET - Abnormal; Notable for the following components:    RBC 5.61 (*)     HGB 18.1 (*)     HCT 51.6 (*)     All other components within normal limits   ETHYL ALCOHOL - Abnormal; Notable for the following components:    Ethyl Alcohol 120 (*)     All other components within normal limits   ACETAMINOPHEN (TYLENOL), S - Abnormal; Notable for the following components:    Acetaminophen <2.0 (*)     All other components within normal limits   SALICYLATE, SERUM - Abnormal; Notable for the following components:    Salicylate <3.0 (*)     All other components within normal limits   MAGNESIUM - Abnormal; Notable for the following components:    Magnesium 1.4 (*)     All other components within normal limits   ABG PANEL W ELECT AND LACTATE - Abnormal; Notable for the following components:    ABG pCO2 30 (*)     ABG pO2 70 (*)     ABG Base Excess -2.5 (*)     Arterial Blood Gas O2Hb 91.9 (*)     Sodium Blood Gas 130 (*)     Lactic Acid (Blood Gas) 4.4 (*)     All other components within normal limits   LACTIC ACID, PLASMA - Abnormal; Notable for the following components:    Lactic Acid 4.3 (*)     All other components within normal limits   LIPASE - Normal   TSH W REFLEX TO FREE T4 - Normal   ACETONE - Normal   URINALYSIS WITH CULTURE REFLEX   DRUG SCREEN 8 W/OUT CONFIRMATION, URINE   URINALYSIS WITH CULTURE REFLEX   RAINBOW DRAW LAVENDER   RAINBOW DRAW LIGHT GREEN   RAINBOW DRAW BLUE   RAINBOW DRAW GOLD     EKG    Rate, intervals and axes as noted on EKG Report.  Rate: 134  Rhythm: Sinus Rhythm  Reading: Sinus tachycardia, no ST elevation.              A total of 35 minutes of critical care time (exclusive of billable procedures) was administered to manage the patient's neurologic instability and metabolic instability due to her alcohol withdrawal/alcoholic ketoacidosis/pancreatitis.  This involved direct  patient intervention, complex decision making, and/or extensive discussions with the patient, family, and clinical staff.              MDM        Differential diagnosis before testing includes but not limited to alcohol withdrawal, pancreatitis, perforated viscus, electrolyte abnormality, which is a medical condition that poses a threat to life/function    Past Medical History/comorbidities-as noted in HPI    Radiographic images  I personally reviewed the radiographs and my individual interpretation shows CT abdomen, no free air I also reviewed the official reports that showed CT no acute process, findings of chronic pancreatitis with persistent intra pancreatic pseudocyst    Discussion of management (consult/physicians, social work, pharmacy,ect) Dr. Hankins hospitalist, Dr. Edward, critical care    Medications Provided: Ativan, multivitamin, IV normal saline, Dilaudid    Course of Events during Emergency Room Visit include patient IV established, placed on cardiac monitor and pulse ox.  CIWA scale performed, patient was given IV Ativan per protocol.  Patient did receive IV fluids and multivitamin.  Chemistry sodium 135 potassium 3.7 bicarb 16 BUN 7 creatinine 0.9 anion gap 27.  Glucose 155.  CT abdomen/pelvis performed.  Discussed patient with pulmonary/critical care who evaluated the patient in ER as well as hospitalist.  Patient will be admitted to the ICU for further evaluation and treatment.  Abdomen is remained soft nonsurgical.  Vital signs are stable.    Shared decision making was utilized           Disposition:    Admission  I have discussed with the patient the results of test, differential diagnosis, and treatment plan. They expressed clear understanding of these instructions and agrees to the plan provided.     Note to patient: The 21st Century Cures Act makes medical notes like these available to patients in the interest of transparency. However, this is a medical document intended as peer to peer  communication. It is written in medical language and may contain abbreviations or verbiage that are unfamiliar. It may appear blunt or direct. Medical documents are intended to carry relevant information, facts as evident, and the clinical opinion of the practitioner.             Admission disposition: 2/5/2025  8:46 PM           Medical Decision Making      Disposition and Plan     Clinical Impression:  1. Alcohol withdrawal syndrome with complication (HCC)    2. Alcoholic ketoacidosis    3. Hypomagnesemia    4. Alcohol-induced chronic pancreatitis (HCC)         Disposition:  Admit  2/5/2025  8:46 pm    Follow-up:  No follow-up provider specified.        Medications Prescribed:  Current Discharge Medication List              Supplementary Documentation:         Hospital Problems       Present on Admission  Date Reviewed: 1/29/2023            ICD-10-CM Noted POA    * (Principal) Alcohol withdrawal syndrome with complication (HCC) F10.939 12/17/2024 Unknown

## 2025-02-06 NOTE — DISCHARGE SUMMARY
[facility]  Discharge Summary    Blanca Coats Patient Status:  Inpatient    1980 MRN [id number]   Location [facility] 4SW-A Attending Madelyn Hankins MD   Hosp Day # 1 PCP Liya Velez PA-C     Date of Admission: 2025    Date of Discharge:  2025      Disposition: Home or Self Care    Discharge Diagnosis:   # ETOH withdrawal   # Anion gap metabolic acidosis likely due to alcoholic ketoacidosis  # acute on chronic pancreatitis   # Intractable nausea and vomiting due to above  # chronic splenic vein occlusion   # Diabetes mellitus type 1 on insulin pump at home which patient did not use for 24 hours and admitted with mild hyperglycemia and treated with long-acting insulin Tresiba in hospital, blood sugar controlled with this  # Mild pseudohyponatremia due to hyperglycemia   # essential HTN   # depression     Chief Complaint:   Chief Complaint   Patient presents with    Eval-D     alcohol withdrawl, last drink this AM   hx of a-fib, SVT          History of Present Illness:   Blanca Coats is a 44 year old female with DM2, alcohol abuse, chronic pancreatitis, HTN, depression, SVTA presented with N/V/abd pain. She drinks \"2 handle of vodka\" per day and her last drink was this morning. She feels dizzy. No CP/SOB/fever, chills, diarrhea reported. No melena, hematochezia, hematemesis.      Please refer to history and physical done by Dr. Hankins for details on admission    Brief Synopsis:   Patient monitored in ICU with CIWA protocol with Ativan.  Patient improved clinically.  Off CIWA this morning.  Tolerating diet.  No more tremors.  Advised abstinence from alcohol, discussed in detail regarding effects of alcohol and body, patient verbalized understanding     # AGMA likely due to alcoholic ketoacidosis  -Improved with IV fluids     # acute on chronic pancreatitis   Improved with conservative management with  - NPO  - bowel rest  - CT abdomen pelvis done on 2025 with no acute process  within abdomen or pelvis.  Findings of chronic pancreatitis with persistent intra pancreatic pseudocyst of the body and tail segments similar in appearance to 12/17/2024.  Moderate hepatic steatosis.  Chronic splenic vein occlusion with extensive portosystemic collaterals of the upper abdomen.  -Given IVF  - antiemetic as needed  - pain control as needed    Improved clinically and diabetic diet advanced and tolerated.     # Intractable nausea and vomiting and epigastric pain   - due to above and may also have an element of alcoholic gastritis-will give PPI  -Improved.  Tolerated diet with no nausea vomiting.     # chronic splenic vein occlusion   # essential HTN   -Continue home beta-blocker  -Blood pressure improved.     # depression, anxiety-continue home medications    Patient improved clinically and eager to go home now.  Okayed for discharge by critical care physician.  Follow-up with regular outpatient endocrinologist, follow-up with regular outpatient primary care physician within 1 week in office with blood sugar log at home      TCM Diagnosis at discharge from Hospital: Other: See above; still recommend for TCM follow-up    Lace+ Score: 57  59-90 High Risk  29-58 Medium Risk  0-28   Low Risk.     TCM Follow-Up Recommendation:Blanca LÓPEZ 29-58: Moderate Risk of readmission after discharge from the hospital.      PCP: Liya Velez PA-C    Consultations:   Consultants         Provider   Role Specialty     Michael Aldrich MD      Consulting Physician Emergency Medicine            Procedures during hospitalization:   None    Incidental or significant findings and recommendations (brief descriptions):  None    Lab/Test results pending at Discharge:   None      Discharge Medications:        Discharge Medications        START taking these medications        Instructions Prescription details   folic acid 1 MG Tabs  Commonly known as: Folvite      Take 1 tablet (1 mg total) by mouth daily.    Quantity: 30 tablet  Refills: 0     thiamine 100 MG Tabs  Commonly known as: Vitamin B1      Take 1 tablet (100 mg total) by mouth daily.   Quantity: 30 tablet  Refills: 0            CONTINUE taking these medications        Instructions Prescription details   ALPRAZolam 0.25 MG Tabs  Commonly known as: Xanax      Take 1 tablet (0.25 mg total) by mouth 3 (three) times daily as needed.   Refills: 0     amphetamine-dextroamphetamine ER 30 MG Cp24  Commonly known as: Adderall XR      Take 1 capsule (30 mg total) by mouth every morning.   Refills: 0     amphetamine-dextroamphetamine ER 10 MG Cp24  Commonly known as: Adderall XR      Take 1 capsule (10 mg total) by mouth every morning.   Refills: 0     atorvastatin 10 MG Tabs  Commonly known as: Lipitor      Take 1 tablet (10 mg total) by mouth daily.   Refills: 0     busPIRone 10 MG Tabs  Commonly known as: Buspar      Take 2 tablets (20 mg total) by mouth 2 (two) times daily.   Refills: 0     gabapentin 300 MG Caps  Commonly known as: Neurontin      Take 1 capsule (300 mg total) by mouth in the morning and 1 capsule (300 mg total) before bedtime.   Refills: 0     haloperidol 5 MG Tabs  Commonly known as: Haldol      Take 1 tablet (5 mg total) by mouth at bedtime.   Refills: 0     metoprolol succinate  MG Tb24  Commonly known as: Toprol XL      Take 1 tablet (100 mg total) by mouth 2 (two) times daily. Take half tablet if blood pressure less than 130/80   Refills: 0     montelukast 10 MG Tabs  Commonly known as: Singulair      Take 1 tablet (10 mg total) by mouth daily.   Refills: 0     Naloxone HCl 4 MG/0.1ML Liqd      4 mg by Nasal route as needed. If patient remains unresponsive, repeat dose in other nostril 2-5 minutes after first dose.   Quantity: 1 kit  Refills: 0     ondansetron 4 MG Tbdp  Commonly known as: Zofran-ODT      Take 1 tablet (4 mg total) by mouth every 8 (eight) hours as needed for Nausea.   Quantity: 20 tablet  Refills: 0     OneTouch Verio  Strp      Check blood sugar two times day, before breakfast and before dinner.  (may substitute any brand covered by insurance)   Quantity: 50 strip  Refills: 0     pantoprazole 40 MG Tbec  Commonly known as: Protonix      Take 1 tablet (40 mg total) by mouth 2 (two) times daily before meals.   Quantity: 60 tablet  Refills: 0     sertraline 100 MG Tabs  Commonly known as: Zoloft      Take 2 tablets (200 mg total) by mouth daily.   Refills: 0     traZODone 100 MG Tabs  Commonly known as: Desyrel      Take 2 tablets (200 mg total) by mouth nightly.   Refills: 0            STOP taking these medications      HumaLOG KwikPen 100 UNIT/ML Sopn  Generic drug: Insulin Lispro (1 Unit Dial)        Pancrelipase (Lip-Prot-Amyl) 50489-379556 units Cpep               ASK your doctor about these medications        Instructions Prescription details   Lantus SoloStar 100 UNIT/ML Sopn  Generic drug: insulin glargine      Inject 15 Units into the skin every morning.   Refills: 0               [place] prescription monitoring program data reviewed in epic before prescribing narcotics/controlled substances: Yes    Follow up Visits: Follow-up with Liya Velez PA-C in 1 week    Liya Velez PA-C  [address] [facility] [id number]  [phone]    Schedule an appointment as soon as possible for a visit in 1 week(s)      Appointments for Next 30 Days 2/6/2025 - 3/8/2025        Date Arrival Time Visit Type Length Department Provider     2/11/2025 11:00 AM  EXAM - NEW PATIENT [9825] 30 min [facility] Roxana Bernabe DO    Patient Instructions:         Location Instructions:     Masks are optional for all patients and visitors, unless otherwise indicated.                        Physical Exam:  /81   Pulse 71   Temp 98.1 °F (36.7 °C) (Temporal)   Resp 13   Ht 5' 6\" (1.676 m)   Wt 137 lb 9.1 oz (62.4 kg)   LMP 01/07/2025 (Approximate)   SpO2 94%   BMI 22.20  kg/m²       General appearance: alert and cooperative  Throat: oral mucosa moist  Lungs: clear to auscultation bilaterally  Heart: S1, S2 normal, no murmur,  regular rate and rhythm  Abdomen: soft, non-tender; bowel sounds normal  Extremities: extremities normal,no cyanosis or edema  Neurologic: Awake,alert,nonfocal  Psych: Mood and affect appears normal      Discharge Condition: stable    Patient Discharge Instructions: See electronic chart      More than 30 minutes on discharge    Asuncion Dejesus MD  2/6/2025  9:16 AM

## 2025-02-06 NOTE — PLAN OF CARE
Pt admitted from ER. A&Ox4. Drowsy but easily arousable. CIWA protocol in place, see flowsheets. Not currently requiring any prns. Room air. NSR, Bps WDL. Complains of pain in abdomen, prn dilaudid given. Voiding in commode.Transfer orders in place.

## 2025-02-06 NOTE — H&P
[facility]     Blanca Coats Patient Status:  Emergency    1980 MRN [id number]   Location [facility] EMERGENCY DEPARTMENT Attending Andrzej Smith,    Hosp Day # 0 PCP Liya Velez PA-C     Chief Complaint: N/V     Subjective:    History of Present Illness:     Blanca Coats is a 44 year old female with DM2, alcohol abuse, chronic pancreatitis, HTN, depression, SVTA presented with N/V/abd pain. She drinks \"2 handle of vodka\" per day and her last drink was this morning. She feels dizzy. No CP/SOB/fever, chills, diarrhea reported. No melena, hematochezia, hematemesis.     History/Other:    Past Medical History:  Past Medical History:    Anxiety    Arrhythmia    SVT    Attention deficit disorder    Breast CA (HCC)    Depression    Esophageal reflux    High blood pressure    OTHER DISEASES    seasonal allergies    Pancreatitis (HCC)    Pneumonia due to organism    SVT (supraventricular tachycardia) (HCC)    Type 1 diabetes mellitus (HCC)     Past Surgical History:   Past Surgical History:   Procedure Laterality Date    Breast surgery      Implant left      Implant right      Mastectomy left      Mastectomy right      Ndsc ablation & rcnstj atria lmtd w/o bypass        Family History:   Family History   Problem Relation Age of Onset    Heart Disorder Father     Diabetes Father     Hypertension Mother     Cancer Mother         breast     Social History:    reports that she has been smoking cigarettes. She has never used smokeless tobacco. She reports current alcohol use of about 7.0 standard drinks of alcohol per week. She reports current drug use. Frequency: 7.00 times per week. Drug: Cannabis.     Allergies: Allergies[1]    Medications:  Medications Ordered Prior to Encounter[2]    Review of Systems:   A comprehensive review of systems was completed.    Pertinent positives and negatives noted in the HPI.    Objective:   Physical Exam:    BP (!) 116/93   Pulse 104    Temp 97.8 °F (36.6 °C) (Temporal)   Resp 19   Ht 5' 6\" (1.676 m)   Wt 145 lb (65.8 kg)   LMP 01/07/2025 (Approximate)   SpO2 97%   BMI 23.40 kg/m²   General: No acute distress, Alert  Respiratory: No rhonchi, no wheezes  Cardiovascular: S1, S2. Regular rate and rhythm  Abdomen: Soft, ND, + epigastric tenderness   Neuro: No new focal deficits  Extremities: No edema      Results:    Labs:      Labs Last 24 Hours:    Recent Labs   Lab 02/05/25  1835   RBC 5.61*   HGB 18.1*   HCT 51.6*   MCV 92.0   MCH 32.3   MCHC 35.1   RDW 12.3   NEPRELIM 3.71   WBC 6.4   .0       Recent Labs   Lab 02/05/25  1835   *   BUN 7*   CREATSERUM 0.89   EGFRCR 83   CA 9.9   ALB 5.2*   *   K 3.7   CL 92*   CO2 16.0*   ALKPHO 108*   AST 24   ALT 19   BILT 0.8   TP 8.6*       Estimated Glomerular Filtration Rate: 83.1 mL/min/1.73m2 (by CKD-EPI based on SCr of 0.89 mg/dL).    No results found for: \"PT\", \"INR\"    No results for input(s): \"TROP\", \"TROPHS\", \"CK\" in the last 168 hours.    No results for input(s): \"TROP\", \"PBNP\" in the last 168 hours.    No results for input(s): \"PCT\" in the last 168 hours.    Imaging: Imaging data reviewed in Epic.    Assessment & Plan:      # ETOH withdrawal   - CIWA  - replace lyte  - psych liasion     # AGMA likely due to euglycemia DKA?  - insulin drip  - monitor BMP  - IVF    # acute on chronic pancreatitis   - NPO  - bowel rest  - CT AP pending   - IVF  - antiemetic  - pain control     # Intractable nausea and vomiting   - due to above     # chronic splenic vein occlusion   # essential HTN   - BB IV PRN     # depression       Plan of care discussed with patient and ER>     Madelyn Hankins MD    Supplementary Documentation:     The 21st Century Cures Act makes medical notes like these available to patients in the interest of transparency. Please be advised this is a medical document. Medical documents are intended to carry relevant information, facts as evident, and the clinical opinion  of the practitioner. The medical note is intended as peer to peer communication and may appear blunt or direct. It is written in medical language and may contain abbreviations or verbiage that are unfamiliar.                                       [1]   Allergies  Allergen Reactions    Bee Venom ANAPHYLAXIS    Morphine HIVES    Fentanyl RASH   [2]   Current Facility-Administered Medications on File Prior to Encounter   Medication Dose Route Frequency Provider Last Rate Last Admin    [COMPLETED] sodium chloride 0.9 % IV bolus 1,000 mL  1,000 mL Intravenous Once Samy Jackson MD   Stopped at 25 1428    [COMPLETED] HYDROmorphone (Dilaudid) 1 MG/ML injection 1 mg  1 mg Intravenous Once Samy Jackson MD   1 mg at 25 1330    [COMPLETED] ondansetron (Zofran) 4 MG/2ML injection 4 mg  4 mg Intravenous Once Samy Jackson MD   4 mg at 25 1331    [COMPLETED] HYDROmorphone (Dilaudid) 1 MG/ML injection 1 mg  1 mg Intravenous Once Samy Jackson MD   1 mg at 25 1453    [COMPLETED] magnesium oxide (Mag-Ox) tab 800 mg  800 mg Oral Once Nj Ashley MD   800 mg at 24 0623    [COMPLETED] sodium phosphate 15 mmol in 0.9% NaCl 100mL IVPB premix  15 mmol Intravenous Once Nj Ashley MD   15 mmol at 24 0536    [] naloxone (Narcan) 0.4 MG/ML injection 0.08 mg  0.08 mg Intravenous Once PRN Reymundo Wayne MD        [COMPLETED] magnesium oxide (Mag-Ox) tab 800 mg  800 mg Oral Once Nj Ashley MD   800 mg at 24 1308    [COMPLETED] insulin aspart (NovoLOG) 100 Units/mL FlexPen 1-20 Units  1-20 Units Subcutaneous Once Whit Frankel APRN   6 Units at 24 1419    [COMPLETED] magnesium sulfate in sterile water for injection 2 g/50mL IVPB premix 2 g  2 g Intravenous Once Michael Aldrich MD 50 mL/hr at 24 1423 2 g at 24 1423    [COMPLETED] sodium phosphate 30 mmol in sodium chloride 0.9% 150 mL IVPB  30 mmol Intravenous Once Xenia Castañeda, APRN 37.5  mL/hr at 24 0144 30 mmol at 24 0144    [COMPLETED] magnesium sulfate 4 g/100mL IVPB premix 4 g  4 g Intravenous Once Xenia Castañeda APRN 50 mL/hr at 24 0137 4 g at 24 0137    [COMPLETED] potassium chloride (Klor-Con M20) tab 40 mEq  40 mEq Oral Q4H RahatfaizanSimone benton, DO   40 mEq at 24 1332    [COMPLETED] LORazepam (Ativan) 2 mg/mL injection 2 mg  2 mg Intravenous Once Samy Jackson MD   2 mg at 24    [COMPLETED] sodium chloride 0.9 % IV bolus 1,000 mL  1,000 mL Intravenous Once Samy Jackson MD   Stopped at 24 192    [COMPLETED] dilTIAZem (cardIZEM) 25 mg/5mL injection 10 mg  10 mg Intravenous Once Samy Jackson MD   10 mg at 24    [COMPLETED] thiamine 100 mg/mL injection 100 mg  100 mg Intravenous Once Samy Jackson MD   100 mg at 24    [COMPLETED] dilTIAZem (cardIZEM) 25 mg/5mL injection 10 mg  10 mg Intravenous Once Samy Jackson MD   10 mg at 24 184    [COMPLETED] HYDROmorphone (Dilaudid) 1 MG/ML injection 1 mg  1 mg Intravenous Once Samy Jackson MD   1 mg at 24 185    [COMPLETED] ondansetron (Zofran) 4 MG/2ML injection 4 mg  4 mg Intravenous Once Samy Jackson MD   4 mg at 24 185    [COMPLETED] metoprolol (Lopressor) 5 mg/5mL injection 5 mg  5 mg Intravenous Once Samy Jackson MD   5 mg at 24 190    [] sodium chloride 0.9% infusion   Intravenous Continuous Samy Jackson MD        [] HYDROmorphone (Dilaudid) 1 MG/ML injection 0.5 mg  0.5 mg Intravenous Q30 Min PRN Samy Jackson MD        [] ondansetron (Zofran) 4 MG/2ML injection 4 mg  4 mg Intravenous Q4H PRN Samy Jackson MD        [COMPLETED] dextrose 5%-sodium chloride 0.9% infusion   Intravenous Once Samy Jackson MD 75 mL/hr at 24 New Bag at 24    [COMPLETED] metoprolol (Lopressor) 5 mg/5mL injection 5 mg  5 mg Intravenous Once Samy Jackson MD   5 mg at 24     [COMPLETED] iopamidol 76% (ISOVUE-370) injection for power injector  100 mL Intravenous ONCE PRN Samy Jackson MD   100 mL at 24    [COMPLETED] lactated ringers IV bolus 1,000 mL  1,000 mL Intravenous Once Xenia Castañeda APRN 1,000 mL/hr at 24 2314 1,000 mL at 24 2314    [COMPLETED] iopamidol 76% (ISOVUE-370) injection for power injector  80 mL Intravenous ONCE PRN Ren Puente MD   80 mL at 24 0011    [COMPLETED] thiamine 100 mg/mL injection 100 mg  100 mg Intravenous Once Sylvester Gonzales DO   100 mg at 24 0819    [COMPLETED] sodium chloride 0.9 % IV bolus 1,000 mL  1,000 mL Intravenous Once Ren Puente MD   Stopped at 24 0018    [COMPLETED] ondansetron (Zofran) 4 MG/2ML injection 4 mg  4 mg Intravenous Once Ren Puente MD   4 mg at 24 2317    [COMPLETED] HYDROmorphone (Dilaudid) 1 MG/ML injection 0.5 mg  0.5 mg Intravenous Q30 Min PRN Ren Puente MD   0.5 mg at 24 0252    [COMPLETED] potassium phosphate dibasic 15 mmol in sodium chloride 0.9% 250 mL IVPB  15 mmol Intravenous Once Jaimie Gray APRN   Stopped at 24 1042    Followed by    [COMPLETED] potassium chloride 20 mEq/100mL IVPB premix 20 mEq  20 mEq Intravenous Once Jaimie Gray APRN 50 mL/hr at 24 1129 20 mEq at 24 1129    [COMPLETED] chlordiazePOXIDE (Librium) cap 25 mg  25 mg Oral Q8H Ashley Young APRN   25 mg at 24 0803    [COMPLETED] sodium chloride 0.9 % IV bolus 1,000 mL  1,000 mL Intravenous Once Ezra Ritchie MD   Stopped at 24 1339    [COMPLETED] ondansetron (Zofran) 4 MG/2ML injection 4 mg  4 mg Intravenous Once Ezra Ritchie MD   4 mg at 24 1234    [COMPLETED] HYDROmorphone (Dilaudid) 1 MG/ML injection 0.5 mg  0.5 mg Intravenous Q30 Min PRN Ezra Ritchie MD   0.5 mg at 24 1513    [] sodium chloride 0.9% infusion   Intravenous Continuous Ezra Ritchie MD        [] ondansetron (Zofran) 4 MG/2ML  injection 4 mg  4 mg Intravenous Q4H PRN Ezra Ritchie MD        [COMPLETED] iopamidol 76% (ISOVUE-370) injection for power injector  80 mL Intravenous ONCE PRN Ezra Ritchie MD   80 mL at 24 1400    [COMPLETED] dextrose 5%-sodium chloride 0.45% infusion   Intravenous Once Ezra Ritchie  mL/hr at 24 1445 New Bag at 24 1445    [COMPLETED] thiamine 100 mg/mL injection 100 mg  100 mg Intravenous Once Ashley Young APRN   100 mg at 24 1733    [COMPLETED] potassium phosphate dibasic 15 mmol in sodium chloride 0.9% 250 mL IVPB  15 mmol Intravenous Once Ashley Young APRN 62.5 mL/hr at 24 1752 15 mmol at 24 175    Followed by    [COMPLETED] sodium phosphate 15 mmol in 0.9% NaCl 100mL IVPB premix  15 mmol Intravenous Once Ashley Young APRN   15 mmol at 24 215    [COMPLETED] magnesium sulfate 4 g/100mL IVPB premix 4 g  4 g Intravenous Once Ashley Young APRN 50 mL/hr at 24 1757 4 g at 24 1757     Current Outpatient Medications on File Prior to Encounter   Medication Sig Dispense Refill    LANTUS SOLOSTAR 100 UNIT/ML Subcutaneous Solution Pen-injector Inject 15 Units into the skin every morning.      pantoprazole 40 MG Oral Tab EC Take 1 tablet (40 mg total) by mouth 2 (two) times daily before meals. 60 tablet 0    [] Pancrelipase, Lip-Prot-Amyl, 24895-135468 units Oral Cap DR Particles Take 1 capsule by mouth with breakfast, with lunch, and with evening meal. 120 capsule 0    atorvastatin 10 MG Oral Tab Take 1 tablet (10 mg total) by mouth daily.      folic acid 1 MG Oral Tab Take 1 tablet (1 mg total) by mouth daily. 30 tablet 0    thiamine 100 MG Oral Tab Take 1 tablet (100 mg total) by mouth daily. 30 tablet 0    Naloxone HCl 4 MG/0.1ML Nasal Liquid 4 mg by Nasal route as needed. If patient remains unresponsive, repeat dose in other nostril 2-5 minutes after first dose. 1 kit 0    HUMALOG KWIKPEN 100 UNIT/ML Subcutaneous Solution Pen-injector  Inject 25 units per sliding scale four times a day by subcutaneous route      Glucose Blood (ONETOUCH VERIO) In Vitro Strip Check blood sugar two times day, before breakfast and before dinner.  (may substitute any brand covered by insurance) 50 strip 0    ondansetron 4 MG Oral Tablet Dispersible Take 1 tablet (4 mg total) by mouth every 8 (eight) hours as needed for Nausea. 20 tablet 0    metoprolol succinate 100 MG Oral Tablet 24 Hr Take 1 tablet (100 mg total) by mouth 2 (two) times daily. Take half tablet if blood pressure less than 130/80  0    haloperidol 5 MG Oral Tab Take 1 tablet (5 mg total) by mouth at bedtime.      Amphetamine-Dextroamphet ER 10 MG Oral Capsule SR 24 Hr Take 1 capsule (10 mg total) by mouth every morning.      Amphetamine-Dextroamphet ER 30 MG Oral Capsule SR 24 Hr Take 1 capsule (30 mg total) by mouth every morning.      ALPRAZolam 0.25 MG Oral Tab Take 1 tablet (0.25 mg total) by mouth 3 (three) times daily as needed.      gabapentin 300 MG Oral Cap Take 1 capsule (300 mg total) by mouth in the morning and 1 capsule (300 mg total) before bedtime.      Montelukast Sodium 10 MG Oral Tab Take 1 tablet (10 mg total) by mouth daily.      Sertraline HCl 100 MG Oral Tab Take 2 tablets (200 mg total) by mouth daily.      busPIRone 10 MG Oral Tab Take 2 tablets (20 mg total) by mouth 2 (two) times daily.      traZODone HCl 100 MG Oral Tab Take 2 tablets (200 mg total) by mouth nightly.

## 2025-02-06 NOTE — CM/SW NOTE
Acknowledged order for PHQ4.  Pt has already discharged.  Noted [facility] counselor/Psych liaison met w/pt.for CIWA/ETOH follow up  Resources on AVS      Rachael Pacheco MBA MSN, RN CTL/  [phone]

## 2025-02-06 NOTE — PROGRESS NOTES
CRITICAL CARE            S: She is having some abdominal pain. She just ate. She wants to go home to take care of her dogs.    Meds:   insulin degludec  15 Units Subcutaneous Daily    insulin aspart  2-10 Units Subcutaneous TID AC and HS    insulin aspart  1-68 Units Subcutaneous TID CC    thiamine  500 mg Intravenous Q8H    Followed by    [START ON 2/9/2025] thiamine  250 mg Intravenous Daily    Followed by    [START ON 2/12/2025] thiamine  100 mg Intravenous Daily    folic acid  1 mg Intravenous Daily    multivitamin  1 tablet Oral Daily    enoxaparin  40 mg Subcutaneous Daily       Prn Meds:    glucose **OR** glucose **OR** glucose-vitamin C **OR** dextrose **OR** glucose **OR** glucose **OR** glucose-vitamin C    HYDROmorphone **OR** HYDROmorphone **OR** HYDROmorphone    ondansetron    prochlorperazine    Infusions:      OBJECTIVE:  Vitals:    02/06/25 0230 02/06/25 0300 02/06/25 0400 02/06/25 0500   BP: 95/64 107/64 105/64 101/71   Pulse: 85 64 69 71   Resp: 16 15 17 15   Temp:  98.3 °F (36.8 °C)     TempSrc:  Temporal     SpO2: 91% 91% 93% 92%   Weight:       Height:         O2: room air    Gen - Alert, oriented x 3, in no apparent distress  Lungs - CTAB  CV - regular rate & rhythm. Normal S1, S2. No murmurs, rubs, or gallops appreciated.  Abdomen - soft, nontender to palpation   Extremities - No cyanosis, clubbing, edema appreciated.        Labs:  Recent Labs   Lab 02/05/25 1835 02/06/25  0009   WBC 6.4 4.3   HGB 18.1* 13.4   .0 131.0*     Recent Labs   Lab 02/05/25 1835 02/05/25 2122 02/06/25  0009 02/06/25  0351   *  --  131*  131* 133*   K 3.7  --  4.0  4.0 3.7   CL 92*  --  96*  96* 98   CO2 16.0*  --  25.0  25.0 28.0   BUN 7*  --  <5*  <5* <5*   CREATSERUM 0.89  --  0.56  0.56 0.54*   *  --  192*  192* 79   ANIONGAP 27*  --  10  10 7   ALB 5.2*  --  3.6  --    CA 9.9  --  8.6*  8.6* 8.9   MG 1.4*  --   --  1.6   ALKPHO 108*  --  74  --    AST 24  --  16  --    ALT 19   --  8*  --    BILT 0.8  --  1.0  --    TP 8.6*  --  6.2  --    LIP 26  --   --   --    LACTI  --  4.3* 1.3  --        Recent Labs   Lab 02/05/25 2117   ABGPHT 7.44   SSYNEE0B 30*   OEKLK7M 70*   ABGHCO3 22.8   ABGBE -2.5*         Imaging reviewed    ASSESSMENT AND PLAN      Alcohol abuse /withdrawal - improved  -recommend quitting drinking  -patient was given resources to help with etoh abuse  Alcoholic ketoacidosis - resolved  -monitor  Chronic pancreatitis  -outpatient follow up   DM  -monitor accuchecks  -continue insulin   Nutrition  -diabetic diet  Dispo  -patient is stable for discharge     Walter Dykes M.D.  Pulmonary/Critical Care

## 2025-02-06 NOTE — CONSULTS
This is a 44-year-old female with a past medical history significant for heavy alcohol abuse who presents to our hospital for abdominal pain.  Patient says she is a chronic alcoholic and that she gets pancreatitis frequently that when her pancreatitis flares up she comes to the emergency department.  He is told me that she needs Dilaudid for her epigastric pain and that this would help her sleep.  Noted that the patient was asleep when I came to examine her when I aroused her to tell the patient was intoxicated though she was able to have a full conversation.    She told me that she had mild epigastric discomfort.  That she had been drinking over the last 2 to 3 days and she has been drinking heavily.  She Cesar that she has not eaten anything in the last 2 or 3 days and she has had no food only alcohol and occasionally water.    On exam she was awake alert oriented x 4.  She had good pulses in bilateral upper and lower extremities.  Her abdomen was soft she had subjective tenderness in the epigastrium there was no rebound no guarding and no rigidity on her exam.  She was on no oxygen she was satting 100% on room air although she appeared somewhat dehydrated as there was a variability seen in the Plath.    Her lung sounds were clear, neurologically the patient did have some nystagmus consistent with alcohol intoxication and her speech was nonfluent again consistent with alcohol intoxication.    Her labs demonstrated a pH of 7.44 pCO2 of 30 a lactate of 4.4.  Sodium of 135 a bicarb of 16 and a chloride of 92.  Creatinine was 0.89 AST and ALT were normal alkaline phosphatase was 108.  She had a lipase of 26.  Her albumin was 5.2 her TSH was 1.1 her hemoglobin was very hemoconcentrated at 18 which she has had in the past when she has been very dry with alcoholic ketoacidosis.  Her platelet count was 228.    In summary this is a 44-year-old female that presents to us with likely alcoholic ketoacidosis after heavy binge  drinking episode without significant p.o. intake and profound dehydration.    Problems  Alcohol abuse  Chronic pancreatitis  Chronic abdominal pain  Lactic acidosis  Likely ketoacidosis  Anion gap metabolic acidosis that I presume is next to ketosis although I have not seen ketones yet to verify this  Diabetes  ADHD  Depression anxiety        Plan  Alcohol abuse  Should be monitored in the intensive care unit with CIWA protocol  Will use phenobarbital as needed    Will start her on fluids with D5 LR at 125 cc an hour  I will put her on thiamine which will be Warnicke encephalopathy dosing    Chronic pancreatitis  Currently does not appear to any active issues with the pancreatitis she will receive pain meds as needed though she has some features about drug-seeking behavior asking specifically for Dilaudid and for specific doses of Dilaudid.  She also was not uncomfortable she was sleeping when I first saw her on examination and had to be woken up with physical stimuli.    Will monitor her closely in the intensive care unit    Lactic acidosis  Again I presume this is secondary to very poor p.o. intake profound dehydration  Will continue with volume resuscitation as she is somewhat dehydrated    Lactic acid can also be elevated in the setting of thiamine deficiency which we are going to give her thiamine supplementation at this point    Psychiatry should see the patient for her chronic alcohol abuse as I think this would be helpful for the patient to avoid coming to the intensive care unit      Diabetes  Patient is a type 1 Diabetic on an insulin pump she hasn't worn for 24 hours but also hasn't eaten. She is not markedly hyperglycemic, and it is possible she does not have DKA at all and the AGMA is 2/2 alcoholic ketosis. IF it is AKA, this should improve very quickly with a marked improvement in bicabr.    IF her bicarb is worsened or not improving it could be euglycemic DKA (unlikely) which I would start her on an  insulin infusion for.    If she needs long acting insulin since she does not have her omnipod (was on 0.8 units/hour basal) ocnverts to 19 Units per day can give her 15U while she is acutely ill. We can then continue her carb coverage and sliding scale.    Depression anxiety  Can be restarted on her home meds    ADHD can be restarted on her home dose of medication    Prophylaxis  Heparin subcu  GI prophylaxis is not indicated though she may have gastritis from alcohol abuse so we will start her on p.o. or IV Protonix    Disposition medical ICU    I anticipate that the patient will improve markedly in a short period of time and may be can be downgraded from the intensive care unit.

## 2025-02-06 NOTE — PROGRESS NOTES
Gap closed, without insulin dextrose only.    Dc dextrose infusion    Give long acting insulin  One time short acting insulin  Am FSG  Transfer to floor    BMP had sugar of 79, give apple juice and follow up POC gluocse at 530am, and get poc before Bfast this am.    If she can get her insulin pump can ask endo apn to see patient and restart

## 2025-02-10 NOTE — PAYOR COMM NOTE
RECONSIDERATION REQUEST FOR INPATIENT SERVICES    PLEASE FAX DETERMINATION OF DAYS -[phone]       Signed         --------------  ADMISSION REVIEW     Payor: TIA  Subscriber #:  [id number]  Authorization Number: [id number]     Admit date: 2/5/25  Admit time: 10:24 PM                                           Patient Seen in: [facility] Emergency Department          Chief Complaint   Patient presents with    Eval-D       alcohol withdrawl, last drink this AM   hx of a-fib, SVT          Stated Complaint: alcohol withdrawl, last drink this AM         This is a 44-year-old female with history of alcohol abuse, pancreatitis, diabetes mellitus, presents to the emergency room with chief complaint of alcohol withdrawal.  States that she been having upper abdominal pain the last 24 hours as well, states it feels similar to previous pancreatitis.  Denies vomiting.  Denies diarrhea.  Denies melena or hematochezia.  Denies any fevers or chills.  Denies chest pain or shortness of breath.  HX BREAST CA  DM            ED Triage Vitals [02/05/25 1741]   /83   Pulse (!) 145   Resp 24   Temp 97.8 °F (36.6 °C)   Temp src Temporal   SpO2 96 %   O2 Device None (Room air)         Current Vitals:   Vital Signs  BP: 114/78  Pulse: 91  Resp: 17  Temp: 97.8 °F (36.6 °C)  Temp src: Temporal  MAP (mmHg): 89     Oxygen Therapy  SpO2: 91 %  O2 Device: None (Room air)           Physical Exam  GENERAL: Patient is awake, alert, tremulous  HEENT: Pupils equal round reactive to light, extraocular muscles are intact, there is no scleral icterus.  Mucous membranes are dry, oropharynx is clear, uvula midline.    Scalp is atraumatic.  NECK: Neck is supple, there is no nuchal rigidity.    HEART: Tachycardic rate regular rhythm   LUNGS: Clear to auscultation bilaterally.  No Rales, no rhonchi, no wheezing, no stridor.  ABDOMEN: Soft, nondistended, diffuse upper abdominal tender,  no rebound, no rigidity, no guarding.no pulsatile masses.  No CVA tenderness  EXTREMITIES: No peripheral edema, no calf tenderness  NEUROLOGIC EXAM: Tongue midline, no facial drooping, no ptosis, moves all 4 extremities daily equally        ED Course            Labs Reviewed   COMP METABOLIC PANEL (14) - Abnormal; Notable for the following components:       Result Value      Glucose 155 (*)       Sodium 135 (*)       Chloride 92 (*)       CO2 16.0 (*)       Anion Gap 27 (*)       BUN 7 (*)       Alkaline Phosphatase 108 (*)       Total Protein 8.6 (*)       Albumin 5.2 (*)       All other components within normal limits   CBC WITH DIFFERENTIAL WITH PLATELET - Abnormal; Notable for the following components:     RBC 5.61 (*)       HGB 18.1 (*)       HCT 51.6 (*)       All other components within normal limits   ETHYL ALCOHOL - Abnormal; Notable for the following components:     Ethyl Alcohol 120 (*)       All other components within normal limits   ACETAMINOPHEN (TYLENOL), S - Abnormal; Notable for the following components:     Acetaminophen <2.0 (*)       All other components within normal limits   SALICYLATE, SERUM - Abnormal; Notable for the following components:     Salicylate <3.0 (*)       All other components within normal limits   MAGNESIUM - Abnormal; Notable for the following components:     Magnesium 1.4 (*)       All other components within normal limits   ABG PANEL W ELECT AND LACTATE - Abnormal; Notable for the following components:     ABG pCO2 30 (*)       ABG pO2 70 (*)       ABG Base Excess -2.5 (*)       Arterial Blood Gas O2Hb 91.9 (*)       Sodium Blood Gas 130 (*)       Lactic Acid (Blood Gas) 4.4 (*)       All other components within normal limits   LACTIC ACID, PLASMA - Abnormal; Notable for the following components:     Lactic Acid 4.3 (*)       All other components within normal limits   LIPASE - Normal   TSH W REFLEX TO FREE T4 - Normal   ACETONE - Normal   URINALYSIS WITH CULTURE REFLEX   DRUG SCREEN 8 W/OUT CONFIRMATION, URINE   URINALYSIS WITH  CULTURE REFLEX   RAINBOW DRAW LAVENDER   RAINBOW DRAW LIGHT GREEN   RAINBOW DRAW BLUE   RAINBOW DRAW GOLD      EKG     Rate, intervals and axes as noted on EKG Report.  Rate: 134  Rhythm: Sinus Rhythm  Reading: Sinus tachycardia, no ST elevation.            Radiographic images  I personally reviewed the radiographs and my individual interpretation shows CT abdomen, no free air I also reviewed the official reports that showed CT no acute process, findings of chronic pancreatitis with persistent intra pancreatic pseudocyst  re     Medications Provided: Ativan, multivitamin, IV normal saline, Dilaudid     Course of Events during Emergency Room Visit include patient IV established, placed on cardiac monitor and pulse ox.  CIWA scale performed, patient was given IV Ativan per protocol.  Patient did receive IV fluids and multivitamin.  Chemistry sodium 135 potassium 3.7 bicarb 16 BUN 7 creatinine 0.9 anion gap 27.  Glucose 155.  CT abdomen/pelvis performed.  Discussed patient with pulmonary/critical care who evaluated the patient in ER as well as hospitalist.       Patient will be admitted to the ICU for further evaluation and treatment.  Abdomen is remained soft nonsurgical.  Vital signs are stable.      Disposition and Plan      Clinical Impression:  1. Alcohol withdrawal syndrome with complication (HCC)    2. Alcoholic ketoacidosis    3. Hypomagnesemia    4. Alcohol-induced chronic pancreatitis (HCC)        H & P    Chief Complaint: N/V      []Expand by Default  # ETOH withdrawal   - CIWA  - replace lyte  - psych liasion      # AGMA likely due to euglycemia DKA?  - insulin drip  - monitor BMP  - IVF     # acute on chronic pancreatitis   - NPO  - bowel rest  - CT AP pending   - IVF  - antiemetic  - pain control      # Intractable nausea and vomiting   - due to above      # chronic splenic vein occlusion   # essential HTN   - BB IV PRN    MEDICATIONS ADMINISTERED IN LAST 1 DAY:  dextrose in lactated ringers 5% infusion          Date Action Dose Route User     Discharged on 2/6/2025 2/5/2025 2121 New Bag (none) Intravenous Will Morrison RN             folic acid (Folvite) 1 mg in sodium chloride 0.9% 50 mL IVPB         Date Action Dose Route User     Discharged on 2/6/2025 2/5/2025 1846 New Bag 1 mg Intravenous Sundeep Sanchez RN             folic acid (Folvite) 1 mg in sodium chloride 0.9% 50 mL IVPB         Date Action Dose Route User     Discharged on 2/6/2025 2/6/2025 0849 New Bag 1 mg Intravenous Lupillo Pineda RN             HYDROmorphone (Dilaudid) 1 MG/ML injection 0.5 mg         Date Action Dose Route User     Discharged on 2/6/2025 2/5/2025 2114 Given 0.5 mg Intravenous Will Morrison RN             HYDROmorphone (Dilaudid) 1 MG/ML injection 0.2 mg         Date Action Dose Route User     Discharged on 2/6/2025 2/5/2025 2337 Given 0.2 mg Intravenous Kassie Yepez RN             HYDROmorphone (Dilaudid) 1 MG/ML injection 0.4 mg         Date Action Dose Route User     Discharged on 2/6/2025 2/6/2025 0112 Given 0.4 mg Intravenous Kassie Yepez RN             insulin aspart (NovoLOG) 100 Units/mL FlexPen 2-10 Units         Date Action Dose Route User     Discharged on 2/6/2025 2/6/2025 0113 Given 6 Units Subcutaneous (Left Upper Arm) Kassie Yepez RN             insulin aspart (NovoLOG) 100 Units/mL FlexPen 1-68 Units         Date Action Dose Route User     Discharged on 2/6/2025 2/6/2025 0851 Given 3 Units Subcutaneous (Right Lower Abdomen) Lupillo Pineda RN             insulin degludec (Tresiba) 100 units/mL flextouch 15 Units         Date Action Dose Route User     Discharged on 2/6/2025 2/6/2025 0112 Given 15 Units Subcutaneous (Right Upper Arm) Kassie Yepez RN             iopamidol 76% (ISOVUE-370) injection for power injector         Date Action Dose Route User     Discharged on 2/6/2025 2/5/2025 2018 Given 80 mL Intravenous Molina, Crystal              lactated ringers IV bolus 1,000 mL         Date Action Dose Route User     Discharged on 2/6/2025 2/5/2025 2209 New Bag 1,000 mL Intravenous Will Morrison RN             LORazepam (Ativan) 2 mg/mL injection 2 mg         Date Action Dose Route User     Discharged on 2/6/2025 2/5/2025 1934 Given 2 mg Intravenous Will Morrison RN     2/5/2025 1845 Given 2 mg Intravenous Sundeep Sanchez RN             magnesium sulfate in sterile water for injection 2 g/50mL IVPB premix 2 g         Date Action Dose Route User     Discharged on 2/6/2025 2/6/2025 0525 New Bag 2 g Intravenous Kassie Yepez RN             magnesium sulfate in dextrose 5% 1 g/100mL infusion premix 1 g         Date Action Dose Route User     Discharged on 2/6/2025 2/5/2025 2036 New Bag 1 g Intravenous Will Morrison RN             ondansetron (Zofran) 4 MG/2ML injection 4 mg         Date Action Dose Route User     Discharged on 2/6/2025 2/5/2025 2337 Given 4 mg Intravenous Kassie Yepez RN             sodium chloride 0.9 % IV bolus 1,000 mL         Date Action Dose Route User     Discharged on 2/6/2025 2/5/2025 1845 New Bag 1,000 mL Intravenous Sundeep Sanchez RN             multivitamin (Tab-A-Toni/Beta Carotene) tab 1 tablet         Date Action Dose Route User     Discharged on 2/6/2025 2/5/2025 1845 Given 1 tablet Oral Sundeep Sanchez RN             multivitamin (Tab-A-Toni/Beta Carotene) tab 1 tablet         Date Action Dose Route User     Discharged on 2/6/2025 2/6/2025 0849 Given 1 tablet Oral Lupillo Pineda RN             thiamine 100 mg/mL injection 100 mg         Date Action Dose Route User     Discharged on 2/6/2025 2/5/2025 1845 Given 100 mg Intravenous Sundeep Sanchez RN             thiamine 100 mg/mL injection 500 mg         Date Action Dose Route User     Discharged on 2/6/2025 2/6/2025 0317 Given 500 mg Intravenous Kassie Yepez RN                Vitals (last day)  before discharge         Date/Time Temp Pulse Resp BP SpO2 Weight O2 Device O2 Flow Rate (L/min) [facility]     02/06/25 1100 -- 82 12 124/86 98 % -- -- -- BK     02/06/25 1000 -- 75 14 116/80 92 % -- None (Room air) -- BK     02/06/25 0900 -- 71 13 117/81 94 % -- -- -- BK     02/06/25 0800 98.1 °F (36.7 °C) 70 13 115/69 93 % -- None (Room air) -- BK     02/06/25 0700 -- 67 13 110/65 93 % -- -- -- CT     02/06/25 0600 -- 62 18 111/72 91 % -- -- -- CT     02/06/25 0500 -- 71 15 101/71 92 % -- -- -- CT     02/06/25 0400 -- 69 17 105/64 93 % -- -- -- CT     02/06/25 0300 98.3 °F (36.8 °C) 64 15 107/64 91 % -- None (Room air) -- CT     02/06/25 0230 -- 85 16 95/64 91 % -- -- -- CT     02/06/25 0200 -- 64 15 106/68 94 % -- -- -- CT     02/06/25 0130 -- 64 11 111/73 92 % -- -- -- CT     02/06/25 0100 -- 71 14 105/68 92 % -- -- -- CT     02/06/25 0030 -- 67 12 107/71 92 % -- -- -- CT     02/06/25 0000 -- 76 12 103/73 94 % -- -- -- CT     02/05/25 2330 -- 92 14 110/72 94 % -- -- -- CT     02/05/25 2308 -- 78 13 105/74 93 % -- -- -- CT     02/05/25 2300 -- 79 13 105/74 95 % -- -- -- CT     02/05/25 2226 98.2 °F (36.8 °C) -- -- -- -- -- -- -- EG     02/05/25 2226 -- 83 20 107/66 95 % 137 lb 9.1 oz (62.4 kg) None (Room air) -- CT     02/05/25 2200 -- 91 17 114/78 91 % -- None (Room air) -- [facility]     02/05/25 2130 -- 90 21 123/85 90 % -- None (Room air) -- [facility]     02/05/25 2030 -- 104 19 116/93 97 % -- None (Room air) -- [facility]     02/05/25 2000 -- 108 20 120/89 94 % -- None (Room air) -- [facility]     02/05/25 1930 -- 108 -- 126/95 -- -- None (Room air) -- [facility]     02/05/25 1900 -- 122 23 132/94 97 % -- None (Room air) --      02/05/25 1837 -- 124 28 138/100 100 % -- None (Room air) --      02/05/25 1741 97.8 °F (36.6 °C) 145 24 106/83 96 % 145 lb (65.8 kg) None (Room air) -- AP             CIWA Scores (last 2 days) before discharge         Date/Time CIWA-Ar Total Who     02/06/25 1000 0 BK     02/06/25 0800 0 BK     02/06/25 0600 1 CT      25 0400 2 CT     25 0200 0 CT     25 0000 1 CT     25 2230 5 CT     25 2030 2 [facility]     25 1930 10 [facility]     25 1837 13                                              --------------  DISCHARGE REVIEW    Payor: TIA  Subscriber #:  [id number]  Authorization Number: [id number]    Admit date: 25  Admit time:  10:24 PM  Discharge Date: 2025 11:20 AM     Admitting Physician: Madelyn Hankins MD  Attending Physician:  No att. providers found  Primary Care Physician: Liya Velez PA-C          Discharge Summary Notes        Discharge Summary signed by Asuncion Dejesus MD at 2025  5:03 PM       Author: Asuncion Dejesus MD Specialty: HOSPITALIST, Internal Medicine Author Type: Physician    Filed: 2025  5:03 PM Date of Service: 2025  9:16 AM Status: Signed    : Asuncion Dejesus MD (Physician)         [facility]  Discharge Summary    Blanca Coats Patient Status:  Inpatient    1980 MRN [id number]   Location [facility] 4SW-A Attending Madelyn Hankins MD   Hosp Day # 1 PCP Liya Velez PA-C     Date of Admission: 2025    Date of Discharge:  2025      Disposition: Home or Self Care    Discharge Diagnosis:   # ETOH withdrawal   # Anion gap metabolic acidosis likely due to alcoholic ketoacidosis  # acute on chronic pancreatitis   # Intractable nausea and vomiting due to above  # chronic splenic vein occlusion   # Diabetes mellitus type 1 on insulin pump at home which patient did not use for 24 hours and admitted with mild hyperglycemia and treated with long-acting insulin Tresiba in hospital, blood sugar controlled with this  # Mild pseudohyponatremia due to hyperglycemia   # essential HTN   # depression     Chief Complaint:   Chief Complaint   Patient presents with    Eval-D     alcohol withdrawl, last drink this AM   hx of a-fib, SVT          History of Present Illness:   Blanca Coats is a 44 year old female with DM2, alcohol abuse,  chronic pancreatitis, HTN, depression, SVTA presented with N/V/abd pain. She drinks \"2 handle of vodka\" per day and her last drink was this morning. She feels dizzy. No CP/SOB/fever, chills, diarrhea reported. No melena, hematochezia, hematemesis.      Please refer to history and physical done by Dr. Hankins for details on admission    Brief Synopsis:   Patient monitored in ICU with CIWA protocol with Ativan.  Patient improved clinically.  Off CIWA this morning.  Tolerating diet.  No more tremors.  Advised abstinence from alcohol, discussed in detail regarding effects of alcohol and body, patient verbalized understanding     # AGMA likely due to alcoholic ketoacidosis  -Improved with IV fluids     # acute on chronic pancreatitis   Improved with conservative management with  - NPO  - bowel rest  - CT abdomen pelvis done on 2/5/2025 with no acute process within abdomen or pelvis.  Findings of chronic pancreatitis with persistent intra pancreatic pseudocyst of the body and tail segments similar in appearance to 12/17/2024.  Moderate hepatic steatosis.  Chronic splenic vein occlusion with extensive portosystemic collaterals of the upper abdomen.  -Given IVF  - antiemetic as needed  - pain control as needed    Improved clinically and diabetic diet advanced and tolerated.     # Intractable nausea and vomiting and epigastric pain   - due to above and may also have an element of alcoholic gastritis-will give PPI  -Improved.  Tolerated diet with no nausea vomiting.     # chronic splenic vein occlusion   # essential HTN   -Continue home beta-blocker  -Blood pressure improved.     # depression, anxiety-continue home medications    Patient improved clinically and eager to go home now.  Okayed for discharge by critical care physician.  Follow-up with regular outpatient endocrinologist, follow-up with regular outpatient primary care physician within 1 week in office with blood sugar log at home      TCM Diagnosis at discharge from  Hospital: Other: See above; still recommend for TCM follow-up    Lace+ Score: 57  59-90 High Risk  29-58 Medium Risk  0-28   Low Risk.     TCM Follow-Up Recommendation:Blanca Coats   RENE 29-58: Moderate Risk of readmission after discharge from the hospital.      PCP: Liya Velez PA-C    Consultations:   Consultants         Provider   Role Specialty     Michael Aldrich MD      Consulting Physician Emergency Medicine            Procedures during hospitalization:   None    Incidental or significant findings and recommendations (brief descriptions):  None    Lab/Test results pending at Discharge:   None      Discharge Medications:        Discharge Medications        START taking these medications        Instructions Prescription details   folic acid 1 MG Tabs  Commonly known as: Folvite      Take 1 tablet (1 mg total) by mouth daily.   Quantity: 30 tablet  Refills: 0     thiamine 100 MG Tabs  Commonly known as: Vitamin B1      Take 1 tablet (100 mg total) by mouth daily.   Quantity: 30 tablet  Refills: 0            CONTINUE taking these medications        Instructions Prescription details   ALPRAZolam 0.25 MG Tabs  Commonly known as: Xanax      Take 1 tablet (0.25 mg total) by mouth 3 (three) times daily as needed.   Refills: 0     amphetamine-dextroamphetamine ER 30 MG Cp24  Commonly known as: Adderall XR      Take 1 capsule (30 mg total) by mouth every morning.   Refills: 0     amphetamine-dextroamphetamine ER 10 MG Cp24  Commonly known as: Adderall XR      Take 1 capsule (10 mg total) by mouth every morning.   Refills: 0     atorvastatin 10 MG Tabs  Commonly known as: Lipitor      Take 1 tablet (10 mg total) by mouth daily.   Refills: 0     busPIRone 10 MG Tabs  Commonly known as: Buspar      Take 2 tablets (20 mg total) by mouth 2 (two) times daily.   Refills: 0     gabapentin 300 MG Caps  Commonly known as: Neurontin      Take 1 capsule (300 mg total) by mouth in the morning and 1 capsule (300 mg  total) before bedtime.   Refills: 0     haloperidol 5 MG Tabs  Commonly known as: Haldol      Take 1 tablet (5 mg total) by mouth at bedtime.   Refills: 0     metoprolol succinate  MG Tb24  Commonly known as: Toprol XL      Take 1 tablet (100 mg total) by mouth 2 (two) times daily. Take half tablet if blood pressure less than 130/80   Refills: 0     montelukast 10 MG Tabs  Commonly known as: Singulair      Take 1 tablet (10 mg total) by mouth daily.   Refills: 0     Naloxone HCl 4 MG/0.1ML Liqd      4 mg by Nasal route as needed. If patient remains unresponsive, repeat dose in other nostril 2-5 minutes after first dose.   Quantity: 1 kit  Refills: 0     ondansetron 4 MG Tbdp  Commonly known as: Zofran-ODT      Take 1 tablet (4 mg total) by mouth every 8 (eight) hours as needed for Nausea.   Quantity: 20 tablet  Refills: 0     OneTouch Verio Strp      Check blood sugar two times day, before breakfast and before dinner.  (may substitute any brand covered by insurance)   Quantity: 50 strip  Refills: 0     pantoprazole 40 MG Tbec  Commonly known as: Protonix      Take 1 tablet (40 mg total) by mouth 2 (two) times daily before meals.   Quantity: 60 tablet  Refills: 0     sertraline 100 MG Tabs  Commonly known as: Zoloft      Take 2 tablets (200 mg total) by mouth daily.   Refills: 0     traZODone 100 MG Tabs  Commonly known as: Desyrel      Take 2 tablets (200 mg total) by mouth nightly.   Refills: 0            STOP taking these medications      HumaLOG KwikPen 100 UNIT/ML Sopn  Generic drug: Insulin Lispro (1 Unit Dial)        Pancrelipase (Lip-Prot-Amyl) 31196-061597 units Cpep               ASK your doctor about these medications        Instructions Prescription details   Lantus SoloStar 100 UNIT/ML Sopn  Generic drug: insulin glargine      Inject 15 Units into the skin every morning.   Refills: 0               [place] prescription monitoring program data reviewed in epic before prescribing narcotics/controlled  substances: Yes    Follow up Visits: Follow-up with Liya Velez PA-C in 1 week    Liya Velez PA-C  [address]  [place]  [facility] [address]  [phone]    Schedule an appointment as soon as possible for a visit in 1 week(s)      Appointments for Next 30 Days 2/6/2025 - 3/8/2025        Date Arrival Time Visit Type Length Department Provider     2/11/2025 11:00 AM  EXAM - NEW PATIENT [7236] 30 min [facility] Roxana Bernabe DO    Patient Instructions:         Location Instructions:     Masks are optional for all patients and visitors, unless otherwise indicated.                        Physical Exam:  /81   Pulse 71   Temp 98.1 °F (36.7 °C) (Temporal)   Resp 13   Ht 5' 6\" (1.676 m)   Wt 137 lb 9.1 oz (62.4 kg)   LMP 01/07/2025 (Approximate)   SpO2 94%   BMI 22.20 kg/m²       General appearance: alert and cooperative  Throat: oral mucosa moist  Lungs: clear to auscultation bilaterally  Heart: S1, S2 normal, no murmur,  regular rate and rhythm  Abdomen: soft, non-tender; bowel sounds normal  Extremities: extremities normal,no cyanosis or edema  Neurologic: Awake,alert,nonfocal  Psych: Mood and affect appears normal      Discharge Condition: stable    Patient Discharge Instructions: See electronic chart      More than 30 minutes on discharge    Asuncion Dejesus MD  2/6/2025  9:16 AM      Electronically signed by Asuncion Dejesus MD on 2/6/2025  5:03 PM         REVIEWER COMMENTS

## 2025-02-22 ENCOUNTER — APPOINTMENT (OUTPATIENT)
Dept: GENERAL RADIOLOGY | Facility: HOSPITAL | Age: 45
End: 2025-02-22
Attending: STUDENT IN AN ORGANIZED HEALTH CARE EDUCATION/TRAINING PROGRAM
Payer: MEDICAID

## 2025-02-22 ENCOUNTER — HOSPITAL ENCOUNTER (INPATIENT)
Facility: HOSPITAL | Age: 45
LOS: 1 days | Discharge: HOME | End: 2025-02-23
Attending: STUDENT IN AN ORGANIZED HEALTH CARE EDUCATION/TRAINING PROGRAM | Admitting: HOSPITALIST
Payer: MEDICAID

## 2025-02-22 DIAGNOSIS — I48.91 ATRIAL FIBRILLATION WITH RAPID VENTRICULAR RESPONSE (HCC): Primary | ICD-10-CM

## 2025-02-22 DIAGNOSIS — F10.29 ALCOHOL DEPENDENCE WITH UNSPECIFIED ALCOHOL-INDUCED DISORDER (HCC): ICD-10-CM

## 2025-02-22 PROBLEM — K85.90 ACUTE ON CHRONIC PANCREATITIS (HCC): Status: ACTIVE | Noted: 2020-04-27

## 2025-02-22 PROBLEM — K86.1 ACUTE ON CHRONIC PANCREATITIS (HCC): Status: ACTIVE | Noted: 2020-04-27

## 2025-02-22 LAB
ALBUMIN SERPL-MCNC: 5.1 G/DL (ref 3.2–4.8)
ALBUMIN/GLOB SERPL: 1.6 {RATIO} (ref 1–2)
ALP LIVER SERPL-CCNC: 121 U/L
ALT SERPL-CCNC: 42 U/L
ANION GAP SERPL CALC-SCNC: 28 MMOL/L (ref 0–18)
APTT PPP: 24.3 SECONDS (ref 23–36)
AST SERPL-CCNC: 30 U/L (ref ?–34)
ATRIAL RATE: 99 BPM
BASOPHILS # BLD AUTO: 0.14 X10(3) UL (ref 0–0.2)
BASOPHILS NFR BLD AUTO: 1.4 %
BILIRUB SERPL-MCNC: 0.3 MG/DL (ref 0.3–1.2)
BUN BLD-MCNC: 5 MG/DL (ref 9–23)
CALCIUM BLD-MCNC: 9.3 MG/DL (ref 8.7–10.6)
CHLORIDE SERPL-SCNC: 97 MMOL/L (ref 98–112)
CO2 SERPL-SCNC: 10 MMOL/L (ref 21–32)
CREAT BLD-MCNC: 0.97 MG/DL
EGFRCR SERPLBLD CKD-EPI 2021: 75 ML/MIN/1.73M2 (ref 60–?)
EOSINOPHIL # BLD AUTO: 0.01 X10(3) UL (ref 0–0.7)
EOSINOPHIL NFR BLD AUTO: 0.1 %
ERYTHROCYTE [DISTWIDTH] IN BLOOD BY AUTOMATED COUNT: 13.1 %
EST. AVERAGE GLUCOSE BLD GHB EST-MCNC: 298 MG/DL (ref 68–126)
ETHANOL SERPL-MCNC: 201 MG/DL (ref ?–3)
GLOBULIN PLAS-MCNC: 3.1 G/DL (ref 2–3.5)
GLUCOSE BLD-MCNC: 316 MG/DL (ref 70–99)
GLUCOSE BLD-MCNC: 321 MG/DL (ref 70–99)
GLUCOSE BLD-MCNC: 423 MG/DL (ref 70–99)
HBA1C MFR BLD: 12 % (ref ?–5.7)
HCT VFR BLD AUTO: 47.5 %
HGB BLD-MCNC: 16.5 G/DL
IMM GRANULOCYTES # BLD AUTO: 0.11 X10(3) UL (ref 0–1)
IMM GRANULOCYTES NFR BLD: 1.1 %
INR BLD: 1.16 (ref 0.8–1.2)
LIPASE SERPL-CCNC: 31 U/L (ref 12–53)
LYMPHOCYTES # BLD AUTO: 1.97 X10(3) UL (ref 1–4)
LYMPHOCYTES NFR BLD AUTO: 19.1 %
MCH RBC QN AUTO: 32.3 PG (ref 26–34)
MCHC RBC AUTO-ENTMCNC: 34.7 G/DL (ref 31–37)
MCV RBC AUTO: 93 FL
MONOCYTES # BLD AUTO: 0.58 X10(3) UL (ref 0.1–1)
MONOCYTES NFR BLD AUTO: 5.6 %
NEUTROPHILS # BLD AUTO: 7.49 X10 (3) UL (ref 1.5–7.7)
NEUTROPHILS # BLD AUTO: 7.49 X10(3) UL (ref 1.5–7.7)
NEUTROPHILS NFR BLD AUTO: 72.7 %
OSMOLALITY SERPL CALC.SUM OF ELEC: 290 MOSM/KG (ref 275–295)
P AXIS: 78 DEGREES
P-R INTERVAL: 176 MS
PLATELET # BLD AUTO: 382 10(3)UL (ref 150–450)
POTASSIUM SERPL-SCNC: 4.2 MMOL/L (ref 3.5–5.1)
PROT SERPL-MCNC: 8.2 G/DL (ref 5.7–8.2)
PROTHROMBIN TIME: 14.9 SECONDS (ref 11.6–14.8)
Q-T INTERVAL: 302 MS
Q-T INTERVAL: 388 MS
QRS DURATION: 70 MS
QRS DURATION: 72 MS
QTC CALCULATION (BEZET): 485 MS
QTC CALCULATION (BEZET): 497 MS
R AXIS: 120 DEGREES
R AXIS: 90 DEGREES
RBC # BLD AUTO: 5.11 X10(6)UL
SODIUM SERPL-SCNC: 135 MMOL/L (ref 136–145)
T AXIS: 0 DEGREES
T AXIS: 78 DEGREES
TROPONIN I SERPL HS-MCNC: 8 NG/L
VENTRICULAR RATE: 155 BPM
VENTRICULAR RATE: 99 BPM
WBC # BLD AUTO: 10.3 X10(3) UL (ref 4–11)

## 2025-02-22 PROCEDURE — 71045 X-RAY EXAM CHEST 1 VIEW: CPT | Performed by: STUDENT IN AN ORGANIZED HEALTH CARE EDUCATION/TRAINING PROGRAM

## 2025-02-22 PROCEDURE — 99223 1ST HOSP IP/OBS HIGH 75: CPT | Performed by: HOSPITALIST

## 2025-02-22 RX ORDER — ERGOCALCIFEROL 1.25 MG/1
50000 CAPSULE, LIQUID FILLED ORAL WEEKLY
COMMUNITY
Start: 2025-01-23

## 2025-02-22 RX ORDER — FOLIC ACID 1 MG/1
1 TABLET ORAL DAILY
Status: DISCONTINUED | OUTPATIENT
Start: 2025-02-22 | End: 2025-02-23

## 2025-02-22 RX ORDER — INSULIN DEGLUDEC 100 U/ML
15 INJECTION, SOLUTION SUBCUTANEOUS ONCE
Status: COMPLETED | OUTPATIENT
Start: 2025-02-22 | End: 2025-02-22

## 2025-02-22 RX ORDER — SENNOSIDES 8.6 MG
17.2 TABLET ORAL NIGHTLY PRN
Status: DISCONTINUED | OUTPATIENT
Start: 2025-02-22 | End: 2025-02-23

## 2025-02-22 RX ORDER — DILTIAZEM HYDROCHLORIDE 5 MG/ML
10 INJECTION INTRAVENOUS ONCE
Status: DISCONTINUED | OUTPATIENT
Start: 2025-02-22 | End: 2025-02-23

## 2025-02-22 RX ORDER — PROCHLORPERAZINE EDISYLATE 5 MG/ML
5 INJECTION INTRAMUSCULAR; INTRAVENOUS EVERY 8 HOURS PRN
Status: DISCONTINUED | OUTPATIENT
Start: 2025-02-22 | End: 2025-02-23

## 2025-02-22 RX ORDER — POLYETHYLENE GLYCOL 3350 17 G/17G
17 POWDER, FOR SOLUTION ORAL DAILY PRN
Status: DISCONTINUED | OUTPATIENT
Start: 2025-02-22 | End: 2025-02-23

## 2025-02-22 RX ORDER — NICOTINE POLACRILEX 4 MG
15 LOZENGE BUCCAL
Status: DISCONTINUED | OUTPATIENT
Start: 2025-02-22 | End: 2025-02-23

## 2025-02-22 RX ORDER — METOPROLOL TARTRATE 1 MG/ML
INJECTION, SOLUTION INTRAVENOUS
Status: COMPLETED
Start: 2025-02-22 | End: 2025-02-22

## 2025-02-22 RX ORDER — ATORVASTATIN CALCIUM 10 MG/1
10 TABLET, FILM COATED ORAL NIGHTLY
Status: DISCONTINUED | OUTPATIENT
Start: 2025-02-22 | End: 2025-02-23

## 2025-02-22 RX ORDER — BISACODYL 10 MG
10 SUPPOSITORY, RECTAL RECTAL
Status: DISCONTINUED | OUTPATIENT
Start: 2025-02-22 | End: 2025-02-23

## 2025-02-22 RX ORDER — HYDROMORPHONE HYDROCHLORIDE 1 MG/ML
0.4 INJECTION, SOLUTION INTRAMUSCULAR; INTRAVENOUS; SUBCUTANEOUS EVERY 2 HOUR PRN
Status: DISCONTINUED | OUTPATIENT
Start: 2025-02-22 | End: 2025-02-23

## 2025-02-22 RX ORDER — HYDROMORPHONE HYDROCHLORIDE 1 MG/ML
0.8 INJECTION, SOLUTION INTRAMUSCULAR; INTRAVENOUS; SUBCUTANEOUS EVERY 2 HOUR PRN
Status: DISCONTINUED | OUTPATIENT
Start: 2025-02-22 | End: 2025-02-23

## 2025-02-22 RX ORDER — ONDANSETRON 2 MG/ML
4 INJECTION INTRAMUSCULAR; INTRAVENOUS EVERY 6 HOURS PRN
Status: DISCONTINUED | OUTPATIENT
Start: 2025-02-22 | End: 2025-02-23

## 2025-02-22 RX ORDER — LORAZEPAM 1 MG/1
2 TABLET ORAL
Status: DISCONTINUED | OUTPATIENT
Start: 2025-02-22 | End: 2025-02-23

## 2025-02-22 RX ORDER — DEXTROSE MONOHYDRATE 25 G/50ML
50 INJECTION, SOLUTION INTRAVENOUS
Status: DISCONTINUED | OUTPATIENT
Start: 2025-02-22 | End: 2025-02-23

## 2025-02-22 RX ORDER — DILTIAZEM HYDROCHLORIDE 5 MG/ML
15 INJECTION INTRAVENOUS ONCE
Status: COMPLETED | OUTPATIENT
Start: 2025-02-22 | End: 2025-02-22

## 2025-02-22 RX ORDER — NICOTINE POLACRILEX 4 MG
30 LOZENGE BUCCAL
Status: DISCONTINUED | OUTPATIENT
Start: 2025-02-22 | End: 2025-02-23

## 2025-02-22 RX ORDER — DILTIAZEM HYDROCHLORIDE 5 MG/ML
INJECTION INTRAVENOUS
Status: DISPENSED
Start: 2025-02-22 | End: 2025-02-22

## 2025-02-22 RX ORDER — BUSPIRONE HYDROCHLORIDE 5 MG/1
20 TABLET ORAL 2 TIMES DAILY
Status: DISCONTINUED | OUTPATIENT
Start: 2025-02-22 | End: 2025-02-23

## 2025-02-22 RX ORDER — SODIUM PHOSPHATE, DIBASIC AND SODIUM PHOSPHATE, MONOBASIC 7; 19 G/230ML; G/230ML
1 ENEMA RECTAL ONCE AS NEEDED
Status: DISCONTINUED | OUTPATIENT
Start: 2025-02-22 | End: 2025-02-23

## 2025-02-22 RX ORDER — MELATONIN
100 DAILY
Status: DISCONTINUED | OUTPATIENT
Start: 2025-02-23 | End: 2025-02-23

## 2025-02-22 RX ORDER — DILTIAZEM HYDROCHLORIDE 5 MG/ML
10 INJECTION INTRAVENOUS ONCE
Status: COMPLETED | OUTPATIENT
Start: 2025-02-22 | End: 2025-02-22

## 2025-02-22 RX ORDER — MONTELUKAST SODIUM 10 MG/1
10 TABLET ORAL DAILY
Status: DISCONTINUED | OUTPATIENT
Start: 2025-02-22 | End: 2025-02-23

## 2025-02-22 RX ORDER — INSULIN LISPRO 100 [IU]/ML
50 INJECTION, SOLUTION INTRAVENOUS; SUBCUTANEOUS DAILY
COMMUNITY
Start: 2024-12-20

## 2025-02-22 RX ORDER — ACETAMINOPHEN 500 MG
500 TABLET ORAL EVERY 4 HOURS PRN
Status: DISCONTINUED | OUTPATIENT
Start: 2025-02-22 | End: 2025-02-23

## 2025-02-22 RX ORDER — GABAPENTIN 300 MG/1
300 CAPSULE ORAL 2 TIMES DAILY
Status: DISCONTINUED | OUTPATIENT
Start: 2025-02-22 | End: 2025-02-23

## 2025-02-22 RX ORDER — METOPROLOL SUCCINATE 50 MG/1
100 TABLET, EXTENDED RELEASE ORAL
Status: DISCONTINUED | OUTPATIENT
Start: 2025-02-22 | End: 2025-02-23

## 2025-02-22 RX ORDER — ONDANSETRON 2 MG/ML
4 INJECTION INTRAMUSCULAR; INTRAVENOUS ONCE
Status: COMPLETED | OUTPATIENT
Start: 2025-02-22 | End: 2025-02-22

## 2025-02-22 RX ORDER — HALOPERIDOL 5 MG/1
5 TABLET ORAL NIGHTLY
Status: DISCONTINUED | OUTPATIENT
Start: 2025-02-22 | End: 2025-02-23

## 2025-02-22 RX ORDER — DOXEPIN HYDROCHLORIDE 50 MG/1
1 CAPSULE ORAL DAILY
Status: DISCONTINUED | OUTPATIENT
Start: 2025-02-22 | End: 2025-02-23

## 2025-02-22 RX ORDER — MAGNESIUM SULFATE HEPTAHYDRATE 40 MG/ML
2 INJECTION, SOLUTION INTRAVENOUS ONCE
Status: COMPLETED | OUTPATIENT
Start: 2025-02-22 | End: 2025-02-22

## 2025-02-22 RX ORDER — TRAZODONE HYDROCHLORIDE 100 MG/1
200 TABLET ORAL NIGHTLY
Status: DISCONTINUED | OUTPATIENT
Start: 2025-02-22 | End: 2025-02-23

## 2025-02-22 RX ORDER — THIAMINE HYDROCHLORIDE 100 MG/ML
100 INJECTION, SOLUTION INTRAMUSCULAR; INTRAVENOUS ONCE
Status: COMPLETED | OUTPATIENT
Start: 2025-02-22 | End: 2025-02-22

## 2025-02-22 RX ORDER — LORAZEPAM 1 MG/1
1 TABLET ORAL
Status: DISCONTINUED | OUTPATIENT
Start: 2025-02-22 | End: 2025-02-23

## 2025-02-22 RX ORDER — LORAZEPAM 2 MG/ML
2 INJECTION INTRAMUSCULAR ONCE
Status: COMPLETED | OUTPATIENT
Start: 2025-02-22 | End: 2025-02-22

## 2025-02-22 RX ORDER — HYDROMORPHONE HYDROCHLORIDE 1 MG/ML
0.2 INJECTION, SOLUTION INTRAMUSCULAR; INTRAVENOUS; SUBCUTANEOUS EVERY 2 HOUR PRN
Status: DISCONTINUED | OUTPATIENT
Start: 2025-02-22 | End: 2025-02-23

## 2025-02-22 RX ORDER — PANTOPRAZOLE SODIUM 40 MG/1
40 TABLET, DELAYED RELEASE ORAL
Status: DISCONTINUED | OUTPATIENT
Start: 2025-02-22 | End: 2025-02-23

## 2025-02-22 RX ORDER — LORAZEPAM 2 MG/ML
INJECTION INTRAMUSCULAR
Status: COMPLETED
Start: 2025-02-22 | End: 2025-02-22

## 2025-02-22 NOTE — ED PROVIDER NOTES
Patient Seen in: [facility] Emergency Department      History     Chief Complaint   Patient presents with    Arrythmia/Palpitations     Stated Complaint: A-fib    Subjective:   HPI    Patient is a 44-year-old female presenting to the emergency department with rapid irregular heart rate.  Patient reports history of alcohol addiction, chronic pancreatitis and atrial fibrillation.  She has also had SVT in the past.  She states that her last drink was earlier this morning.  She then began having very rapid heart rate and called 911 for help.  Paramedics transported the patient with high heart rate but alert with normal blood pressure with knife no diaphoresis or chest pain reported.  Patient did report some mild epigastric abdominal pain which she stated is from her pancreatitis.    Objective:   Past Medical History:    Anxiety    Arrhythmia    SVT    Attention deficit disorder    Breast CA (HCC)    Depression    Esophageal reflux    High blood pressure    OTHER DISEASES    seasonal allergies    Pancreatitis (HCC)    Pneumonia due to organism    SVT (supraventricular tachycardia) (HCC)    Type 1 diabetes mellitus (HCC)              Past Surgical History:   Procedure Laterality Date    Breast surgery      Implant left      Implant right      Mastectomy left      Mastectomy right      Ndsc ablation & rcnstj atria lmtd w/o bypass                  Social History     Socioeconomic History    Marital status: Single   Tobacco Use    Smoking status: Every Day     Current packs/day: 0.50     Types: Cigarettes    Smokeless tobacco: Never    Tobacco comments:     1/2ppd x 8 years   Vaping Use    Vaping status: Never Used   Substance and Sexual Activity    Alcohol use: Yes     Alcohol/week: 7.0 standard drinks of alcohol     Types: 7 Shots of liquor per week     Comment: 2 handles of vodka in the last 2 days    Drug use: Yes     Frequency: 7.0 times per week     Types: Cannabis     Comment: use daily   Other Topics Concern     Exercise Yes     Comment: MODERATE    Seat Belt Yes    Self-Exams Yes     Social Drivers of Health     Food Insecurity: No Food Insecurity (2/5/2025)    NCSS - Food Insecurity     Worried About Running Out of Food in the Last Year: No     Ran Out of Food in the Last Year: No   Transportation Needs: No Transportation Needs (2/5/2025)    NCSS - Transportation     Lack of Transportation: No   Housing Stability: Not At Risk (2/5/2025)    NCSS - Housing/Utilities     Has Housing: Yes     Worried About Losing Housing: No     Unable to Get Utilities: No              Review of Systems    Positive for stated complaint: A-fib  Other systems are as noted in HPI.  Constitutional and vital signs reviewed.      All other systems reviewed and negative except as noted above.    Physical Exam     ED Triage Vitals [02/22/25 1133]   /64   Pulse (!) 210   Resp 20   Temp 96.9 °F (36.1 °C)   Temp src Temporal   SpO2 100 %   O2 Device None (Room air)       Current:/79   Pulse 111   Temp 96.9 °F (36.1 °C) (Temporal)   Resp 24   Wt 74.8 kg   LMP 01/07/2025 (Approximate)   SpO2 97%   BMI 26.63 kg/m²         Physical Exam    Constitutional: No apparent distress  Eyes: No scleral icterus  Heart: Very tachycardic, irregular, no murmurs  Lungs: Clear to auscultation bilaterally  Abdomen: Soft and nontender  Skin: No rash  Neuro: Alert and oriented ×3          ED Course/ My interpretations:     Labs Reviewed   PROTHROMBIN TIME (PT) - Abnormal; Notable for the following components:       Result Value    PT 14.9 (*)     All other components within normal limits   LIPASE - Normal   PTT, ACTIVATED - Normal   TROPONIN I HIGH SENSITIVITY - Normal   COMP METABOLIC PANEL (14)   CBC WITH DIFFERENTIAL WITH PLATELET   ETHYL ALCOHOL   RAINBOW DRAW LAVENDER   RAINBOW DRAW LIGHT GREEN   RAINBOW DRAW BLUE   RAINBOW DRAW GOLD     EKG 11:19    Rate, intervals and axes as noted on EKG Report.  Rate: 203  Rhythm: Atrial Fibrillation  Reading:  Atrial fibrillation with rapid ventricular response, nonspecific ST wave abnormality noted.  No previous EKG available for comparison initially         EKG 11:34    Rate, intervals and axes as noted on EKG Report.  Rate: 155  Rhythm: Atrial Fibrillation  Reading: Atrial fibrillation with rapid ventricular response, heart rate 155 beats per minute, nonspecific ST wave abnormality noted.  No ST elevations.    EKG 11:34    Rate, intervals and axes as noted on EKG Report.  Rate: 99  Rhythm: Sinus Rhythm  Reading: Normal sinus rhythm, right atrial enlargement, right axis, QTc 497, no ST elevations, nonspecific T wave abnormality present.        My independent imaging interpretation:      Procedures    Lipase    Prothrombin Time (PT)    PTT, Activated    Troponin I (High Sensitivity)    XR CHEST AP PORTABLE  (CPT=71045)      No pneumothorax  All available radiology reports for this visit reviewed.      Medications given:  Orders Placed This Encounter    Lipase    Prothrombin Time (PT)    PTT, Activated    Troponin I (High Sensitivity)    Comp Metabolic Panel (14)    CBC With Differential With Platelet    Ethyl Alcohol    LORazepam (Ativan) 2 mg/mL injection    dilTIAZem (cardIZEM) 25 mg/5mL injection 10 mg    dilTIAZem (cardIZEM) 100 mg in sodium chloride 0.9% 100 mL IVPB-ADDV    dilTIAZem (cardIZEM) 25 mg/5mL injection    LORazepam (Ativan) 2 mg/mL injection 2 mg    LORazepam (Ativan) 2 mg/mL injection 2 mg    LORazepam (Ativan) 2 mg/mL injection    dilTIAZem (cardIZEM) 25 mg/5mL injection 10 mg    dilTIAZem (cardIZEM) 25 mg/5mL injection 15 mg    metoprolol (Lopressor) 5 mg/5mL injection    magnesium sulfate in sterile water for injection 2 g/50mL IVPB premix 2 g                         MDM      Extensive differential diagnosis was considered for patient's rapid irregular heart rate including atrial fibrillation, atrial flutter, V. tach, SVT, and other cardiac arrhythmias as well as other cardiac, GI, toxic and other  pathology.    Patient's abdominal exam is reassuring with no tenderness therefore I do not suspect any intra-abdominal surgical emergency.     Patient was given 2 doses of Ativan 2 mg each.  Tachycardia was addressed with Cardizem 15 mg IV push and starting of Cardizem drip, it was then titrated up an additional dose of Cardizem 10 mg was then added.  Patient's heart rate only slowed from over 200s to the 160s.  Dose of 5 mg of Lopressor was last administered with significant improvement in heart rate.  Magnesium infusion was also ordered.    Patient appeared to then converted into sinus spontaneously with heart rate of 99 bpm at the time of repeat EKG.  Cardiology was consulted and evaluated patient in the emergency department.    Admission disposition: 2/22/2025 12:54 PM           Medical Decision Making      Disposition and Plan     Clinical Impression:  1. Atrial fibrillation with rapid ventricular response (HCC)    2. Alcohol dependence with unspecified alcohol-induced disorder (HCC)         Disposition:  Admit  2/22/2025 12:54 pm    Follow-up:  No follow-up provider specified.        Medications Prescribed:  Current Discharge Medication List              Supplementary Documentation:         Hospital Problems       Present on Admission  Date Reviewed: 1/29/2023            ICD-10-CM Noted POA    * (Principal) Atrial fibrillation with rapid ventricular response (HCC) I48.91 8/1/2023 Yes    Alcohol abuse F10.10 11/11/2024 Yes    Alcoholic intoxication with complication F10.929 3/7/2020 Yes    Anxiety and depression F41.9, F32.A Unknown Yes    Diabetic ketoacidosis without coma associated with type 2 diabetes mellitus (HCC) E11.10 1/14/2023 Yes    Primary hypertension I10 Unknown Yes                                                    Hospital Problems       Present on Admission  Date Reviewed: 1/29/2023            ICD-10-CM Noted POA    * (Principal) Atrial fibrillation with rapid ventricular response (HCC) I48.91  8/1/2023 Yes    Alcohol abuse F10.10 11/11/2024 Yes    Alcoholic intoxication with complication F10.929 3/7/2020 Yes    Anxiety and depression F41.9, F32.A Unknown Yes    Diabetic ketoacidosis without coma associated with type 2 diabetes mellitus (HCC) E11.10 1/14/2023 Yes    Primary hypertension I10 Unknown Yes           Documentation created with the aid of Dragon voice recognition software.  Although efforts were made to ensure the accuracy of the note, some inaccuracies may persist.

## 2025-02-22 NOTE — H&P
[facility]     Blanca Coats Patient Status:  Emergency    1980 MRN [id number]   Location [facility] EMERGENCY DEPARTMENT Attending Siri Bell MD   Hosp Day # 0 PCP Liya Velez PA-C     Chief Complaint: Palpitations    Subjective:    History of Present Illness:     Blanca Coats is a 44 year old female with history of atrial fibrillation, alcohol abuse, type 2 diabetes, depression, hyperlipidemia, SVT presents emergency room with palpitations.  Patient states that started this morning.  No diaphoresis or chest pain.  No fevers, chills, nausea, vomiting, diarrhea.  No hematochezia, melena, hematuria.    History/Other:    Past Medical History:  Past Medical History:    Anxiety    Arrhythmia    SVT    Attention deficit disorder    Breast CA (HCC)    Depression    Esophageal reflux    High blood pressure    OTHER DISEASES    seasonal allergies    Pancreatitis (HCC)    Pneumonia due to organism    SVT (supraventricular tachycardia) (HCC)    Type 1 diabetes mellitus (HCC)     Past Surgical History:   Past Surgical History:   Procedure Laterality Date    Breast surgery      Implant left      Implant right      Mastectomy left      Mastectomy right      Ndsc ablation & rcnstj atria lmtd w/o bypass        Family History:   Family History   Problem Relation Age of Onset    Heart Disorder Father     Diabetes Father     Hypertension Mother     Cancer Mother         breast     Social History:    reports that she has been smoking cigarettes. She has never used smokeless tobacco. She reports current alcohol use of about 7.0 standard drinks of alcohol per week. She reports current drug use. Frequency: 7.00 times per week. Drug: Cannabis.     Allergies: Allergies[1]    Medications:  Medications Ordered Prior to Encounter[2]    Review of Systems:   A comprehensive review of systems was completed.    Pertinent positives and negatives noted in the HPI.    Objective:   Physical  Exam:    /79   Pulse 111   Temp 96.9 °F (36.1 °C) (Temporal)   Resp 24   Wt 165 lb (74.8 kg)   LMP 01/07/2025 (Approximate)   SpO2 97%   BMI 26.63 kg/m²   General: No acute distress, Alert  Respiratory: No rhonchi, no wheezes  Cardiovascular: S1, S2. Regular rate and rhythm  Abdomen: Soft, Non-tender, non-distended, positive bowel sounds  Neuro: No new focal deficits  Extremities: No edema      Results:    Labs:      Labs Last 24 Hours:    No results for input(s): \"RBC\", \"HGB\", \"HCT\", \"MCV\", \"MCH\", \"MCHC\", \"RDW\", \"NEPRELIM\", \"WBC\", \"PLT\" in the last 168 hours.    No results for input(s): \"GLU\", \"BUN\", \"CREATSERUM\", \"GFRAA\", \"GFRNAA\", \"EGFRCR\", \"CA\", \"ALB\", \"NA\", \"K\", \"CL\", \"CO2\", \"ALKPHO\", \"AST\", \"ALT\", \"BILT\", \"TP\" in the last 168 hours.    eGFR cannot be calculated (Patient's most recent lab result is older than the maximum 7 days allowed.).    Lab Results   Component Value Date    INR 1.16 02/22/2025       Recent Labs   Lab 02/22/25  1138   TROPHS 8       No results for input(s): \"TROP\", \"PBNP\" in the last 168 hours.    No results for input(s): \"PCT\" in the last 168 hours.    Imaging: Imaging data reviewed in Epic.    Assessment & Plan:      # Atrial fibrillation with RVR  -Will continue on Cardizem drip  -Will continue on metoprolol  -Cardiology on consult  -No heparin drip per cardiology    # Alcohol abuse   -last drink was this morning  - will place on CIWA protocol.    # Acute on chronic pancreatitis  - Prn IV pain meds    # Type 2 diabetes  -Will place on hyperglycemia protocol with correction factor insulin    # Depression  -Will continue on sertraline      Plan of care discussed with patient at bedside.    Sylvester Gonzales, DO    Supplementary Documentation:     The 21st Century Cures Act makes medical notes like these available to patients in the interest of transparency. Please be advised this is a medical document. Medical documents are intended to carry relevant information, facts  as evident, and the clinical opinion of the practitioner. The medical note is intended as peer to peer communication and may appear blunt or direct. It is written in medical language and may contain abbreviations or verbiage that are unfamiliar.              [1]   Allergies  Allergen Reactions    Bee Venom ANAPHYLAXIS    Morphine HIVES    Fentanyl RASH   [2]   Current Facility-Administered Medications on File Prior to Encounter   Medication Dose Route Frequency Provider Last Rate Last Admin    [COMPLETED] HYDROmorphone (Dilaudid) 1 MG/ML injection 0.4 mg  0.4 mg Intravenous Once Michael Aldrich MD   0.4 mg at 25 0112    [COMPLETED] magnesium sulfate in sterile water for injection 2 g/50mL IVPB premix 2 g  2 g Intravenous Once Michael Aldrich MD 50 mL/hr at 25 2 g at 25    [COMPLETED] sodium chloride 0.9 % IV bolus 1,000 mL  1,000 mL Intravenous Once Andrzej Smith DO   Stopped at 25    [COMPLETED] thiamine 100 mg/mL injection 100 mg  100 mg Intravenous Once Andrzej Smith DO   100 mg at 25 184    [COMPLETED] folic acid (Folvite) 1 mg in sodium chloride 0.9% 50 mL IVPB  1 mg Intravenous Once Andrzej Smith DO   Stopped at 25    [COMPLETED] magnesium sulfate in dextrose 5% 1 g/100mL infusion premix 1 g  1 g Intravenous Once Andrzej Smith DO   Stopped at 25    [COMPLETED] iopamidol 76% (ISOVUE-370) injection for power injector  80 mL Intravenous ONCE PRN Andrzej Smith DO   80 mL at 25 2018    [COMPLETED] HYDROmorphone (Dilaudid) 1 MG/ML injection 0.5 mg  0.5 mg Intravenous Once Andrzej Smith DO   0.5 mg at 25    [] sodium chloride 0.9% infusion   Intravenous Continuous Andrzej Smith DO        [COMPLETED] lactated ringers IV bolus 1,000 mL  1,000 mL Intravenous Once Michael Aldrich MD 1,000 mL/hr at 25 1,000 mL at 25    [COMPLETED] sodium chloride 0.9 % IV bolus 1,000 mL  1,000 mL Intravenous Once Schubel,  Samy HENDRIX MD   Stopped at 25 1428    [COMPLETED] HYDROmorphone (Dilaudid) 1 MG/ML injection 1 mg  1 mg Intravenous Once Samy Jackson MD   1 mg at 25 1330    [COMPLETED] ondansetron (Zofran) 4 MG/2ML injection 4 mg  4 mg Intravenous Once Samy Jackson MD   4 mg at 25 1331    [COMPLETED] HYDROmorphone (Dilaudid) 1 MG/ML injection 1 mg  1 mg Intravenous Once Samy Jackson MD   1 mg at 25 1453    [COMPLETED] magnesium oxide (Mag-Ox) tab 800 mg  800 mg Oral Once Nj Ashley MD   800 mg at 24 0623    [COMPLETED] sodium phosphate 15 mmol in 0.9% NaCl 100mL IVPB premix  15 mmol Intravenous Once Nj Ashley MD   15 mmol at 24 0536    [] naloxone (Narcan) 0.4 MG/ML injection 0.08 mg  0.08 mg Intravenous Once PRN Reymundo Wayne MD        [COMPLETED] magnesium oxide (Mag-Ox) tab 800 mg  800 mg Oral Once Nj Ashley MD   800 mg at 24 1308    [COMPLETED] insulin aspart (NovoLOG) 100 Units/mL FlexPen 1-20 Units  1-20 Units Subcutaneous Once Whit Frankel APRN   6 Units at 24 1419    [COMPLETED] magnesium sulfate in sterile water for injection 2 g/50mL IVPB premix 2 g  2 g Intravenous Once Michael Aldrich MD 50 mL/hr at 24 1423 2 g at 24 1423    [COMPLETED] sodium phosphate 30 mmol in sodium chloride 0.9% 150 mL IVPB  30 mmol Intravenous Once Xenia Castañeda APRN 37.5 mL/hr at 24 0144 30 mmol at 24 0144    [COMPLETED] magnesium sulfate 4 g/100mL IVPB premix 4 g  4 g Intravenous Once Xenia Castañeda APRN 50 mL/hr at 24 0137 4 g at 24 0137    [COMPLETED] potassium chloride (Klor-Con M20) tab 40 mEq  40 mEq Oral Q4H Simone Samson, DO   40 mEq at 24 1332    [COMPLETED] LORazepam (Ativan) 2 mg/mL injection 2 mg  2 mg Intravenous Once Samy Jackson MD   2 mg at 24 1820    [COMPLETED] sodium chloride 0.9 % IV bolus 1,000 mL  1,000 mL Intravenous Once Samy Jackson MD   Stopped at 24 192     [COMPLETED] dilTIAZem (cardIZEM) 25 mg/5mL injection 10 mg  10 mg Intravenous Once Samy Jackson MD   10 mg at 24    [COMPLETED] thiamine 100 mg/mL injection 100 mg  100 mg Intravenous Once Samy Jackson MD   100 mg at 24    [COMPLETED] dilTIAZem (cardIZEM) 25 mg/5mL injection 10 mg  10 mg Intravenous Once Samy Jackson MD   10 mg at 24 184    [COMPLETED] HYDROmorphone (Dilaudid) 1 MG/ML injection 1 mg  1 mg Intravenous Once Samy Jackson MD   1 mg at 24    [COMPLETED] ondansetron (Zofran) 4 MG/2ML injection 4 mg  4 mg Intravenous Once Samy Jackson MD   4 mg at 24    [COMPLETED] metoprolol (Lopressor) 5 mg/5mL injection 5 mg  5 mg Intravenous Once Samy Jackson MD   5 mg at 24    [] sodium chloride 0.9% infusion   Intravenous Continuous Samy Jackson MD        [] HYDROmorphone (Dilaudid) 1 MG/ML injection 0.5 mg  0.5 mg Intravenous Q30 Min PRN Samy Jackson MD        [] ondansetron (Zofran) 4 MG/2ML injection 4 mg  4 mg Intravenous Q4H PRN Samy Jackson MD        [COMPLETED] dextrose 5%-sodium chloride 0.9% infusion   Intravenous Once Samy Jackson MD 75 mL/hr at 24 New Bag at 24    [COMPLETED] metoprolol (Lopressor) 5 mg/5mL injection 5 mg  5 mg Intravenous Once Samy Jackson MD   5 mg at 24    [COMPLETED] iopamidol 76% (ISOVUE-370) injection for power injector  100 mL Intravenous ONCE PRN Samy Jackson MD   100 mL at 24    [COMPLETED] lactated ringers IV bolus 1,000 mL  1,000 mL Intravenous Once Xenia Castañeda APRN 1,000 mL/hr at 24 2314 1,000 mL at 24 2314     Current Outpatient Medications on File Prior to Encounter   Medication Sig Dispense Refill    LANTUS SOLOSTAR 100 UNIT/ML Subcutaneous Solution Pen-injector Inject 15 Units into the skin every morning. (Patient not taking: Reported on 2025)      pantoprazole 40 MG  Oral Tab EC Take 1 tablet (40 mg total) by mouth 2 (two) times daily before meals. (Patient taking differently: Take 1 tablet (40 mg total) by mouth every morning before breakfast.) 60 tablet 0    atorvastatin 10 MG Oral Tab Take 1 tablet (10 mg total) by mouth daily.      folic acid 1 MG Oral Tab Take 1 tablet (1 mg total) by mouth daily. 30 tablet 0    thiamine 100 MG Oral Tab Take 1 tablet (100 mg total) by mouth daily. 30 tablet 0    Naloxone HCl 4 MG/0.1ML Nasal Liquid 4 mg by Nasal route as needed. If patient remains unresponsive, repeat dose in other nostril 2-5 minutes after first dose. 1 kit 0    Glucose Blood (ONETOUCH VERIO) In Vitro Strip Check blood sugar two times day, before breakfast and before dinner.  (may substitute any brand covered by insurance) 50 strip 0    ondansetron 4 MG Oral Tablet Dispersible Take 1 tablet (4 mg total) by mouth every 8 (eight) hours as needed for Nausea. 20 tablet 0    metoprolol succinate 100 MG Oral Tablet 24 Hr Take 1 tablet (100 mg total) by mouth 2 (two) times daily. Take half tablet if blood pressure less than 130/80  0    haloperidol 5 MG Oral Tab Take 1 tablet (5 mg total) by mouth at bedtime.      Amphetamine-Dextroamphet ER 10 MG Oral Capsule SR 24 Hr Take 1 capsule (10 mg total) by mouth every morning.      Amphetamine-Dextroamphet ER 30 MG Oral Capsule SR 24 Hr Take 1 capsule (30 mg total) by mouth every morning.      ALPRAZolam 0.25 MG Oral Tab Take 1 tablet (0.25 mg total) by mouth 3 (three) times daily as needed.      gabapentin 300 MG Oral Cap Take 1 capsule (300 mg total) by mouth in the morning and 1 capsule (300 mg total) before bedtime.      Montelukast Sodium 10 MG Oral Tab Take 1 tablet (10 mg total) by mouth daily.      Sertraline HCl 100 MG Oral Tab Take 2 tablets (200 mg total) by mouth daily.      busPIRone 10 MG Oral Tab Take 2 tablets (20 mg total) by mouth 2 (two) times daily.      traZODone HCl 100 MG Oral Tab Take 2 tablets (200 mg total) by  mouth nightly.        Family

## 2025-02-22 NOTE — ED INITIAL ASSESSMENT (HPI)
Pt presents to ed with heart palpations that started this morning, per ems pt's heart rate is in the 200's and pt reports ETOH withdraw with last drank this morning

## 2025-02-22 NOTE — PLAN OF CARE
Received patient from ER.  Oriented to room.  Alert and oriented x 4.  Room air.  CIWA protocol.  QID accucheck.  Carb controlled 1800 K yuliya diet.  NSR/ST on telemetry.  Complaint of R abdominal pain of 9/10, dilaudid given.  Seizure precautions initiated.  Safety fall precautions initiated, fell yesterday per patient.  PIV on her RAC/LAC SL  Ambulatory with 1 assist, history of fall at home yesterday.  Admission navigator completed.  Needs attended, call light within her reach.  Bed in lowest position, bed alarm is on.  Problem: Diabetes/Glucose Control  Goal: Glucose maintained within prescribed range  Description: INTERVENTIONS:  - Monitor Blood Glucose as ordered  - Assess for signs and symptoms of hyperglycemia and hypoglycemia  - Administer ordered medications to maintain glucose within target range  - Assess barriers to adequate nutritional intake and initiate nutrition consult as needed  - Instruct patient on self management of diabetes  Outcome: Progressing     Problem: Patient/Family Goals  Goal: Patient/Family Long Term Goal  Description: Patient's Long Term Goal: Discharge to home when medically stable    Interventions:  - Cards follow  - See additional Care Plan goals for specific interventions  Outcome: Progressing  Goal: Patient/Family Short Term Goal  Description: Patient's Short Term Goal: discharge to home when medically stable.    Interventions:   - monitor vital signs, mental status.  -administer medications as ordered.  -education on alcohol cessation.  - See additional Care Plan goals for specific interventions  Outcome: Progressing     Problem: PAIN - ADULT  Goal: Verbalizes/displays adequate comfort level or patient's stated pain goal  Description: INTERVENTIONS:  - Encourage pt to monitor pain and request assistance  - Assess pain using appropriate pain scale  - Administer analgesics based on type and severity of pain and evaluate response  - Implement non-pharmacological measures as  appropriate and evaluate response  - Consider cultural and social influences on pain and pain management  - Manage/alleviate anxiety  - Utilize distraction and/or relaxation techniques  - Monitor for opioid side effects  - Notify MD/LIP if interventions unsuccessful or patient reports new pain  - Anticipate increased pain with activity and pre-medicate as appropriate  Outcome: Progressing     Problem: SAFETY ADULT - FALL  Goal: Free from fall injury  Description: INTERVENTIONS:  - Assess pt frequently for physical needs  - Identify cognitive and physical deficits and behaviors that affect risk of falls.  - Smoot fall precautions as indicated by assessment.  - Educate pt/family on patient safety including physical limitations  - Instruct pt to call for assistance with activity based on assessment  - Modify environment to reduce risk of injury  - Provide assistive devices as appropriate  - Consider OT/PT consult to assist with strengthening/mobility  - Encourage toileting schedule  Outcome: Progressing     Problem: DISCHARGE PLANNING  Goal: Discharge to home or other facility with appropriate resources  Description: INTERVENTIONS:  - Identify barriers to discharge w/pt and caregiver  - Include patient/family/discharge partner in discharge planning  - Arrange for needed discharge resources and transportation as appropriate  - Identify discharge learning needs (meds, wound care, etc)  - Arrange for interpreters to assist at discharge as needed  - Consider post-discharge preferences of patient/family/discharge partner  - Complete POLST form as appropriate  - Assess patient's ability to be responsible for managing their own health  - Refer to Case Management Department for coordinating discharge planning if the patient needs post-hospital services based on physician/LIP order or complex needs related to functional status, cognitive ability or social support system  Outcome: Progressing

## 2025-02-22 NOTE — ED QUICK NOTES
Pt's belongings collected and secured with security at this time due to pt being intoxicated and not allowed to leave on her own.

## 2025-02-22 NOTE — CONSULTS
[facility]  Cardiology Consultation    Blanca Coats Patient Status:  Emergency    1980 MRN [id number]   Location [facility] EMERGENCY DEPARTMENT Attending Siri Bell MD   Hosp Day # 0 PCP Liya Velez PA-C     Reason for Consultation:  Afib with RVR    History of Present Illness:  Blanca Coats is a a(n) 44 year old female with hx of alcohol abuse and pancreatitis who presents with recurrent pancreatitis.  Given ativan.  Started on cardizem gtt up to 15 mg/hr and one dose of 5 mg lopressor - now in SR with sinus tach.  Drowsy and sleepy.  Asking for ice chips.  Last drink this AM.    ECG on presentation afib with RVR - ventricular rate 155 bpm  Echo 2024:  1. Left ventricle: The cavity size was normal. Wall thickness was normal.      Systolic function was normal. The estimated ejection fraction was 60-65%,      by visual assessment. No diagnostic evidence for regional wall motion      abnormalities. Left ventricular diastolic function parameters were      normal.   2. Right ventricle: Systolic function was normal.   3. Left atrium: The left atrial volume was mildly increased.   4. Pulmonary arteries: Systolic pressure was within the normal range,      estimated to be 28mm Hg.   Impressions:  This study is compared with previous dated 01/15/2023: No   significant change since prior study.     History:  Past Medical History:    Anxiety    Arrhythmia    SVT    Attention deficit disorder    Breast CA (HCC)    Depression    Esophageal reflux    High blood pressure    OTHER DISEASES    seasonal allergies    Pancreatitis (HCC)    Pneumonia due to organism    SVT (supraventricular tachycardia) (HCC)    Type 1 diabetes mellitus (HCC)     Past Surgical History:   Procedure Laterality Date    Breast surgery      Implant left      Implant right      Mastectomy left      Mastectomy right      Ndsc ablation & rcnstj atria lmtd w/o bypass       Family History   Problem Relation Age of Onset     Heart Disorder Father     Diabetes Father     Hypertension Mother     Cancer Mother         breast      reports that she has been smoking cigarettes. She has never used smokeless tobacco. She reports current alcohol use of about 7.0 standard drinks of alcohol per week. She reports current drug use. Frequency: 7.00 times per week. Drug: Cannabis.    Allergies:  Allergies[1]    Medications:    Current Facility-Administered Medications:     dilTIAZem (cardIZEM) 100 mg in sodium chloride 0.9% 100 mL IVPB-ADDV, 5 mg/hr, Intravenous, Continuous    dilTIAZem (cardIZEM) 25 mg/5mL injection 10 mg, 10 mg, Intravenous, Once    Review of Systems:  A comprehensive review of systems was negative if not otherwise mention in above HPI.    /70   Pulse 105   Temp 96.9 °F (36.1 °C) (Temporal)   Resp 14   Wt 165 lb   LMP 2025 (Approximate)   SpO2 97%   BMI 26.63 kg/m²   Temp (24hrs), Av.9 °F (36.1 °C), Min:96.9 °F (36.1 °C), Max:96.9 °F (36.1 °C)     No intake or output data in the 24 hours ending 25 1316  Wt Readings from Last 3 Encounters:   25 165 lb   25 137 lb 9.1 oz   25 144 lb 2.9 oz       Physical Exam:   General: Alert and oriented x 3. No apparent distress. No respiratory or constitutional distress.  HEENT: Normocephalic, anicteric sclera, neck supple.  Neck: No JVD, carotids 2+, no bruits.  Cardiac: Regular rate and rhythm. S1, S2 normal. No murmur, pericardial rub, S3.  Lungs: Clear anteriorly  Extremities: Without edema  Neurologic: Alert and oriented, normal affect.  Skin: Warm and dry.     Laboratory Data:  Lab Results   Component Value Date    PTT 24.3 2025    INR 1.16 2025    PTP 14.9 2025    LIP 31 2025       Imaging:  ECG with afib with RVR on presentation    Impression:  Principal Problem:    Atrial fibrillation with rapid ventricular response (HCC)  Recurrent pancreatitis  EtOH abuse  EtOH withdrawal    Recommendations:  - admit to third floor  with tele and CIWA protocol  - continue IV cardizem for now at 15 mg/hr  - no additional cardiac testing needed  - not a candidate for oral anticoaguation as outlined by Dr. Sidhu in December of 2024    Thank you for allowing me to participate in the care of your patient.    Bay Buchanan MD  2/22/2025  1:16 PM         [1]   Allergies  Allergen Reactions    Bee Venom ANAPHYLAXIS    Morphine HIVES    Fentanyl RASH

## 2025-02-23 VITALS
SYSTOLIC BLOOD PRESSURE: 103 MMHG | RESPIRATION RATE: 18 BRPM | WEIGHT: 165 LBS | OXYGEN SATURATION: 92 % | DIASTOLIC BLOOD PRESSURE: 55 MMHG | HEART RATE: 66 BPM | TEMPERATURE: 98 F | BODY MASS INDEX: 27 KG/M2

## 2025-02-23 LAB
ACETONE: NEGATIVE
ANION GAP SERPL CALC-SCNC: 7 MMOL/L (ref 0–18)
BASOPHILS # BLD AUTO: 0.05 X10(3) UL (ref 0–0.2)
BASOPHILS NFR BLD AUTO: 0.7 %
BUN BLD-MCNC: <5 MG/DL (ref 9–23)
CALCIUM BLD-MCNC: 9 MG/DL (ref 8.7–10.6)
CHLORIDE SERPL-SCNC: 101 MMOL/L (ref 98–112)
CO2 SERPL-SCNC: 21 MMOL/L (ref 21–32)
CREAT BLD-MCNC: 0.81 MG/DL
EGFRCR SERPLBLD CKD-EPI 2021: 93 ML/MIN/1.73M2 (ref 60–?)
EOSINOPHIL # BLD AUTO: 0.03 X10(3) UL (ref 0–0.7)
EOSINOPHIL NFR BLD AUTO: 0.4 %
ERYTHROCYTE [DISTWIDTH] IN BLOOD BY AUTOMATED COUNT: 12.9 %
GLUCOSE BLD-MCNC: 229 MG/DL (ref 70–99)
GLUCOSE BLD-MCNC: 277 MG/DL (ref 70–99)
GLUCOSE BLD-MCNC: 296 MG/DL (ref 70–99)
GLUCOSE BLD-MCNC: 407 MG/DL (ref 70–99)
HCT VFR BLD AUTO: 37.7 %
HGB BLD-MCNC: 13.4 G/DL
IMM GRANULOCYTES # BLD AUTO: 0.03 X10(3) UL (ref 0–1)
IMM GRANULOCYTES NFR BLD: 0.4 %
LYMPHOCYTES # BLD AUTO: 1.85 X10(3) UL (ref 1–4)
LYMPHOCYTES NFR BLD AUTO: 24.7 %
MCH RBC QN AUTO: 32.3 PG (ref 26–34)
MCHC RBC AUTO-ENTMCNC: 35.5 G/DL (ref 31–37)
MCV RBC AUTO: 90.8 FL
MONOCYTES # BLD AUTO: 0.92 X10(3) UL (ref 0.1–1)
MONOCYTES NFR BLD AUTO: 12.3 %
NEUTROPHILS # BLD AUTO: 4.62 X10 (3) UL (ref 1.5–7.7)
NEUTROPHILS # BLD AUTO: 4.62 X10(3) UL (ref 1.5–7.7)
NEUTROPHILS NFR BLD AUTO: 61.5 %
PLATELET # BLD AUTO: 228 10(3)UL (ref 150–450)
POTASSIUM SERPL-SCNC: 4.4 MMOL/L (ref 3.5–5.1)
RBC # BLD AUTO: 4.15 X10(6)UL
SODIUM SERPL-SCNC: 129 MMOL/L (ref 136–145)
WBC # BLD AUTO: 7.5 X10(3) UL (ref 4–11)

## 2025-02-23 PROCEDURE — 99239 HOSP IP/OBS DSCHRG MGMT >30: CPT | Performed by: HOSPITALIST

## 2025-02-23 NOTE — PROGRESS NOTES
Progress Note  Blanca Coats Patient Status:  Inpatient    1980 MRN [id number]   Location [facility] 3NE-A Attending Sylvester Gonzales*   Hosp Day # 1 PCP Liya Velez PA-C     Subjective   No cardiac complaints for me this morning. Wants to go home.       Objective:   /55 (BP Location: Right arm)   Pulse 66   Temp 97.8 °F (36.6 °C) (Oral)   Resp 18   Wt 165 lb (74.8 kg)   LMP 2025 (Approximate)   SpO2 92%   BMI 26.63 kg/m²     Telemetry: sinus to sinus tachycardia     Intake/Output:    Intake/Output Summary (Last 24 hours) at 2025 1100  Last data filed at 2025 1630  Gross per 24 hour   Intake 770 ml   Output --   Net 770 ml       Wt Readings from Last 3 Encounters:   25 165 lb (74.8 kg)   25 137 lb 9.1 oz (62.4 kg)   25 144 lb 2.9 oz (65.4 kg)         Labs:  Recent Labs   Lab 25  1138 25  0129   * 277*   BUN 5* <5*   CREATSERUM 0.97 0.81   EGFRCR 75 93   CA 9.3 9.0   * 129*   K 4.2 4.4   CL 97* 101   CO2 10.0* 21.0     Recent Labs   Lab 25  1138 25  0301   RBC 5.11 4.15   HGB 16.5* 13.4   HCT 47.5 37.7   MCV 93.0 90.8   MCH 32.3 32.3   MCHC 34.7 35.5   RDW 13.1 12.9   NEPRELIM 7.49 4.62   WBC 10.3 7.5   .0 228.0         Recent Labs   Lab 25  1138   TROPHS 8       Review of Systems:     10 point review of systems completed and negative except as noted in HPI    Constitutional:No fevers, chills, fatigue or night sweats.  Respiratory: Denies cough, wheezing or shortness of breath .  CV: Denies chest pain, palpitations, orthopnea, PND or dizziness .      Exam:     Physical Exam:  General: Alert and oriented x 3. No apparent distress.   HEENT: Normocephalic, neck supple, no thyromegaly or adenopathy.  Neck: No JVD, carotids 2+, no bruits.  Cardiac: Regular rate and rhythm. S1, S2 normal. No murmur, pericardial rub, S3, or extra cardiac sounds.  Lungs: Clear without wheezes, rales, rhonchi or  dullness.  Normal excursions and effort.  Abdomen: Soft, non-tender. BS-present.  Extremities: Without clubbing or cyanosis. No edema.    Neurologic: Alert and oriented, normal affect. No focal defects  Skin: Warm and dry.     Medications:     dilTIAZem  10 mg Intravenous Once    atorvastatin  10 mg Oral Nightly    busPIRone  20 mg Oral BID    gabapentin  300 mg Oral BID    haloperidol  5 mg Oral Nightly    metoprolol succinate ER  100 mg Oral 2x Daily(Beta Blocker)    montelukast  10 mg Oral Daily    traZODone  200 mg Oral Nightly    sertraline  200 mg Oral Daily    pantoprazole  40 mg Oral BID AC    insulin aspart  1-10 Units Subcutaneous TID AC and HS    thiamine  100 mg Oral Daily    multivitamin  1 tablet Oral Daily    folic acid  1 mg Oral Daily    insulin aspart  1-10 Units Subcutaneous TID CC      dilTIAZem Stopped (02/22/25 1501)       Diagnostic Studies:     ECG on presentation afib with RVR - ventricular rate 155 bpm    Echo 12/2024:  1. Left ventricle: The cavity size was normal. Wall thickness was normal.      Systolic function was normal. The estimated ejection fraction was 60-65%,      by visual assessment. No diagnostic evidence for regional wall motion      abnormalities. Left ventricular diastolic function parameters were      normal.   2. Right ventricle: Systolic function was normal.   3. Left atrium: The left atrial volume was mildly increased.   4. Pulmonary arteries: Systolic pressure was within the normal range,      estimated to be 28mm Hg.   Impressions:  This study is compared with previous dated 01/15/2023: No   significant change since prior study.     Assessment and Plan:     Assessment:  # Atrial fibrillation with rapid ventricular response   Started on Cardizem gtt on arrival > converted to sinus rhythm   Not a candidate for oral anticoagulation   # EtOH abuse/ withdrawal   CIWA protocol   Management per primary team   # recurrent pancreatitis d/t above   # DM type I - management per  primary team       Plan:  Currently sustaining sinus rhythm off of cardizem drip   No active chest pain or cardiac symptoms   Cardiology will sign off. Call our service with question or concerns    Plan of care discussed with patient, RN.      Tati Saleh, APRN  2/23/2025  11:00 AM  Ph [phone] ([place])  Ph [phone] ([place]ma)      Patient seen and examined independently.  Note reviewed and labs reviewed.  Agree with above assessment and plan.  Rhythm control strategy for afib.  Stable cardiac standpoint.  Cards will sign off.    BERE Buchanan MD

## 2025-02-23 NOTE — PLAN OF CARE
Assumed care of 43 y/o female @1930  A/Ox4, RA  CIWA protocol  Seizure prec  NSR-Tele  Carb cont diet  QID accucheck  Continent, bedside commode  Up x 1 assist  RAC/LAC PIV-SL  Dilaudid Q2 for pain   Safety precautions maintained and all needs met    Problem: PAIN - ADULT  Goal: Verbalizes/displays adequate comfort level or patient's stated pain goal  Description: INTERVENTIONS:  - Encourage pt to monitor pain and request assistance  - Assess pain using appropriate pain scale  - Administer analgesics based on type and severity of pain and evaluate response  - Implement non-pharmacological measures as appropriate and evaluate response  - Consider cultural and social influences on pain and pain management  - Manage/alleviate anxiety  - Utilize distraction and/or relaxation techniques  - Monitor for opioid side effects  - Notify MD/LIP if interventions unsuccessful or patient reports new pain  - Anticipate increased pain with activity and pre-medicate as appropriate  Outcome: Progressing

## 2025-02-23 NOTE — PLAN OF CARE
Assumed patient care @ 7:30.  Alert and oriented x 4.  Room air.  NSR on telemetry.  Complaint of R abdominal pain.  CIWA protocol.  QID accucheck.  Carb controlled 1800 K yuliya diet.  Seizure precautions.  R arm precautions.  Safety fall precautions maintained.  Needs attended, call light within her reach.  Bed in lowest position, bed alarm is on.  Problem: Diabetes/Glucose Control  Goal: Glucose maintained within prescribed range  Description: INTERVENTIONS:  - Monitor Blood Glucose as ordered  - Assess for signs and symptoms of hyperglycemia and hypoglycemia  - Administer ordered medications to maintain glucose within target range  - Assess barriers to adequate nutritional intake and initiate nutrition consult as needed  - Instruct patient on self management of diabetes  Outcome: Progressing     Problem: Patient/Family Goals  Goal: Patient/Family Long Term Goal  Description: Patient's Long Term Goal: discharge to home when stable    Interventions:  - cards follows  - See additional Care Plan goals for specific interventions  Outcome: Progressing  Goal: Patient/Family Short Term Goal  Description: Patient's Short Term Goal: discharge to home when medically stable.    Interventions:   - monitor vital signs, mental status  -administer medications as ordered.  -pain management  - See additional Care Plan goals for specific interventions  Outcome: Progressing     Problem: PAIN - ADULT  Goal: Verbalizes/displays adequate comfort level or patient's stated pain goal  Description: INTERVENTIONS:  - Encourage pt to monitor pain and request assistance  - Assess pain using appropriate pain scale  - Administer analgesics based on type and severity of pain and evaluate response  - Implement non-pharmacological measures as appropriate and evaluate response  - Consider cultural and social influences on pain and pain management  - Manage/alleviate anxiety  - Utilize distraction and/or relaxation techniques  - Monitor for opioid  side effects  - Notify MD/LIP if interventions unsuccessful or patient reports new pain  - Anticipate increased pain with activity and pre-medicate as appropriate  Outcome: Progressing     Problem: SAFETY ADULT - FALL  Goal: Free from fall injury  Description: INTERVENTIONS:  - Assess pt frequently for physical needs  - Identify cognitive and physical deficits and behaviors that affect risk of falls.  - [place] fall precautions as indicated by assessment.  - Educate pt/family on patient safety including physical limitations  - Instruct pt to call for assistance with activity based on assessment  - Modify environment to reduce risk of injury  - Provide assistive devices as appropriate  - Consider OT/PT consult to assist with strengthening/mobility  - Encourage toileting schedule  Outcome: Progressing     Problem: DISCHARGE PLANNING  Goal: Discharge to home or other facility with appropriate resources  Description: INTERVENTIONS:  - Identify barriers to discharge w/pt and caregiver  - Include patient/family/discharge partner in discharge planning  - Arrange for needed discharge resources and transportation as appropriate  - Identify discharge learning needs (meds, wound care, etc)  - Arrange for interpreters to assist at discharge as needed  - Consider post-discharge preferences of patient/family/discharge partner  - Complete POLST form as appropriate  - Assess patient's ability to be responsible for managing their own health  - Refer to Case Management Department for coordinating discharge planning if the patient needs post-hospital services based on physician/LIP order or complex needs related to functional status, cognitive ability or social support system  Outcome: Progressing

## 2025-02-23 NOTE — PROGRESS NOTES
NURSING DISCHARGE NOTE    Discharged Home via Ambulatory.  Accompanied by Support staff  Belongings Taken by patient/family.    Discharge instructions discussed with patient.  Instructed to follow up with her PCP.  Instructed to continue to take all her medications as ordered.  Educated on importance of alcohol cessation.  Instructed to check her blood sugar 4x a day and to follow the prescribed medications and diet.  IV/telemetry removed.  Patient verbalizes understanding of the instructions provided.

## 2025-02-25 ENCOUNTER — PATIENT OUTREACH (OUTPATIENT)
Dept: CASE MANAGEMENT | Age: 45
End: 2025-02-25

## 2025-02-25 NOTE — PAYOR COMM NOTE
--------------  DISCHARGE REVIEW    Payor: TIA  Subscriber #:  [id number]  Authorization Number: [id number]    Admit date: 2/22/25  Admit time:   3:13 PM  Discharge Date: 2/23/2025 12:10 PM     Admitting Physician: Sylvester Gonzales DO  Primary Care Physician: Liya Velez PA-C        REVIEWER COMMENTS    FOR FINAL REVIEW/APPROVAL OF ALL INPT DAYS

## 2025-02-25 NOTE — PROGRESS NOTES
Hospital follow up.    Last A1C Value: 12% Date: [2/22/2025]    Liya Velez PA-C  Internal Medicine  [address] [place] [address] [phone]  Patient will contact the office to schedule.    Jennifer Schoenberger, DO  Endocrinology  [address] [id number] [phone]  Tuesday 3/4 @3  Existing appointment.    Confirmed with patient.    Closing encounter.

## 2025-02-25 NOTE — PAYOR COMM NOTE
--------------  ADMISSION REVIEW     Payor: TIA  Subscriber #:  [id number]  Authorization Number: [id number]    Admit date: 2/22/25  Admit time:  3:13 PM       REVIEW DOCUMENTATION:  ED Provider Notes signed by Siri Bell MD at 2/22/2025  1:29 PM       Author: Siri Bell MD Service: -- Author Type: Physician    Filed: 2/22/2025  1:29 PM Date of Service: 2/22/2025 12:54 PM Status: Signed     Patient Seen in: [facility] Emergency Department    History     Chief Complaint   Patient presents with    Arrythmia/Palpitations     Stated Complaint: A-fib    HPI  Patient is a 44-year-old female presenting to the emergency department with rapid irregular heart rate.  Patient reports history of alcohol addiction, chronic pancreatitis and atrial fibrillation.  She has also had SVT in the past.  She states that her last drink was earlier this morning.  She then began having very rapid heart rate and called 911 for help.  Paramedics transported the patient with high heart rate but alert with normal blood pressure no diaphoresis or chest pain reported.  Patient did report some mild epigastric abdominal pain which she stated is from her pancreatitis.    Review of Systems  Positive for stated complaint: A-fib  Other systems are as noted in HPI.  Constitutional and vital signs reviewed.      All other systems reviewed and negative except as noted above.    Physical Exam     ED Triage Vitals [02/22/25 1133]   /64   Pulse (!) 210   Resp 20   Temp 96.9 °F (36.1 °C)   Temp src Temporal   SpO2 100 %   O2 Device None (Room air)     Current:/79   Pulse 111   Temp 96.9 °F (36.1 °C) (Temporal)   Resp 24   Wt 74.8 kg   LMP 01/07/2025 (Approximate)   SpO2 97%   BMI 26.63 kg/m²     Physical Exam  Constitutional: No apparent distress  Eyes: No scleral icterus  Heart: Very tachycardic, irregular, no murmurs  Lungs: Clear to auscultation bilaterally  Abdomen: Soft and nontender  Skin: No rash  Neuro: Alert  and oriented ×3    ED Course/ My interpretations:     Labs Reviewed   PROTHROMBIN TIME (PT) - Abnormal; Notable for the following components:       Result Value    PT 14.9 (*)     All other components within normal limits   LIPASE - Normal   PTT, ACTIVATED - Normal   TROPONIN I HIGH SENSITIVITY - Normal   COMP METABOLIC PANEL (14)   CBC WITH DIFFERENTIAL WITH PLATELET     EKG 11:19  Rate, intervals and axes as noted on EKG Report.  Rate: 203  Rhythm: Atrial Fibrillation  Reading: Atrial fibrillation with rapid ventricular response, nonspecific ST wave abnormality noted.  No previous EKG available for comparison initially    EKG 11:34  Rate, intervals and axes as noted on EKG Report.  Rate: 155  Rhythm: Atrial Fibrillation  Reading: Atrial fibrillation with rapid ventricular response, heart rate 155 beats per minute, nonspecific ST wave abnormality noted.  No ST elevations.  EKG 11:34  Rate, intervals and axes as noted on EKG Report.  Rate: 99  Rhythm: Sinus Rhythm  Reading: Normal sinus rhythm, right atrial enlargement, right axis, QTc 497, no ST elevations, nonspecific T wave abnormality present.  dependent imaging interpretation:  Procedures    Lipase    Prothrombin Time (PT)    PTT, Activated    Troponin I (High Sensitivity)    XR CHEST AP PORTABLE  (CPT=71045)      No pneumothorax  All available radiology reports for this visit reviewed.    Medications given:  Orders Placed This Encounter    Lipase    Prothrombin Time (PT)    PTT, Activated    Troponin I (High Sensitivity)    Comp Metabolic Panel (14)    CBC With Differential With Platelet    Ethyl Alcohol    LORazepam (Ativan) 2 mg/mL injection    dilTIAZem (cardIZEM) 25 mg/5mL injection 10 mg    dilTIAZem (cardIZEM) 100 mg in sodium chloride 0.9% 100 mL IVPB-ADDV    dilTIAZem (cardIZEM) 25 mg/5mL injection    LORazepam (Ativan) 2 mg/mL injection 2 mg    LORazepam (Ativan) 2 mg/mL injection 2 mg    LORazepam (Ativan) 2 mg/mL injection    dilTIAZem (cardIZEM) 25  mg/5mL injection 10 mg    dilTIAZem (cardIZEM) 25 mg/5mL injection 15 mg    metoprolol (Lopressor) 5 mg/5mL injection    magnesium sulfate in sterile water for injection 2 g/50mL IVPB premix 2 g     MDM      Extensive differential diagnosis was considered for patient's rapid irregular heart rate including atrial fibrillation, atrial flutter, V. tach, SVT, and other cardiac arrhythmias as well as other cardiac, GI, toxic and other pathology.    Patient's abdominal exam is reassuring with no tenderness therefore I do not suspect any intra-abdominal surgical emergency.     Patient was given 2 doses of Ativan 2 mg each.  Tachycardia was addressed with Cardizem 15 mg IV push and starting of Cardizem drip, it was then titrated up an additional dose of Cardizem 10 mg was then added.  Patient's heart rate only slowed from over 200s to the 160s.  Dose of 5 mg of Lopressor was last administered with significant improvement in heart rate.  Magnesium infusion was also ordered.    Patient appeared to then converted into sinus spontaneously with heart rate of 99 bpm at the time of repeat EKG.  Cardiology was consulted and evaluated patient in the emergency department.    Admission disposition: 2/22/2025 12:54 PM    Medical Decision Making  Disposition and Plan     Clinical Impression:  1. Atrial fibrillation with rapid ventricular response (HCC)    2. Alcohol dependence with unspecified alcohol-induced disorder (HCC)       Disposition:  Admit  2/22/2025 12:54 pm    Hospital Problems       Present on Admission  Date Reviewed: 1/29/2023            ICD-10-CM Noted POA    * (Principal) Atrial fibrillation with rapid ventricular response (HCC) I48.91 8/1/2023 Yes    Alcohol abuse F10.10 11/11/2024 Yes    Alcoholic intoxication with complication F10.929 3/7/2020 Yes    Anxiety and depression F41.9, F32.A Unknown Yes    Diabetic ketoacidosis without coma associated with type 2 diabetes mellitus (HCC) E11.10 1/14/2023 Yes    Primary  hypertension I10 Unknown Yes        Siri Bell MD on 2025  1:29 PM       Cardiology Consultation   Afib with RVR     History of Present Illness:  44 year old female with hx of alcohol abuse and pancreatitis who presents with recurrent pancreatitis.  Given ativan.  Started on cardizem gtt up to 15 mg/hr and one dose of 5 mg lopressor - now in SR with sinus tach.  Drowsy and sleepy.  Asking for ice chips.  Last drink this AM.     ECG on presentation afib with RVR - ventricular rate 155 bpm    /70   Pulse 105   Temp 96.9 °F (36.1 °C) (Temporal)   Resp 14   Wt 165 lb   LMP 2025 (Approximate)   SpO2 97%   BMI 26.63 kg/m²   Temp (24hrs), Av.9 °F (36.1 °C), Min:96.9 °F (36.1 °C), Max:96.9 °F (36.1 °C)    Component Value Date     PTT 24.3 2025     INR 1.16 2025     PTP 14.9 2025     LIP 31 2025     Impression:  Principal Problem:    Atrial fibrillation with rapid ventricular response (HCC)  Recurrent pancreatitis  EtOH abuse  EtOH withdrawal     Recommendations:  - admit to third floor with tele and CIWA protocol  - continue IV cardizem for now at 15 mg/hr  - no additional cardiac testing needed  - not a candidate for oral anticoaguation as outlined by Dr. Sidhu in 2024  Bay Buchanan MD  2025  1:16 PM     History and Physical   Chief Complaint: Palpitations    History of Present Illness:   44 year old female with history of atrial fibrillation, alcohol abuse, type 2 diabetes, depression, hyperlipidemia, SVT presents emergency room with palpitations.  Patient states that started this morning.  No diaphoresis or chest pain.  No fevers, chills, nausea, vomiting, diarrhea.  No hematochezia, melena, hematuria.     /79  Pulse 111  Temp 96.9 °F (36.1 °C) (Temporal)  Resp 24  Wt 165 lb (74.8 kg)  SpO2 97%  BMI 26.63 kg/m²     Component Value Date     INR 1.16 2025     Lab 25  1138   TROPHS 8     Assessment & Plan:  # Atrial  fibrillation with RVR  -Will continue on Cardizem drip  -Will continue on metoprolol  -Cardiology on consult  -No heparin drip per cardiology     # Alcohol abuse   -last drink was this morning  - will place on CIWA protocol.     # Acute on chronic pancreatitis  - Prn IV pain meds     # Type 2 diabetes  -Will place on hyperglycemia protocol with correction factor insulin     # Depression  -Will continue on sertraline  Sylvester Bob Janet, DO  2/22/2025  2:55 PM     2/23/2025  Cardiology   CV: Denies chest pain, palpitations, orthopnea, PND or dizziness     /55 (BP Location: Right arm)   Pulse 66   Temp 97.8 °F (36.6 °C) (Oral)   Resp 18   Wt 165 lb (74.8 kg)   LMP 02/06/2025 (Approximate)   SpO2 92%   BMI 26.63 kg/m²      Telemetry: sinus to sinus tachycardia     2/22/2025 1630      Gross per 24 hour   Intake 770 ml   Output --   Net 770 ml      Lab 02/22/25  1138 02/23/25  0129   * 277*   BUN 5* <5*   CREATSERUM 0.97 0.81   EGFRCR 75 93   CA 9.3 9.0   * 129*   K 4.2 4.4   CL 97* 101   CO2 10.0* 21.0      RBC 5.11 4.15   HGB 16.5* 13.4   HCT 47.5 37.7   MCV 93.0 90.8   MCH 32.3 32.3   MCHC 34.7 35.5   RDW 13.1 12.9   NEPRELIM 7.49 4.62   WBC 10.3 7.5   .0 228.0     Assessment:  # Atrial fibrillation with rapid ventricular response   Started on Cardizem gtt on arrival > converted to sinus rhythm   Not a candidate for oral anticoagulation   # EtOH abuse/ withdrawal   CIWA protocol   Management per primary team   # recurrent pancreatitis d/t above   # DM type I - management per primary team     Plan:  Currently sustaining sinus rhythm off of cardizem drip   No active chest pain or cardiac symptoms   Cardiology will sign off. Call our service with question or concerns  BERE Buchanan MD   2/23/2025 12:02 PM     MEDICATIONS ADMINISTERED:  Medications 02/22/25 02/23/25   atorvastatin (Lipitor) tab 10 mg  Dose: 10 mg  Freq: Nightly Route: OR  Start: 02/22/25 2100 End: 02/23/25 1412     2045 JA-Given          1410-D/C'd          busPIRone (Buspar) tab 20 mg  Dose: 20 mg  Freq: 2 times daily Route: OR  Start: 02/22/25 2100 End: 02/23/25 1410 2045 JA-Given          0837 MA-Given   1410-D/C'd         dilTIAZem (cardIZEM) 25 mg/5mL injection 10 mg  Dose: 10 mg  Freq: Once Route: IV  Start: 02/22/25 1126 End: 02/22/25 1135    1135 SS-Given             dilTIAZem (cardIZEM) 25 mg/5mL injection 15 mg  Dose: 15 mg  Freq: Once Route: IV  Start: 02/22/25 1146 End: 02/22/25 1130    1130 SS-Given             folic acid (Folvite) tab 1 mg  Dose: 1 mg  Freq: Daily Route: OR  Start: 02/22/25 1545 End: 02/23/25 1410    1604 MA-Given          0838 MA-Given   1410-D/C'd         gabapentin (Neurontin) cap 300 mg  Dose: 300 mg  Freq: 2 times daily Route: OR  Start: 02/22/25 2100 End: 02/23/25 1410 2045 JA-Given          0837 MA-Given   1410-D/C'd         haloperidol (Haldol) tab 5 mg  Dose: 5 mg  Freq: Nightly Route: OR  Start: 02/22/25 2100 End: 02/23/25 1410 2103 JA-Given          1410-D/C'd          insulin aspart (NovoLOG) 100 Units/mL FlexPen 1-10 Units  Dose: 1-10 Units  Freq: 3 times daily with meals Route: SC  Start: 02/23/25 0800 End: 02/23/25 1410   Admin Instructions:   1 unit of Novolog (aspart) insulin for every 12 grams of carbohydrates eaten  Give within 10 minutes before or after a meal  Do not give if patient is NPO (carbohydrate is on hold)     0839 MA-Given      1410-D/C'd        insulin aspart (NovoLOG) 100 Units/mL FlexPen 1-10 Units  Dose: 1-10 Units  Freq: 3 times daily before meals and nightly Route: SC  Start: 02/22/25 1600 End: 02/23/25 1410   Admin Instructions:   Correction Insulin - calculate insulin requirement  Give 1 unit of Novolog (aspart) insulin for every 40 points blood glucose is greater than 140 mg/dL  **DO NOT HOLD OR ALTER INSULIN DOSE WITHOUT A PHYSICIAN ORDER**  Give Novolog/aspart insulin subcutaneously within 30 minutes of scheduled finger-stick time  Notify  physician for blood glucose >351mg/dL with time and last dose of correction insulin given.    1723 MA-Given          0013 JA-Given   0553 JA-Given [C]      1410-D/C'd       insulin aspart (NovoLOG) 100 Units/mL FlexPen 10 Units  Dose: 10 Units  Freq: Once Route: SC  Start: 02/22/25 2130 End: 02/22/25 2151 2151 JA-Given             insulin degludec (Tresiba) 100 units/mL flextouch 15 Units  Dose: 15 Units  Freq: Once Route: SC  Start: 02/22/25 2130 End: 02/22/25 2150   Admin Instructions:   This is LONG ACTING insulin.  Continue to give basal insulin (insulin degludec - Tresiba) even if NPO.  DO NOT hold or alter insulin dose without a physician order.    2150 JA-Given             LORazepam (Ativan) 2 mg/mL injection 2 mg  Dose: 2 mg  Freq: Once Route: IV  Start: 02/22/25 1144 End: 02/22/25 1128   Admin Instructions:   For IV use dilute 1:1 with saline    1128 SS-Given             LORazepam (Ativan) 2 mg/mL injection 2 mg  Dose: 2 mg  Freq: Once Route: IV  Start: 02/22/25 1144 End: 02/22/25 1148   Admin Instructions:   For IV use dilute 1:1 with saline    1148 SS-Given             magnesium sulfate in sterile water for injection 2 g/50mL IVPB premix 2 g  Dose: 2 g  Freq: Once Route: IV  Last Dose: Stopped (02/22/25 1317)  Start: 02/22/25 1155 End: 02/22/25 1317    1159 SS-New Bag   1317 SS-Stopped            metoprolol (Lopressor) 5 mg/5mL injection  Start: 02/22/25 1151 End: 02/22/25 1154   Admin Instructions:   Basia Molina: cabinet override    1154 SS-Given             metoprolol succinate ER (Toprol XL) 24 hr tab 100 mg  Dose: 100 mg  Freq: 2 times daily(Beta Blocker) Route: OR  Start: 02/22/25 1800 End: 02/23/25 1410   Admin Instructions:   Do not crush    1604 MA-Given [C]          (0600 JA)-Not Given [C]   1410-D/C'd         montelukast (Singulair) tab 10 mg  Dose: 10 mg  Freq: Daily Route: OR  Start: 02/22/25 1545 End: 02/23/25 1410    1603 MA-Given          0837 MA-Given   1410-D/C'd          multivitamin (Tab-A-Toni/Beta Carotene) tab 1 tablet  Dose: 1 tablet  Freq: Daily Route: OR  Start: 02/22/25 1545 End: 02/23/25 1410    1604 MA-Given          0837 MA-Given   1410-D/C'd         ondansetron (Zofran) 4 MG/2ML injection 4 mg  Dose: 4 mg  Freq: Once Route: IV  Start: 02/22/25 1345 End: 02/22/25 1401    1401 SS-Given             pantoprazole (Protonix) DR tab 40 mg  Dose: 40 mg  Freq: 2 times daily before meals Route: OR  Start: 02/22/25 1700 End: 02/23/25 1410   Admin Instructions:   Do not crush    1604 MA-Given          0556 JA-Given   1410-D/C'd         sertraline (Zoloft) tab 200 mg  Dose: 200 mg  Freq: Daily Route: OR  Start: 02/22/25 1545 End: 02/23/25 1410    1604 MA-Given          0836 MA-Given   1410-D/C'd         thiamine (Vitamin B1) tab 100 mg  Dose: 100 mg  Freq: Daily Route: OR  Start: 02/23/25 0930 End: 02/23/25 1410     0837 MA-Given   1410-D/C'd         thiamine 100 mg/mL injection 100 mg  Dose: 100 mg  Freq: Once Route: IV  Start: 02/22/25 1545 End: 02/22/25 1604   Admin Instructions:   If pt did not already receive dose.  Must administer prior to starting IV fluids with dextrose  For IV Push Doses: Administer slowly over 1 to 2 minute    1604 MA-Given             traZODone (Desyrel) tab 200 mg  Dose: 200 mg  Freq: Nightly Route: OR  Start: 02/22/25 2100 End: 02/23/25 1410    2045 JA-Given          1410-D/C'd            dilTIAZem (cardIZEM) 100 mg in sodium chloride 0.9% 100 mL IVPB-ADDV  Rate: 5 mL/hr Dose: 5 mg/hr  Freq: Continuous Route: IV  Last Dose: Stopped (02/22/25 1501)  Start: 02/22/25 1127 End: 02/23/25 1410   Admin Instructions:   (Conc=1mg/mL) Titrate to maintain HR<120; keep SBP >90, unless otherwise specified by provider   Order specific questions:       1145 SS-New Bag [C]   1154 SS-Rate/Dose Change [C]   1450 SS-Handoff   1501 SS-Stopped [C]       1410-D/C'd            Or   HYDROmorphone (Dilaudid) 1 MG/ML injection 0.4 mg  Dose: 0.4 mg  Freq: Every 2 hour PRN Route:  IV  PRN Reason: moderate pain  Start: 02/22/25 1533 End: 02/23/25 1410   Admin Instructions:   Use PRN reason as a guide and follow range order policy. If oral pain meds are ordered and patient can tolerate oral intake, start with PRN oral pain medications first.       1835 MA-Given                0915 MA-Given   1410-D/C'd        Or   HYDROmorphone (Dilaudid) 1 MG/ML injection 0.8 mg  Dose: 0.8 mg  Freq: Every 2 hour PRN Route: IV  PRN Reason: severe pain  Start: 02/22/25 1533 End: 02/23/25 1410   Admin Instructions:   Use PRN reason as a guide and follow range order policy. If oral pain meds are ordered and patient can tolerate oral intake, start with PRN oral pain medications first.    1605 MA-Given      2045 JA-Given   2301 JA-Given       0553 JA-Given      1410-D/C'd        metoprolol (Lopressor) 5 mg/5mL injection  Start: 02/22/25 1151 End: 02/22/25 1154   Admin Instructions:   Basia Molina: cabinet override    1154 SS-Given               Vitals (last day) before discharge       Date/Time Temp Pulse Resp BP SpO2 Weight O2 Device O2 Flow Rate (L/min) [facility]    02/23/25 1043 -- 66 -- -- 92 % -- -- -- AM    02/23/25 0800 97.8 °F (36.6 °C) 56 18 103/55 93 % -- None (Room air) -- AM    02/23/25 0400 98.2 °F (36.8 °C) 68 18 93/60 93 % -- None (Room air) 0 L/min MM    02/23/25 0200 98.3 °F (36.8 °C) 64 17 96/61 92 % -- None (Room air) 0 L/min MM    02/23/25 0130 -- 62 -- -- 92 % -- -- -- MM    02/23/25 0000 98.2 °F (36.8 °C) 63 18 97/56 92 % -- None (Room air) 0 L/min MM    02/22/25 2200 98.3 °F (36.8 °C) 76 18 107/64 95 % -- None (Room air) 0 L/min MM    02/22/25 2000 98.2 °F (36.8 °C) 79 20 124/76 95 % -- None (Room air) 0 L/min MM    02/22/25 1520 -- -- -- -- -- 165 lb (74.8 kg) -- -- MA    02/22/25 1445 -- 114 20 124/73 97 % -- None (Room air) --     02/22/25 1315 -- 111 24 136/79 97 % -- -- --     02/22/25 1223 -- 105 14 115/70 97 % -- None (Room air) --     02/22/25 1150 -- 178 18 133/78 99 % -- None (Room  air) -- SS    02/22/25 1133 96.9 °F (36.1 °C) 210 20 119/64 100 % 165 lb (74.8 kg) None (Room air) -- SS       CIWA Scores      Date/Time CIWA-Ar Total [facility]    02/23/25 1000 0 MA    02/23/25 0800 0 MA    02/23/25 0600 0     02/23/25 0400 0     02/23/25 0200 1     02/23/25 0000 3     02/22/25 2200 4     02/22/25 2000 6     02/22/25 1800 5 MA    02/22/25 1600 7 MA       FOR REVIEW/APPROVAL OF INPT ADMISSION

## 2025-03-08 NOTE — DISCHARGE SUMMARY
[place] [facility]  DISCHARGE SUMMARY     Blanca Coats Patient Status:  Inpatient    1980 MRN [id number]   Location [facility] 3NE-A Attending No att. providers found   Hosp Day # 1 PCP Liya Velez PA-C     Date of Admission: 2025  Date of Discharge:  2025     Discharge Disposition: Home or Self Care    Discharge Diagnosis:  1.  Atrial fibrillation with RVR  2.  Alcohol abuse  3.  Acute on chronic pancreatitis  4.  Type 2 diabetes  5.  Depression    History of Present Illness: Blanca Coats is a 44 year old female with history of atrial fibrillation, alcohol abuse, type 2 diabetes, depression, hyperlipidemia, SVT presents emergency room with palpitations.  Patient states that started this morning.  No diaphoresis or chest pain.  No fevers, chills, nausea, vomiting, diarrhea.  No hematochezia, melena, hematuria.     Brief Synopsis: Patient was admitted to the cardiac telemetry unit was started on Cardizem drip patient converted to normal sinus rhythm.  Patient was placed on CIWA protocol not requiring any Ativan and was initially given some pain medication however with the pancreatitis being chronic IV pain medication was discontinued.  Cardiology was consulted and saw the patient as the patient wanted go home and patient's vitals were stable and was back in sinus rhythm patient was cleared for discharge home    Lace+ Score: 57  59-90 High Risk  29-58 Medium Risk  0-28   Low Risk       TCM Follow-Up Recommendation:  LACE 29-58: Moderate Risk of readmission after discharge from the hospital.      Procedures during hospitalization:   None    Incidental or significant findings and recommendations (brief descriptions):  None    Lab/Test results pending at Discharge:   None    Consultants:  Cardiology-Dr. Buchanan    Discharge Medication List:     Discharge Medications        CONTINUE taking these medications        Instructions Prescription details   ALPRAZolam 0.25 MG Tabs  Commonly known  as: Xanax      Take 1 tablet (0.25 mg total) by mouth 3 (three) times daily as needed.   Refills: 0     amphetamine-dextroamphetamine ER 30 MG Cp24  Commonly known as: Adderall XR      Take 1 capsule (30 mg total) by mouth every morning.   Refills: 0     amphetamine-dextroamphetamine ER 10 MG Cp24  Commonly known as: Adderall XR      Take 1 capsule (10 mg total) by mouth every morning.   Refills: 0     atorvastatin 10 MG Tabs  Commonly known as: Lipitor      Take 1 tablet (10 mg total) by mouth daily.   Refills: 0     busPIRone 10 MG Tabs  Commonly known as: Buspar      Take 2 tablets (20 mg total) by mouth 2 (two) times daily.   Refills: 0     ergocalciferol 1.25 MG (45481 UT) Caps  Commonly known as: Vitamin D2      Take 1 capsule (50,000 Units total) by mouth once a week. Every Monday   Refills: 0     gabapentin 300 MG Caps  Commonly known as: Neurontin      Take 1 capsule (300 mg total) by mouth in the morning and 1 capsule (300 mg total) before bedtime.   Refills: 0     haloperidol 5 MG Tabs  Commonly known as: Haldol      Take 1 tablet (5 mg total) by mouth at bedtime.   Refills: 0     insulin lispro 100 UNIT/ML Soln  Commonly known as: Humalog      Inject 50 Units into the skin daily. AS DIRECTED IN INSULIN PUMP   Refills: 0     metoprolol succinate  MG Tb24  Commonly known as: Toprol XL      Take 1 tablet (100 mg total) by mouth 2 (two) times daily. Take half tablet if blood pressure less than 130/80   Refills: 0     montelukast 10 MG Tabs  Commonly known as: Singulair      Take 1 tablet (10 mg total) by mouth daily.   Refills: 0     Naloxone HCl 4 MG/0.1ML Liqd      4 mg by Nasal route as needed. If patient remains unresponsive, repeat dose in other nostril 2-5 minutes after first dose.   Quantity: 1 kit  Refills: 0     ondansetron 4 MG Tbdp  Commonly known as: Zofran-ODT      Take 1 tablet (4 mg total) by mouth every 8 (eight) hours as needed for Nausea.   Quantity: 20 tablet  Refills: 0     OneTouch  Verio Strp      Check blood sugar two times day, before breakfast and before dinner.  (may substitute any brand covered by insurance)   Quantity: 50 strip  Refills: 0     pantoprazole 40 MG Tbec  Commonly known as: Protonix      Take 1 tablet (40 mg total) by mouth 2 (two) times daily before meals.   Quantity: 60 tablet  Refills: 0     sertraline 100 MG Tabs  Commonly known as: Zoloft      Take 2 tablets (200 mg total) by mouth daily.   Refills: 0     thiamine 100 MG Tabs  Commonly known as: Vitamin B1      Take 1 tablet (100 mg total) by mouth daily.   Quantity: 30 tablet  Refills: 0     traZODone 100 MG Tabs  Commonly known as: Desyrel      Take 2 tablets (200 mg total) by mouth nightly.   Refills: 0              [place]    Follow-up appointment:   Liya Velez PA-C  [address]  [place]  [facility] [address]  [phone]    Schedule an appointment as soon as possible for a visit in 1 week(s)      Appointments for Next 30 Days 3/7/2025 - 2025      None            Vital signs:       Physical Exam:    General: No acute distress   Lungs: clear to auscultation  Cardiovascular: S1, S2  Abdomen: Soft      -----------------------------------------------------------------------------------------------  PATIENT DISCHARGE INSTRUCTIONS: See electronic chart    Sylvester Gonzales, DO    Total time spent on discharge plannin minutes     The  [employer] C[employer] Act makes medical notes like these available to patients in the interest of transparency. Please be advised this is a medical document. Medical documents are intended to carry relevant information, facts as evident, and the clinical opinion of the practitioner. The medical note is intended as peer to peer communication and may appear blunt or direct. It is written in medical language and may contain abbreviations or verbiage that are unfamiliar.

## 2025-03-14 ENCOUNTER — APPOINTMENT (OUTPATIENT)
Dept: CT IMAGING | Facility: HOSPITAL | Age: 45
End: 2025-03-14
Attending: STUDENT IN AN ORGANIZED HEALTH CARE EDUCATION/TRAINING PROGRAM
Payer: MEDICAID

## 2025-03-14 ENCOUNTER — HOSPITAL ENCOUNTER (INPATIENT)
Facility: HOSPITAL | Age: 45
LOS: 1 days | Discharge: HOME | End: 2025-03-15
Attending: STUDENT IN AN ORGANIZED HEALTH CARE EDUCATION/TRAINING PROGRAM | Admitting: INTERNAL MEDICINE
Payer: MEDICAID

## 2025-03-14 DIAGNOSIS — E87.29 KETOACIDOSIS: Primary | ICD-10-CM

## 2025-03-14 LAB
ALBUMIN SERPL-MCNC: 4.3 G/DL (ref 3.2–4.8)
ALBUMIN SERPL-MCNC: 5 G/DL (ref 3.2–4.8)
ALBUMIN/GLOB SERPL: 1.7 {RATIO} (ref 1–2)
ALBUMIN/GLOB SERPL: 1.7 {RATIO} (ref 1–2)
ALP LIVER SERPL-CCNC: 106 U/L
ALP LIVER SERPL-CCNC: 86 U/L
ALT SERPL-CCNC: 18 U/L
ALT SERPL-CCNC: 22 U/L
AMPHET UR QL SCN: NEGATIVE
ANION GAP SERPL CALC-SCNC: 13 MMOL/L (ref 0–18)
ANION GAP SERPL CALC-SCNC: 19 MMOL/L (ref 0–18)
ANION GAP SERPL CALC-SCNC: 19 MMOL/L (ref 0–18)
ANION GAP SERPL CALC-SCNC: 21 MMOL/L (ref 0–18)
APAP SERPL-MCNC: <2 UG/ML (ref 10–20)
APTT PPP: 24.2 SECONDS (ref 23–36)
AST SERPL-CCNC: 19 U/L (ref ?–34)
AST SERPL-CCNC: 22 U/L (ref ?–34)
ATRIAL RATE: 84 BPM
B-HCG UR QL: NEGATIVE
BASE EXCESS BLDV CALC-SCNC: -6.8 MMOL/L
BASOPHILS # BLD AUTO: 0.13 X10(3) UL (ref 0–0.2)
BASOPHILS NFR BLD AUTO: 2.1 %
BENZODIAZ UR QL SCN: NEGATIVE
BILIRUB SERPL-MCNC: 0.3 MG/DL (ref 0.3–1.2)
BILIRUB SERPL-MCNC: 0.3 MG/DL (ref 0.3–1.2)
BILIRUB UR QL STRIP.AUTO: NEGATIVE
BUN BLD-MCNC: 5 MG/DL (ref 9–23)
BUN BLD-MCNC: 6 MG/DL (ref 9–23)
BUN BLD-MCNC: <5 MG/DL (ref 9–23)
BUN BLD-MCNC: <5 MG/DL (ref 9–23)
CALCIUM BLD-MCNC: 8.7 MG/DL (ref 8.7–10.6)
CALCIUM BLD-MCNC: 9.1 MG/DL (ref 8.7–10.6)
CALCIUM BLD-MCNC: 9.3 MG/DL (ref 8.7–10.6)
CALCIUM BLD-MCNC: 9.6 MG/DL (ref 8.7–10.6)
CHLORIDE SERPL-SCNC: 101 MMOL/L (ref 98–112)
CHLORIDE SERPL-SCNC: 97 MMOL/L (ref 98–112)
CHLORIDE SERPL-SCNC: 98 MMOL/L (ref 98–112)
CHLORIDE SERPL-SCNC: 98 MMOL/L (ref 98–112)
CO2 SERPL-SCNC: 17 MMOL/L (ref 21–32)
CO2 SERPL-SCNC: 18 MMOL/L (ref 21–32)
CO2 SERPL-SCNC: 20 MMOL/L (ref 21–32)
CO2 SERPL-SCNC: 22 MMOL/L (ref 21–32)
COCAINE UR QL: NEGATIVE
CREAT BLD-MCNC: 0.71 MG/DL
CREAT BLD-MCNC: 0.73 MG/DL
CREAT BLD-MCNC: 0.81 MG/DL
CREAT BLD-MCNC: 0.92 MG/DL
CREAT UR-SCNC: 76 MG/DL
EGFRCR SERPLBLD CKD-EPI 2021: 105 ML/MIN/1.73M2 (ref 60–?)
EGFRCR SERPLBLD CKD-EPI 2021: 109 ML/MIN/1.73M2 (ref 60–?)
EGFRCR SERPLBLD CKD-EPI 2021: 80 ML/MIN/1.73M2 (ref 60–?)
EGFRCR SERPLBLD CKD-EPI 2021: 93 ML/MIN/1.73M2 (ref 60–?)
EOSINOPHIL # BLD AUTO: 0.01 X10(3) UL (ref 0–0.7)
EOSINOPHIL NFR BLD AUTO: 0.2 %
ERYTHROCYTE [DISTWIDTH] IN BLOOD BY AUTOMATED COUNT: 12.7 %
ETHANOL SERPL-MCNC: 184 MG/DL (ref ?–3)
FENTANYL UR QL SCN: NEGATIVE
GLOBULIN PLAS-MCNC: 2.5 G/DL (ref 2–3.5)
GLOBULIN PLAS-MCNC: 2.9 G/DL (ref 2–3.5)
GLUCOSE BLD-MCNC: 153 MG/DL (ref 70–99)
GLUCOSE BLD-MCNC: 164 MG/DL (ref 70–99)
GLUCOSE BLD-MCNC: 177 MG/DL (ref 70–99)
GLUCOSE BLD-MCNC: 183 MG/DL (ref 70–99)
GLUCOSE BLD-MCNC: 183 MG/DL (ref 70–99)
GLUCOSE BLD-MCNC: 209 MG/DL (ref 70–99)
GLUCOSE BLD-MCNC: 219 MG/DL (ref 70–99)
GLUCOSE BLD-MCNC: 229 MG/DL (ref 70–99)
GLUCOSE BLD-MCNC: 229 MG/DL (ref 70–99)
GLUCOSE BLD-MCNC: 231 MG/DL (ref 70–99)
GLUCOSE BLD-MCNC: 232 MG/DL (ref 70–99)
GLUCOSE BLD-MCNC: 238 MG/DL (ref 70–99)
GLUCOSE UR STRIP.AUTO-MCNC: 1000 MG/DL
HCO3 BLDV-SCNC: 18.7 MEQ/L (ref 22–26)
HCT VFR BLD AUTO: 45.8 %
HGB BLD-MCNC: 16.1 G/DL
HYALINE CASTS #/AREA URNS AUTO: PRESENT /LPF
IMM GRANULOCYTES # BLD AUTO: 0.03 X10(3) UL (ref 0–1)
IMM GRANULOCYTES NFR BLD: 0.5 %
INR BLD: 1.21 (ref 0.8–1.2)
KETONES UR STRIP.AUTO-MCNC: >150 MG/DL
LACTATE BLD-SCNC: 6.7 MMOL/L (ref 0.5–2)
LACTATE SERPL-SCNC: 3.3 MMOL/L (ref 0.5–2)
LACTATE SERPL-SCNC: 4.4 MMOL/L (ref 0.5–2)
LEUKOCYTE ESTERASE UR QL STRIP.AUTO: NEGATIVE
LIPASE SERPL-CCNC: 26 U/L (ref 12–53)
LYMPHOCYTES # BLD AUTO: 2.15 X10(3) UL (ref 1–4)
LYMPHOCYTES NFR BLD AUTO: 33.9 %
MCH RBC QN AUTO: 31.9 PG (ref 26–34)
MCHC RBC AUTO-ENTMCNC: 35.2 G/DL (ref 31–37)
MCV RBC AUTO: 90.7 FL
MDMA UR QL SCN: NEGATIVE
MONOCYTES # BLD AUTO: 0.56 X10(3) UL (ref 0.1–1)
MONOCYTES NFR BLD AUTO: 8.8 %
MRSA DNA SPEC QL NAA+PROBE: NEGATIVE
NEUTROPHILS # BLD AUTO: 3.46 X10 (3) UL (ref 1.5–7.7)
NEUTROPHILS # BLD AUTO: 3.46 X10(3) UL (ref 1.5–7.7)
NEUTROPHILS NFR BLD AUTO: 54.5 %
NITRITE UR QL STRIP.AUTO: NEGATIVE
OPIATES UR QL SCN: NEGATIVE
OSMOLALITY SERPL CALC.SUM OF ELEC: 287 MOSM/KG (ref 275–295)
OSMOLALITY SERPL CALC.SUM OF ELEC: 289 MOSM/KG (ref 275–295)
OXYCODONE UR QL SCN: NEGATIVE
OXYHGB MFR BLDV: 63.5 % (ref 72–78)
P AXIS: 73 DEGREES
P-R INTERVAL: 144 MS
PCO2 BLDV: 31 MM HG (ref 38–50)
PH BLDV: 7.36 [PH] (ref 7.33–7.43)
PH UR STRIP.AUTO: 5 [PH] (ref 5–8)
PLATELET # BLD AUTO: 263 10(3)UL (ref 150–450)
PO2 BLDV: 38 MM HG (ref 30–50)
POTASSIUM SERPL-SCNC: 3.9 MMOL/L (ref 3.5–5.1)
POTASSIUM SERPL-SCNC: 3.9 MMOL/L (ref 3.5–5.1)
POTASSIUM SERPL-SCNC: 4.4 MMOL/L (ref 3.5–5.1)
POTASSIUM SERPL-SCNC: 4.6 MMOL/L (ref 3.5–5.1)
PROT SERPL-MCNC: 6.8 G/DL (ref 5.7–8.2)
PROT SERPL-MCNC: 7.9 G/DL (ref 5.7–8.2)
PROT UR STRIP.AUTO-MCNC: 50 MG/DL
PROTHROMBIN TIME: 15.5 SECONDS (ref 11.6–14.8)
Q-T INTERVAL: 408 MS
QRS DURATION: 74 MS
QTC CALCULATION (BEZET): 482 MS
R AXIS: 86 DEGREES
RBC # BLD AUTO: 5.05 X10(6)UL
RBC UR QL AUTO: NEGATIVE
SALICYLATES SERPL-MCNC: <3 MG/DL (ref 3–20)
SODIUM SERPL-SCNC: 133 MMOL/L (ref 136–145)
SODIUM SERPL-SCNC: 136 MMOL/L (ref 136–145)
SODIUM SERPL-SCNC: 137 MMOL/L (ref 136–145)
SODIUM SERPL-SCNC: 137 MMOL/L (ref 136–145)
SP GR UR STRIP.AUTO: 1.02 (ref 1–1.03)
T AXIS: 67 DEGREES
UROBILINOGEN UR STRIP.AUTO-MCNC: NORMAL MG/DL
VENTRICULAR RATE: 84 BPM
WBC # BLD AUTO: 6.3 X10(3) UL (ref 4–11)

## 2025-03-14 PROCEDURE — 99291 CRITICAL CARE FIRST HOUR: CPT | Performed by: INTERNAL MEDICINE

## 2025-03-14 PROCEDURE — 99223 1ST HOSP IP/OBS HIGH 75: CPT | Performed by: INTERNAL MEDICINE

## 2025-03-14 PROCEDURE — 74177 CT ABD & PELVIS W/CONTRAST: CPT | Performed by: STUDENT IN AN ORGANIZED HEALTH CARE EDUCATION/TRAINING PROGRAM

## 2025-03-14 RX ORDER — HYDROMORPHONE HYDROCHLORIDE 1 MG/ML
0.8 INJECTION, SOLUTION INTRAMUSCULAR; INTRAVENOUS; SUBCUTANEOUS EVERY 2 HOUR PRN
Status: DISCONTINUED | OUTPATIENT
Start: 2025-03-14 | End: 2025-03-15

## 2025-03-14 RX ORDER — DOXEPIN HYDROCHLORIDE 50 MG/1
1 CAPSULE ORAL DAILY
Status: DISCONTINUED | OUTPATIENT
Start: 2025-03-14 | End: 2025-03-15

## 2025-03-14 RX ORDER — HYDROMORPHONE HYDROCHLORIDE 1 MG/ML
1 INJECTION, SOLUTION INTRAMUSCULAR; INTRAVENOUS; SUBCUTANEOUS ONCE
Status: DISCONTINUED | OUTPATIENT
Start: 2025-03-14 | End: 2025-03-14

## 2025-03-14 RX ORDER — ATORVASTATIN CALCIUM 10 MG/1
10 TABLET, FILM COATED ORAL DAILY
Status: DISCONTINUED | OUTPATIENT
Start: 2025-03-14 | End: 2025-03-15

## 2025-03-14 RX ORDER — GABAPENTIN 300 MG/1
300 CAPSULE ORAL 2 TIMES DAILY
Status: DISCONTINUED | OUTPATIENT
Start: 2025-03-14 | End: 2025-03-15

## 2025-03-14 RX ORDER — LORAZEPAM 2 MG/ML
5 INJECTION INTRAMUSCULAR
Status: DISCONTINUED | OUTPATIENT
Start: 2025-03-14 | End: 2025-03-15

## 2025-03-14 RX ORDER — DEXTROSE MONOHYDRATE AND SODIUM CHLORIDE 5; .45 G/100ML; G/100ML
INJECTION, SOLUTION INTRAVENOUS CONTINUOUS
Status: DISCONTINUED | OUTPATIENT
Start: 2025-03-14 | End: 2025-03-15

## 2025-03-14 RX ORDER — BENZONATATE 200 MG/1
200 CAPSULE ORAL 3 TIMES DAILY PRN
Status: DISCONTINUED | OUTPATIENT
Start: 2025-03-14 | End: 2025-03-15

## 2025-03-14 RX ORDER — INSULIN DEGLUDEC 100 U/ML
15 INJECTION, SOLUTION SUBCUTANEOUS NIGHTLY
Status: DISCONTINUED | OUTPATIENT
Start: 2025-03-14 | End: 2025-03-15

## 2025-03-14 RX ORDER — NALTREXONE HYDROCHLORIDE 50 MG/1
50 TABLET, FILM COATED ORAL DAILY
COMMUNITY

## 2025-03-14 RX ORDER — LORAZEPAM 2 MG/ML
6 INJECTION INTRAMUSCULAR EVERY 10 MIN PRN
Status: DISCONTINUED | OUTPATIENT
Start: 2025-03-14 | End: 2025-03-15

## 2025-03-14 RX ORDER — THIAMINE HYDROCHLORIDE 100 MG/ML
100 INJECTION, SOLUTION INTRAMUSCULAR; INTRAVENOUS ONCE
Status: COMPLETED | OUTPATIENT
Start: 2025-03-14 | End: 2025-03-14

## 2025-03-14 RX ORDER — SODIUM PHOSPHATE, DIBASIC AND SODIUM PHOSPHATE, MONOBASIC 7; 19 G/230ML; G/230ML
1 ENEMA RECTAL ONCE AS NEEDED
Status: DISCONTINUED | OUTPATIENT
Start: 2025-03-14 | End: 2025-03-15

## 2025-03-14 RX ORDER — NICOTINE POLACRILEX 4 MG
15 LOZENGE BUCCAL
Status: DISCONTINUED | OUTPATIENT
Start: 2025-03-14 | End: 2025-03-15

## 2025-03-14 RX ORDER — ENOXAPARIN SODIUM 100 MG/ML
40 INJECTION SUBCUTANEOUS NIGHTLY
Status: DISCONTINUED | OUTPATIENT
Start: 2025-03-14 | End: 2025-03-15

## 2025-03-14 RX ORDER — LORAZEPAM 2 MG/ML
1 INJECTION INTRAMUSCULAR
Status: DISCONTINUED | OUTPATIENT
Start: 2025-03-14 | End: 2025-03-15

## 2025-03-14 RX ORDER — NICOTINE POLACRILEX 4 MG
30 LOZENGE BUCCAL
Status: DISCONTINUED | OUTPATIENT
Start: 2025-03-14 | End: 2025-03-15

## 2025-03-14 RX ORDER — LORAZEPAM 2 MG/ML
4 INJECTION INTRAMUSCULAR EVERY 30 MIN PRN
Status: DISCONTINUED | OUTPATIENT
Start: 2025-03-14 | End: 2025-03-15

## 2025-03-14 RX ORDER — DIPHENHYDRAMINE HYDROCHLORIDE 50 MG/ML
12.5 INJECTION, SOLUTION INTRAMUSCULAR; INTRAVENOUS ONCE
Status: COMPLETED | OUTPATIENT
Start: 2025-03-14 | End: 2025-03-14

## 2025-03-14 RX ORDER — ONDANSETRON 2 MG/ML
4 INJECTION INTRAMUSCULAR; INTRAVENOUS ONCE
Status: COMPLETED | OUTPATIENT
Start: 2025-03-14 | End: 2025-03-14

## 2025-03-14 RX ORDER — METOCLOPRAMIDE HYDROCHLORIDE 5 MG/ML
10 INJECTION INTRAMUSCULAR; INTRAVENOUS ONCE
Status: COMPLETED | OUTPATIENT
Start: 2025-03-14 | End: 2025-03-14

## 2025-03-14 RX ORDER — MELATONIN
100 DAILY
Status: DISCONTINUED | OUTPATIENT
Start: 2025-03-15 | End: 2025-03-15

## 2025-03-14 RX ORDER — TETRACAINE HYDROCHLORIDE 5 MG/ML
SOLUTION OPHTHALMIC
Status: DISPENSED
Start: 2025-03-14 | End: 2025-03-15

## 2025-03-14 RX ORDER — LORAZEPAM 2 MG/ML
2 INJECTION INTRAMUSCULAR
Status: DISCONTINUED | OUTPATIENT
Start: 2025-03-14 | End: 2025-03-15

## 2025-03-14 RX ORDER — ACETAMINOPHEN 500 MG
1000 TABLET ORAL EVERY 8 HOURS PRN
Status: DISCONTINUED | OUTPATIENT
Start: 2025-03-14 | End: 2025-03-15

## 2025-03-14 RX ORDER — LORAZEPAM 1 MG/1
1 TABLET ORAL
Status: DISCONTINUED | OUTPATIENT
Start: 2025-03-14 | End: 2025-03-15

## 2025-03-14 RX ORDER — BUSPIRONE HYDROCHLORIDE 5 MG/1
20 TABLET ORAL 2 TIMES DAILY
Status: DISCONTINUED | OUTPATIENT
Start: 2025-03-14 | End: 2025-03-15

## 2025-03-14 RX ORDER — HYDROMORPHONE HYDROCHLORIDE 1 MG/ML
0.5 INJECTION, SOLUTION INTRAMUSCULAR; INTRAVENOUS; SUBCUTANEOUS ONCE
Status: COMPLETED | OUTPATIENT
Start: 2025-03-14 | End: 2025-03-14

## 2025-03-14 RX ORDER — ECHINACEA PURPUREA EXTRACT 125 MG
1 TABLET ORAL
Status: DISCONTINUED | OUTPATIENT
Start: 2025-03-14 | End: 2025-03-15

## 2025-03-14 RX ORDER — DEXTROSE MONOHYDRATE AND SODIUM CHLORIDE 5; .45 G/100ML; G/100ML
100 INJECTION, SOLUTION INTRAVENOUS CONTINUOUS PRN
Status: DISCONTINUED | OUTPATIENT
Start: 2025-03-14 | End: 2025-03-15

## 2025-03-14 RX ORDER — SODIUM CHLORIDE 9 MG/ML
INJECTION, SOLUTION INTRAVENOUS CONTINUOUS
Status: DISCONTINUED | OUTPATIENT
Start: 2025-03-14 | End: 2025-03-15

## 2025-03-14 RX ORDER — FOLIC ACID 1 MG/1
1 TABLET ORAL DAILY
Status: DISCONTINUED | OUTPATIENT
Start: 2025-03-14 | End: 2025-03-15

## 2025-03-14 RX ORDER — HYDROMORPHONE HYDROCHLORIDE 1 MG/ML
0.2 INJECTION, SOLUTION INTRAMUSCULAR; INTRAVENOUS; SUBCUTANEOUS EVERY 2 HOUR PRN
Status: DISCONTINUED | OUTPATIENT
Start: 2025-03-14 | End: 2025-03-15

## 2025-03-14 RX ORDER — LORAZEPAM 2 MG/ML
3 INJECTION INTRAMUSCULAR
Status: DISCONTINUED | OUTPATIENT
Start: 2025-03-14 | End: 2025-03-15

## 2025-03-14 RX ORDER — TRAMADOL HYDROCHLORIDE 50 MG/1
50 TABLET ORAL EVERY 4 HOURS PRN
COMMUNITY

## 2025-03-14 RX ORDER — SENNOSIDES 8.6 MG
17.2 TABLET ORAL NIGHTLY PRN
Status: DISCONTINUED | OUTPATIENT
Start: 2025-03-14 | End: 2025-03-15

## 2025-03-14 RX ORDER — DEXTROSE MONOHYDRATE 25 G/50ML
50 INJECTION, SOLUTION INTRAVENOUS
Status: DISCONTINUED | OUTPATIENT
Start: 2025-03-14 | End: 2025-03-15

## 2025-03-14 RX ORDER — ALPRAZOLAM 0.25 MG
0.25 TABLET ORAL 3 TIMES DAILY PRN
Status: DISCONTINUED | OUTPATIENT
Start: 2025-03-14 | End: 2025-03-15

## 2025-03-14 RX ORDER — HYDROMORPHONE HYDROCHLORIDE 1 MG/ML
0.4 INJECTION, SOLUTION INTRAMUSCULAR; INTRAVENOUS; SUBCUTANEOUS EVERY 2 HOUR PRN
Status: DISCONTINUED | OUTPATIENT
Start: 2025-03-14 | End: 2025-03-15

## 2025-03-14 RX ORDER — BISACODYL 10 MG
10 SUPPOSITORY, RECTAL RECTAL
Status: DISCONTINUED | OUTPATIENT
Start: 2025-03-14 | End: 2025-03-15

## 2025-03-14 RX ORDER — POLYETHYLENE GLYCOL 3350 17 G/17G
17 POWDER, FOR SOLUTION ORAL DAILY PRN
Status: DISCONTINUED | OUTPATIENT
Start: 2025-03-14 | End: 2025-03-15

## 2025-03-14 RX ORDER — LORAZEPAM 2 MG/ML
1 INJECTION INTRAMUSCULAR EVERY 30 MIN PRN
Status: DISCONTINUED | OUTPATIENT
Start: 2025-03-14 | End: 2025-03-15

## 2025-03-14 RX ORDER — ONDANSETRON 2 MG/ML
4 INJECTION INTRAMUSCULAR; INTRAVENOUS EVERY 6 HOURS PRN
Status: DISCONTINUED | OUTPATIENT
Start: 2025-03-14 | End: 2025-03-15

## 2025-03-14 RX ORDER — LORAZEPAM 2 MG/1
2 TABLET ORAL
Status: DISCONTINUED | OUTPATIENT
Start: 2025-03-14 | End: 2025-03-15

## 2025-03-14 RX ORDER — DEXTROSE MONOHYDRATE 25 G/50ML
50 INJECTION, SOLUTION INTRAVENOUS AS NEEDED
Status: DISCONTINUED | OUTPATIENT
Start: 2025-03-14 | End: 2025-03-15

## 2025-03-14 RX ORDER — PROCHLORPERAZINE EDISYLATE 5 MG/ML
5 INJECTION INTRAMUSCULAR; INTRAVENOUS EVERY 8 HOURS PRN
Status: DISCONTINUED | OUTPATIENT
Start: 2025-03-14 | End: 2025-03-15

## 2025-03-14 NOTE — CONSULTS
[facility] Pulmonary Critical Care Consult Note    NAME: Blanca Coats - ROOM: B1/B1 - MRN: [id number] - Age: 44 year old - :1980    History Of Present Illness:  Blanca Coats is a 44 year old female with PMHx significant for etoh abuse and chronic pancreatiis who presents with abdominal pain found to be in DKA vs etoh ketosis.  Patient reports relapsing and drinking bottle of liquor yesterday.  Has not eaten in several days.  Patient was given 1L bolus of NS and gap worsened so started on insulin drip and admitted to ICU.    Past Medical History:  Past Medical History:    Alcoholic (HCC)    Anxiety    Arrhythmia    SVT    Attention deficit disorder    Breast CA (HCC)    Depression    Esophageal reflux    High blood pressure    OTHER DISEASES    seasonal allergies    Pancreatitis (HCC)    Pneumonia due to organism    SVT (supraventricular tachycardia) (HCC)    Type 1 diabetes mellitus (HCC)     Past Surgical History:   Past Surgical History:   Procedure Laterality Date    Breast surgery      Implant left      Implant right      Mastectomy left      Mastectomy right      Ndsc ablation & rcnstj atria lmtd w/o bypass        Family History:   Family History   Problem Relation Age of Onset    Heart Disorder Father     Diabetes Father     Hypertension Mother     Cancer Mother         breast     Social History:    reports that she has been smoking cigarettes. She has never used smokeless tobacco. She reports current alcohol use of about 7.0 standard drinks of alcohol per week. She reports current drug use. Frequency: 7.00 times per week. Drug: Cannabis.     Review of Systems:   A comprehensive 10 point review of systems was completed.  Pertinent positives and negatives noted in the HPI.    Current Facility-Administered Medications   Medication Dose Route Frequency    glucose (Dex4) 15 GM/59ML oral liquid 15 g  15 g Oral Q15 Min PRN    Or    glucose (Glutose) 40% oral gel 15 g  15 g Oral Q15 Min PRN    Or     glucose-vitamin C (Dex-4) chewable tab 4 tablet  4 tablet Oral Q15 Min PRN    Or    dextrose 50% injection 50 mL  50 mL Intravenous Q15 Min PRN    Or    glucose (Dex4) 15 GM/59ML oral liquid 30 g  30 g Oral Q15 Min PRN    Or    glucose (Glutose) 40% oral gel 30 g  30 g Oral Q15 Min PRN    Or    glucose-vitamin C (Dex-4) chewable tab 8 tablet  8 tablet Oral Q15 Min PRN    dextrose 5%-sodium chloride 0.45% infusion   Intravenous Continuous    insulin regular human (Novolin R, Humulin R) 100 Units in sodium chloride 0.9% 100 mL standard infusion (100 mL)  0.5-9 Units/hr Intravenous Continuous    LORazepam (Ativan) 2 mg/mL injection 1 mg  1 mg Intravenous Q30 Min PRN    folic acid (Folvite) 1 mg in sodium chloride 0.9% 50 mL IVPB  1 mg Intravenous Daily    HYDROmorphone (Dilaudid) 1 MG/ML injection 0.5 mg  0.5 mg Intravenous Once     OBJECTIVE  Vitals:  /79   Pulse 90   Temp 97.8 °F (36.6 °C) (Temporal)   Resp 20   Ht 5' 6\" (1.676 m)   Wt 126 lb (57.2 kg)   LMP 03/06/2025 (Approximate)   SpO2 99%   BMI 20.34 kg/m²           Physical Exam:    General Appearance: sleeping comfortably on arrival  Neck: no adenopathy, no carotid bruit, no JVD, supple, symmetrical, trachea midline, and thyroid not enlarged, symmetric, no tenderness/mass/nodules  Lungs: clear to auscultation bilaterally  Heart: regular rate and rhythm, S1, S2 normal, no murmur, click, rub or gallop  Abdomen: soft, non-tender, without masses or organomegaly  Extremities: Atraumatic, no cyanosis or edema, capillary refill <3 sec.    Pulses: 2+ and symmetric all extremities  Skin: Skin color, texture, turgor normal for ethnicity, no rashes or lesions, warm and dry  Neurologic: Grossly normal    Data this admission:  CT ABDOMEN+PELVIS(CONTRAST ONLY)(CPT=74177)    Result Date: 3/14/2025  CONCLUSION:  Pancreatic coarse calcifications and atrophy with ductal dilatation consistent with chronic pancreatitis unchanged.  There are 2 cystic walled-off  collections arising from the body and tail the pancreas consistent with pseudocysts which appear relatively stable in size.  Wall thickening of ascending transverse and descending colon with some adjacent soft tissue stranding most notably at the splenic flexure.  Findings suspicious for colitis.  This less likely represents colonic decompression.  Wall thickening of the urinary bladder is nonspecific.  Recommend clinical correlation to exclude cystitis.   LOCATION:  [id number]   Dictated by (CST): Rossi Moreno MD on 3/14/2025 at 5:09 PM     Finalized by (CST): Rossi Moreno MD on 3/14/2025 at 5:15 PM       XR CHEST AP PORTABLE  (CPT=71045)    Result Date: 2/22/2025  PROCEDURE:  XR CHEST AP PORTABLE  (CPT=71045)  TECHNIQUE:  AP chest radiograph was obtained.  COMPARISON:  [facility] , XR, XR CHEST AP/PA (1 VIEW) (CPT=71045), 11/12/2024, 8:39 AM.  INDICATIONS:  A-fib  PATIENT STATED HISTORY: (As transcribed by Technologist)  patient states she has a history of a fib with ablation procedure before. she states she also has shortness of breath   FINDINGS: Cardiac silhouette and pulmonary vasculature are unremarkable. No consolidation, pleural effusion or pneumothorax. IMPRESSION: Unremarkable portable chest radiograph.   LOCATION:  [facility]      Dictated by (CST): Doni Cruz MD on 2/22/2025 at 12:41 PM     Finalized by (CST): Doni Cruz MD on 2/22/2025 at 12:42 PM       Labs:  Lab Results   Component Value Date    WBC 6.3 03/14/2025    HGB 16.1 03/14/2025    HCT 45.8 03/14/2025    .0 03/14/2025    CREATSERUM 0.73 03/14/2025    BUN <5 03/14/2025     03/14/2025    K 4.6 03/14/2025     03/14/2025    CO2 17.0 03/14/2025     03/14/2025    CA 8.7 03/14/2025    ALB 5.0 03/14/2025    ALKPHO 106 03/14/2025    BILT 0.3 03/14/2025    TP 7.9 03/14/2025    AST 22 03/14/2025    ALT 22 03/14/2025       Assessment/Plan:    Probable etoh ketosis   - last drink yesterday drank a bottle of liquor, has  not eaten in several days   - will give 1 amp of D50   - getting started on insulin ggt and D5 1/2 NS in ER, would plan to recheck labs once gets up to ICU and assess ongoing need for insulin ggt     Lactic acidosis, suspect due to dehydration normotensive   - ongoing fluid resuscitation    Chronic pancreatitis with psuedocysts   - NPO, ongoing IV fluid rehydration   - serial abdominal exams, assess for uncontrolled pain, was sleeping comfortably in ER on arrival then asking for pain medication    Etoh abuse   - was reportedly sober for 17 days relapsed yesterday   - monitor for etoh withdrawal    Hypertension   - on BB    HLD    - on statin    Dispo:     Code Status: Full Code  -ICU for close monitoring      The 21st Century Cures Act makes medical notes like these available to patients in the interest of transparency. Please be advised this is a medical document. Medical documents are intended to carry relevant information, facts as evident, and the clinical opinion of the practitioner. The medical note is intended as peer to peer communication and may appear blunt or direct. It is written in medical language and may contain abbreviations or verbiage that are unfamiliar.

## 2025-03-14 NOTE — H&P
[facility]     Blanca Coats Patient Status:  Emergency    1980 MRN [id number]   Location [facility] EMERGENCY DEPARTMENT Attending Chasity Holly MD   Hosp Day # 0 PCP Victor Hugo Leiva MD     Chief Complaint: Abdominal Pain    Subjective:    History of Present Illness:     Blanca Coats is a 44 year old female with past medical history of depression, type 1 diabetes with insulin pump, alcohol use disorder presented to the emergency department for abdominal pain.    Patient complaining of severe abdominal pain reminiscent of prior episodes of pancreatitis, she has chronic pancreatitis as a result of her alcohol use disorder.  Reports that she is in a program and had been sober for 17 days but last night started drinking because she was depressed.  She denies any suicidal or homicidal ideation.  Her main complaint is her severe epigastric and right sided abdominal pain.    History/Other:    Past Medical History:  Past Medical History:    Alcoholic (HCC)    Anxiety    Arrhythmia    SVT    Attention deficit disorder    Breast CA (HCC)    Depression    Esophageal reflux    High blood pressure    OTHER DISEASES    seasonal allergies    Pancreatitis (HCC)    Pneumonia due to organism    SVT (supraventricular tachycardia) (HCC)    Type 1 diabetes mellitus (HCC)     Past Surgical History:   Past Surgical History:   Procedure Laterality Date    Breast surgery      Implant left      Implant right      Mastectomy left      Mastectomy right      Ndsc ablation & rcnstj [facility] w/o bypass        Family History:   Family History   Problem Relation Age of Onset    Heart Disorder Father     Diabetes Father     Hypertension Mother     Cancer Mother         breast     Social History:    reports that she has been smoking cigarettes. She has never used smokeless tobacco. She reports current alcohol use of about 7.0 standard drinks of alcohol per week. She reports current drug use. Frequency:  7.00 times per week. Drug: Cannabis.     Allergies: Allergies[1]    Medications:  Medications Ordered Prior to Encounter[2]    Review of Systems:   A comprehensive review of systems was completed.    Pertinent positives and negatives noted in the HPI.    Objective:   Physical Exam:    /79   Pulse 90   Temp 97.8 °F (36.6 °C) (Temporal)   Resp 20   Ht 5' 6\" (1.676 m)   Wt 126 lb (57.2 kg)   LMP 03/06/2025 (Approximate)   SpO2 99%   BMI 20.34 kg/m²   General: ill appearing   Respiratory: No rhonchi, no wheezes  Cardiovascular: S1, S2. Tachycardic   Abdomen: Right sided ttp, no guarding   Neuro: No new focal deficits  Extremities: No edema      Results:    Labs:      Labs Last 24 Hours:    Recent Labs   Lab 03/14/25  1456   RBC 5.05   HGB 16.1*   HCT 45.8   MCV 90.7   MCH 31.9   MCHC 35.2   RDW 12.7   NEPRELIM 3.46   WBC 6.3   .0       Recent Labs   Lab 03/14/25  1456   *  231*   BUN 6*  5*   CREATSERUM 0.81  0.92   EGFRCR 93  80   CA 9.3  9.6   ALB 5.0*     137   K 4.4  3.9   CL 97*  98   CO2 18.0*  20.0*   ALKPHO 106*   AST 22   ALT 22   BILT 0.3   TP 7.9       Estimated Glomerular Filtration Rate: 80 mL/min/1.73m2 (result from lab).    Lab Results   Component Value Date    INR 1.16 02/22/2025       No results for input(s): \"TROP\", \"TROPHS\", \"CK\" in the last 168 hours.    No results for input(s): \"TROP\", \"PBNP\" in the last 168 hours.    No results for input(s): \"PCT\" in the last 168 hours.    Imaging: Imaging data reviewed in Epic.    Assessment & Plan:      #Euglycemic DKA vs Alcoholic Ketoacidosis   -insulin pump removed  -IV thiamine, dextrose gtt, Insulin gtt    #Abdominal Pain/Colitis  -GI Stool PCR, cdiff    #Lactic acidosis  -no obvious infectious etiology, possibly UTI, Empiric Rocephin   -Blood Cultures     #Chronic pancreatitis with pseudocysts   #Alcohol use disorder   -NPO  -IVF   -had been sober for 17 days and drank last night, can monitor for any withdrawal  symptoms      #Essential HTN  -BB     #DL  -Statin    #Mood disorder, depression  -Buspirone   -PRN Xanax    Upon my evaluation, this patient had a high probability of imminent or life-threatening deterioration due to metabolic failure, which required my direct attention, intervention, and personal management.    I have personally provided 37 minutes of critical care time, exclusive of time spent on separately billable procedures.  Time includes review of all pertinent laboratory/radiology results, discussion with consultants, and monitoring for potential decompensation.  Performed interventions included fluids, insulin infusions, and Dextrose .      Plan of care discussed with Dr. Avni Tinajero,     Supplementary Documentation:     The 21st Century Cures Act makes medical notes like these available to patients in the interest of transparency. Please be advised this is a medical document. Medical documents are intended to carry relevant information, facts as evident, and the clinical opinion of the practitioner. The medical note is intended as peer to peer communication and may appear blunt or direct. It is written in medical language and may contain abbreviations or verbiage that are unfamiliar.                                       [1]   Allergies  Allergen Reactions    Bee Venom ANAPHYLAXIS    Morphine HIVES    Fentanyl RASH   [2]   Current Facility-Administered Medications on File Prior to Encounter   Medication Dose Route Frequency Provider Last Rate Last Admin    [COMPLETED] dilTIAZem (cardIZEM) 25 mg/5mL injection 10 mg  10 mg Intravenous Once Siri Bell MD   10 mg at 02/22/25 1135    [COMPLETED] LORazepam (Ativan) 2 mg/mL injection 2 mg  2 mg Intravenous Once Siri Bell MD   2 mg at 02/22/25 1148    [COMPLETED] LORazepam (Ativan) 2 mg/mL injection 2 mg  2 mg Intravenous Once Siri Bell MD   2 mg at 02/22/25 1128    [COMPLETED] dilTIAZem (cardIZEM) 25 mg/5mL injection 15 mg  15  mg Intravenous Once Siri Bell MD   15 mg at 02/22/25 1130    [COMPLETED] metoprolol (Lopressor) 5 mg/5mL injection        5 mg at 02/22/25 1154    [COMPLETED] magnesium sulfate in sterile water for injection 2 g/50mL IVPB premix 2 g  2 g Intravenous Once Siri Bell MD   Stopped at 02/22/25 1317    [COMPLETED] thiamine 100 mg/mL injection 100 mg  100 mg Intravenous Once Sylvester Gonzales DO   100 mg at 02/22/25 1604    [COMPLETED] ondansetron (Zofran) 4 MG/2ML injection 4 mg  4 mg Intravenous Once Siri Bell MD   4 mg at 02/22/25 1401    [COMPLETED] insulin degludec (Tresiba) 100 units/mL flextouch 15 Units  15 Units Subcutaneous Once Asuncion Dejesus MD   15 Units at 02/22/25 2150    [COMPLETED] insulin aspart (NovoLOG) 100 Units/mL FlexPen 10 Units  10 Units Subcutaneous Once Asuncion Dejesus MD   10 Units at 02/22/25 2151    [COMPLETED] HYDROmorphone (Dilaudid) 1 MG/ML injection 0.4 mg  0.4 mg Intravenous Once Michael Aldrich MD   0.4 mg at 02/06/25 0112    [COMPLETED] magnesium sulfate in sterile water for injection 2 g/50mL IVPB premix 2 g  2 g Intravenous Once Michael Aldrich MD 50 mL/hr at 02/06/25 0525 2 g at 02/06/25 0525    [COMPLETED] sodium chloride 0.9 % IV bolus 1,000 mL  1,000 mL Intravenous Once Andrzej Smith DO   Stopped at 02/05/25 2132    [COMPLETED] thiamine 100 mg/mL injection 100 mg  100 mg Intravenous Once Andrzej Smith DO   100 mg at 02/05/25 1845    [COMPLETED] folic acid (Folvite) 1 mg in sodium chloride 0.9% 50 mL IVPB  1 mg Intravenous Once Andrzej Smith DO   Stopped at 02/05/25 1931    [COMPLETED] magnesium sulfate in dextrose 5% 1 g/100mL infusion premix 1 g  1 g Intravenous Once Andrzej Smith DO   Stopped at 02/05/25 2139    [COMPLETED] iopamidol 76% (ISOVUE-370) injection for power injector  80 mL Intravenous ONCE PRN Andrzej Smith DO   80 mL at 02/05/25 2018    [COMPLETED] HYDROmorphone (Dilaudid) 1 MG/ML injection 0.5 mg  0.5 mg Intravenous Once Luis  DO Andrzej   0.5 mg at 25    [] sodium chloride 0.9% infusion   Intravenous Continuous Andrzej Smith DO        [COMPLETED] lactated ringers IV bolus 1,000 mL  1,000 mL Intravenous Once Michael Aldrich MD 1,000 mL/hr at 25 1,000 mL at 25 220    [COMPLETED] sodium chloride 0.9 % IV bolus 1,000 mL  1,000 mL Intravenous Once Samy Jackson MD   Stopped at 25 1428    [COMPLETED] HYDROmorphone (Dilaudid) 1 MG/ML injection 1 mg  1 mg Intravenous Once Samy Jackson MD   1 mg at 25 1330    [COMPLETED] ondansetron (Zofran) 4 MG/2ML injection 4 mg  4 mg Intravenous Once Samy Jackson MD   4 mg at 25 1331    [COMPLETED] HYDROmorphone (Dilaudid) 1 MG/ML injection 1 mg  1 mg Intravenous Once Samy Jackson MD   1 mg at 25 1453    [COMPLETED] magnesium oxide (Mag-Ox) tab 800 mg  800 mg Oral Once Nj Ashley MD   800 mg at 24 0623    [COMPLETED] sodium phosphate 15 mmol in 0.9% NaCl 100mL IVPB premix  15 mmol Intravenous Once Nj Ashley MD   15 mmol at 24 0536    [] naloxone (Narcan) 0.4 MG/ML injection 0.08 mg  0.08 mg Intravenous Once PRN Reymundo Wayne MD        [COMPLETED] magnesium oxide (Mag-Ox) tab 800 mg  800 mg Oral Once Nj Ashley MD   800 mg at 24 1308    [COMPLETED] insulin aspart (NovoLOG) 100 Units/mL FlexPen 1-20 Units  1-20 Units Subcutaneous Once Whit Frankel APRN   6 Units at 24 1419    [COMPLETED] magnesium sulfate in sterile water for injection 2 g/50mL IVPB premix 2 g  2 g Intravenous Once Michael Aldrich MD 50 mL/hr at 24 1423 2 g at 24 1423    [COMPLETED] sodium phosphate 30 mmol in sodium chloride 0.9% 150 mL IVPB  30 mmol Intravenous Once Xenia Castañeda APRN 37.5 mL/hr at 24 0144 30 mmol at 24 0144    [COMPLETED] magnesium sulfate 4 g/100mL IVPB premix 4 g  4 g Intravenous Once Xenia Castañeda APRN 50 mL/hr at 24 0137 4 g at 24 0137     [COMPLETED] potassium chloride (Klor-Con M20) tab 40 mEq  40 mEq Oral Q4H Simone Samson, DO   40 mEq at 24 1332    [COMPLETED] LORazepam (Ativan) 2 mg/mL injection 2 mg  2 mg Intravenous Once Samy Jackson MD   2 mg at 24    [COMPLETED] sodium chloride 0.9 % IV bolus 1,000 mL  1,000 mL Intravenous Once Samy Jackson MD   Stopped at 24 192    [COMPLETED] dilTIAZem (cardIZEM) 25 mg/5mL injection 10 mg  10 mg Intravenous Once Samy Jcakson MD   10 mg at 24 182    [COMPLETED] thiamine 100 mg/mL injection 100 mg  100 mg Intravenous Once Samy Jackson MD   100 mg at 24    [COMPLETED] dilTIAZem (cardIZEM) 25 mg/5mL injection 10 mg  10 mg Intravenous Once Samy Jackson MD   10 mg at 24 184    [COMPLETED] HYDROmorphone (Dilaudid) 1 MG/ML injection 1 mg  1 mg Intravenous Once Samy Jackson MD   1 mg at 24 185    [COMPLETED] ondansetron (Zofran) 4 MG/2ML injection 4 mg  4 mg Intravenous Once Samy Jackson MD   4 mg at 24 185    [COMPLETED] metoprolol (Lopressor) 5 mg/5mL injection 5 mg  5 mg Intravenous Once Samy Jackson MD   5 mg at 24 190    [] sodium chloride 0.9% infusion   Intravenous Continuous Samy Jackson MD        [] HYDROmorphone (Dilaudid) 1 MG/ML injection 0.5 mg  0.5 mg Intravenous Q30 Min PRN Samy Jackson MD        [] ondansetron (Zofran) 4 MG/2ML injection 4 mg  4 mg Intravenous Q4H PRN Samy Jackson MD        [COMPLETED] dextrose 5%-sodium chloride 0.9% infusion   Intravenous Once Samy Jackson MD 75 mL/hr at 24 New Bag at 24    [COMPLETED] metoprolol (Lopressor) 5 mg/5mL injection 5 mg  5 mg Intravenous Once Samy Jackson MD   5 mg at 24    [COMPLETED] iopamidol 76% (ISOVUE-370) injection for power injector  100 mL Intravenous ONCE PRN Samy Jackson MD   100 mL at 24    [COMPLETED] lactated ringers IV bolus 1,000 mL   1,000 mL Intravenous Once Xenia Castañeda APRN 1,000 mL/hr at 12/17/24 2314 1,000 mL at 12/17/24 2314     Current Outpatient Medications on File Prior to Encounter   Medication Sig Dispense Refill    ergocalciferol 1.25 MG (04746 UT) Oral Cap Take 1 capsule (50,000 Units total) by mouth once a week. Every Monday      insulin lispro 100 UNIT/ML Injection Solution Inject 50 Units into the skin daily. AS DIRECTED IN INSULIN PUMP      pantoprazole 40 MG Oral Tab EC Take 1 tablet (40 mg total) by mouth 2 (two) times daily before meals. 60 tablet 0    atorvastatin 10 MG Oral Tab Take 1 tablet (10 mg total) by mouth daily.      thiamine 100 MG Oral Tab Take 1 tablet (100 mg total) by mouth daily. 30 tablet 0    Naloxone HCl 4 MG/0.1ML Nasal Liquid 4 mg by Nasal route as needed. If patient remains unresponsive, repeat dose in other nostril 2-5 minutes after first dose. 1 kit 0    Glucose Blood (ONETOUCH VERIO) In Vitro Strip Check blood sugar two times day, before breakfast and before dinner.  (may substitute any brand covered by insurance) 50 strip 0    ondansetron 4 MG Oral Tablet Dispersible Take 1 tablet (4 mg total) by mouth every 8 (eight) hours as needed for Nausea. 20 tablet 0    metoprolol succinate 100 MG Oral Tablet 24 Hr Take 1 tablet (100 mg total) by mouth 2 (two) times daily. Take half tablet if blood pressure less than 130/80  0    haloperidol 5 MG Oral Tab Take 1 tablet (5 mg total) by mouth at bedtime.      Amphetamine-Dextroamphet ER 10 MG Oral Capsule SR 24 Hr Take 1 capsule (10 mg total) by mouth every morning.      Amphetamine-Dextroamphet ER 30 MG Oral Capsule SR 24 Hr Take 1 capsule (30 mg total) by mouth every morning.      ALPRAZolam 0.25 MG Oral Tab Take 1 tablet (0.25 mg total) by mouth 3 (three) times daily as needed.      gabapentin 300 MG Oral Cap Take 1 capsule (300 mg total) by mouth in the morning and 1 capsule (300 mg total) before bedtime.      Montelukast Sodium 10 MG Oral Tab Take 1  tablet (10 mg total) by mouth daily.      Sertraline HCl 100 MG Oral Tab Take 2 tablets (200 mg total) by mouth daily.      busPIRone 10 MG Oral Tab Take 2 tablets (20 mg total) by mouth 2 (two) times daily.      traZODone HCl 100 MG Oral Tab Take 2 tablets (200 mg total) by mouth nightly.

## 2025-03-14 NOTE — ED QUICK NOTES
Pt able to walk in room with steady gait. Pt does not appear to be hyperventilating at this time. Resps appear easy and unlabored.     Pt reports she has not had any alcohol today and has not drank since yesterday.

## 2025-03-14 NOTE — ED INITIAL ASSESSMENT (HPI)
Pt presents to the ED via ems with c/o abdominal pain. Pt states, \"my pancreas hurts\". Pt admits to drinking alcohol yesterday but denies any today. Received pt seated in wheelchair hyperventilating, skin w/d.     **pt instructed repeatedly to attempt to slow breathing down but pt reports \"I can't\"**

## 2025-03-14 NOTE — ED PROVIDER NOTES
Patient Seen in: [facility] Emergency Department      History     Chief Complaint   Patient presents with    Abdomen/Flank Pain    Alcohol Intoxication     Stated Complaint: etoh,abd pain    Subjective:   HPI       patient is a 44-year-old female with a history of  diabetes with insulin pump, alcohol use disorder presented to the emergency room with abdominal pain.  She states she drank 1/5 of alcohol yesterday after she had been abstinent for 17 days.  She has had excessive vomiting and abdominal pain and believes is her pancreatitis.  She denies any hematemesis.  She denies any obstipation.  She has had no fevers or chills.  Patient also uses marijuana daily but denies any prior history of cyclical vomiting episodes.  Of note patient states she is in an outpatient rehab program.    Objective:     No pertinent past medical history.            Past Surgical History:   Procedure Laterality Date    Breast surgery      Implant left      Implant right      Mastectomy left      Mastectomy right      Ndsc ablation & rcnstj [facility] w/o bypass                  Social History     Socioeconomic History    Marital status: Single   Tobacco Use    Smoking status: Every Day     Current packs/day: 0.50     Types: Cigarettes    Smokeless tobacco: Never    Tobacco comments:     1/2ppd x 8 years   Vaping Use    Vaping status: Never Used   Substance and Sexual Activity    Alcohol use: Yes     Alcohol/week: 7.0 standard drinks of alcohol     Types: 7 Shots of liquor per week     Comment: 2 handles of vodka in the last 2 days    Drug use: Yes     Frequency: 7.0 times per week     Types: Cannabis     Comment: use daily   Other Topics Concern    Exercise Yes     Comment: MODERATE    Seat Belt Yes    Self-Exams Yes     Social Drivers of Health     Food Insecurity: No Food Insecurity (2/22/2025)    NCSS - Food Insecurity     Worried About Running Out of Food in the Last Year: No     Ran Out of Food in the Last Year: No    Transportation Needs: No Transportation Needs (2/22/2025)    NCSS - Transportation     Lack of Transportation: No   Housing Stability: Not At Risk (2/22/2025)    NCSS - Housing/Utilities     Has Housing: Yes     Worried About Losing Housing: No     Unable to Get Utilities: No                  Physical Exam     ED Triage Vitals [03/14/25 1424]   /78   Pulse 111   Resp (!) 30   Temp 98.4 °F (36.9 °C)   Temp src Oral   SpO2 100 %   O2 Device None (Room air)       Current Vitals:   Vital Signs  BP: 126/69  Pulse: 83  Resp: 19  Temp: 97.8 °F (36.6 °C)  Temp src: Temporal  MAP (mmHg): 86    Oxygen Therapy  SpO2: 96 %  O2 Device: None (Room air)        Physical Exam  Vitals and nursing note reviewed.   HENT:      Head: Normocephalic and atraumatic.   Cardiovascular:      Rate and Rhythm: Normal rate and regular rhythm.      Heart sounds: Normal heart sounds.   Pulmonary:      Effort: Pulmonary effort is normal.      Breath sounds: Normal breath sounds.   Abdominal:      General: Bowel sounds are normal.      Palpations: Abdomen is soft.      Tenderness: There is generalized abdominal tenderness.   Skin:     General: Skin is warm.   Neurological:      Mental Status: She is alert.             ED Course     Labs Reviewed   COMP METABOLIC PANEL (14) - Abnormal; Notable for the following components:       Result Value    Glucose 231 (*)     CO2 20.0 (*)     Anion Gap 19 (*)     BUN 5 (*)     Alkaline Phosphatase 106 (*)     Albumin 5.0 (*)     All other components within normal limits   CBC WITH DIFFERENTIAL WITH PLATELET - Abnormal; Notable for the following components:    HGB 16.1 (*)     All other components within normal limits   URINALYSIS WITH CULTURE REFLEX - Abnormal; Notable for the following components:    Clarity Urine Turbid (*)     Glucose Urine 1000 (*)     Ketones Urine >150 (*)     Protein Urine 50 (*)     WBC Urine 6-10 (*)     Bacteria Urine Rare (*)     Squamous Epi. Cells Few (*)     Hyaline Casts  Present (*)     All other components within normal limits   ETHYL ALCOHOL - Abnormal; Notable for the following components:    Ethyl Alcohol 184 (*)     All other components within normal limits   DRUG SCREEN 8 W/OUT CONFIRMATION, URINE - Abnormal; Notable for the following components:    Cannabinoid Urine Presumed Positive (*)     All other components within normal limits    Narrative:     Results of the Urine Drug Screen should be used only for medical purposes.   ACETAMINOPHEN (TYLENOL), S - Abnormal; Notable for the following components:    Acetaminophen <2.0 (*)     All other components within normal limits   SALICYLATE, SERUM - Abnormal; Notable for the following components:    Salicylate <3.0 (*)     All other components within normal limits   VENOUS BLOOD GAS - Abnormal; Notable for the following components:    Venous pCO2 31 (*)     Venous HCO3 18.7 (*)     Venous O2Hb 63.5 (*)     All other components within normal limits   LACTIC ACID RESPIRATORY - Abnormal; Notable for the following components:    Lactic Acid (Blood Gas) 6.7 (*)     All other components within normal limits   BASIC METABOLIC PANEL (8) - Abnormal; Notable for the following components:    Glucose 229 (*)     Chloride 97 (*)     CO2 18.0 (*)     Anion Gap 21 (*)     BUN 6 (*)     All other components within normal limits   BASIC METABOLIC PANEL (8) - Abnormal; Notable for the following components:    Glucose 209 (*)     CO2 17.0 (*)     Anion Gap 19 (*)     BUN <5 (*)     All other components within normal limits   POCT GLUCOSE - Abnormal; Notable for the following components:    POC Glucose 229 (*)     All other components within normal limits   POCT GLUCOSE - Abnormal; Notable for the following components:    POC Glucose 232 (*)     All other components within normal limits   POCT GLUCOSE - Abnormal; Notable for the following components:    POC Glucose 238 (*)     All other components within normal limits   POCT GLUCOSE - Abnormal;  Notable for the following components:    POC Glucose 219 (*)     All other components within normal limits   LIPASE - Normal   POCT PREGNANCY URINE - Normal   LACTIC ACID, PLASMA   RAINBOW DRAW BLUE   RAINBOW DRAW GOLD     EKG    Rate, intervals and axes as noted on EKG Report.  Rate: 84  Rhythm: Sinus Rhythm  Reading: No ST elevation depressions with her QT prolongation                CT ABDOMEN+PELVIS(CONTRAST ONLY)(CPT=74177)    Result Date: 3/14/2025  CONCLUSION:  Pancreatic coarse calcifications and atrophy with ductal dilatation consistent with chronic pancreatitis unchanged.  There are 2 cystic walled-off collections arising from the body and tail the pancreas consistent with pseudocysts which appear relatively stable in size.  Wall thickening of ascending transverse and descending colon with some adjacent soft tissue stranding most notably at the splenic flexure.  Findings suspicious for colitis.  This less likely represents colonic decompression.  Wall thickening of the urinary bladder is nonspecific.  Recommend clinical correlation to exclude cystitis.   LOCATION:  [id number]   Dictated by (CST): Rossi Moreno MD on 3/14/2025 at 5:09 PM     Finalized by (CST): Rossi Moreno MD on 3/14/2025 at 5:15 PM             [facility]          Admission disposition: 3/14/2025  5:45 PM           Medical Decision Making    The differential includes the following  Pancreatitis, alcohol withdrawal, alcoholic gastritis, alcoholic ketoacidosis, cannabinoid hyperemesis    Pertinent comorbidities include  As detailed above    Pertinent social history includes  As detailed above    External data reviewed    Discussion of management with external providers  [facility] hospitalist along with ICU intensivist    ER course  Arrival patient normotensive hemodynamically stable nontachycardic.  On exam she is slightly tremulous with diffuse abdominal tenderness to palpation.  She has no rebound or guarding.  Patient's labs notable for metabolic  acidosis with a anion gap.  Urinalysis with evidence of ketones.  Blood sugar ranging from 200 2230.  VBG obtained with a normal pH however patient's lactic acid is 6.7.  Patient received fluid bolus metabolic panel rechecked showing worsening gap.  Patient therefore started on DKA protocol after discussion with hospitalist.  Patient's care has also been endorsed to the intensivist.  CT scan shows evidence of chronic pancreatitis and potential pseudocyst.    A total of 45 minutes of critical care time (exclusive of billable procedures) was administered to manage the patient's critical lab values and metabolic instability due to her DKA/STEVEN and her alcohol withdrawal.  This involved direct patient intervention, complex decision making, and/or extensive discussions with the patient, family, and clinical staff.    Disposition and Plan     Clinical Impression:  1. Ketoacidosis         Disposition:  Admit  3/14/2025  5:45 pm    Follow-up:  No follow-up provider specified.        Medications Prescribed:  Current Discharge Medication List              Supplementary Documentation:         Hospital Problems       Present on Admission  Date Reviewed: 1/29/2023            ICD-10-CM Noted POA    * (Principal) Ketoacidosis E87.29 3/14/2025 Unknown

## 2025-03-14 NOTE — ED QUICK NOTES
Pt requesting repeatedly to lay down. Pt instructed we need to complete triage and pt needs to stay in the wheelchair     Pt intermittently attempting to sit forward in chair, instructed repeatedly she needs to sit back with feet on foot rests.     Pt noted to intermittently hyperventilate but is able to stop when speaking loudly and using foul language. Pt repeatedly stated, \"I need to fucking lay down\".

## 2025-03-15 VITALS
RESPIRATION RATE: 20 BRPM | HEIGHT: 66 IN | SYSTOLIC BLOOD PRESSURE: 141 MMHG | TEMPERATURE: 99 F | BODY MASS INDEX: 20.94 KG/M2 | OXYGEN SATURATION: 93 % | HEART RATE: 96 BPM | WEIGHT: 130.31 LBS | DIASTOLIC BLOOD PRESSURE: 85 MMHG

## 2025-03-15 LAB
ALBUMIN SERPL-MCNC: 3.8 G/DL (ref 3.2–4.8)
ALBUMIN/GLOB SERPL: 1.7 {RATIO} (ref 1–2)
ALP LIVER SERPL-CCNC: 78 U/L
ALT SERPL-CCNC: 15 U/L
ANION GAP SERPL CALC-SCNC: 10 MMOL/L (ref 0–18)
ANION GAP SERPL CALC-SCNC: 7 MMOL/L (ref 0–18)
ANION GAP SERPL CALC-SCNC: 7 MMOL/L (ref 0–18)
ANION GAP SERPL CALC-SCNC: 9 MMOL/L (ref 0–18)
ANION GAP SERPL CALC-SCNC: 9 MMOL/L (ref 0–18)
AST SERPL-CCNC: 30 U/L (ref ?–34)
BASOPHILS # BLD AUTO: 0.03 X10(3) UL (ref 0–0.2)
BASOPHILS NFR BLD AUTO: 0.6 %
BILIRUB SERPL-MCNC: 0.5 MG/DL (ref 0.3–1.2)
BUN BLD-MCNC: 5 MG/DL (ref 9–23)
BUN BLD-MCNC: <5 MG/DL (ref 9–23)
BUN BLD-MCNC: <5 MG/DL (ref 9–23)
CALCIUM BLD-MCNC: 8.6 MG/DL (ref 8.7–10.6)
CALCIUM BLD-MCNC: 8.8 MG/DL (ref 8.7–10.6)
CALCIUM BLD-MCNC: 8.8 MG/DL (ref 8.7–10.6)
CALCIUM BLD-MCNC: 8.9 MG/DL (ref 8.7–10.6)
CALCIUM BLD-MCNC: 9.1 MG/DL (ref 8.7–10.6)
CHLORIDE SERPL-SCNC: 100 MMOL/L (ref 98–112)
CHLORIDE SERPL-SCNC: 98 MMOL/L (ref 98–112)
CHLORIDE SERPL-SCNC: 99 MMOL/L (ref 98–112)
CO2 SERPL-SCNC: 26 MMOL/L (ref 21–32)
CO2 SERPL-SCNC: 27 MMOL/L (ref 21–32)
CO2 SERPL-SCNC: 28 MMOL/L (ref 21–32)
CO2 SERPL-SCNC: 28 MMOL/L (ref 21–32)
CO2 SERPL-SCNC: 29 MMOL/L (ref 21–32)
CREAT BLD-MCNC: 0.61 MG/DL
CREAT BLD-MCNC: 0.67 MG/DL
CREAT BLD-MCNC: 0.68 MG/DL
CREAT BLD-MCNC: 0.74 MG/DL
CREAT BLD-MCNC: 0.74 MG/DL
EGFRCR SERPLBLD CKD-EPI 2021: 104 ML/MIN/1.73M2 (ref 60–?)
EGFRCR SERPLBLD CKD-EPI 2021: 104 ML/MIN/1.73M2 (ref 60–?)
EGFRCR SERPLBLD CKD-EPI 2021: 111 ML/MIN/1.73M2 (ref 60–?)
EGFRCR SERPLBLD CKD-EPI 2021: 112 ML/MIN/1.73M2 (ref 60–?)
EGFRCR SERPLBLD CKD-EPI 2021: 114 ML/MIN/1.73M2 (ref 60–?)
EOSINOPHIL # BLD AUTO: 0.04 X10(3) UL (ref 0–0.7)
EOSINOPHIL NFR BLD AUTO: 0.8 %
ERYTHROCYTE [DISTWIDTH] IN BLOOD BY AUTOMATED COUNT: 12.2 %
GLOBULIN PLAS-MCNC: 2.2 G/DL (ref 2–3.5)
GLUCOSE BLD-MCNC: 162 MG/DL (ref 70–99)
GLUCOSE BLD-MCNC: 215 MG/DL (ref 70–99)
GLUCOSE BLD-MCNC: 225 MG/DL (ref 70–99)
GLUCOSE BLD-MCNC: 226 MG/DL (ref 70–99)
GLUCOSE BLD-MCNC: 228 MG/DL (ref 70–99)
GLUCOSE BLD-MCNC: 240 MG/DL (ref 70–99)
GLUCOSE BLD-MCNC: 242 MG/DL (ref 70–99)
GLUCOSE BLD-MCNC: 255 MG/DL (ref 70–99)
GLUCOSE BLD-MCNC: 274 MG/DL (ref 70–99)
HCT VFR BLD AUTO: 37 %
HGB BLD-MCNC: 12.8 G/DL
IMM GRANULOCYTES # BLD AUTO: 0.01 X10(3) UL (ref 0–1)
IMM GRANULOCYTES NFR BLD: 0.2 %
LACTATE SERPL-SCNC: 1.2 MMOL/L (ref 0.5–2)
LYMPHOCYTES # BLD AUTO: 1.42 X10(3) UL (ref 1–4)
LYMPHOCYTES NFR BLD AUTO: 28.7 %
MAGNESIUM SERPL-MCNC: 1.1 MG/DL (ref 1.6–2.6)
MAGNESIUM SERPL-MCNC: 1.2 MG/DL (ref 1.6–2.6)
MAGNESIUM SERPL-MCNC: 1.3 MG/DL (ref 1.6–2.6)
MAGNESIUM SERPL-MCNC: 1.3 MG/DL (ref 1.6–2.6)
MCH RBC QN AUTO: 31.6 PG (ref 26–34)
MCHC RBC AUTO-ENTMCNC: 34.6 G/DL (ref 31–37)
MCV RBC AUTO: 91.4 FL
MONOCYTES # BLD AUTO: 0.66 X10(3) UL (ref 0.1–1)
MONOCYTES NFR BLD AUTO: 13.4 %
NEUTROPHILS # BLD AUTO: 2.78 X10 (3) UL (ref 1.5–7.7)
NEUTROPHILS # BLD AUTO: 2.78 X10(3) UL (ref 1.5–7.7)
NEUTROPHILS NFR BLD AUTO: 56.3 %
OSMOLALITY SERPL CALC.SUM OF ELEC: 284 MOSM/KG (ref 275–295)
OSMOLALITY SERPL CALC.SUM OF ELEC: 286 MOSM/KG (ref 275–295)
OSMOLALITY SERPL CALC.SUM OF ELEC: 286 MOSM/KG (ref 275–295)
PHOSPHATE SERPL-MCNC: 2 MG/DL (ref 2.4–5.1)
PHOSPHATE SERPL-MCNC: 2.1 MG/DL (ref 2.4–5.1)
PHOSPHATE SERPL-MCNC: 2.6 MG/DL (ref 2.4–5.1)
PHOSPHATE SERPL-MCNC: 3.9 MG/DL (ref 2.4–5.1)
PLATELET # BLD AUTO: 129 10(3)UL (ref 150–450)
PLATELETS.RETICULATED NFR BLD AUTO: 2.4 % (ref 0–7)
POTASSIUM SERPL-SCNC: 3.3 MMOL/L (ref 3.5–5.1)
POTASSIUM SERPL-SCNC: 3.8 MMOL/L (ref 3.5–5.1)
POTASSIUM SERPL-SCNC: 3.8 MMOL/L (ref 3.5–5.1)
POTASSIUM SERPL-SCNC: 4.3 MMOL/L (ref 3.5–5.1)
POTASSIUM SERPL-SCNC: 4.3 MMOL/L (ref 3.5–5.1)
POTASSIUM SERPL-SCNC: 4.4 MMOL/L (ref 3.5–5.1)
PROT SERPL-MCNC: 6 G/DL (ref 5.7–8.2)
RBC # BLD AUTO: 4.05 X10(6)UL
SODIUM SERPL-SCNC: 134 MMOL/L (ref 136–145)
SODIUM SERPL-SCNC: 134 MMOL/L (ref 136–145)
SODIUM SERPL-SCNC: 135 MMOL/L (ref 136–145)
WBC # BLD AUTO: 4.9 X10(3) UL (ref 4–11)

## 2025-03-15 PROCEDURE — 99232 SBSQ HOSP IP/OBS MODERATE 35: CPT | Performed by: INTERNAL MEDICINE

## 2025-03-15 PROCEDURE — 99239 HOSP IP/OBS DSCHRG MGMT >30: CPT | Performed by: INTERNAL MEDICINE

## 2025-03-15 RX ORDER — MAGNESIUM OXIDE 400 MG/1
800 TABLET ORAL ONCE
Status: COMPLETED | OUTPATIENT
Start: 2025-03-15 | End: 2025-03-15

## 2025-03-15 RX ORDER — POTASSIUM CHLORIDE 14.9 MG/ML
20 INJECTION INTRAVENOUS ONCE
Status: DISCONTINUED | OUTPATIENT
Start: 2025-03-15 | End: 2025-03-15

## 2025-03-15 NOTE — PROGRESS NOTES
[facility] Critical Care Progress Note    Blanca Coats Patient Status:  Inpatient    1980 MRN [id number]   Formerly [facility] 4SW-A Attending Katia Rivera, DO   Hosp Day # 1 PCP Victor Hugo Leiva MD     Subjective:  Blanca Coats is a(n) 44 year old female who is admitted on 3/14/2025 for Ketoacidosis [E87.29]      Overnight: No acute events overnight gap closed quickly insulin ggt stopped    OBJECTIVE  Vitals:  /90   Pulse 68   Temp 99 °F (37.2 °C) (Temporal)   Resp 18   Ht 5' 6\" (1.676 m)   Wt 130 lb 4.7 oz (59.1 kg)   LMP 2025 (Approximate)   SpO2 94%   BMI 21.03 kg/m²           Physical Exam:    General Appearance: alert, well appearing, and in no distress  Lungs: clear to auscultation bilaterally  Heart: regular rate and rhythm, S1, S2 normal, no murmur, click, rub or gallop  Abdomen: soft, non-tender, without masses or organomegaly  Extremities: Atraumatic, no cyanosis or edema, capillary refill <3 sec.    Pulses: 2+ and symmetric all extremities  Skin: Skin color, texture, turgor normal for ethnicity, no rashes or lesions, warm and dry  Neurologic: Grossly normal no focal deficits    Lab Data Review:  Recent Labs     25  1456 03/15/25  0351   WBC 6.3 4.9   HGB 16.1* 12.8   .0 129.0*     Recent Labs     25  1456 25  1718 25  2339 03/15/25  0351     137 137 133* 134* 135*  135*   K 4.4  3.9 4.6 3.9 3.3* 4.3  4.3   CL 97*  98 101 98 98 98  98   CO2 18.0*  20.0* 17.0* 22.0 26.0 28.0  28.0   BUN 6*  5* <5* <5* <5* 5*  5*   CREATSERUM 0.81  0.92 0.73 0.71 0.68 0.74  0.74     Recent Labs   Lab 25   PTP 15.5*   INR 1.21*   PTT 24.2       Cultures:   No results found for this visit on 25.    Radiology:  CT ABDOMEN+PELVIS(CONTRAST ONLY)(CPT=74177)  Narrative: PROCEDURE:  CT ABDOMEN+PELVIS (CONTRAST ONLY) (CPT=74177)     COMPARISON:  [facility] CT, CT ABDOMEN+PELVIS(CONTRAST ONLY)(CPT=74177), 2025, 8:17  PM.     INDICATIONS:  etoh,abd pain     TECHNIQUE:  CT scanning was performed from the dome of the diaphragm to the pubic symphysis with non-ionic intravenous contrast material. Post contrast coronal MPR imaging was performed.  Dose reduction techniques were used. Dose information is   transmitted to the ACR (American College of Radiology) NRDR (National Radiology Data Registry) which includes the Dose Index Registry.     PATIENT STATED HISTORY:(As transcribed by Technologist)  General abdominal pain, hyperventilation. History of pancreatits/alcohol use      CONTRAST USED:  80cc of Isovue 370     FINDINGS:    LUNG BASE:  Clear.  LIVER:  Diffuse fatty infiltration of the liver.    BILIARY:  No biliary ductal dilatation.  PANCREAS:  Coarse dystrophic calcification arising from the uncinate process and head of the pancreas with pancreatic atrophy and pseudocyst formation consistent with chronic pancreatitis.  There are 2 walled-off fluid collections arising from the body   and tail of the pancreas consistent with pseudocysts.  These are similar in size to prior CT imaging from 2/5/2025 measuring 8.2 x 4.4 cm and 2.2 x 1.8 cm arising from the body and tail of the pancreas.  There is associated pancreatic ductal dilatation.    No significant surrounding peripancreatic inflammatory changes to suggest active pancreatitis.    SPLEEN:  Normal caliber.  KIDNEYS:  No hydronephrosis or focal renal mass.  ADRENALS:  Normal.  AORTA/VASCULAR:  No aneurysm.  RETROPERITONEUM:  No enlarged adenopathy.  BOWEL/MESENTERY:  There is wall thickening involving the ascending and transverse and descending colon.  Mild pericolonic inflammatory stranding at the site of the splenic flexure.  Findings may represent colitis versus incomplete colonic distension.  No   evidence of bowel obstruction.    ABDOMINAL WALL:  No ventral wall hernia.  PELVIC ORGANS:  Mild wall thickening of the urinary bladder is nonspecific.  Recommend clinical  correlation to exclude cystitis   BONES:  Mild degenerative changes in the lower lumbar facets.                    Impression: CONCLUSION:  Pancreatic coarse calcifications and atrophy with ductal dilatation consistent with chronic pancreatitis unchanged.  There are 2 cystic walled-off collections arising from the body and tail the pancreas consistent with pseudocysts which   appear relatively stable in size.     Wall thickening of ascending transverse and descending colon with some adjacent soft tissue stranding most notably at the splenic flexure.  Findings suspicious for colitis.  This less likely represents colonic decompression.     Wall thickening of the urinary bladder is nonspecific.  Recommend clinical correlation to exclude cystitis.        LOCATION:  [id number]        Dictated by (CST): Rossi Moreno MD on 3/14/2025 at 5:09 PM       Finalized by (CST): Rossi Moreno MD on 3/14/2025 at 5:15 PM         Medications reviewed     Assessment and Plan:   Patient Active Problem List   Diagnosis    Community acquired pneumonia    Alcohol-induced chronic pancreatitis (HCC)    Pseudocyst of pancreas (HCC)    Splenic vein thrombosis    Pneumonia due to infectious organism    Alcoholic intoxication with complication    Pancreas cyst (HCC)    Pancreatic necrosis (HCC)    Acute pancreatitis (HCC)    Hyponatremia    Acute on chronic pancreatitis (HCC)    Hypokalemia    Thrombocytopenia    Hyperglycemia    Alcohol-induced acute pancreatitis without infection or necrosis (HCC)    SVT (supraventricular tachycardia) (HCC)    Alcohol abuse    Alcohol-induced acute pancreatitis, unspecified complication status (HCC)    Abdominal pain, acute    Primary hypertension    Elevated liver enzymes    Anxiety and depression    Pancreatic cyst (HCC)    High anion gap metabolic acidosis    Dyspnea, unspecified type    Diabetic ketoacidosis without coma associated with type 2 diabetes mellitus (HCC)    Tachycardia    Acute respiratory  failure with hypoxia (HCC)    Oral thrush    Colitis due to Escherichia coli    Diarrhea of infectious origin    Tobacco dependence    New onset type 2 diabetes mellitus (HCC)    Lung nodule    Atrial fibrillation with rapid ventricular response (HCC)    Type 1 diabetes mellitus with ketoacidosis without coma (HCC)    Chronic pancreatitis, unspecified pancreatitis type (HCC)    Alcohol withdrawal syndrome with complication (HCC)    Type 1 diabetes mellitus with hyperglycemia (HCC)    Acidosis    Alcoholic ketoacidosis    A-fib (HCC)    Hypomagnesemia    Alcohol dependence with unspecified alcohol-induced disorder (HCC)    Ketoacidosis       Probable etoh ketosis              - gap closed once given dextrose                            Lactic acidosis, suspect due to dehydration normotensive              - ongoing fluid resuscitation, should be fluid replete now at this time     Chronic pancreatitis with psuedocysts              - started diet overnight              - serial abdominal exams, assess for uncontrolled pain, was sleeping comfortably in ER on arrival then asking for pain medication     Etoh abuse              - was reportedly sober for 17 days relapsed yesterday              - monitor for etoh withdrawal     Hypertension              - on BB     HLD               - on statin    Home vs floor today pending withdrawal      The 21st Century Cures Act makes medical notes like these available to patients in the interest of transparency. Please be advised this is a medical document. Medical documents are intended to carry relevant information, facts as evident, and the clinical opinion of the practitioner. The medical note is intended as peer to peer communication and may appear blunt or direct. It is written in medical language and may contain abbreviations or verbiage that are unfamiliar.      Sangita Palafox MD

## 2025-03-15 NOTE — PROGRESS NOTES
NURSING DISCHARGE NOTE    Discharged Home via Ambulatory.  Accompanied by RN  Belongings Taken by patient/family.    Patient discharge home with an Uber. Discussed all discharge instructions, medications and follow-up care. Patient verbalized understanding. Verbalized that she would re apply her insulin pump this evening. PIVs removed.

## 2025-03-15 NOTE — DISCHARGE SUMMARY
[facility]   part of [facility]     Hospitalist Progress Note     Blanca Coats Patient Status:  Inpatient    1980 MRN [id number]   Location [facility] 4SW-A Attending Katia Rivera, DO   Hosp Day # 1 PCP Victor Hugo Leiva MD     Chief Complaint: ***    Subjective:     Patient ***    Objective:    Review of Systems:   A comprehensive review of systems was completed; pertinent positive and negatives stated in subjective.    Vital signs:  Temp:  [97.8 °F (36.6 °C)-99.8 °F (37.7 °C)] 99 °F (37.2 °C)  Pulse:  [] 68  Resp:  [12-30] 18  BP: (120-133)/(68-90) 130/90  SpO2:  [93 %-100 %] 94 %    Physical Exam:    General: No acute distress  Respiratory: No wheezes, no rhonchi  Cardiovascular: S1, S2, regular rate and rhythm  Abdomen: Soft, Non-tender, non-distended, positive bowel sounds  Neuro: No new focal deficits.   Extremities: No edema  ***    Diagnostic Data:    Labs:  Recent Labs   Lab 25  1456 03/14/25  2021 03/15/25  0351   WBC 6.3  --  4.9   HGB 16.1*  --  12.8   MCV 90.7  --  91.4   .0  --  129.0*   INR  --  1.21*  --        Recent Labs   Lab 25  1456 25  1718 25  2339 03/15/25  0351   *  231*   < > 177* 162* 255*  255*   BUN 6*  5*   < > <5* <5* 5*  5*   CREATSERUM 0.81  0.92   < > 0.71 0.68 0.74  0.74   CA 9.3  9.6   < > 9.1 8.6* 8.8  8.8   ALB 5.0*  --  4.3  --  3.8     137   < > 133* 134* 135*  135*   K 4.4  3.9   < > 3.9 3.3* 4.3  4.3   CL 97*  98   < > 98 98 98  98   CO2 18.0*  20.0*   < > 22.0 26.0 28.0  28.0   ALKPHO 106*  --  86  --  78   AST 22  --  19  --  30   ALT 22  --  18  --  15   BILT 0.3  --  0.3  --  0.5   TP 7.9  --  6.8  --  6.0    < > = values in this interval not displayed.       Estimated Glomerular Filtration Rate: 104 mL/min/1.73m2 (result from lab).    No results for input(s): \"TROP\", \"TROPHS\", \"CK\" in the last 168 hours.    Recent Labs   Lab 25   PTP 15.5*   INR 1.21*                   Microbiology    No results found for this visit on 03/14/25.      Imaging: Reviewed in Epic.    Medications:    potassium chloride  20 mEq Intravenous Once    folic acid  1 mg Intravenous Daily    atorvastatin  10 mg Oral Daily    busPIRone  20 mg Oral BID    gabapentin  300 mg Oral BID    enoxaparin  40 mg Subcutaneous Nightly    cefTRIAXone  2 g Intravenous Q24H    thiamine  100 mg Oral Daily    multivitamin  1 tablet Oral Daily    folic acid  1 mg Oral Daily    insulin aspart  1-5 Units Subcutaneous TID AC and HS    insulin degludec  15 Units Subcutaneous Nightly       Assessment & Plan:      #***      Katia Rivera, DO    Supplementary Documentation:     Quality:  DVT Mechanical Prophylaxis:     Early ambuation  DVT Pharmacologic Prophylaxis   Medication    enoxaparin (Lovenox) 40 MG/0.4ML SUBQ injection 40 mg                Code Status: Full Code  Muro: No urinary catheter in place  Muro Duration (in days):   Central line:    MARK:     Discharge is dependent on: ***  At this point Ms. Coats is expected to be discharge to: ***    The 21st Century Cures Act makes medical notes like these available to patients in the interest of transparency. Please be advised this is a medical document. Medical documents are intended to carry relevant information, facts as evident, and the clinical opinion of the practitioner. The medical note is intended as peer to peer communication and may appear blunt or direct. It is written in medical language and may contain abbreviations or verbiage that are unfamiliar.                     Liqd      4 mg by Nasal route as needed. If patient remains unresponsive, repeat dose in other nostril 2-5 minutes after first dose.   Quantity: 1 kit  Refills: 0     naltrexone 50 MG Tabs  Commonly known as: ReVia      Take 1 tablet (50 mg total) by mouth daily.   Refills: 0     ondansetron 4 MG Tbdp  Commonly known as: Zofran-ODT      Take 1 tablet (4 mg total) by mouth every 8 (eight) hours as needed for Nausea.   Quantity: 20 tablet  Refills: 0     pantoprazole 40 MG Tbec  Commonly known as: Protonix      Take 1 tablet (40 mg total) by mouth 2 (two) times daily before meals.   Quantity: 60 tablet  Refills: 0     sertraline 100 MG Tabs  Commonly known as: Zoloft      Take 2 tablets (200 mg total) by mouth daily.   Refills: 0     thiamine 100 MG Tabs  Commonly known as: Vitamin B1      Take 1 tablet (100 mg total) by mouth daily.   Quantity: 30 tablet  Refills: 0     traMADol 50 MG Tabs  Commonly known as: Ultram      Take 1 tablet (50 mg total) by mouth every 4 (four) hours as needed for Pain.   Refills: 0     traZODone 100 MG Tabs  Commonly known as: Desyrel      Take 2 tablets (200 mg total) by mouth nightly.   Refills: 0            STOP taking these medications      insulin lispro 100 UNIT/ML Soln  Commonly known as: Humalog                 [place]    Follow-up appointment:   No follow-up provider specified.  Appointments for Next 30 Days 3/26/2025 - 4/25/2025      None            Vital signs:   /85   Pulse 96   Temp 99 °F (37.2 °C) (Temporal)   Resp 20   Ht 5' 6\" (1.676 m)   Wt 130 lb 4.7 oz (59.1 kg)   LMP 03/06/2025 (Approximate)   SpO2 93%   BMI 21.03 kg/m²       Physical Exam:    General: No acute distress   Lungs: clear to auscultation  Cardiovascular: S1, S2  Abdomen: Soft    -----------------------------------------------------------------------------------------------  PATIENT DISCHARGE INSTRUCTIONS: See electronic chart    Katia Rivera, DO    Total time spent on  discharge plannin minutes     The  [place] Cures Act makes medical notes like these available to patients in the interest of transparency. Please be advised this is a medical document. Medical documents are intended to carry relevant information, facts as evident, and the clinical opinion of the practitioner. The medical note is intended as peer to peer communication and may appear blunt or direct. It is written in medical language and may contain abbreviations or verbiage that are unfamiliar.

## 2025-03-15 NOTE — SPIRITUAL CARE NOTE
Spiritual Care Visit Note    Patient Name: Blanca Coats Date of Spiritual Care Visit: 25   : 1980 Primary Dx: Ketoacidosis   Referred By:  ED rounds    Spiritual Care Taxonomy:    Intended Effects: Demonstrate caring and concern    Methods: Demonstrate acceptance, Encourage sharing of feelings, Encourage story-telling, Offer support    Interventions: Acknowledge current situation, Active listening, Ask guided questions, Ask questions to bring forth feelings, Discuss concerns, Discuss plan of care, Share words of hope and inspiration    Visit Type/Summary:  Blanca was sitting partially up in bed drinking water when  arrived at her ED room.  He body was shaking which she attributed to being cold. She knows she is being admitted and hopes she will be able to sleep tonight.   - Spiritual Care: Consulted with RN prior to visit. Offered empathic listening and emotional support. Provided information regarding how to contact Spiritual Care.  remains available as needed for follow up.    Spiritual Care support can be requested via an Epic consult. For urgent/immediate needs, please contact the On Call  at: [facility]: ext [phone]    Karlene Roberts MDiv.  Staff

## 2025-03-15 NOTE — PROGRESS NOTES
Received patient from the ED at 2000 with Insulin gtt and D5 1/2 infusing. Alert and oriented x4. Complaints of severe pain to \"pancreas\", PRN meds given (see MAR). Transitioned to SubQ insulin at 2300 and drip off at 0100. Patient is tolerating oral intake. Electrolytes replaced per protocol. Up to bedside commode.

## 2025-03-17 ENCOUNTER — PATIENT OUTREACH (OUTPATIENT)
Dept: CASE MANAGEMENT | Age: 45
End: 2025-03-17

## 2025-03-17 NOTE — PROGRESS NOTES
Hospital follow up.    Last A1C Value: 12% Date: [2/22/2025]    Victor Hugo Leiva M.D.  Internal Medicine  [id number] [place] [address]  [phone]    Jennifer Schoenberger, DO  Endocrinology  [address] [address]  [phone]    Attempt #1:  Left message on voicemail for patient to call transitions specialist back to schedule follow up appointments. Provided Transitions specialist scheduling phone number [phone].

## 2025-03-18 NOTE — PROGRESS NOTES
Hospital follow up.    Last A1C Value: 12% Date: [2/22/2025]    Victor Hugo Leiva M.D.  Internal Medicine  [id number] [place] [address]  [phone]    Jennifer Schoenberger, DO  Endocrinology  [address] [address]  [phone]  Tuesday 5/13 @2:20  Existing appointment.    Attempt #2:  Left message on voicemail for patient to call transitions specialist back to schedule follow up appointments. Provided Transitions specialist scheduling phone number [phone].

## 2025-03-18 NOTE — PAYOR COMM NOTE
RECONSIDERATION REQUEST FOR INPATIENT SERVICES      --------------  ADMISSION REVIEW     Payor: TIA  Subscriber #:  [id number]  Authorization Number: [id number]    Admit date: 3/14/25  Admit time:  7:59 PM       REVIEW DOCUMENTATION:    ED Provider Notes signed by Chasity Holly MD at 3/14/2025  7:24 PM        Patient Seen in: [facility] Emergency Department      History     Chief Complaint   Patient presents with    Abdomen/Flank Pain    Alcohol Intoxication     Stated Complaint: etoh,abd pain    Subjective:    patient is a 44-year-old female with a history of  diabetes with insulin pump, alcohol use disorder presented to the emergency room with abdominal pain.  She states she drank 1/5 of alcohol yesterday after she had been abstinent for 17 days.  She has had excessive vomiting and abdominal pain and believes is her pancreatitis.  She denies any hematemesis.  She denies any obstipation.  She has had no fevers or chills.  Patient also uses marijuana daily but denies any prior history of cyclical vomiting episodes.  Of note patient states she is in an outpatient rehab program.    Physical Exam     ED Triage Vitals [03/14/25 1424]   /78   Pulse 111   Resp (!) 30   Temp 98.4 °F (36.9 °C)   Temp src Oral   SpO2 100 %   O2 Device None (Room air)       Current Vitals:   Vital Signs  BP: 126/69  Pulse: 83  Resp: 19  Temp: 97.8 °F (36.6 °C)  Temp src: Temporal  MAP (mmHg): 86    Oxygen Therapy  SpO2: 96 %  O2 Device: None (Room air)        Physical Exam  Vitals and nursing note reviewed.   HENT:      Head: Normocephalic and atraumatic.   Cardiovascular:      Rate and Rhythm: Normal rate and regular rhythm.      Heart sounds: Normal heart sounds.   Pulmonary:      Effort: Pulmonary effort is normal.      Breath sounds: Normal breath sounds.   Abdominal:      General: Bowel sounds are normal.      Palpations: Abdomen is soft.      Tenderness: There is generalized abdominal tenderness.   Skin:     General: Skin  is warm.   Neurological:      Mental Status: She is alert.             ED Course     Labs Reviewed   COMP METABOLIC PANEL (14) - Abnormal; Notable for the following components:       Result Value    Glucose 231 (*)     CO2 20.0 (*)     Anion Gap 19 (*)     BUN 5 (*)     Alkaline Phosphatase 106 (*)     Albumin 5.0 (*)     All other components within normal limits   CBC WITH DIFFERENTIAL WITH PLATELET - Abnormal; Notable for the following components:    HGB 16.1 (*)     All other components within normal limits   URINALYSIS WITH CULTURE REFLEX - Abnormal; Notable for the following components:    Clarity Urine Turbid (*)     Glucose Urine 1000 (*)     Ketones Urine >150 (*)     Protein Urine 50 (*)     WBC Urine 6-10 (*)     Bacteria Urine Rare (*)     Squamous Epi. Cells Few (*)     Hyaline Casts Present (*)     All other components within normal limits   ETHYL ALCOHOL - Abnormal; Notable for the following components:    Ethyl Alcohol 184 (*)     All other components within normal limits   DRUG SCREEN 8 W/OUT CONFIRMATION, URINE - Abnormal; Notable for the following components:    Cannabinoid Urine Presumed Positive (*)     All other components within normal limits    Narrative:     Results of the Urine Drug Screen should be used only for medical purposes.   ACETAMINOPHEN (TYLENOL), S - Abnormal; Notable for the following components:    Acetaminophen <2.0 (*)     All other components within normal limits   SALICYLATE, SERUM - Abnormal; Notable for the following components:    Salicylate <3.0 (*)     All other components within normal limits   VENOUS BLOOD GAS - Abnormal; Notable for the following components:    Venous pCO2 31 (*)     Venous HCO3 18.7 (*)     Venous O2Hb 63.5 (*)     All other components within normal limits   LACTIC ACID RESPIRATORY - Abnormal; Notable for the following components:    Lactic Acid (Blood Gas) 6.7 (*)     All other components within normal limits   BASIC METABOLIC PANEL (8) - Abnormal;  Notable for the following components:    Glucose 229 (*)     Chloride 97 (*)     CO2 18.0 (*)     Anion Gap 21 (*)     BUN 6 (*)     All other components within normal limits   BASIC METABOLIC PANEL (8) - Abnormal; Notable for the following components:    Glucose 209 (*)     CO2 17.0 (*)     Anion Gap 19 (*)     BUN <5 (*)     All other components within normal limits   POCT GLUCOSE - Abnormal; Notable for the following components:    POC Glucose 229 (*)     All other components within normal limits   POCT GLUCOSE - Abnormal; Notable for the following components:    POC Glucose 232 (*)     All other components within normal limits   POCT GLUCOSE - Abnormal; Notable for the following components:    POC Glucose 238 (*)     All other components within normal limits   POCT GLUCOSE - Abnormal; Notable for the following components:    POC Glucose 219 (*)     All other components within normal limits   LIPASE - Normal   POCT PREGNANCY URINE - Normal   LACTIC ACID, PLASMA   RAINBOW DRAW BLUE   RAINBOW DRAW GOLD     EKG    Rate, intervals and axes as noted on EKG Report.  Rate: 84  Rhythm: Sinus Rhythm  Reading: No ST elevation depressions with her QT prolongation          CT ABDOMEN+PELVIS(CONTRAST ONLY)(CPT=74177)    Result Date: 3/14/2025  CONCLUSION:  Pancreatic coarse calcifications and atrophy with ductal dilatation consistent with chronic pancreatitis unchanged.  There are 2 cystic walled-off collections arising from the body and tail the pancreas consistent with pseudocysts which appear relatively stable in size.  Wall thickening of ascending transverse and descending colon with some adjacent soft tissue stranding most notably at the splenic flexure.  Findings suspicious for colitis.  This less likely represents colonic decompression.  Wall thickening of the urinary bladder is nonspecific.  Recommend clinical correlation to exclude cystitis.   LOCATION:  [id number]   Dictated by (CST): Rossi Moreno MD on 3/14/2025 at  5:09 PM     Finalized by (CST): Rossi Moreno MD on 3/14/2025 at 5:15 PM              Admission disposition: 3/14/2025  5:45 PM           Medical Decision Making    The differential includes the following  Pancreatitis, alcohol withdrawal, alcoholic gastritis, alcoholic ketoacidosis, cannabinoid hyperemesis    Pertinent comorbidities include  As detailed above    Pertinent social history includes  As detailed above    External data reviewed    Discussion of management with external providers  [facility] hospitalist along with ICU intensivist    ER course  Arrival patient normotensive hemodynamically stable nontachycardic.  On exam she is slightly tremulous with diffuse abdominal tenderness to palpation.  She has no rebound or guarding.  Patient's labs notable for metabolic acidosis with a anion gap.  Urinalysis with evidence of ketones.  Blood sugar ranging from 200 2230.  VBG obtained with a normal pH however patient's lactic acid is 6.7.  Patient received fluid bolus metabolic panel rechecked showing worsening gap.  Patient therefore started on DKA protocol after discussion with hospitalist.  Patient's care has also been endorsed to the intensivist.  CT scan shows evidence of chronic pancreatitis and potential pseudocyst.    A total of 45 minutes of critical care time (exclusive of billable procedures) was administered to manage the patient's critical lab values and metabolic instability due to her DKA/STEVEN and her alcohol withdrawal.  This involved direct patient intervention, complex decision making, and/or extensive discussions with the patient, family, and clinical staff.    Disposition and Plan     Clinical Impression:  1. Ketoacidosis         Disposition:  Admit  3/14/2025  5:45 pm     Hospital Problems       Present on Admission  Date Reviewed: 1/29/2023            ICD-10-CM Noted POA    * (Principal) Ketoacidosis E87.29 3/14/2025 Unknown           3/14 H&P        Chief Complaint: Abdominal Pain         Subjective:  History of Present Illness:      Blanca Coats is a 44 year old female with past medical history of depression, type 1 diabetes with insulin pump, alcohol use disorder presented to the emergency department for abdominal pain.     Patient complaining of severe abdominal pain reminiscent of prior episodes of pancreatitis, she has chronic pancreatitis as a result of her alcohol use disorder.  Reports that she is in a program and had been sober for 17 days but last night started drinking because she was depressed.  She denies any suicidal or homicidal ideation.  Her main complaint is her severe epigastric and right sided abdominal pain.       #Euglycemic DKA vs Alcoholic Ketoacidosis   -insulin pump removed  -IV thiamine, dextrose gtt, Insulin gtt     #Abdominal Pain/Colitis  -GI Stool PCR, cdiff     #Lactic acidosis  -no obvious infectious etiology, possibly UTI, Empiric Rocephin   -Blood Cultures     #Chronic pancreatitis with pseudocysts   #Alcohol use disorder   -NPO  -IVF   -had been sober for 17 days and drank last night, can monitor for any withdrawal symptoms      #Essential HTN  -BB     #DL  -Statin     #Mood disorder, depression  -Buspirone   -PRN Xanax         3/14 critical care    History Of Present Illness:  Blanca Coats is a 44 year old female with PMHx significant for etoh abuse and chronic pancreatiis who presents with abdominal pain found to be in DKA vs etoh ketosis.  Patient reports relapsing and drinking bottle of liquor yesterday.  Has not eaten in several days.  Patient was given 1L bolus of NS and gap worsened so started on insulin drip and admitted to ICU.      Probable etoh ketosis              - last drink yesterday drank a bottle of liquor, has not eaten in several days              - will give 1 amp of D50              - getting started on insulin ggt and D5 1/2 NS in ER, would plan to recheck labs once gets up to ICU and assess ongoing need for insulin ggt                 Lactic acidosis, suspect due to dehydration normotensive              - ongoing fluid resuscitation     Chronic pancreatitis with psuedocysts              - NPO, ongoing IV fluid rehydration              - serial abdominal exams, assess for uncontrolled pain, was sleeping comfortably in ER on arrival then asking for pain medication     Etoh abuse              - was reportedly sober for 17 days relapsed yesterday              - monitor for etoh withdrawal     Hypertension              - on BB     HLD               - on statin     Dispo:     Code Status: Full Code  -ICU for close monitoring          3/15 critical care    Blanca Coats is a(n) 44 year old female who is admitted on 3/14/2025 for Ketoacidosis [E87.29]        Overnight: No acute events overnight gap closed quickly insulin ggt stopped     OBJECTIVE  Vitals:  /90   Pulse 68   Temp 99 °F (37.2 °C) (Temporal)   Resp 18   Ht 5' 6\" (1.676 m)   Wt 130 lb 4.7 oz (59.1 kg)   LMP 03/06/2025 (Approximate)   SpO2 94%   BMI 21.03 kg/m²           Physical Exam:    General Appearance: alert, well appearing, and in no distress  Lungs: clear to auscultation bilaterally  Heart: regular rate and rhythm, S1, S2 normal, no murmur, click, rub or gallop  Abdomen: soft, non-tender, without masses or organomegaly  Extremities: Atraumatic, no cyanosis or edema, capillary refill <3 sec.    Pulses: 2+ and symmetric all extremities  Skin: Skin color, texture, turgor normal for ethnicity, no rashes or lesions, warm and dry  Neurologic: Grossly normal no focal deficits     Lab Data Review:       Recent Labs     03/14/25  1456 03/15/25  0351   WBC 6.3 4.9   HGB 16.1* 12.8   .0 129.0*              Recent Labs     03/14/25  1456 03/14/25  1718 03/14/25  2022 03/14/25  2339 03/15/25  0351     137 137 133* 134* 135*  135*   K 4.4  3.9 4.6 3.9 3.3* 4.3  4.3   CL 97*  98 101 98 98 98  98   CO2 18.0*  20.0* 17.0* 22.0 26.0 28.0  28.0   BUN 6*  5* <5* <5*  <5* 5*  5*   CREATSERUM 0.81  0.92 0.73 0.71 0.68 0.74  0.74          Recent Labs   Lab 03/14/25 2021   PTP 15.5*   INR 1.21*   PTT 24.2         Cultures:   No results found for this visit on 03/14/25.     Radiology:  CT ABDOMEN+PELVIS(CONTRAST ONLY)(CPT=74177)  Narrative: PROCEDURE:  CT ABDOMEN+PELVIS (CONTRAST ONLY) (CPT=74177)     COMPARISON:  [facility] , CT, CT ABDOMEN+PELVIS(CONTRAST ONLY)(CPT=74177), 2/05/2025, 8:17 PM.     INDICATIONS:  etoh,abd pain     TECHNIQUE:  CT scanning was performed from the dome of the diaphragm to the pubic symphysis with non-ionic intravenous contrast material. Post contrast coronal MPR imaging was performed.  Dose reduction techniques were used. Dose information is   transmitted to the ACR (American College of Radiology) NRDR (National Radiology Data Registry) which includes the Dose Index Registry.     PATIENT STATED HISTORY:(As transcribed by Technologist)  General abdominal pain, hyperventilation. History of pancreatits/alcohol use      CONTRAST USED:  80cc of Isovue 370     FINDINGS:    LUNG BASE:  Clear.  LIVER:  Diffuse fatty infiltration of the liver.    BILIARY:  No biliary ductal dilatation.  PANCREAS:  Coarse dystrophic calcification arising from the uncinate process and head of the pancreas with pancreatic atrophy and pseudocyst formation consistent with chronic pancreatitis.  There are 2 walled-off fluid collections arising from the body   and tail of the pancreas consistent with pseudocysts.  These are similar in size to prior CT imaging from 2/5/2025 measuring 8.2 x 4.4 cm and 2.2 x 1.8 cm arising from the body and tail of the pancreas.  There is associated pancreatic ductal dilatation.    No significant surrounding peripancreatic inflammatory changes to suggest active pancreatitis.    SPLEEN:  Normal caliber.  KIDNEYS:  No hydronephrosis or focal renal mass.  ADRENALS:  Normal.  AORTA/VASCULAR:  No aneurysm.  RETROPERITONEUM:  No enlarged  adenopathy.  BOWEL/MESENTERY:  There is wall thickening involving the ascending and transverse and descending colon.  Mild pericolonic inflammatory stranding at the site of the splenic flexure.  Findings may represent colitis versus incomplete colonic distension.  No   evidence of bowel obstruction.    ABDOMINAL WALL:  No ventral wall hernia.  PELVIC ORGANS:  Mild wall thickening of the urinary bladder is nonspecific.  Recommend clinical correlation to exclude cystitis   BONES:  Mild degenerative changes in the lower lumbar facets.        Probable etoh ketosis              - gap closed once given dextrose                            Lactic acidosis, suspect due to dehydration normotensive              - ongoing fluid resuscitation, should be fluid replete now at this time     Chronic pancreatitis with psuedocysts              - started diet overnight              - serial abdominal exams, assess for uncontrolled pain, was sleeping comfortably in ER on arrival then asking for pain medication     Etoh abuse              - was reportedly sober for 17 days relapsed yesterday              - monitor for etoh withdrawal     Hypertension              - on BB     HLD               - on statin      Vitals (last day) before discharge       Date/Time Temp Pulse Resp BP SpO2 Weight O2 Device O2 Flow Rate (L/min) [facility]    03/15/25 1300 -- 96 20 141/85 93 % -- -- -- CONSUELO    03/15/25 1200 -- 66 18 141/88 92 % -- -- -- CONSUELO    03/15/25 1100 -- 82 17 145/81 95 % -- -- -- CONSUELO    03/15/25 1000 -- 65 15 132/91 94 % -- -- -- CONSUELO    03/15/25 0900 -- 81 16 136/82 92 % -- -- -- CONSUELO    03/15/25 0800 99 °F (37.2 °C) 69 17 117/75 92 % -- -- -- CONSUELO    03/15/25 0600 -- 68 18 130/90 94 % -- -- -- ZS    03/15/25 0500 -- 66 16 129/81 93 % -- -- -- ZS    03/15/25 0400 99 °F (37.2 °C) 69 16 124/85 95 % -- None (Room air) -- ZS    03/15/25 0300 -- 66 20 120/82 94 % -- -- -- ZS    03/15/25 0200 -- 68 12 129/68 96 % -- -- -- ZS    03/15/25 0100 -- 71 12 125/68 94  % -- -- -- ZS    03/15/25 0000 99.3 °F (37.4 °C) 83 15 128/73 93 % -- None (Room air) -- ZS    03/14/25 2300 -- 82 16 121/77 97 % -- -- -- ZS    03/14/25 2200 -- 77 13 126/68 96 % -- -- -- ZS    03/14/25 2100 -- 78 15 121/73 96 % -- -- -- ZS    03/14/25 2000 99.8 °F (37.7 °C) 85 20 133/79 97 % 130 lb 4.7 oz (59.1 kg) None (Room air) -- ZS    03/14/25 1800 -- 83 19 126/69 96 % -- -- -- PS    03/14/25 1505 -- -- -- -- -- -- None (Room air) -- LD    03/14/25 1453 97.8 °F (36.6 °C) 90 20 124/79 99 % -- None (Room air) -- CP    03/14/25 1424 98.4 °F (36.9 °C) 111 30 127/78 100 % 126 lb (57.2 kg) None (Room air) -- SK          CIWA Scores (last 2 days) before discharge       Date/Time CIWA-Ar Total Who    03/15/25 1200 3 CONSUELO    03/15/25 0920 5 CONSUELO    03/15/25 0820 10 CONSUELO    03/15/25 0644 8 ZS    03/15/25 0400 7 ZS    03/15/25 0000 7 ZS    03/14/25 2000 7 ZS    03/14/25 1453 8 LD              --------------  DISCHARGE REVIEW    Payor: TIA  Subscriber #:  [id number]  Authorization Number: [id number]    Admit date: 3/14/25  Admit time:   7:59 PM  Discharge Date: 3/15/2025  1:43 PM     Admitting Physician: Anette Tinajero DO  Attending Physician:  No att. providers found  Primary Care Physician: Victor Hugo Leiva MD       REVIEWER COMMENTS      RECONSIDERATION REQUEST FOR INPATIENT SERVICES

## 2025-03-19 NOTE — PROGRESS NOTES
Hospital follow up.    Last A1C Value: 12% Date: [2/22/2025]    Victor Hugo Leiva M.D.  Internal Medicine  [id number] [place] [address]  [phone]  Patient does not wish to follow up.    Jennifer Schoenberger, DO  Endocrinology  [address] [address]  [phone]  Tuesday 5/13 @2:20  Existing appointment.    Confirmed with patient.    Closing encounter.

## 2025-05-12 ENCOUNTER — APPOINTMENT (OUTPATIENT)
Dept: CT IMAGING | Facility: HOSPITAL | Age: 45
End: 2025-05-12
Attending: EMERGENCY MEDICINE
Payer: MEDICAID

## 2025-05-12 PROCEDURE — 74177 CT ABD & PELVIS W/CONTRAST: CPT | Performed by: EMERGENCY MEDICINE

## 2025-05-30 ENCOUNTER — HOSPITAL ENCOUNTER (INPATIENT)
Facility: HOSPITAL | Age: 45
LOS: 2 days | Discharge: HOME | End: 2025-06-02
Attending: EMERGENCY MEDICINE | Admitting: STUDENT IN AN ORGANIZED HEALTH CARE EDUCATION/TRAINING PROGRAM
Payer: MEDICAID

## 2025-05-30 DIAGNOSIS — F10.920 ALCOHOLIC INTOXICATION WITHOUT COMPLICATION: ICD-10-CM

## 2025-05-30 DIAGNOSIS — E10.10 TYPE 1 DIABETES MELLITUS WITH KETOACIDOSIS WITHOUT COMA (HCC): Primary | ICD-10-CM

## 2025-05-30 DIAGNOSIS — N39.0 URINARY TRACT INFECTION WITHOUT HEMATURIA, SITE UNSPECIFIED: ICD-10-CM

## 2025-05-30 DIAGNOSIS — E87.20 LACTIC ACIDOSIS: ICD-10-CM

## 2025-05-30 LAB
ALBUMIN SERPL-MCNC: 4.9 G/DL (ref 3.2–4.8)
ALBUMIN/GLOB SERPL: 1.6 {RATIO} (ref 1–2)
ALP LIVER SERPL-CCNC: 100 U/L (ref 37–98)
ALT SERPL-CCNC: 22 U/L (ref 10–49)
AMPHET UR QL SCN: NEGATIVE
ANION GAP SERPL CALC-SCNC: 22 MMOL/L (ref 0–18)
APAP SERPL-MCNC: <2 UG/ML (ref 10–20)
AST SERPL-CCNC: 20 U/L (ref ?–34)
B-HCG UR QL: NEGATIVE
BASOPHILS # BLD AUTO: 0.11 X10(3) UL (ref 0–0.2)
BASOPHILS NFR BLD AUTO: 1.3 %
BENZODIAZ UR QL SCN: NEGATIVE
BILIRUB SERPL-MCNC: 0.5 MG/DL (ref 0.3–1.2)
BILIRUB UR QL STRIP.AUTO: NEGATIVE
BUN BLD-MCNC: 6 MG/DL (ref 9–23)
CALCIUM BLD-MCNC: 9.3 MG/DL (ref 8.7–10.6)
CHLORIDE SERPL-SCNC: 96 MMOL/L (ref 98–112)
CO2 SERPL-SCNC: 17 MMOL/L (ref 21–32)
COCAINE UR QL: NEGATIVE
COLOR UR AUTO: YELLOW
CREAT BLD-MCNC: 0.9 MG/DL (ref 0.55–1.02)
CREAT UR-SCNC: 82.2 MG/DL
EGFRCR SERPLBLD CKD-EPI 2021: 81 ML/MIN/1.73M2 (ref 60–?)
EOSINOPHIL # BLD AUTO: 0.01 X10(3) UL (ref 0–0.7)
EOSINOPHIL NFR BLD AUTO: 0.1 %
ERYTHROCYTE [DISTWIDTH] IN BLOOD BY AUTOMATED COUNT: 13.7 %
ETHANOL SERPL-MCNC: 291 MG/DL (ref ?–3)
FENTANYL UR QL SCN: NEGATIVE
GLOBULIN PLAS-MCNC: 3.1 G/DL (ref 2–3.5)
GLUCOSE BLD-MCNC: 371 MG/DL (ref 70–99)
GLUCOSE BLD-MCNC: 373 MG/DL (ref 70–99)
GLUCOSE UR STRIP.AUTO-MCNC: >1000 MG/DL
HCG SERPL QL: NEGATIVE
HCT VFR BLD AUTO: 48.6 % (ref 35–48)
HGB BLD-MCNC: 17.2 G/DL (ref 12–16)
IMM GRANULOCYTES # BLD AUTO: 0.08 X10(3) UL (ref 0–1)
IMM GRANULOCYTES NFR BLD: 0.9 %
KETONES UR STRIP.AUTO-MCNC: 80 MG/DL
LEUKOCYTE ESTERASE UR QL STRIP.AUTO: NEGATIVE
LIPASE SERPL-CCNC: 26 U/L (ref 12–53)
LYMPHOCYTES # BLD AUTO: 4.27 X10(3) UL (ref 1–4)
LYMPHOCYTES NFR BLD AUTO: 50.4 %
MCH RBC QN AUTO: 31 PG (ref 26–34)
MCHC RBC AUTO-ENTMCNC: 35.4 G/DL (ref 31–37)
MCV RBC AUTO: 87.6 FL (ref 80–100)
MDMA UR QL SCN: NEGATIVE
MONOCYTES # BLD AUTO: 0.56 X10(3) UL (ref 0.1–1)
MONOCYTES NFR BLD AUTO: 6.6 %
NEUTROPHILS # BLD AUTO: 3.44 X10 (3) UL (ref 1.5–7.7)
NEUTROPHILS # BLD AUTO: 3.44 X10(3) UL (ref 1.5–7.7)
NEUTROPHILS NFR BLD AUTO: 40.7 %
NITRITE UR QL STRIP.AUTO: NEGATIVE
OPIATES UR QL SCN: NEGATIVE
OSMOLALITY SERPL CALC.SUM OF ELEC: 293 MOSM/KG (ref 275–295)
OXYCODONE UR QL SCN: NEGATIVE
PH UR STRIP.AUTO: 5.5 [PH] (ref 5–8)
PLATELET # BLD AUTO: 314 10(3)UL (ref 150–450)
POTASSIUM SERPL-SCNC: 3.7 MMOL/L (ref 3.5–5.1)
PROT SERPL-MCNC: 8 G/DL (ref 5.7–8.2)
PROT UR STRIP.AUTO-MCNC: 50 MG/DL
RBC # BLD AUTO: 5.55 X10(6)UL (ref 3.8–5.3)
RBC UR QL AUTO: NEGATIVE
SALICYLATES SERPL-MCNC: <3 MG/DL (ref 3–20)
SODIUM SERPL-SCNC: 135 MMOL/L (ref 136–145)
SP GR UR STRIP.AUTO: 1.02 (ref 1–1.03)
UROBILINOGEN UR STRIP.AUTO-MCNC: NORMAL MG/DL
WBC # BLD AUTO: 8.5 X10(3) UL (ref 4–11)

## 2025-05-30 RX ORDER — DIPHENHYDRAMINE HYDROCHLORIDE 50 MG/ML
INJECTION, SOLUTION INTRAMUSCULAR; INTRAVENOUS
Status: COMPLETED
Start: 2025-05-30 | End: 2025-05-31

## 2025-05-30 RX ORDER — KETOROLAC TROMETHAMINE 15 MG/ML
15 INJECTION, SOLUTION INTRAMUSCULAR; INTRAVENOUS ONCE
Status: COMPLETED | OUTPATIENT
Start: 2025-05-30 | End: 2025-05-31

## 2025-05-30 RX ORDER — DIPHENHYDRAMINE HYDROCHLORIDE 50 MG/ML
25 INJECTION, SOLUTION INTRAMUSCULAR; INTRAVENOUS ONCE
Status: COMPLETED | OUTPATIENT
Start: 2025-05-30 | End: 2025-05-31

## 2025-05-30 RX ORDER — HALOPERIDOL 5 MG/ML
5 INJECTION INTRAMUSCULAR ONCE
Status: COMPLETED | OUTPATIENT
Start: 2025-05-30 | End: 2025-05-30

## 2025-05-30 RX ORDER — HALOPERIDOL 5 MG/ML
INJECTION INTRAMUSCULAR
Status: COMPLETED
Start: 2025-05-30 | End: 2025-05-30

## 2025-05-31 ENCOUNTER — APPOINTMENT (OUTPATIENT)
Dept: CT IMAGING | Facility: HOSPITAL | Age: 45
End: 2025-05-31
Attending: EMERGENCY MEDICINE
Payer: MEDICAID

## 2025-05-31 PROBLEM — F10.920 ALCOHOLIC INTOXICATION WITHOUT COMPLICATION: Status: ACTIVE | Noted: 2025-05-31

## 2025-05-31 PROBLEM — N39.0 URINARY TRACT INFECTION WITHOUT HEMATURIA, SITE UNSPECIFIED: Status: ACTIVE | Noted: 2025-05-31

## 2025-05-31 LAB
ALBUMIN SERPL-MCNC: 4 G/DL (ref 3.2–4.8)
ALBUMIN/GLOB SERPL: 1.7 {RATIO} (ref 1–2)
ALP LIVER SERPL-CCNC: 72 U/L (ref 37–98)
ALT SERPL-CCNC: 17 U/L (ref 10–49)
ANION GAP SERPL CALC-SCNC: 10 MMOL/L (ref 0–18)
ANION GAP SERPL CALC-SCNC: 14 MMOL/L (ref 0–18)
ANION GAP SERPL CALC-SCNC: 16 MMOL/L (ref 0–18)
ANION GAP SERPL CALC-SCNC: 6 MMOL/L (ref 0–18)
ANION GAP SERPL CALC-SCNC: 7 MMOL/L (ref 0–18)
AST SERPL-CCNC: 22 U/L (ref ?–34)
ATRIAL RATE: 89 BPM
BASE EXCESS BLDV CALC-SCNC: -9.3 MMOL/L
BILIRUB SERPL-MCNC: 0.4 MG/DL (ref 0.3–1.2)
BUN BLD-MCNC: 6 MG/DL (ref 9–23)
CALCIUM BLD-MCNC: 8.3 MG/DL (ref 8.7–10.6)
CALCIUM BLD-MCNC: 8.4 MG/DL (ref 8.7–10.6)
CALCIUM BLD-MCNC: 8.4 MG/DL (ref 8.7–10.6)
CHLORIDE SERPL-SCNC: 103 MMOL/L (ref 98–112)
CHLORIDE SERPL-SCNC: 104 MMOL/L (ref 98–112)
CHLORIDE SERPL-SCNC: 99 MMOL/L (ref 98–112)
CO2 SERPL-SCNC: 20 MMOL/L (ref 21–32)
CO2 SERPL-SCNC: 21 MMOL/L (ref 21–32)
CO2 SERPL-SCNC: 24 MMOL/L (ref 21–32)
CO2 SERPL-SCNC: 26 MMOL/L (ref 21–32)
CO2 SERPL-SCNC: 27 MMOL/L (ref 21–32)
CREAT BLD-MCNC: 0.6 MG/DL (ref 0.55–1.02)
CREAT BLD-MCNC: 0.6 MG/DL (ref 0.55–1.02)
CREAT BLD-MCNC: 0.68 MG/DL (ref 0.55–1.02)
CREAT BLD-MCNC: 0.69 MG/DL (ref 0.55–1.02)
CREAT BLD-MCNC: 0.7 MG/DL (ref 0.55–1.02)
EGFRCR SERPLBLD CKD-EPI 2021: 110 ML/MIN/1.73M2 (ref 60–?)
EGFRCR SERPLBLD CKD-EPI 2021: 110 ML/MIN/1.73M2 (ref 60–?)
EGFRCR SERPLBLD CKD-EPI 2021: 111 ML/MIN/1.73M2 (ref 60–?)
EGFRCR SERPLBLD CKD-EPI 2021: 114 ML/MIN/1.73M2 (ref 60–?)
EGFRCR SERPLBLD CKD-EPI 2021: 114 ML/MIN/1.73M2 (ref 60–?)
ERYTHROCYTE [DISTWIDTH] IN BLOOD BY AUTOMATED COUNT: 13.7 %
EST. AVERAGE GLUCOSE BLD GHB EST-MCNC: 286 MG/DL (ref 68–126)
GLOBULIN PLAS-MCNC: 2.3 G/DL (ref 2–3.5)
GLUCOSE BLD-MCNC: 100 MG/DL (ref 70–99)
GLUCOSE BLD-MCNC: 111 MG/DL (ref 70–99)
GLUCOSE BLD-MCNC: 121 MG/DL (ref 70–99)
GLUCOSE BLD-MCNC: 126 MG/DL (ref 70–99)
GLUCOSE BLD-MCNC: 129 MG/DL (ref 70–99)
GLUCOSE BLD-MCNC: 129 MG/DL (ref 70–99)
GLUCOSE BLD-MCNC: 130 MG/DL (ref 70–99)
GLUCOSE BLD-MCNC: 145 MG/DL (ref 70–99)
GLUCOSE BLD-MCNC: 155 MG/DL (ref 70–99)
GLUCOSE BLD-MCNC: 157 MG/DL (ref 70–99)
GLUCOSE BLD-MCNC: 158 MG/DL (ref 70–99)
GLUCOSE BLD-MCNC: 169 MG/DL (ref 70–99)
GLUCOSE BLD-MCNC: 178 MG/DL (ref 70–99)
GLUCOSE BLD-MCNC: 193 MG/DL (ref 70–99)
GLUCOSE BLD-MCNC: 208 MG/DL (ref 70–99)
GLUCOSE BLD-MCNC: 209 MG/DL (ref 70–99)
GLUCOSE BLD-MCNC: 215 MG/DL (ref 70–99)
GLUCOSE BLD-MCNC: 221 MG/DL (ref 70–99)
GLUCOSE BLD-MCNC: 243 MG/DL (ref 70–99)
GLUCOSE BLD-MCNC: 256 MG/DL (ref 70–99)
GLUCOSE BLD-MCNC: 364 MG/DL (ref 70–99)
GLUCOSE BLD-MCNC: 96 MG/DL (ref 70–99)
HBA1C MFR BLD: 11.6 % (ref ?–5.7)
HCO3 BLDV-SCNC: 17.4 MEQ/L (ref 22–26)
HCT VFR BLD AUTO: 37.9 % (ref 35–48)
HGB BLD-MCNC: 13.1 G/DL (ref 12–16)
LACTATE SERPL-SCNC: 1.5 MMOL/L (ref 0.5–2)
LACTATE SERPL-SCNC: 4.8 MMOL/L (ref 0.5–2)
LACTATE SERPL-SCNC: 9.2 MMOL/L (ref 0.5–2)
LIPASE SERPL-CCNC: 22 U/L (ref 12–53)
MAGNESIUM SERPL-MCNC: 1.5 MG/DL (ref 1.6–2.6)
MAGNESIUM SERPL-MCNC: 1.6 MG/DL (ref 1.6–2.6)
MAGNESIUM SERPL-MCNC: 1.9 MG/DL (ref 1.6–2.6)
MAGNESIUM SERPL-MCNC: 2.7 MG/DL (ref 1.6–2.6)
MAGNESIUM SERPL-MCNC: 2.7 MG/DL (ref 1.6–2.6)
MCH RBC QN AUTO: 30.8 PG (ref 26–34)
MCHC RBC AUTO-ENTMCNC: 34.6 G/DL (ref 31–37)
MCV RBC AUTO: 89.2 FL (ref 80–100)
OSMOLALITY SERPL CALC.SUM OF ELEC: 279 MOSM/KG (ref 275–295)
OSMOLALITY SERPL CALC.SUM OF ELEC: 280 MOSM/KG (ref 275–295)
OSMOLALITY SERPL CALC.SUM OF ELEC: 282 MOSM/KG (ref 275–295)
OSMOLALITY SERPL CALC.SUM OF ELEC: 287 MOSM/KG (ref 275–295)
OSMOLALITY SERPL CALC.SUM OF ELEC: 291 MOSM/KG (ref 275–295)
OXYHGB MFR BLDV: 88 % (ref 72–78)
P AXIS: 69 DEGREES
P-R INTERVAL: 144 MS
PCO2 BLDV: 31 MM HG (ref 38–50)
PH BLDV: 7.31 [PH] (ref 7.33–7.43)
PHOSPHATE SERPL-MCNC: 1 MG/DL (ref 2.4–5.1)
PHOSPHATE SERPL-MCNC: 1 MG/DL (ref 2.4–5.1)
PHOSPHATE SERPL-MCNC: 1.2 MG/DL (ref 2.4–5.1)
PHOSPHATE SERPL-MCNC: 2.4 MG/DL (ref 2.4–5.1)
PLATELET # BLD AUTO: 153 10(3)UL (ref 150–450)
PO2 BLDV: 62 MM HG (ref 30–50)
POTASSIUM SERPL-SCNC: 3.7 MMOL/L (ref 3.5–5.1)
POTASSIUM SERPL-SCNC: 3.7 MMOL/L (ref 3.5–5.1)
POTASSIUM SERPL-SCNC: 4.3 MMOL/L (ref 3.5–5.1)
POTASSIUM SERPL-SCNC: 4.4 MMOL/L (ref 3.5–5.1)
POTASSIUM SERPL-SCNC: 4.6 MMOL/L (ref 3.5–5.1)
PROCALCITONIN SERPL-MCNC: 0.04 NG/ML (ref ?–0.05)
PROT SERPL-MCNC: 6.3 G/DL (ref 5.7–8.2)
Q-T INTERVAL: 384 MS
QRS DURATION: 72 MS
QTC CALCULATION (BEZET): 467 MS
R AXIS: 90 DEGREES
RBC # BLD AUTO: 4.25 X10(6)UL (ref 3.8–5.3)
SARS-COV-2 RNA RESP QL NAA+PROBE: NOT DETECTED
SODIUM SERPL-SCNC: 133 MMOL/L (ref 136–145)
SODIUM SERPL-SCNC: 136 MMOL/L (ref 136–145)
SODIUM SERPL-SCNC: 136 MMOL/L (ref 136–145)
SODIUM SERPL-SCNC: 138 MMOL/L (ref 136–145)
SODIUM SERPL-SCNC: 140 MMOL/L (ref 136–145)
T AXIS: 68 DEGREES
TRIGL SERPL-MCNC: 421 MG/DL (ref 30–149)
VENTRICULAR RATE: 89 BPM
WBC # BLD AUTO: 5.9 X10(3) UL (ref 4–11)

## 2025-05-31 PROCEDURE — 74177 CT ABD & PELVIS W/CONTRAST: CPT | Performed by: EMERGENCY MEDICINE

## 2025-05-31 PROCEDURE — 99223 1ST HOSP IP/OBS HIGH 75: CPT | Performed by: STUDENT IN AN ORGANIZED HEALTH CARE EDUCATION/TRAINING PROGRAM

## 2025-05-31 PROCEDURE — 99291 CRITICAL CARE FIRST HOUR: CPT | Performed by: INTERNAL MEDICINE

## 2025-05-31 RX ORDER — INSULIN DEGLUDEC 100 U/ML
15 INJECTION, SOLUTION SUBCUTANEOUS DAILY
Status: DISCONTINUED | OUTPATIENT
Start: 2025-05-31 | End: 2025-06-01

## 2025-05-31 RX ORDER — THIAMINE HYDROCHLORIDE 100 MG/ML
100 INJECTION, SOLUTION INTRAMUSCULAR; INTRAVENOUS DAILY
Status: DISCONTINUED | OUTPATIENT
Start: 2025-06-06 | End: 2025-06-02

## 2025-05-31 RX ORDER — HALOPERIDOL 5 MG/1
5 TABLET ORAL NIGHTLY PRN
Status: DISCONTINUED | OUTPATIENT
Start: 2025-05-31 | End: 2025-06-02

## 2025-05-31 RX ORDER — PANTOPRAZOLE SODIUM 40 MG/1
40 TABLET, DELAYED RELEASE ORAL
Status: DISCONTINUED | OUTPATIENT
Start: 2025-06-01 | End: 2025-06-02

## 2025-05-31 RX ORDER — TRAZODONE HYDROCHLORIDE 50 MG/1
200 TABLET ORAL NIGHTLY PRN
Status: DISCONTINUED | OUTPATIENT
Start: 2025-06-01 | End: 2025-05-31

## 2025-05-31 RX ORDER — DEXTROSE, SODIUM CHLORIDE, SODIUM LACTATE, POTASSIUM CHLORIDE, AND CALCIUM CHLORIDE 5; .6; .31; .03; .02 G/100ML; G/100ML; G/100ML; G/100ML; G/100ML
INJECTION, SOLUTION INTRAVENOUS CONTINUOUS
Status: DISCONTINUED | OUTPATIENT
Start: 2025-05-31 | End: 2025-05-31

## 2025-05-31 RX ORDER — TRAZODONE HYDROCHLORIDE 50 MG/1
200 TABLET ORAL NIGHTLY
Status: DISCONTINUED | OUTPATIENT
Start: 2025-06-01 | End: 2025-05-31

## 2025-05-31 RX ORDER — DEXTROSE MONOHYDRATE 25 G/50ML
50 INJECTION, SOLUTION INTRAVENOUS
Status: DISCONTINUED | OUTPATIENT
Start: 2025-05-31 | End: 2025-06-02

## 2025-05-31 RX ORDER — TRAMADOL HYDROCHLORIDE 50 MG/1
50 TABLET ORAL EVERY 6 HOURS PRN
Status: DISCONTINUED | OUTPATIENT
Start: 2025-05-31 | End: 2025-06-02

## 2025-05-31 RX ORDER — SERTRALINE HYDROCHLORIDE 100 MG/1
200 TABLET, FILM COATED ORAL DAILY
Status: DISCONTINUED | OUTPATIENT
Start: 2025-05-31 | End: 2025-06-02

## 2025-05-31 RX ORDER — ACETAMINOPHEN 325 MG/1
650 TABLET ORAL EVERY 6 HOURS PRN
Status: DISCONTINUED | OUTPATIENT
Start: 2025-05-31 | End: 2025-06-02

## 2025-05-31 RX ORDER — ENOXAPARIN SODIUM 100 MG/ML
40 INJECTION SUBCUTANEOUS DAILY
Status: DISCONTINUED | OUTPATIENT
Start: 2025-05-31 | End: 2025-06-02

## 2025-05-31 RX ORDER — ATORVASTATIN CALCIUM 10 MG/1
10 TABLET, FILM COATED ORAL NIGHTLY
Status: DISCONTINUED | OUTPATIENT
Start: 2025-05-31 | End: 2025-06-02

## 2025-05-31 RX ORDER — LORAZEPAM 1 MG/1
1 TABLET ORAL
Status: DISCONTINUED | OUTPATIENT
Start: 2025-05-31 | End: 2025-06-02

## 2025-05-31 RX ORDER — LORAZEPAM 2 MG/ML
2 INJECTION INTRAMUSCULAR
Status: DISCONTINUED | OUTPATIENT
Start: 2025-05-31 | End: 2025-06-02

## 2025-05-31 RX ORDER — ATORVASTATIN CALCIUM 10 MG/1
10 TABLET, FILM COATED ORAL DAILY
Status: DISCONTINUED | OUTPATIENT
Start: 2025-05-31 | End: 2025-05-31

## 2025-05-31 RX ORDER — THIAMINE HYDROCHLORIDE 100 MG/ML
100 INJECTION, SOLUTION INTRAMUSCULAR; INTRAVENOUS ONCE
Status: COMPLETED | OUTPATIENT
Start: 2025-05-31 | End: 2025-05-31

## 2025-05-31 RX ORDER — ONDANSETRON 2 MG/ML
4 INJECTION INTRAMUSCULAR; INTRAVENOUS EVERY 6 HOURS PRN
Status: DISCONTINUED | OUTPATIENT
Start: 2025-05-31 | End: 2025-06-02

## 2025-05-31 RX ORDER — TRAZODONE HYDROCHLORIDE 50 MG/1
200 TABLET ORAL NIGHTLY PRN
Status: DISCONTINUED | OUTPATIENT
Start: 2025-05-31 | End: 2025-06-02

## 2025-05-31 RX ORDER — ECHINACEA PURPUREA EXTRACT 125 MG
1 TABLET ORAL
Status: DISCONTINUED | OUTPATIENT
Start: 2025-05-31 | End: 2025-06-02

## 2025-05-31 RX ORDER — NICOTINE POLACRILEX 4 MG
30 LOZENGE BUCCAL
Status: DISCONTINUED | OUTPATIENT
Start: 2025-05-31 | End: 2025-06-02

## 2025-05-31 RX ORDER — THIAMINE HYDROCHLORIDE 100 MG/ML
250 INJECTION, SOLUTION INTRAMUSCULAR; INTRAVENOUS DAILY
Status: DISCONTINUED | OUTPATIENT
Start: 2025-06-03 | End: 2025-06-02

## 2025-05-31 RX ORDER — TRAZODONE HYDROCHLORIDE 50 MG/1
200 TABLET ORAL NIGHTLY
Status: DISCONTINUED | OUTPATIENT
Start: 2025-05-31 | End: 2025-05-31

## 2025-05-31 RX ORDER — GABAPENTIN 300 MG/1
300 CAPSULE ORAL 2 TIMES DAILY
Status: DISCONTINUED | OUTPATIENT
Start: 2025-05-31 | End: 2025-06-02

## 2025-05-31 RX ORDER — MONTELUKAST SODIUM 10 MG/1
10 TABLET ORAL DAILY
Status: DISCONTINUED | OUTPATIENT
Start: 2025-05-31 | End: 2025-06-02

## 2025-05-31 RX ORDER — METOPROLOL SUCCINATE 100 MG/1
100 TABLET, EXTENDED RELEASE ORAL
Status: DISCONTINUED | OUTPATIENT
Start: 2025-05-31 | End: 2025-06-02

## 2025-05-31 RX ORDER — FOLIC ACID 1 MG/1
1 TABLET ORAL DAILY
Status: DISCONTINUED | OUTPATIENT
Start: 2025-06-01 | End: 2025-06-02

## 2025-05-31 RX ORDER — LORAZEPAM 2 MG/1
2 TABLET ORAL
Status: DISCONTINUED | OUTPATIENT
Start: 2025-05-31 | End: 2025-06-02

## 2025-05-31 RX ORDER — BENZONATATE 200 MG/1
200 CAPSULE ORAL 3 TIMES DAILY PRN
Status: DISCONTINUED | OUTPATIENT
Start: 2025-05-31 | End: 2025-06-02

## 2025-05-31 RX ORDER — LORAZEPAM 2 MG/ML
4 INJECTION INTRAMUSCULAR EVERY 30 MIN PRN
Status: DISCONTINUED | OUTPATIENT
Start: 2025-05-31 | End: 2025-06-02

## 2025-05-31 RX ORDER — PROCHLORPERAZINE EDISYLATE 5 MG/ML
5 INJECTION INTRAMUSCULAR; INTRAVENOUS EVERY 8 HOURS PRN
Status: DISCONTINUED | OUTPATIENT
Start: 2025-05-31 | End: 2025-06-02

## 2025-05-31 RX ORDER — DOXEPIN HYDROCHLORIDE 50 MG/1
1 CAPSULE ORAL DAILY
Status: DISCONTINUED | OUTPATIENT
Start: 2025-05-31 | End: 2025-06-02

## 2025-05-31 RX ORDER — HALOPERIDOL 5 MG/1
5 TABLET ORAL NIGHTLY
Status: DISCONTINUED | OUTPATIENT
Start: 2025-05-31 | End: 2025-05-31

## 2025-05-31 RX ORDER — NALTREXONE HYDROCHLORIDE 50 MG/1
50 TABLET, FILM COATED ORAL DAILY
Status: DISCONTINUED | OUTPATIENT
Start: 2025-05-31 | End: 2025-05-31

## 2025-05-31 RX ORDER — THIAMINE HYDROCHLORIDE 100 MG/ML
500 INJECTION, SOLUTION INTRAMUSCULAR; INTRAVENOUS EVERY 8 HOURS
Status: DISCONTINUED | OUTPATIENT
Start: 2025-05-31 | End: 2025-06-02

## 2025-05-31 RX ORDER — INSULIN DEGLUDEC 100 U/ML
15 INJECTION, SOLUTION SUBCUTANEOUS DAILY
Status: CANCELLED | OUTPATIENT
Start: 2025-05-31

## 2025-05-31 RX ORDER — LORAZEPAM 2 MG/ML
1 INJECTION INTRAMUSCULAR
Status: DISCONTINUED | OUTPATIENT
Start: 2025-05-31 | End: 2025-06-02

## 2025-05-31 RX ORDER — NICOTINE POLACRILEX 4 MG
15 LOZENGE BUCCAL
Status: DISCONTINUED | OUTPATIENT
Start: 2025-05-31 | End: 2025-06-02

## 2025-05-31 RX ORDER — LORAZEPAM 2 MG/ML
3 INJECTION INTRAMUSCULAR
Status: DISCONTINUED | OUTPATIENT
Start: 2025-05-31 | End: 2025-06-02

## 2025-05-31 RX ORDER — TRAMADOL HYDROCHLORIDE 50 MG/1
50 TABLET ORAL EVERY 4 HOURS PRN
Status: DISCONTINUED | OUTPATIENT
Start: 2025-05-31 | End: 2025-05-31

## 2025-05-31 RX ORDER — ACETAMINOPHEN 500 MG
500 TABLET ORAL EVERY 4 HOURS PRN
Status: DISCONTINUED | OUTPATIENT
Start: 2025-05-31 | End: 2025-05-31

## 2025-05-31 RX ORDER — BUSPIRONE HYDROCHLORIDE 10 MG/1
20 TABLET ORAL EVERY MORNING
Status: DISCONTINUED | OUTPATIENT
Start: 2025-05-31 | End: 2025-06-02

## 2025-05-31 RX ORDER — NICOTINE 21 MG/24HR
1 PATCH, TRANSDERMAL 24 HOURS TRANSDERMAL DAILY
Status: DISCONTINUED | OUTPATIENT
Start: 2025-05-31 | End: 2025-06-02

## 2025-05-31 NOTE — PLAN OF CARE
Assume care of patient after RN report. Patient alert and oriented, follows commands, and moves all extremities. NSR, VSS, afebrile, on RA. Insulin gtt infusing per order, Q1h blood sugar checks. Electrolytes being replaced per protocol. Transitioned patient off insulin gtt per order. Pt blood sugar increased again in afternoon, intensivist notified and orders carried out. Voiding in the bathroom. Tolerating diet. No c/o pain. Refer to flowsheets and MAR for further details.

## 2025-05-31 NOTE — ED QUICK NOTES
Orders for admission, patient is aware of plan and ready to go upstairs. Any questions, please call ED RN Cody at extension [phone].     Patient Covid vaccination status: Fully vaccinated     COVID Test Ordered in ED: SARS-CoV-2 by PCR ([facility])    COVID Suspicion at Admission: N/A    Running Infusions: Medication Infusions[1]     Mental Status/LOC at time of transport: A&O x 4    Other pertinent information:   CIWA score: 4   NIH score:  N/A             [1]    insulin regular 3 Units/hr (05/31/25 0205)    sodium chloride 1,824 mL (05/31/25 0213)    dextrose in lactated ringers

## 2025-05-31 NOTE — SPIRITUAL CARE NOTE
Spiritual Care Visit Note    Patient Name: Blanca Coats Date of Spiritual Care Visit: 25   : 1980 Primary Dx: Type 1 diabetes mellitus with ketoacidosis without coma (HCC)   Referred By: ED rounds     Spiritual Care Taxonomy:    Intended Effects: Demonstrate caring and concern    Methods: Offer support    Interventions: Silent prayer    Visit Type/Summary:  Blanca was awake and sitting partially up in bed.   consulted with RN prior to visit and was advised patient was not able to have a meaningful visit at this time.     - Spiritual Care: Provided information regarding how to contact Spiritual Care and left a Spiritual Care information card.  remains available as needed for follow up.    Spiritual Care support can be requested via an Epic consult. For urgent/immediate needs, please contact the On Call  at: [facility]: ext [phone]    Karlene Roberts MDiv  Staff

## 2025-05-31 NOTE — PROGRESS NOTES
CRITICAL CARE PROGRESS NOTE    Patient Name: Blanca Coats  : 1980  MRN: [id number]  Admit Date: 2025  Length of Stay: 0 Days    Subjective/24h events: Taken off drip yesterday patient does not have her pump therefore was given 15 units of degludec yesterday morning with carb coverage and moderate sliding scale  This morning blood sugars very elevated in the upper 300 range will increase degludec to 19 units  Placed on high-dose sliding scale also blood cultures both bottles drawn in the ER show coag negative staph epidermidis repeat blood cultures drawn last night then started on IV Ancef    1.  Neurologic agitation secondary to alcohol intoxication currently does not have any signs of withdrawal as of yet that requires medicines     2.  Pulmonary no signs of respiratory failure     3.  Cardiovascular no signs of shock  LActiq acidosis peaked at 9 resolved with hydration  ECHO pending     4.  GI no eder abdominal pathology based on exam  Ultrasound abdomen CBD 6 mm. Liver no disease,      5.  Renal no signs of acute kidney injury        6.  Infectious disease no fever but now has positive blood cultures repeat blood cultures pending Vanco started after repeat blood cultures with coag negative staph  Interestingly the patient is leukopenic and thrombocytopenic relative to baseline we will repeat this may be concerning if it is real in lieu of above no obvious nidus for coag negative staph     7.  Hematology polycythemia secondary to hemoconcentration now significantly improved with volume resuscitation     8.  Endocrine DKA with component of alcoholic ketoacidosis and starvation ketosis all appear to be resolved status post insulin drip patient on her previous regimen of degludec started 19 units this morning high-dose sliding scale and carb coverage      ICU checklist  Feeding: Diabetic carb controlled diet  Analgesia:   Sedation:   Thromboembolism PPX: Lovenox  Stress Ulcer PPX:   Glucose control:    Bowel Regimen:   Lines/drains/tubes:   Deescalation of antibiotics:   Therapies:PT/OT/ST when appropriate   floor  Code Status: Full Code            Hemodynamics  24h Vitals:  VitalLast Value (24 Hour)24 Hour Range  Temp98.1 °F (36.7 °C)Temp  Min: 97.6 °F (36.4 °C)  Max: 98.9 °F (37.2 °C)  [id number]  Min: 68  Max: 93  BP (Non-Invasive)128/77 BP  Min: 93/72  Max: 132/77  BP (A-Line) No data recorded  CVP  could not be evaluated. This SmartLink does not work with rows of the type:   [id number]  Min: 12  Max: 30  [id number] %SpO2  Min: 87 %  Max: 100 %  O2 Therapy

## 2025-05-31 NOTE — H&P
[facility]     Blanca Coats Patient Status:  Inpatient    1980 MRN [id number]   Location [facility] 4SW-A Attending Amira Latif, DO   Hosp Day # 0 PCP Victor Hugo Leiva MD     Chief Complaint: abdominal pain, n/v    Subjective:    History of Present Illness:     Blanca Coats is a 45 year old female with PMHx DM/ splenic v thrombosis alcohol abuse/ A-fib who presented to the hospital for abdominal pain. She reports she drinks about 750 mls hard liquor daily and was drunk overnight. She notes having abdominal pain in her RUQ which is sharp and rated 8/10. There are no alleviating or exacerbating factors. She denied any diarrhea or constipation. She was having nausea and emesis.    History/Other:    Past Medical History:  Past Medical History[1]  Past Surgical History:   Past Surgical History[2]   Family History:   Family History[3]  Social History:    reports that she has been smoking cigarettes. She has never used smokeless tobacco. She reports current alcohol use of about 7.0 standard drinks of alcohol per week. She reports current drug use. Frequency: 7.00 times per week. Drug: Cannabis.     Allergies: Allergies[4]    Medications:  Medications Ordered Prior to Encounter[5]    Review of Systems:   A comprehensive review of systems was completed.    Pertinent positives and negatives noted in the HPI.    Objective:   Physical Exam:    /77 (BP Location: Left arm)   Pulse 72   Temp 98.9 °F (37.2 °C) (Temporal)   Resp 12   Wt 134 lb 11.2 oz (61.1 kg)   LMP 2025 (Approximate)   SpO2 98%   BMI 21.74 kg/m²   General: No acute distress, Alert  Respiratory: No rhonchi, no wheezes  Cardiovascular: S1, S2. Regular rate and rhythm  Abdomen: Soft, Non-tender, non-distended, positive bowel sounds  Neuro: No new focal deficits  Extremities: No edema    Results:    Labs:      Labs Last 24 Hours:    Recent Labs   Lab 25  2305   RBC 5.55*   HGB 17.2*   HCT 48.6*    MCV 87.6   MCH 31.0   MCHC 35.4   RDW 13.7   NEPRELIM 3.44   WBC 8.5   .0       Recent Labs   Lab 05/30/25  2305   *   BUN 6*   CREATSERUM 0.90   EGFRCR 81   CA 9.3   ALB 4.9*   *   K 3.7   CL 96*   CO2 17.0*   ALKPHO 100*   AST 20   ALT 22   BILT 0.5   TP 8.0       Estimated Glomerular Filtration Rate: 81 mL/min/1.73m2 (result from lab).    Lab Results   Component Value Date    INR 1.21 (H) 03/14/2025    INR 1.16 02/22/2025       No results for input(s): \"TROP\", \"TROPHS\", \"CK\" in the last 168 hours.    No results for input(s): \"TROP\", \"PBNP\" in the last 168 hours.    No results for input(s): \"PCT\" in the last 168 hours.    Imaging: Imaging data reviewed in Epic.    Assessment & Plan:      #DKA  -glucose 371, pH 7.31, bicarb 17 with AG of 22  -DKA protocol  -q1H checks  -D5LR @125 mls/h when glucose <250, NSS @150 mls/h when glucose >250  -q4H BMP  -NPO  -ICU c/s in ED    #gastritis, colitis  -CT showing chronic pancreatitis with stable pseduocysts, interval increased dilatation of CBD and proximal pancreatic duct, diffuse colonic wall thickening, gastritis  -supportive care  -protonix  -zofran prn  -US pending to follow up on CBD changes although LFTs normal so choledocolithiasis less likely    #lactic acidosis  -lactate 9.2  -no clear infectious etiology, hypotension, or hypoxia --> ?long tourniquet time  -cont to trend    #alcohol intoxication  -alcohol 291  -WA protocol  -thiamine and folic acid  -cont home naltrexone    #pseudohyponatremia  -Na corrects to normal for hyperglycemia    #anxiety  -cont home buspirone, sertraline    #HLD  -cont home statin    #chronic pain  -cont home gabapentin    #A-fib  -cont home metoprolol    #GERD  -cont home PPI    #insomnia  -cont home trazodone        Plan of care discussed with ED physician    Amira Latif DO    Supplementary Documentation:     The 21st Century Cures Act makes medical notes like these available to patients in the interest of  transparency. Please be advised this is a medical document. Medical documents are intended to carry relevant information, facts as evident, and the clinical opinion of the practitioner. The medical note is intended as peer to peer communication and may appear blunt or direct. It is written in medical language and may contain abbreviations or verbiage that are unfamiliar.                                       [1]   Past Medical History:   Alcohol abuse    Alcoholic (HCC)    Alcoholic ketoacidosis    Anxiety    Arrhythmia    SVT    Atrial fibrillation with RVR (HCC)    Attention deficit disorder    Breast CA (HCC)    Depression    Esophageal reflux    High blood pressure    OTHER DISEASES    seasonal allergies    Pancreatitis (HCC)    Pneumonia due to organism    SVT (supraventricular tachycardia) (HCC)    Type 1 diabetes mellitus (HCC)   [2]   Past Surgical History:  Procedure Laterality Date    Breast surgery      Implant left      Implant right      Mastectomy left      Mastectomy right      Ndsc ablation & rcnstj atria lmtd w/o bypass     [3]   Family History  Problem Relation Age of Onset    Heart Disorder Father     Diabetes Father     Hypertension Mother     Cancer Mother         breast   [4]   Allergies  Allergen Reactions    Bee Venom ANAPHYLAXIS    Morphine HIVES    Fentanyl RASH   [5]   Current Facility-Administered Medications on File Prior to Encounter   Medication Dose Route Frequency Provider Last Rate Last Admin    [COMPLETED] pantoprazole (Protonix) 40 mg in sodium chloride 0.9% PF 10 mL IV push  40 mg Intravenous Once Carley Guzmán MD   40 mg at 05/12/25 1834    [COMPLETED] ketorolac (Toradol) 15 MG/ML injection 15 mg  15 mg Intravenous Once Carley Guzmán MD   15 mg at 05/12/25 1835    [COMPLETED] metoclopramide (Reglan) 5 mg/mL injection 10 mg  10 mg Intravenous Once Carley Guzmán MD   10 mg at 05/12/25 1835    [COMPLETED] diphenhydrAMINE (Benadryl) 50 mg/mL  injection 50 mg  50 mg Intravenous  Once Carley Guzmán MD   50 mg at 05/12/25 1834    [COMPLETED] iopamidol 76% (ISOVUE-370) injection for power injector  80 mL Intravenous ONCE PRN Carley Guzmán MD   80 mL at 05/12/25 1855    [COMPLETED] sodium chloride 0.9 % IV bolus 1,000 mL  1,000 mL Intravenous Once Carley Guzmán MD   Stopped at 05/12/25 2001    [COMPLETED] insulin aspart (NovoLOG) 100 Units/mL vial 6 Units  0.1 Units/kg Subcutaneous Once Carley Guzmán MD   6 Units at 05/12/25 2049    [COMPLETED] potassium phosphate dibasic 15 mmol in sodium chloride 0.9% 250 mL IVPB  15 mmol Intravenous Once Kimmie Hernandez MD 62.5 mL/hr at 03/15/25 0045 15 mmol at 03/15/25 0045    [COMPLETED] magnesium oxide (Mag-Ox) tab 800 mg  800 mg Oral Once Kimmie Hernandez MD   800 mg at 03/15/25 0052    [COMPLETED] ondansetron (Zofran) 4 MG/2ML injection 4 mg  4 mg Intravenous Once Chasity Holly MD   4 mg at 03/14/25 1502    [COMPLETED] sodium chloride 0.9 % IV bolus 1,000 mL  1,000 mL Intravenous Once Chasity Holly MD   Stopped at 03/14/25 1821    [COMPLETED] metoclopramide (Reglan) 5 mg/mL injection 10 mg  10 mg Intravenous Once Chasity Holly MD   10 mg at 03/14/25 1531    [COMPLETED] diphenhydrAMINE (Benadryl) 50 mg/mL  injection 12.5 mg  12.5 mg Intravenous Once Chasity Holly MD   12.5 mg at 03/14/25 1531    [COMPLETED] HYDROmorphone (Dilaudid) 1 MG/ML injection 0.5 mg  0.5 mg Intravenous Once Chasity Holly MD   0.5 mg at 03/14/25 1602    [COMPLETED] iopamidol 76% (ISOVUE-370) injection for power injector  80 mL Intravenous ONCE PRN Chasity Holly MD   80 mL at 03/14/25 1707    [COMPLETED] thiamine 100 mg/mL injection 100 mg  100 mg Intravenous Once Chasity Holly MD   100 mg at 03/14/25 1812    [COMPLETED] HYDROmorphone (Dilaudid) 1 MG/ML injection 0.5 mg  0.5 mg Intravenous Once Chasity Holly MD   0.5 mg at 03/14/25 1804    [COMPLETED] HYDROmorphone (Dilaudid) 1 MG/ML injection 0.5 mg  0.5 mg Intravenous Once Chasity Holly MD   0.5 mg at 03/14/25      [] tetracaine (Pontocaine) 0.5 % ophthalmic solution              Current Outpatient Medications on File Prior to Encounter   Medication Sig Dispense Refill    Insulin Infusion Pump Does not apply Device       naltrexone 50 MG Oral Tab Take 1 tablet (50 mg total) by mouth daily.      traMADol 50 MG Oral Tab Take 1 tablet (50 mg total) by mouth every 4 (four) hours as needed for Pain.      ergocalciferol 1.25 MG (17270 UT) Oral Cap Take 1 capsule (50,000 Units total) by mouth once a week.      pantoprazole 40 MG Oral Tab EC Take 1 tablet (40 mg total) by mouth 2 (two) times daily before meals. 60 tablet 0    atorvastatin 10 MG Oral Tab Take 1 tablet (10 mg total) by mouth daily.      thiamine 100 MG Oral Tab Take 1 tablet (100 mg total) by mouth daily. 30 tablet 0    Naloxone HCl 4 MG/0.1ML Nasal Liquid 4 mg by Nasal route as needed. If patient remains unresponsive, repeat dose in other nostril 2-5 minutes after first dose. 1 kit 0    ondansetron 4 MG Oral Tablet Dispersible Take 1 tablet (4 mg total) by mouth every 8 (eight) hours as needed for Nausea. 20 tablet 0    metoprolol succinate 100 MG Oral Tablet 24 Hr Take 1 tablet (100 mg total) by mouth 2 (two) times daily. Take half tablet if blood pressure less than 130/80  0    haloperidol 5 MG Oral Tab Take 1 tablet (5 mg total) by mouth at bedtime.      Amphetamine-Dextroamphet ER 10 MG Oral Capsule SR 24 Hr Take 1 capsule (10 mg total) by mouth every morning. Take 10mg (1 capsule) by mouth every morning. Take each dose with 1 capsule of 30mg Adderall Xr for a total morning dose of 40mg Adderall Xr.      Amphetamine-Dextroamphet ER 30 MG Oral Capsule SR 24 Hr Take 1 capsule (30 mg total) by mouth every morning. Take 30mg (1 capsule) by mouth once daily in the morning. Take each dose with 1 capsule of 10mg Adderall Xr for a total daily dose of 40mg Adderall Xr every morning.      ALPRAZolam 0.25 MG Oral Tab Take 1 tablet (0.25 mg total) by mouth 3  (three) times daily as needed.      gabapentin 300 MG Oral Cap Take 1 capsule (300 mg total) by mouth in the morning and 1 capsule (300 mg total) before bedtime.      Montelukast Sodium 10 MG Oral Tab Take 1 tablet (10 mg total) by mouth daily.      Sertraline HCl 100 MG Oral Tab Take 2 tablets (200 mg total) by mouth daily.      busPIRone 10 MG Oral Tab Take 2 tablets (20 mg total) by mouth 2 (two) times daily. (Patient taking differently: Take 2 tablets (20 mg total) by mouth every morning.)      traZODone HCl 100 MG Oral Tab Take 2 tablets (200 mg total) by mouth nightly.

## 2025-05-31 NOTE — ED QUICK NOTES
Patient became uncooperative when ED Tech about to take her blood glucose. Pt started screaming in the hallway and kept saying \" it hurts\" . Patient given haloperidol IM and benadryl IV. Toradol given as well. Pt requesting dilaudid for her pain.

## 2025-05-31 NOTE — PLAN OF CARE
Received pt AOX4, on RA.  Pt on insulin gtt with hourly BS. Insulin titrated per protocol.  Electrolytes replaced. Pt very difficult to work with due to pts disposition.  Pt drug seeking and very persistent on getting what she wants.          Problem: Safety Risk - Non-Violent Restraints  Goal: Patient will remain free from self-harm  Description: INTERVENTIONS:  - Apply the least restrictive restraint to prevent harm  - Notify patient and family of reasons restraints applied  - Assess for any contributing factors to confusion (electrolyte disturbances, delirium, medications)  - Discontinue any unnecessary medical devices as soon as possible  - Assess the patient's physical comfort, circulation, skin condition, hydration, nutrition and elimination needs   - Reorient and redirection as needed  - Assess for the need to continue restraints  Outcome: Not Progressing     Problem: Risk for Violence-Violent Restraints/Seclusion  Goal: Patient will not express any violent or self-destructive behaviors  Description: Interventions:  - Apply the least restrictive restraint to prevent harm if patient strikes out and is not responding to redirection  - Notify patient and family of reasons restraints applied  - Assist the patient to identify the precipitating event  - Assist the patient to identify alternatives to physically acting out  - Assess for any contributing factors to violent behaviors (substances,  medications, history of trauma)  - Assess the patient's physical comfort, circulation, skin condition, hydration, nutrition and elimination needs   - Assess for the need to continue restraints  - Evaluate the need for an emergency medication  Outcome: Not Progressing     Problem: Risk for Violence/Aggression  Goal: Absence of Violence/Aggression  Description: INTERVENTIONS:   - Identify precipitating factors for behavior   - Notify Charge RN/Provider   - Consider decreasing stimulation   - Consider distraction measures   -  Consider discussion with provider regarding prn meds    - Consider JESSIE (Moderate Risk only)   - Consider Code Support (High Risk only)   - Consider room safety checks   - Consider restraints  Outcome: Not Progressing     Problem: Patient/Family Goals  Goal: Patient/Family Long Term Goal  Description: Patient's Long Term Goal:     Interventions:  -   - See additional Care Plan goals for specific interventions  Outcome: Not Progressing  Goal: Patient/Family Short Term Goal  Description: Patient's Short Term Goal:     Interventions:   -   - See additional Care Plan goals for specific interventions  Outcome: Not Progressing     Problem: Diabetes/Glucose Control  Goal: Glucose maintained within prescribed range  Description: INTERVENTIONS:  - Monitor Blood Glucose as ordered  - Assess for signs and symptoms of hyperglycemia and hypoglycemia  - Administer ordered medications to maintain glucose within target range  - Assess barriers to adequate nutritional intake and initiate nutrition consult as needed  - Instruct patient on self management of diabetes  Outcome: Not Progressing

## 2025-05-31 NOTE — PROGRESS NOTES
Please see overnight APN note for exact details    In brief 51-year-old female high recidivism presents with alcohol ketoacidosis mild DKA with component of starvation    1.  Neurologic agitation secondary to alcohol intoxication currently does not have any signs of withdrawal low threshold for CIWA    2.  Pulmonary no signs of respiratory failure    3.  Cardiovascular no signs of shock    4.  GI no eder abdominal pathology based on exam    5.  Renal no signs of acute kidney injury    Metabolic acidosis secondary to combination of starvation ketoacidosis versus alcoholic ketoacidosis difficult to actually ascertain given that the patient \"drinks vodka like it is her job\".  Without eating.  Combination of lactic acidosis as well downtrending    6.  Infectious disease no fever no leukocytosis no reason for antibiotics    7.  Hematology polycythemia secondary to hemoconcentration now significantly improved with volume resuscitation    8.  Endocrine history of diabetes mellitus on insulin pump clearly noncompliant  Currently patient still on insulin drip will transition based on previous recommendations as patient does not have her insulin pump will do 15 units of Lantus along with carb coverage previously recommended along with low-dose sliding scale

## 2025-05-31 NOTE — ED INITIAL ASSESSMENT (HPI)
Patient arrived to ED with chief complaint of shortness of breath and abdominal pain. Pt has hx of anxiety, alcohol abuse, pancreatitis. Last alcohol intake was 1 hr prior to consult

## 2025-05-31 NOTE — CONSULTS
CRITICAL CARE CONSULT NOTE:     Patient Name: Blanca Coats  : 1980  MRN: [id number]  Admit Date: 2025  Length of Stay: 0 Days    HPI:  Blanca Coats is a 45 year old female with a past medical history of type 1 diabetes with insulin pump- poorly controlled, ETOH dependence with heavy daily use, chronic pancreatitis, HTN, HLD,  tobacco abuse, anxiety, depression presents to the ER with abdominal pain, nausea, vomiting, and alcohol intoxication. Frequent hospital admissions for DKA and ETOH intoxication/withdrawal. Labs in the ER significant for , serum bicarb 17, anion gap 22, urine + ketones, hgb 17.2. ETOH 291. Lactic, VBG, and CT a/p pending. UA abnormal. She was given 1L NS fluid bolus, ceftriaxone for possible UTI?and started on a insulin infusion for DKA. She became agitated and belligerent with staff requiring haldol, benadryl, and hard restraints. Patient seen and examined in ER A0, she is calm and cooperative out of restraints at this time. Her insulin pump is not currently on. Will transfer to ICU for further care in the management of DKA.     ROS as above    Past medical history as above    Allergies  Patient is allergic to bee venom, morphine, and fentanyl.    Physical Exam  General: No acute distress  Neuro: AOx3, non focal   Lungs: Clear  Cardio:  RRR  Abdomen: Soft, non tended, non distended   Extremities: Warm, no swelling    Hemodynamics  24h Vitals:  VitalLast Value (24 Hour)24 Hour Range  Temp No data recorded  [id number]  Min: 93  Max: 93  BP (Non-Invasive)114/77 BP  Min: 114/77  Max: 114/77  BP (A-Line) No data recorded  CVP  could not be evaluated. This SmartLink does not work with rows of the type:   RR(!) 30Resp  Min: 30  Max: 30  [id number] %SpO2  Min: 97 %  Max: 97 %  O2 Therapy       Assessment and Plan:  DKA, with history of type 1 DM - poorly controlled  Anion gap metabolic acidosis  Lactic acidosis   Alcohol intoxication  Abnormal UA  H/o HTN  H/o HLD  H/o Chronic  pancreatitis   H/o R breast CA s/p chemo and bilateral mastectomies   H/o ETOH dependence   H/o Anxiety/depression     Neuro/Psych: Intoxicated with ETOH level 291 upon admission. Likely will go through ETOH withdrawal at some point. CIWA. High dose thiamine. Folic. Received haldol and benadryl in the ER.     Cardiovascular: Hemodynamically stable. Non tachycardic.     Pulmonary: No acute issues, on room air    GI: NPO until DKA resolved. CT a/p pending. Lipase is normal. H/o chronic ETOH pancreatitis.     Renal/Metabolic: Renal function normal. Lactic and VBG pending -likely component of DKA and alcoholic ketoacidosis. Give additional liter of LR once NS finished. Would start dextrose containing IVF upon arrival to ICU. Aggressive electrolyte replacement. Serial BMP, Mg, phos.     Heme: Hgb 17.2 - hemo concentrated in the setting of DKA/dehydration. LMWH for DVT prophylaxis     Infectious Disease: Mildly abnormal UA, no urinary symptoms although patient is drunk. Pending CT a/p would hold off on further antibiotics at this time. Follow blood and urine cultures.     Endocrine: Insulin infusion per DKA protocol. Endo APN consult. Check A1C.    Urine pregnancy negative     ICU checklist  Feeding: NPO  Analgesia: Tylenol   Sedation: NA  Thromboembolism PPX: LMWH  Stress Ulcer PPX: H2B while NPO  Glucose control: <180  Bowel Regimen: PRN  Lines/drains/tubes: PIV  Deescalation of antibiotics: NA  Therapies: PT/OT/ST when appropriate    Code Status: Full Code    Discussed with critical care intensivist Dr. Kimmie Hernandez     Upon my evaluation, this patient had a high probability of imminent or life-threatening deterioration due to critical findings of laboratory tests, which required my direct attention, intervention, and personal management.    I have personally provided 35 minutes of critical care time, exclusive of time spent on separately billable procedures.  Time includes review of all pertinent  laboratory/radiology results, discussion with consultants, and monitoring for potential decompensation.  Performed interventions included fluids and insulin infusions.     Antonina Cano [facility]  Critical Care Nurse Practitioner

## 2025-05-31 NOTE — PLAN OF CARE
Assume care of patient after RN report. Patient alert and oriented, follows commands, and moves all extremities. NSR, VSS, afebrile, on RA. Insulin gtt infusing per order, Q1h blood sugar checks. Electrolytes being replaced per protocol. Transitioned patient off insulin gtt per order. Pt blood sugar increased again in afternoon, intensivist notified and orders carried out. Refer to flowsheets and MAR for further details.
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
## 2025-06-01 ENCOUNTER — APPOINTMENT (OUTPATIENT)
Dept: ULTRASOUND IMAGING | Facility: HOSPITAL | Age: 45
End: 2025-06-01
Attending: INTERNAL MEDICINE
Payer: MEDICAID

## 2025-06-01 ENCOUNTER — APPOINTMENT (OUTPATIENT)
Dept: CV DIAGNOSTICS | Facility: HOSPITAL | Age: 45
End: 2025-06-01
Attending: INTERNAL MEDICINE
Payer: MEDICAID

## 2025-06-01 LAB
ALBUMIN SERPL-MCNC: 3.8 G/DL (ref 3.2–4.8)
ALBUMIN/GLOB SERPL: 1.7 {RATIO} (ref 1–2)
ALP LIVER SERPL-CCNC: 70 U/L (ref 37–98)
ALT SERPL-CCNC: 19 U/L (ref 10–49)
ANION GAP SERPL CALC-SCNC: 7 MMOL/L (ref 0–18)
AST SERPL-CCNC: 42 U/L (ref ?–34)
BASOPHILS # BLD AUTO: 0.02 X10(3) UL (ref 0–0.2)
BASOPHILS # BLD AUTO: 0.02 X10(3) UL (ref 0–0.2)
BASOPHILS NFR BLD AUTO: 0.5 %
BASOPHILS NFR BLD AUTO: 0.7 %
BILIRUB SERPL-MCNC: 1.4 MG/DL (ref 0.3–1.2)
BUN BLD-MCNC: 6 MG/DL (ref 9–23)
CALCIUM BLD-MCNC: 8.9 MG/DL (ref 8.7–10.6)
CHLORIDE SERPL-SCNC: 100 MMOL/L (ref 98–112)
CO2 SERPL-SCNC: 26 MMOL/L (ref 21–32)
CREAT BLD-MCNC: 0.74 MG/DL (ref 0.55–1.02)
EGFRCR SERPLBLD CKD-EPI 2021: 103 ML/MIN/1.73M2 (ref 60–?)
EOSINOPHIL # BLD AUTO: 0.03 X10(3) UL (ref 0–0.7)
EOSINOPHIL # BLD AUTO: 0.04 X10(3) UL (ref 0–0.7)
EOSINOPHIL NFR BLD AUTO: 1 %
EOSINOPHIL NFR BLD AUTO: 1.1 %
ERYTHROCYTE [DISTWIDTH] IN BLOOD BY AUTOMATED COUNT: 13.3 %
GLOBULIN PLAS-MCNC: 2.2 G/DL (ref 2–3.5)
GLUCOSE BLD-MCNC: 122 MG/DL (ref 70–99)
GLUCOSE BLD-MCNC: 236 MG/DL (ref 70–99)
GLUCOSE BLD-MCNC: 244 MG/DL (ref 70–99)
GLUCOSE BLD-MCNC: 301 MG/DL (ref 70–99)
GLUCOSE BLD-MCNC: 323 MG/DL (ref 70–99)
HCT VFR BLD AUTO: 37 % (ref 35–48)
HCT VFR BLD AUTO: 37.2 % (ref 35–48)
HCT VFR BLD AUTO: 37.2 % (ref 35–48)
HGB BLD-MCNC: 12.6 G/DL (ref 12–16)
HGB BLD-MCNC: 13 G/DL (ref 12–16)
HGB BLD-MCNC: 13 G/DL (ref 12–16)
IMM GRANULOCYTES # BLD AUTO: 0.01 X10(3) UL (ref 0–1)
IMM GRANULOCYTES # BLD AUTO: 0.02 X10(3) UL (ref 0–1)
IMM GRANULOCYTES NFR BLD: 0.3 %
IMM GRANULOCYTES NFR BLD: 0.5 %
LYMPHOCYTES # BLD AUTO: 0.91 X10(3) UL (ref 1–4)
LYMPHOCYTES # BLD AUTO: 1.28 X10(3) UL (ref 1–4)
LYMPHOCYTES NFR BLD AUTO: 31.2 %
LYMPHOCYTES NFR BLD AUTO: 34.6 %
MAGNESIUM SERPL-MCNC: 1.5 MG/DL (ref 1.6–2.6)
MCH RBC QN AUTO: 31.1 PG (ref 26–34)
MCHC RBC AUTO-ENTMCNC: 34.1 G/DL (ref 31–37)
MCHC RBC AUTO-ENTMCNC: 34.9 G/DL (ref 31–37)
MCHC RBC AUTO-ENTMCNC: 34.9 G/DL (ref 31–37)
MCV RBC AUTO: 89 FL (ref 80–100)
MCV RBC AUTO: 89 FL (ref 80–100)
MCV RBC AUTO: 91.4 FL (ref 80–100)
MONOCYTES # BLD AUTO: 0.34 X10(3) UL (ref 0.1–1)
MONOCYTES # BLD AUTO: 0.41 X10(3) UL (ref 0.1–1)
MONOCYTES NFR BLD AUTO: 11.1 %
MONOCYTES NFR BLD AUTO: 11.6 %
NEUTROPHILS # BLD AUTO: 1.61 X10 (3) UL (ref 1.5–7.7)
NEUTROPHILS # BLD AUTO: 1.61 X10(3) UL (ref 1.5–7.7)
NEUTROPHILS # BLD AUTO: 1.93 X10 (3) UL (ref 1.5–7.7)
NEUTROPHILS # BLD AUTO: 1.93 X10(3) UL (ref 1.5–7.7)
NEUTROPHILS NFR BLD AUTO: 52.2 %
NEUTROPHILS NFR BLD AUTO: 55.2 %
OSMOLALITY SERPL CALC.SUM OF ELEC: 285 MOSM/KG (ref 275–295)
PHOSPHATE SERPL-MCNC: 3.7 MG/DL (ref 2.4–5.1)
PLATELET # BLD AUTO: 115 10(3)UL (ref 150–450)
PLATELET # BLD AUTO: 116 10(3)UL (ref 150–450)
PLATELET # BLD AUTO: 116 10(3)UL (ref 150–450)
POTASSIUM SERPL-SCNC: 4.7 MMOL/L (ref 3.5–5.1)
PROT SERPL-MCNC: 6 G/DL (ref 5.7–8.2)
RBC # BLD AUTO: 4.05 X10(6)UL (ref 3.8–5.3)
RBC # BLD AUTO: 4.18 X10(6)UL (ref 3.8–5.3)
RBC # BLD AUTO: 4.18 X10(6)UL (ref 3.8–5.3)
SODIUM SERPL-SCNC: 133 MMOL/L (ref 136–145)
WBC # BLD AUTO: 2.9 X10(3) UL (ref 4–11)
WBC # BLD AUTO: 3.7 X10(3) UL (ref 4–11)
WBC # BLD AUTO: 3.7 X10(3) UL (ref 4–11)

## 2025-06-01 PROCEDURE — 93306 TTE W/DOPPLER COMPLETE: CPT | Performed by: INTERNAL MEDICINE

## 2025-06-01 PROCEDURE — 76705 ECHO EXAM OF ABDOMEN: CPT | Performed by: INTERNAL MEDICINE

## 2025-06-01 PROCEDURE — 99232 SBSQ HOSP IP/OBS MODERATE 35: CPT | Performed by: HOSPITALIST

## 2025-06-01 PROCEDURE — 99233 SBSQ HOSP IP/OBS HIGH 50: CPT | Performed by: EMERGENCY MEDICINE

## 2025-06-01 RX ORDER — MAGNESIUM OXIDE 400 MG/1
800 TABLET ORAL ONCE
Status: DISCONTINUED | OUTPATIENT
Start: 2025-06-01 | End: 2025-06-01

## 2025-06-01 RX ORDER — INSULIN DEGLUDEC 100 U/ML
19 INJECTION, SOLUTION SUBCUTANEOUS DAILY
Status: DISCONTINUED | OUTPATIENT
Start: 2025-06-01 | End: 2025-06-02

## 2025-06-01 RX ORDER — LORAZEPAM 1 MG/1
1 TABLET ORAL ONCE
Status: COMPLETED | OUTPATIENT
Start: 2025-06-01 | End: 2025-06-01

## 2025-06-01 NOTE — DIETARY NOTE
[facility]   part of [facility]    NUTRITION ASSESSMENT    Pt meets severe malnutrition criteria at this time.    CRITERIA FOR MALNUTRITION DIAGNOSIS:  Criteria for severe malnutrition diagnosis: acute illness/injury related to wt loss greater than 7.5% in 3 months and muscle mass moderate depletion      NUTRITION INTERVENTION:    RD nutrition Care Plan- See RD nutrition assessment for additional recommendations  Meal and Snacks - Monitor and encourage adequate PO intake.   Medical Food Supplements - Pt not interested at this time. Rationale/use for oral supplements discussed.  Nutrition Education - Discussed eating more consistently throughout the day. Encouraged compliance w/ Insulin Regimen Pt denies need for Carb Counting education. Pt/family receptive to education, no barriers noted.   Vitamin and Mineral Supplements - Continue Multivitamin with minerals, Folic Acid, and Thiamine      PATIENT STATUS: 06/01/25 45/F admitted with Type 1 DM w/ Ketoacidosis.  Pt seen for MST 3. Pt seen lying in bed, reports she feels well and is eating well. Ate omelet with potatoes for breakfast and finished it all. Denies n/v/d. Reports 15# wt loss int he past few months - per EMR wt tends to fluctuate around 140-145 over the past year.  NFPE indicates mild muscle wasting to calf.  Pt acknowledges that she really only eats 1 big meal/day, She does snack sometimes throughout the day, but reports her appetite is suppressed due to adderall.  Also noted, pt drinking 750ml Etoh daily.  Hyperglycemia noted with A1C 11.6%.  Pt states she forgets to bolus her insulin via insulin pump, and is \"really bad\" at counting carbs.  She denies needing education on carb counting, and has several apps on her phone to help her with this.  Encouraged pt to eat more consistently throughout the day to ensure adequate nutrient intake, and to help keep control at meal times.  Encouraged pt to choose some attainable goals to work towards. Pt  agreeable and all questions answered at time of visit.       ANTHROPOMETRICS:  Ht:  5'6\"  Wt: 61.6 kg (135 lb 12.9 oz).   BMI: Body mass index is 21.92 kg/m².  IBW: 59.1 kg      WEIGHT HISTORY:   Weight loss: Yes, Severe Wt loss of 15 lbs, 10%, over 3 months     Wt Readings from Last 10 Encounters:   05/31/25 61.6 kg (135 lb 12.9 oz)   05/12/25 60.8 kg (134 lb)   03/14/25 59.1 kg (130 lb 4.7 oz)   02/22/25 74.8 kg (165 lb)   02/05/25 62.4 kg (137 lb 9.1 oz)   01/25/25 65.4 kg (144 lb 2.9 oz)   12/20/24 64.5 kg (142 lb 3.2 oz)   11/24/24 65.1 kg (143 lb 8 oz)   11/12/24 63.1 kg (139 lb 1.8 oz)   08/01/23 74.8 kg (164 lb 14.5 oz)        NUTRITION:  Diet:       Procedures    Carbohydrate controlled diet 1800 kcal/60 grams; Is Patient on Accuchecks? Yes      Food Allergies: No  Cultural/Ethnic/Hoahaoism Preferences Addressed: Yes    Percent Meals Eaten (last 3 days)       None            GI system review: WNL Last BM Date: 05/30/25 (per patient)  Skin and wounds: intact    NUTRITION RELATED PHYSICAL FINDINGS:     1. Body Fat/Muscle Mass: mild muscle depletion Calf region     2. Fluid Accumulation: none    NUTRITION PRESCRIPTION: 61.6 kg Actual Body Weight  Calories: 9209-5151 calories/day (25-30 kcal/kg)  Protein: 62-92 grams protein/day (1.0-1.5 grams protein per kg)  Fluid: ~1 ml/kcal or per MD discretion    NUTRITION DIAGNOSIS/PROBLEM:  Malnutrition related to insufficient appetite resulting in inadequate nutrition intake and alcohol/substance abuse as evidenced by documented/reported insufficient oral intake, documented/reported unintentional weight loss, and loss of muscle mass      MONITOR AND EVALUATE/NUTRITION GOALS:  PO intake of 75% of meals TID - New  Weight stable within 1 to 2 lbs during admission - New      MEDICATIONS:  Abx,  FA, Novolog - 53 x 24h, Tresiba 19u, Mg Sulfate, MVI, Thiamine    LABS:  Na 133; Mg 1.5    Pt is at High nutrition risk      Charline Tinsley RD, LDN, CNSC  Clinical Dietitian  Phone  [phone]

## 2025-06-01 NOTE — PROGRESS NOTES
[facility]   part of [facility]     Hospitalist Progress Note     Blanca Coats Patient Status:  Inpatient    1980 MRN [id number]   Location [facility] 4SW-A Attending Simone Samson,    Hosp Day # 1 PCP Victor Hugo Leiva MD     Chief Complaint: Abdominal pain/N/V    Subjective:     Patient seen and examined. Denies abdominal pain/N/V/D.    Objective:    Review of Systems:   A comprehensive review of systems was completed; pertinent positive and negatives stated in subjective.    Vital signs:  Temp:  [97.8 °F (36.6 °C)-99.2 °F (37.3 °C)] 98.9 °F (37.2 °C)  Pulse:  [53-82] 77  Resp:  [12-20] 17  BP: ()/(53-91) 120/77  SpO2:  [77 %-100 %] 90 %    Physical Exam:    General: No acute distress  Respiratory: No wheezes, no rhonchi  Cardiovascular: S1, S2, regular rate and rhythm  Abdomen: Soft, Non-tender, non-distended, positive bowel sounds  Neuro: No new focal deficits.   Extremities: No edema    Diagnostic Data:    Labs:  Recent Labs   Lab 25  2305 25  0439 25  0356 25  1005   WBC 8.5 5.9 3.7*  3.7* 2.9*   HGB 17.2* 13.1 13.0  13.0 12.6   MCV 87.6 89.2 89.0  89.0 91.4   .0 153.0 116.0*  116.0* 115.0*       Recent Labs   Lab 25  2305 25  0439 25  0817 25  1207 25  1606 25  0356   * 157*  158*   < > 96 243* 301*   BUN 6* 6*  6*   < > 6* 6* 6*   CREATSERUM 0.90 0.68  0.69   < > 0.60 0.70 0.74   CA 9.3 8.3*  8.4*   < > 8.3* 8.4* 8.9   ALB 4.9* 4.0  --   --   --  3.8   * 138  140   < > 136 133* 133*   K 3.7 3.7  3.7   < > 4.4 4.3 4.7   CL 96* 104  103   < > 103 99 100   CO2 17.0* 20.0*  21.0   < > 27.0 24.0 26.0   ALKPHO 100* 72  --   --   --  70   AST 20 22  --   --   --  42*   ALT 22 17  --   --   --  19   BILT 0.5 0.4  --   --   --  1.4*   TP 8.0 6.3  --   --   --  6.0    < > = values in this interval not displayed.       Estimated Glomerular Filtration Rate: 103 mL/min/1.73m2 (result from lab).    No  results for input(s): \"TROP\", \"TROPHS\", \"CK\" in the last 168 hours.    No results for input(s): \"PTP\", \"INR\" in the last 168 hours.               Microbiology    Hospital Encounter on 05/30/25   1. Blood Culture     Status: Abnormal (Preliminary result)    Collection Time: 05/31/25 12:26 AM    Specimen: Blood,peripheral   Result Value Ref Range    Blood Culture Result Positive N/A    Blood Culture Result Staphylococcus epidermidis (A) N/A    Blood Smear Positive Blood Culture (A) N/A    Blood Smear Gram positive cocci in pairs and clusters (A) N/A         Imaging: Reviewed in Epic.    Medications: Scheduled Medications[1]    Assessment & Plan:      #DKA  #Type 1 diabetes   -Resolved  -Normally on insulin pump  -Currently tresiba/SSI  -Diabetes APN to see in AM    #Staph epi bacteremia  -2/2 blood cultures positive, repeat pending  -Ancef started  -Echo pending   -ID consulted    #Lactic acidosis  -Resolved    #Alcohol intoxication  #Alcohol abuse with risk for withdrawal  -Naltrexona  -CIWA protocol    #Leukopenia  #Thrombocytopenia  -Monitor    #Pseudohyponatremia secondary to hyperglycemia  -Monitor     #Anxiety  -Buspar/SSRi    #Chronic pain  -Gabapentin    #PAF  -BB  -Previously deemed poor anticoagulation candidate due to ETOH abuse    #GERD  -PPI      Simone Samson,     Supplementary Documentation:     Quality:  DVT Mechanical Prophylaxis:   SCDs, Early ambuation  DVT Pharmacologic Prophylaxis   Medication    enoxaparin (Lovenox) 40 MG/0.4ML SUBQ injection 40 mg                Code Status: Full Code  Muro: No urinary catheter in place  Muro Duration (in days):   Central line:    MARK:     Discharge is dependent on: clinical progress  At this point Miss Coats is expected to be discharge to: home     The 21st Century Cures Act makes medical notes like these available to patients in the interest of transparency. Please be advised this is a medical document. Medical documents are intended to carry relevant  information, facts as evident, and the clinical opinion of the practitioner. The medical note is intended as peer to peer communication and may appear blunt or direct. It is written in medical language and may contain abbreviations or verbiage that are unfamiliar.     Dietitian Malnutrition Assessment    Evaluation for Malnutrition: Criteria for severe malnutrition diagnosis- acute illness/injury related to Wt loss greater than 7.5% in 3 months., Muscle mass moderate depletion.                 RD Malnutrition Care Plan: See RD nutrition assessment for additional recommendations.    Body Fat/Muscle Mass:          Physician Assessment     Patient has a diagnosis of severe malnutrition                     [1]    insulin degludec  19 Units Subcutaneous Daily    insulin aspart  4-20 Units Subcutaneous TID AC and HS    vancomycin  15 mg/kg Intravenous Q12H    multivitamin  1 tablet Oral Daily    thiamine  500 mg Intravenous Q8H    Followed by    [START ON 6/3/2025] thiamine  250 mg Intravenous Daily    Followed by    [START ON 6/6/2025] thiamine  100 mg Intravenous Daily    enoxaparin  40 mg Subcutaneous Daily    nicotine  1 patch Transdermal Daily    busPIRone  20 mg Oral QAM    gabapentin  300 mg Oral BID    [Held by provider] metoprolol succinate ER  100 mg Oral 2x Daily(Beta Blocker)    montelukast  10 mg Oral Daily    sertraline  200 mg Oral Daily    atorvastatin  10 mg Oral Nightly    folic acid  1 mg Oral Daily    insulin aspart  1-68 Units Subcutaneous TID CC    pantoprazole  40 mg Oral QAM AC

## 2025-06-01 NOTE — CONSULTS
[facility]   part of [facility] ID CONSULT NOTE    Blanca Coats Patient Status:  Inpatient    1980 MRN [id number]   Location [facility] 4SW-A Attending Simone Samson, DO   Hosp Day # 1 PCP Victor Hugo Leiva MD       Reason for Consultation:  Staph epi bacteremia    Antibiotics: IV vanc    ASSESSMENT:    # Staph epi bacteremia  - 2/2 sets positive from  - probably contaminant  - repeat blood cx pending  - TTE neg for IE  -  No clear source - no indwelling lines/cath except PIVs. No open wounds/skin findings.     # DM type 1 with DKA  # alcohol abuse  # chronic pancreatitis with splenic thrombosis  # thrombocytopenia    PLAN:    -  continue IV vanc for now. If repeat blood cx with no growth, can likely dc vanc and monitor off abx  -  follow blood cx results  -  BG control per primary. DKA now resolved  -  Follow fever curve, wbc  -  Reviewed labs, micro, imaging reports, available old records    D/w pt and staff    Thank you for allowing us to participate in the care of this patient. Please do not hesitate to call if you have any questions.   We will continue to follow with you and will make further recommendations based on her progress.    History of Present Illness:  Blanca Coats is a a(n) 45 year old female with past medical history of alcohol abuse, chronic pancreatitis as well as splenic thrombosis and type 1 diabetes on insulin pump who presented to ED on 2025 with complaints of abdominal pain in her right upper quadrant area.  Denies any fevers but states she was having chills probably due to alcohol withdrawal.  States her alcohol drink was on Friday. Drinks daily.  States her pain is better today.  She has insulin pump for her diabetes but states her blood glucose are between around 300-400s at home.  Was found to be in DKA and admitted to ICU for insulin management.  Her blood cultures collected on  both sets are positive for staph epi.  Started on IV Vanco.  ID  consulted for further antibiotics management    History:  Past Medical History[1]  Past Surgical History[2]  Family History[3]   reports that she has been smoking cigarettes. She has never used smokeless tobacco. She reports current alcohol use of about 7.0 standard drinks of alcohol per week. She reports current drug use. Frequency: 7.00 times per week. Drug: Cannabis.    Allergies:  Allergies[4]    Medications:  Current Hospital Medications[5]    Review of Systems:  CONSTITUTIONAL:  No weight loss, weakness or fatigue.  HEENT:  Eyes:  No visual loss, blurred vision, double vision or yellow sclerae. Ears, Nose, Throat:  No hearing loss, sneezing, congestion, runny nose or sore throat.  SKIN:  No rash or itching.  CARDIOVASCULAR:  No chest pain, chest pressure or chest discomfort  RESPIRATORY:  No shortness of breath, cough or sputum.  GASTROINTESTINAL:  No anorexia, nausea, vomiting or diarrhea. +abdominal pain.  GENITOURINARY:  No Burning on urination.   NEUROLOGICAL:  No headache, dizziness, syncope, paralysis, ataxia, numbness or tingling in the extremities.  MUSCULOSKELETAL:  No muscle, back pain, joint pain or stiffness.    Physical Exam:  Vital signs: Blood pressure 122/72, pulse 84, temperature 99.1 °F (37.3 °C), temperature source Temporal, resp. rate 15, weight 135 lb 12.9 oz (61.6 kg), last menstrual period 03/06/2025, SpO2 96%, not currently breastfeeding.    General: Alert, oriented, NAD  HEENT: Moist mucous membranes. EOMI  Neck: No lymphadenopathy.  Supple.  Cardiovascular: RRR  Respiratory: Clear to auscultation bilaterally.  No wheezes. No rhonchi.  Abdomen: Soft, RUQ tenderness, nondistended.   Musculoskeletal: No edema noted  Integument: No lesions. No erythema.    Laboratory Data:  Recent Labs   Lab 06/01/25  1005   RBC 4.05   HGB 12.6   HCT 37.0   MCV 91.4   MCH 31.1   MCHC 34.1   RDW 13.3   NEPRELIM 1.61   WBC 2.9*   .0*     Recent Labs   Lab 05/30/25  2305 05/31/25  0439 05/31/25  0817  05/31/25  1207 05/31/25  1606 06/01/25  0356   * 157*  158*   < > 96 243* 301*   BUN 6* 6*  6*   < > 6* 6* 6*   CREATSERUM 0.90 0.68  0.69   < > 0.60 0.70 0.74   CA 9.3 8.3*  8.4*   < > 8.3* 8.4* 8.9   ALB 4.9* 4.0  --   --   --  3.8   * 138  140   < > 136 133* 133*   K 3.7 3.7  3.7   < > 4.4 4.3 4.7   CL 96* 104  103   < > 103 99 100   CO2 17.0* 20.0*  21.0   < > 27.0 24.0 26.0   ALKPHO 100* 72  --   --   --  70   AST 20 22  --   --   --  42*   ALT 22 17  --   --   --  19   BILT 0.5 0.4  --   --   --  1.4*   TP 8.0 6.3  --   --   --  6.0    < > = values in this interval not displayed.       Microbiology: Reviewed in EMR    Radiology: Reviewed      Trumna Barajas MD   [facility], [place], operated by [facility]  [phone]  6/1/2025       [1]   Past Medical History:   Alcohol abuse    Alcoholic (HCC)    Alcoholic ketoacidosis    Anxiety    Arrhythmia    SVT    Atrial fibrillation with RVR (HCC)    Attention deficit disorder    Breast CA (HCC)    Depression    Esophageal reflux    High blood pressure    OTHER DISEASES    seasonal allergies    Pancreatitis (HCC)    Pneumonia due to organism    SVT (supraventricular tachycardia) (HCC)    Type 1 diabetes mellitus (HCC)   [2]   Past Surgical History:  Procedure Laterality Date    Breast surgery      Implant left      Implant right      Mastectomy left      Mastectomy right      Ndsc ablation & rcnstj atria lmtd w/o bypass     [3]   Family History  Problem Relation Age of Onset    Heart Disorder Father     Diabetes Father     Hypertension Mother     Cancer Mother         breast   [4]   Allergies  Allergen Reactions    Bee Venom ANAPHYLAXIS    Morphine HIVES    Fentanyl RASH   [5]   Current Facility-Administered Medications:     insulin degludec (Tresiba) 100 units/mL flextouch 19 Units, 19 Units, Subcutaneous, Daily    insulin aspart (NovoLOG) 100 Units/mL FlexPen 4-20 Units, 4-20 Units, Subcutaneous, TID AC and HS    vancomycin (Vancocin) 1,000 mg in  sodium chloride 0.9% 250 mL IVPB-ADDV, 15 mg/kg, Intravenous, Q12H    multivitamin (Tab-A-Toni/Beta Carotene) tab 1 tablet, 1 tablet, Oral, Daily    glucose (Dex4) 15 GM/59ML oral liquid 15 g, 15 g, Oral, Q15 Min PRN **OR** glucose (Glutose) 40% oral gel 15 g, 15 g, Oral, Q15 Min PRN **OR** glucose-vitamin C (Dex-4) chewable tab 4 tablet, 4 tablet, Oral, Q15 Min PRN **OR** dextrose 50% injection 50 mL, 50 mL, Intravenous, Q15 Min PRN **OR** glucose (Dex4) 15 GM/59ML oral liquid 30 g, 30 g, Oral, Q15 Min PRN **OR** glucose (Glutose) 40% oral gel 30 g, 30 g, Oral, Q15 Min PRN **OR** glucose-vitamin C (Dex-4) chewable tab 8 tablet, 8 tablet, Oral, Q15 Min PRN    thiamine 100 mg/mL injection 500 mg, 500 mg, Intravenous, Q8H **FOLLOWED BY** [START ON 6/3/2025] thiamine 100 mg/mL injection 250 mg, 250 mg, Intravenous, Daily **FOLLOWED BY** [START ON 6/6/2025] thiamine 100 mg/mL injection 100 mg, 100 mg, Intravenous, Daily    LORazepam (Ativan) tab 1 mg, 1 mg, Oral, Q1H PRN **OR** LORazepam (Ativan) 2 mg/mL injection 1 mg, 1 mg, Intravenous, Q1H PRN    LORazepam (Ativan) tab 2 mg, 2 mg, Oral, Q1H PRN **OR** LORazepam (Ativan) 2 mg/mL injection 2 mg, 2 mg, Intravenous, Q1H PRN    LORazepam (Ativan) tab 3 mg, 3 mg, Oral, Q1H PRN **OR** LORazepam (Ativan) 2 mg/mL injection 3 mg, 3 mg, Intravenous, Q1H PRN    LORazepam (Ativan) 2 mg/mL injection 4 mg, 4 mg, Intravenous, Q30 Min PRN    enoxaparin (Lovenox) 40 MG/0.4ML SUBQ injection 40 mg, 40 mg, Subcutaneous, Daily    acetaminophen (Tylenol) tab 650 mg, 650 mg, Oral, Q6H PRN    traMADol (Ultram) tab 50 mg, 50 mg, Oral, Q6H PRN    nicotine (Nicoderm CQ) 21 MG/24HR patch 1 patch, 1 patch, Transdermal, Daily    melatonin tab 3 mg, 3 mg, Oral, Nightly PRN    ondansetron (Zofran) 4 MG/2ML injection 4 mg, 4 mg, Intravenous, Q6H PRN    prochlorperazine (Compazine) 10 MG/2ML injection 5 mg, 5 mg, Intravenous, Q8H PRN    benzonatate (Tessalon) cap 200 mg, 200 mg, Oral, TID PRN     glycerin-hypromellose- (Artificial Tears) 0.2-0.2-1 % ophthalmic solution 1 drop, 1 drop, Both Eyes, QID PRN    sodium chloride (Saline Mist) 0.65 % nasal solution 1 spray, 1 spray, Each Nare, Q3H PRN    busPIRone (Buspar) tab 20 mg, 20 mg, Oral, QAM    gabapentin (Neurontin) cap 300 mg, 300 mg, Oral, BID    [Held by provider] metoprolol succinate ER (Toprol XL) 24 hr tab 100 mg, 100 mg, Oral, 2x Daily(Beta Blocker)    montelukast (Singulair) tab 10 mg, 10 mg, Oral, Daily    sertraline (Zoloft) tab 200 mg, 200 mg, Oral, Daily    haloperidol (Haldol) tab 5 mg, 5 mg, Oral, Nightly PRN    atorvastatin (Lipitor) tab 10 mg, 10 mg, Oral, Nightly    folic acid (Folvite) tab 1 mg, 1 mg, Oral, Daily    insulin aspart (NovoLOG) 100 Units/mL FlexPen 1-68 Units, 1-68 Units, Subcutaneous, TID CC    pantoprazole (Protonix) DR tab 40 mg, 40 mg, Oral, QAM AC    traZODone (Desyrel) tab 200 mg, 200 mg, Oral, Nightly PRN

## 2025-06-01 NOTE — PLAN OF CARE
Assume care of patient after RN report.  Alert and oriented, follows commands, and moves all extremities. NSR on monitor, VSS, on RA. No c/o pain. AM blood sugar check high, intensivist notified and insulin orders adjusted. Tolerating diet. Voiding in the bathroom. Ambulated in the room and ramsay .Refer to flowsheets and MAR for further details. Stable for transfer out of the ICU.

## 2025-06-01 NOTE — PLAN OF CARE
Pt alert and oriented x4. HURTADO. Follows commands. NSR-SB on tele. On RA. Normotensive. Afebrile. Ambulates to bathroom. Denies pain. PRN sleep medication given. MARLENAWA conducted see assessments. NPO @ midnight for Abdominal US. Bcx resulted, MD/PRINCE notified.     See MAR and flowsheets for additional information.

## 2025-06-02 VITALS
RESPIRATION RATE: 20 BRPM | DIASTOLIC BLOOD PRESSURE: 73 MMHG | OXYGEN SATURATION: 94 % | WEIGHT: 139.75 LBS | HEART RATE: 74 BPM | SYSTOLIC BLOOD PRESSURE: 123 MMHG | TEMPERATURE: 99 F | BODY MASS INDEX: 23 KG/M2

## 2025-06-02 LAB
ALBUMIN SERPL-MCNC: 4 G/DL (ref 3.2–4.8)
ALBUMIN/GLOB SERPL: 1.7 {RATIO} (ref 1–2)
ALP LIVER SERPL-CCNC: 69 U/L (ref 37–98)
ALT SERPL-CCNC: 14 U/L (ref 10–49)
ANION GAP SERPL CALC-SCNC: 11 MMOL/L (ref 0–18)
AST SERPL-CCNC: 22 U/L (ref ?–34)
BACTERIA BLD CULT: POSITIVE
BASOPHILS # BLD AUTO: 0.02 X10(3) UL (ref 0–0.2)
BASOPHILS NFR BLD AUTO: 0.4 %
BILIRUB SERPL-MCNC: 0.9 MG/DL (ref 0.3–1.2)
BUN BLD-MCNC: 8 MG/DL (ref 9–23)
CALCIUM BLD-MCNC: 8.9 MG/DL (ref 8.7–10.6)
CHLORIDE SERPL-SCNC: 100 MMOL/L (ref 98–112)
CO2 SERPL-SCNC: 24 MMOL/L (ref 21–32)
CREAT BLD-MCNC: 0.8 MG/DL (ref 0.55–1.02)
EGFRCR SERPLBLD CKD-EPI 2021: 94 ML/MIN/1.73M2 (ref 60–?)
EOSINOPHIL # BLD AUTO: 0.04 X10(3) UL (ref 0–0.7)
EOSINOPHIL NFR BLD AUTO: 0.8 %
ERYTHROCYTE [DISTWIDTH] IN BLOOD BY AUTOMATED COUNT: 13.2 %
GLOBULIN PLAS-MCNC: 2.3 G/DL (ref 2–3.5)
GLUCOSE BLD-MCNC: 216 MG/DL (ref 70–99)
GLUCOSE BLD-MCNC: 285 MG/DL (ref 70–99)
GLUCOSE BLD-MCNC: 337 MG/DL (ref 70–99)
HCT VFR BLD AUTO: 36.4 % (ref 35–48)
HGB BLD-MCNC: 13.1 G/DL (ref 12–16)
IMM GRANULOCYTES # BLD AUTO: 0.01 X10(3) UL (ref 0–1)
IMM GRANULOCYTES NFR BLD: 0.2 %
LYMPHOCYTES # BLD AUTO: 1.31 X10(3) UL (ref 1–4)
LYMPHOCYTES NFR BLD AUTO: 27.2 %
MAGNESIUM SERPL-MCNC: 1.6 MG/DL (ref 1.6–2.6)
MCH RBC QN AUTO: 31.5 PG (ref 26–34)
MCHC RBC AUTO-ENTMCNC: 36 G/DL (ref 31–37)
MCV RBC AUTO: 87.5 FL (ref 80–100)
MONOCYTES # BLD AUTO: 0.35 X10(3) UL (ref 0.1–1)
MONOCYTES NFR BLD AUTO: 7.3 %
NEUTROPHILS # BLD AUTO: 3.08 X10 (3) UL (ref 1.5–7.7)
NEUTROPHILS # BLD AUTO: 3.08 X10(3) UL (ref 1.5–7.7)
NEUTROPHILS NFR BLD AUTO: 64.1 %
OSMOLALITY SERPL CALC.SUM OF ELEC: 289 MOSM/KG (ref 275–295)
PLATELET # BLD AUTO: 125 10(3)UL (ref 150–450)
PLATELETS.RETICULATED NFR BLD AUTO: 3.8 % (ref 0–7)
POTASSIUM SERPL-SCNC: 4 MMOL/L (ref 3.5–5.1)
PROT SERPL-MCNC: 6.3 G/DL (ref 5.7–8.2)
RBC # BLD AUTO: 4.16 X10(6)UL (ref 3.8–5.3)
S EPIDERMIDIS DNA BLD POS QL NAA+NON-PRB: DETECTED
SODIUM SERPL-SCNC: 135 MMOL/L (ref 136–145)
WBC # BLD AUTO: 4.8 X10(3) UL (ref 4–11)

## 2025-06-02 PROCEDURE — 99222 1ST HOSP IP/OBS MODERATE 55: CPT | Performed by: CLINICAL NURSE SPECIALIST

## 2025-06-02 PROCEDURE — 99232 SBSQ HOSP IP/OBS MODERATE 35: CPT | Performed by: EMERGENCY MEDICINE

## 2025-06-02 PROCEDURE — 99239 HOSP IP/OBS DSCHRG MGMT >30: CPT | Performed by: HOSPITALIST

## 2025-06-02 RX ORDER — MAGNESIUM OXIDE 400 MG/1
400 TABLET ORAL ONCE
Status: COMPLETED | OUTPATIENT
Start: 2025-06-02 | End: 2025-06-02

## 2025-06-02 RX ORDER — BUSPIRONE HYDROCHLORIDE 10 MG/1
20 TABLET ORAL EVERY MORNING
Status: SHIPPED | COMMUNITY
Start: 2025-06-02

## 2025-06-02 RX ORDER — INSULIN DEGLUDEC 100 U/ML
26 INJECTION, SOLUTION SUBCUTANEOUS DAILY
Status: DISCONTINUED | OUTPATIENT
Start: 2025-06-02 | End: 2025-06-02

## 2025-06-02 RX ORDER — INSULIN LISPRO 100 [IU]/ML
INJECTION, SOLUTION INTRAVENOUS; SUBCUTANEOUS
Status: SHIPPED | COMMUNITY
Start: 2025-06-02

## 2025-06-02 RX ORDER — INSULIN DEGLUDEC 100 U/ML
24 INJECTION, SOLUTION SUBCUTANEOUS DAILY
Status: DISCONTINUED | OUTPATIENT
Start: 2025-06-02 | End: 2025-06-02

## 2025-06-02 NOTE — PROGRESS NOTES
2025  Blanca Coats  : 1980  MRN: [id number]     from [facility] met with patient and she declined referrals for treatment, noting, \"I am already in outpatient treatment at [facility] in [place], and I am seeing a counselor there too\" adding, \"I am there Monday through Thursday.\" Writer offered encouragement and provided a business card should she decide to go for inpatient treatment.    NEGAR King, CADC  Care Coordinator, [address] [address] [phone]/ Work cell

## 2025-06-02 NOTE — PAYOR COMM NOTE
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   Lab 06/01/25  0735 06/01/25  1129 06/01/25  1628 06/01/25  2043 06/02/25  0825   PGLU 323* 244* 122* 236* 337*        05/30/25  2305 05/30/25  2320 05/31/25  0439 05/31/25  0527 05/31/25  0817 05/31/25  0825 05/31/25  1207 05/31/25  1253 05/31/25  1606 05/31/25  1651 06/01/25  0356 06/01/25  0735 06/02/25  0454 06/02/25  0825   *  --  138  140  --  136  --  136  --  133*  --  133*  --  135*  --    CL 96*  --  104  103  --  103  --  103  --  99  --  100  --  100  --    CO2 17.0*  --  20.0*  21.0  --  26.0  --  27.0  --  24.0  --  26.0  --  24.0  --    BUN 6*  --  6*  6*  --  6*  --  6*  --  6*  --  6*  --  8*  --    CREATSERUM 0.90  --  0.68  0.69  --  0.60  --  0.60  --  0.70  --  0.74  --  0.80  --    A1C 11.6*  --   --   --   --   --   --   --   --   --   --   --   --   --    PGLU  --    < >  --    < >  --    < >  --    < >  --    < >  --    < >  --  337*   CA 9.3  --  8.3*  8.4*  --  8.3*  --  8.3*  --  8.4*  --  8.9  --  8.9  --    ALB 4.9*  --  4.0  --   --   --   --   --   --   --  3.8  --  4.0  --      History of Present Illness: Blanca Coats is a 45 year old female with PMH of T1DM c/b DKA (1/2023), chronic pancreatitis w/ pancreatic pseudocyst, ETOH abuse, breast CA s/p b/l mastectomy w/ chemo (2017), anxiety, depression, HTN, SVT and ETOH hepatic steatosis admitted 5/30/2025 from  home with complaints of pancreatitis-like abdominal pain. ED evaluation revealed DKA (, A1C 11.6%, CO2 17, anion gap 22, pH 7.31, +urine ketones).           Assessment/Recommendations:  Upon interview today, patient states she was diagnosed with DKA in 2023 during first DKA admission; states she follows with Dr. Schoenberger (endocrinology at [facility]). Today, patient states her insulin pump ran out of insulin during an ETOH binge and believes she was without insulin for about 36 hours; of note, this is similar set of circumstances to previous DKA admissions. Reminded patient that OmniPod insulin pump only holds about 3 days worth of insulin; changing pump cartridge in imperative to staying out of DKA. Additionally, patient states OmniPod switches from auto-mode (with automatic injections based on CGM data) to manual mode (requires user to bolus additional insulin for high glucose readings) d/t consistently high glucose; patient believes this switch is due to forgetting to bolus for carbohydrate intake and subsequent high glucose readings. Reinforced need for carb counting with intake to pump for more effective pump usage.      Today, patient without pump supplies at bedside; therefore, recommended patient restart insulin pump 24 hours after last long-acting insuli (degludec) dose once discharged. Patient states she has multiple U100 aspart insulin pens to continue bolusing for correction and carb doses until pump restart. Finally, recommended patient to continue care with outpatient endocrinology for further T1DM management. Patient states understanding of and willingness to participate in plan of care.         Plan:  Inpatient recommendations -   Given lack of insulin pump supplies at bedside, recommend continuing basal/bolus insulin at:  Degludec = given elevated fasted AM glucose, recommend increasing to 24u every day   Aspart = given elevated AC/HS glucose values yesterday, recommend increasing to 1:20>140 &  1:8gm CHO  Discharge recommendations -   Restart OmniPod insulin pump at home 24 hours after last long-acting insulin (degludec) dose  Patient with U100 lispro insulin pens at home to dose meal-time correction/carb coverage until pump restart  Continue bolus insulin until pump restart:  U100 lispro = 1:20>140 + 1:8gm CHO        Prescription Recommendations:  Commercial Medicaid - [place] Medicaid  Insulin: Humalog, Levemir, Lantus   Supplies:  BD pen needles (Kasia)  Glucometer:  One Touch Ultra ([employer])  CGM:  Dexcom G7   Insulin Pump:  OmniPod         Provider follow up recommendations:  PCP - Dr. Cande Velez (independent provider)  Endocrinology/[facility] - Dr. Schoenberger ([facility] [place] endocrinology)           Medications 05/31/25 06/01/25 06/02/25   atorvastatin (Lipitor) tab 10 mg  Dose: 10 mg  Freq: Nightly Route: OR  Start: 05/31/25 2100 2021 JM-Given        2036 KK-Given        2100          atorvastatin (Lipitor) tab 10 mg  Dose: 10 mg  Freq: Daily Route: OR  Start: 05/31/25 0930 End: 05/31/25 0423    0423-D/C'd        busPIRone (Buspar) tab 20 mg  Dose: 20 mg  Freq: Every morning Route: OR  Start: 05/31/25 0900    (0900 AK)-Not Given        0851 AK-Given        0834 AK-Given          ceFAZolin (Ancef) 2g in 10mL IV syringe premix  Dose: 2 g  Freq: Every 8 hours Route: IV  Last Dose: 2 g (06/02/25 0916)  Start: 06/02/25 0900 End: 06/02/25 0953   Order specific questions:         0916 AK-New Bag   0953-D/C'd       ceFAZolin (Ancef) 2g in 10mL IV syringe premix  Dose: 2 g  Freq: Every 8 hours Route: IV  Last Dose: 2 g (06/01/25 0639)  Start: 05/31/25 2300 End: 06/01/25 1108   Order specific questions:       2325 JM-New Bag        0639 JM-New Bag   1108-D/C'd       cefTRIAXone (Rocephin) 2 g in sodium chloride 0.9% 100 mL IVPB-ADDV  Dose: 2 g  Freq: Once Route: IV  Last Dose: Stopped (05/31/25 0321)  Start: 05/31/25 0007 End: 05/31/25 0321   Admin Instructions:   Ceftriaxone must NOT be administered  simultaneously with calcium containing IV solutions. Includes Y-site as well.  In patients other than neonates ceftriaxone and calcium containing products may administered sequentially, provided the line is flushed in between administrations.   Order specific questions:       0050 GG-New Bag   0321 GG-Stopped         diphenhydrAMINE (Benadryl) 50 mg/mL injection 25 mg  Dose: 25 mg  Freq: Once Route: IV  Start: 05/30/25 2320 End: 05/31/25 0014    0014 GG-Given          enoxaparin (Lovenox) 40 MG/0.4ML SUBQ injection 40 mg  Dose: 40 mg  Freq: Daily Route: SC  Start: 05/31/25 0930   Admin Instructions:   Only administer Enoxaparin subcutaneously into the abdomen unless directed otherwise by a physician. Alternate injection sites between the left and right anterolateral and left and right posterolateral abdominal wall.    0820 AK-Given        0849 AK-Given        (0930 AK)-Not Given          folic acid (Folvite) 1 mg in sodium chloride 0.9% 50 mL IVPB  Dose: 1 mg  Freq: Daily Route: IV  Start: 06/01/25 0930 End: 05/31/25 0753    0753-D/C'd        folic acid (Folvite) 1 mg in sodium chloride 0.9% 50 mL IVPB  Dose: 1 mg  Freq: Daily Route: IV  Last Dose: 1 mg (05/31/25 0331)  Start: 05/31/25 0038 End: 05/31/25 0313    0313-D/C'd  0331 EN-New Bag          folic acid (Folvite) tab 1 mg  Dose: 1 mg  Freq: Daily Route: OR  Start: 06/01/25 0930     0850 AK-Given        0834 AK-Given          gabapentin (Neurontin) cap 300 mg  Dose: 300 mg  Freq: 2 times daily Route: OR  Start: 05/31/25 0900    0820 AK-Given   2021 JM-Given       0850 AK-Given   2036 KK-Given       0834 AK-Given   2100         haloperidol (Haldol) tab 5 mg  Dose: 5 mg  Freq: Nightly Route: OR  Start: 05/31/25 0400 End: 05/31/25 0422    (0400 EN)-Not Given   0422-D/C'd       haloperidol lactate (Haldol) 5 MG/ML injection 5 mg  Dose: 5 mg  Freq: Once Route: IM  Start: 05/30/25 2320 End: 05/30/25 2330         insulin aspart (NovoLOG) 100 Units/mL FlexPen 1-20  Units  Dose: 1-20 Units  Freq: 3 times daily with meals and at bedtime Route: SC  Start: 06/02/25 0800   Admin Instructions:   1 unit of Novolog (aspart) insulin for every 8 grams of carbohydrates eaten  Give within 10 minutes before or after a meal  Do not give if patient is NPO (carbohydrate is on hold)      0916 AK-Given   1200   1700     2100          insulin aspart (NovoLOG) 100 Units/mL FlexPen 1-68 Units  Dose: 1-68 Units  Freq: 3 x daily before meals and at bedtime Route: SC  Start: 06/02/25 0700   Admin Instructions:   Correction Insulin - calculate insulin requirement  Give 1 unit of NovoLOG (insulin aspart) for every 25 points blood glucose greater than 140 mg/dL  **DO NOT HOLD OR ALTER INSULIN DOSE WITHOUT A PHYSICIAN ORDER**  Give NovoLOG/insulin aspart subcutaneously within 30 minutes of scheduled finger-stick time  Notify physician for blood glucose > 351 mg/dL      0917 AK-Given   1100   1600     2200          insulin aspart (NovoLOG) 100 Units/mL FlexPen 1-68 Units  Dose: 1-68 Units  Freq: 3 times daily with meals Route: SC  Start: 05/31/25 1700 End: 06/02/25 0620   Admin Instructions:   1 unit of Novolog (aspart) insulin for every 1:8gm CHO & 1:30>140  Give within 10 minutes before or after a meal  Do not give if patient is NPO (carbohydrate is on hold)         1703 AK-Given        0847 AK-Given   1223 AK-Given   1651 AK-Given      0620-D/C'd        insulin aspart (NovoLOG) 100 Units/mL FlexPen 2-10 Units  Dose: 2-10 Units  Freq: 3 times daily before meals and nightly Route: SC  Start: 05/31/25 1100 End: 06/01/25 0824   Admin Instructions:   CORRECTION FACTOR - COLUMN MEDIUM DOSE.  Continue to give correction insulin (Novolog/aspart) even if NPO; DO NOT HOLD OR ALTER INSULIN DOSE WITHOUT A PHYSICIAN ORDER.  1 unit of Novolog/aspart insulin is expected to decrease blood glucose by 20 mg/dL.    Give 2 unit if blood glucose 141-180 mg/dL  Give 3 units if blood glucose 181-220 mg/dL  Give 6 units if  blood glucose 221-260 mg/dL  Give 8 units if blood glucose 261-300 mg/dL  Give 10 units if blood glucose 301-350 mg/dL  Call Physician if blood glucose is greater than 351 mg/dl with time and last dose of correction insulin given.    1255 AK-Given   1500 AK-Given [C]   2135 JM-Given      0824-D/C'd  0846 AK-Given          insulin aspart (NovoLOG) 100 Units/mL FlexPen 4-20 Units  Dose: 4-20 Units  Freq: 3 times daily before meals and nightly Route: SC  Start: 06/01/25 1100 End: 06/02/25 0620   Admin Instructions:   CORRECTION FACTOR - COLUMN HIGH DOSE.  Continue to give correction insulin (Novolog/aspart) even if NPO; DO NOT HOLD OR ALTER INSULIN DOSE WITHOUT A PHYSICIAN ORDER.  1 unit of Novolog/aspart insulin is expected to decrease blood glucose by 10 mg/dL.    Give 4 unit if blood glucose 141-180 mg/dL  Give 8 units if blood glucose 181-220 mg/dL  Give 12 units if blood glucose 221-260 mg/dL  Give 16 units if blood glucose 261-300 mg/dL  Give 20 units if blood glucose 301-350 mg/dL  Call Physician if blood glucose is greater than 351 mg/dL with time and last dose of correction insulin given.     1222 AK-Given   (1600 AK)-Not Given   2044 KK-Given [C]      0620-D/C'd        insulin degludec (Tresiba) 100 units/mL flextouch 15 Units  Dose: 15 Units  Freq: Daily Route: SC  Start: 05/31/25 1000 End: 06/01/25 0819   Admin Instructions:   This is LONG ACTING insulin.  Continue to give basal insulin (insulin degludec - Tresiba) even if NPO.  DO NOT hold or alter insulin dose without a physician order.    1052 AK-Given        0819-D/C'd        insulin degludec (Tresiba) 100 units/mL flextouch 19 Units  Dose: 19 Units  Freq: Daily Route: SC  Start: 06/01/25 0930 End: 06/02/25 0620   Admin Instructions:   This is LONG ACTING insulin.  Continue to give basal insulin (insulin degludec - Tresiba) even if NPO.  DO NOT hold or alter insulin dose without a physician order.     0847 AK-Given        0620-D/C'd        insulin  degludec (Tresiba) 100 units/mL flextouch 24 Units  Dose: 24 Units  Freq: Daily Route: SC  Start: 06/02/25 0930 End: 06/02/25 0624   Admin Instructions:   This is LONG ACTING insulin.  Continue to give basal insulin (insulin degludec - Tresiba) even if NPO.  DO NOT hold or alter insulin dose without a physician order.      0624-D/C'd        insulin degludec (Tresiba) 100 units/mL flextouch 26 Units  Dose: 26 Units  Freq: Daily Route: SC  Start: 06/02/25 0930   Admin Instructions:   This is LONG ACTING insulin.  Continue to give basal insulin (insulin degludec - Tresiba) even if NPO.  DO NOT hold or alter insulin dose without a physician order.      0835 AK-Given          ketorolac (Toradol) 15 MG/ML injection 15 mg  Dose: 15 mg  Freq: Once Route: IV  Start: 05/30/25 2317 End: 05/31/25 0015    0015 GG-Given          LORazepam (Ativan) tab 1 mg  Dose: 1 mg  Freq: Once Route: OR  Start: 06/01/25 2045 End: 06/01/25 2052 2052 KK-Given          magnesium oxide (Mag-Ox) tab 400 mg  Dose: 400 mg  Freq: Once Route: OR  Start: 06/02/25 0615 End: 06/02/25 0658      0658 KK-Given          magnesium oxide (Mag-Ox) tab 800 mg  Dose: 800 mg  Freq: Once Route: OR  Start: 06/01/25 0615 End: 06/01/25 0642     (0639 )-Not Given   0642-D/C'd       magnesium sulfate 4 g/100mL IVPB premix 4 g  Dose: 4 g  Freq: Once Route: IV  Last Dose: 4 g (06/01/25 0849)  Start: 06/01/25 0645 End: 06/01/25 1049     0849 AK-New Bag          magnesium sulfate 4 g/100mL IVPB premix 4 g  Dose: 4 g  Freq: Once Route: IV  Last Dose: 4 g (05/31/25 0425)  Start: 05/31/25 0135 End: 05/31/25 0625 0425 EN-New Bag          metoprolol succinate ER (Toprol XL) 24 hr tab 100 mg  Dose: 100 mg  Freq: 2 times daily(Beta Blocker) Route: OR  Start: 05/31/25 0600   Admin Instructions:   Do not crush    0422 MN-Held by provider [C]   0600 MN   1800 MN      0600 MN   1800 MN   2056 MN-Unheld by provider [C]      0658 KK-Given [C]   1800         montelukast  (Singulair) tab 10 mg  Dose: 10 mg  Freq: Daily Route: OR  Start: 05/31/25 0930    0820 AK-Given        0849 AK-Given        0834 AK-Given          multivitamin (Tab-A-Toni/Beta Carotene) tab 1 tablet  Dose: 1 tablet  Freq: Daily Route: OR  Start: 05/31/25 0038   Admin Instructions:   Begin when patient can tolerate oral intake    0100 GG-Given   0820 AK-Given       0849 AK-Given        0834 AK-Given          naltrexone (ReVia) tab 50 mg  Dose: 50 mg  Freq: Daily Route: OR  Start: 05/31/25 0400 End: 05/31/25 0422    (0400 EN)-Not Given   0422-D/C'd       nicotine (Nicoderm CQ) 21 MG/24HR patch 1 patch  Dose: 1 patch  Freq: Daily Route: TD  Start: 05/31/25 0345   Admin Instructions:   Apply patch to upper body or upper outer arm.  Rotate patch site    0416 EN-Patch Applied        0850 AK-Patch Applied   0929 AK-Patch removed   (0930 AK)-Not Given      0835 AK-Patch Applied          pantoprazole (Protonix) 40 mg in sodium chloride 0.9% PF 10 mL IV push  Dose: 40 mg  Freq: Every 24 hours Route: IV  Start: 05/31/25 0400 End: 05/31/25 1946   Admin Instructions:   Dilute with 10 ml NS; IV push over 2 minutes    0410 EN-Given   1946-D/C'd       pantoprazole (Protonix) DR tab 40 mg  Dose: 40 mg  Freq: Every morning before breakfast Route: OR  Start: 06/01/25 0700   Admin Instructions:   Do not crush     (0639 JM)-Not Given        0658 KK-Given          potassium chloride 40 mEq in 250mL sodium chloride 0.9% IVPB premix  Dose: 40 mEq  Freq: Once Route: IV  Last Dose: 40 mEq (05/31/25 0420)  Start: 05/31/25 0145 End: 05/31/25 0820    0420 EN-New Bag          potassium phosphate dibasic 15 mmol in sodium chloride 0.9% 250 mL IVPB  Dose: 15 mmol  Freq: Once Route: IV  Last Dose: 15 mmol (05/31/25 1130)  Start: 05/31/25 1000 End: 05/31/25 1530    1130 AK-New Bag          potassium phosphate dibasic 30 mmol in sodium chloride 0.9% 250 mL IVPB  Dose: 30 mmol  Freq: Once Route: IV  Last Dose: 30 mmol (05/31/25 8620)  Start:  05/31/25 0600 End: 05/31/25 1220    0820 AK-New Bag          sertraline (Zoloft) tab 200 mg  Dose: 200 mg  Freq: Daily Route: OR  Start: 05/31/25 0930    0820 AK-Given        0849 AK-Given        0834 AK-Given          sodium chloride 0.9 % IV bolus 1,000 mL  Dose: 1,000 mL  Freq: Once Route: IV  Last Dose: Stopped (05/31/25 0320)  Start: 05/30/25 2352 End: 05/31/25 0320    0042 GG-New Bag   0320 GG-Stopped         sodium phosphate 15 mmol in 0.9% NaCl 100mL IVPB premix  Dose: 15 mmol  Freq: Once Route: IV  Start: 05/31/25 1930 End: 06/01/25 0033   Admin Instructions:   Administer through peripheral line    2033 JM-Given           thiamine 100 mg/mL injection 500 mg  Dose: 500 mg  Freq: Every 8 hours Route: IV  Start: 05/31/25 0400 End: 06/03/25 0359   Admin Instructions:   For IV Push Doses: Administer slowly over 1 to 2 minute    0400 EN-Given   1129 AK-Given   2031 JM-Given      0317 JM-Given   1223 AK-Given   2036 KK-Given      0454 KK-Given   1200   2000        Followed by   thiamine 100 mg/mL injection 250 mg  Dose: 250 mg  Freq: Daily Route: IV  Start: 06/03/25 0930 End: 06/06/25 0929   Admin Instructions:   For IV Push Doses: Administer slowly over 1 to 2 minute         Followed by   thiamine 100 mg/mL injection 100 mg  Dose: 100 mg  Freq: Daily Route: IV  Start: 06/06/25 0930   Admin Instructions:   For IV Push Doses: Administer slowly over 1 to 2 minute         thiamine 100 mg/mL injection 100 mg  Dose: 100 mg  Freq: Once Route: IV  Start: 05/31/25 0038 End: 05/31/25 0210   Admin Instructions:   For IV Push Doses: Administer slowly over 1 to 2 minute    0210 GG-Given          traZODone (Desyrel) tab 200 mg  Dose: 200 mg  Freq: Nightly Route: OR  Start: 06/01/25 2100 End: 05/31/25 0422    0422-D/C'd        traZODone (Desyrel) tab 200 mg  Dose: 200 mg  Freq: Nightly Route: OR  Start: 05/31/25 0400 End: 05/31/25 0418    0408 EN-Given   0418-D/C'd       vancomycin (Vancocin) 1,000 mg in sodium chloride 0.9%  250 mL IVPB-ADDV  Dose: 15 mg/kg  Weight Dosing Info: 61.6 kg  Freq: Every 12 hours scheduled Route: IV  Last Dose: 1,000 mg (06/01/25 2041)  Start: 06/01/25 1115 End: 06/02/25 0718   Admin Instructions:   Vancomycin concentrations >/= 5 mg/mL are incompatible with Zosyn and must be run separately   Order specific questions:        1117 AK-New Bag   2041 KK-New Bag       0718-D/C'd         Continuous Meds Sorted by Name  for Blanca Coats as of 5/31/25 through 6/2/25    1 Day 3 Days 7 Days 10 Days < Today >   Legend:         Medications 05/31/25 06/01/25 06/02/25   dextrose in lactated ringers 5% infusion  Rate: 125 mL/hr  Freq: Continuous Route: IV  Start: 05/31/25 0135 End: 05/31/25 1454    0322 EN-New Bag   1454-D/C'd         dextrose in lactated ringers 5% infusion  Rate: 125 mL/hr  Freq: Continuous Route: IV  Start: 05/31/25 0315 End: 05/31/25 0313    0313-D/C'd          insulin regular human (Novolin R, Humulin R) 100 Units in sodium chloride 0.9% 100 mL standard infusion (100 mL)  Rate: 2-52 mL/hr Dose: 2-52 Units/hr  Freq: Continuous Route: IV  Last Dose: Stopped (05/31/25 1300)  Start: 05/31/25 0315 End: 05/31/25 1946   Admin Instructions:   Use hyperlink for DKA-HHS IV Titration Guidelines  Start on Base Algorithm; column 1     0330 EN/SD-New Bag   0529 EN-Rate/Dose Change   0727 EN/AK-Handoff     0930 AK-Rate/Dose Change   1300 AK-Stopped   1946-D/C'd        insulin regular human (Novolin R, Humulin R) 100 Units in sodium chloride 0.9% 100 mL standard infusion (100 mL)  Rate: 0.5-9 mL/hr Dose: 0.5-9 Units/hr  Freq: Continuous Route: IV  Last Dose: 1.5 Units/hr (05/31/25 0240)  Start: 05/31/25 0006 End: 05/31/25 0313   Admin Instructions:   **For ED use only**  Less than 100 - Off  100-119 - 0.5 units/hr  120-149 - 1 unit/hr  150-179 - 1.5 units/hr  180-209 - 2 units/hr  210-239 - 3 units/hr  240-269 - 4 units/hr  270-299 - 5 units/hr  300-329 - 6 units/hr  330-359 - 7 units/hr  Greater than or equal to  360 - 9 units/hr    0034 GG/EM-New Bag   0131 GG-Rate/Dose Change   0205 GG-Rate/Dose Change     0240 GG-Rate/Dose Change   0312 SD/GG-Handoff   0313-D/C'd        sodium chloride 0.9 % IV bolus 1,824 mL  Rate: 608 mL/hr Dose: 30 mL/kg  Weight Dosing Info: 60.8 kg  Freq: Continuous Route: IV  Last Dose: 1,824 mL (05/31/25 0213)  Start: 05/31/25 0106 End: 05/31/25 0512 0213 GG-New Bag   0320 GG-Handoff [C]   0512-D/C'd          Vitals  06/01/25 1200 97.9 °F (36.6 °C) 92 19 137/97 Abnormal  92 % -- None (Room air) -- AK   06/01/25 1100 -- 77 17 120/77 90 % -- -- -- AK   06/01/25 1000 -- 72 17 120/88 91 % -- -- -- AK   06/01/25 0900 -- 80 16 131/77 94 % -- -- -- AK   06/01/25 0800 98.9 °F (37.2 °C) 53 14 119/77 95 % -- None (Room air) -- AK   06/01/25 0700 -- 60 14 123/86 89 % Abnormal  -- -- --    06/01/25 0600 -- 60 16 124/75 80 % Abnormal  -- -- --    06/01/25 0500 -- 54 13 146/89 85 % Abnormal  -- -- --    06/01/25 0400 99.1 °F (37.3 °C) 60 12 118/77 77 % Abnormal  -- None (Room air) --    06/01/25 0300 -- 64 14 105/65 90 % -- -- --    06/01/25 0200 -- 67 20 113/69 95 % -- -- --    06/01/25 0100 -- 71 15 144/75 97 % -- -- --    06/01/25 0000 97.9 °F (36.6 °C) 54 15 103/82 94 % -- None (Room air) --    05/31/25 2300 -- 61 14 104/59 93 % -- -- --    05/31/25 2200 -- 55 15 111/68 97 % -- -- --    05/31/25 2100 -- 70 16 121/74 92 % -- -- --    05/31/25 2000 99.2 °F (37.3 °C) 70 15 118/69 95 % -- None (Room air) --    05/31/25 1900 -- 74 15 128/87 96 % -- -- -- AK   05/31/25 1800 -- 59 16 134/91 Abnormal  97 % -- -- -- AK   05/31/25 1700 -- 64 15 128/87 100 % -- -- -- AK   05/31/25 1600 97.8 °F (36.6 °C) 75 18 131/86 100 % -- None (Room air) -- AK   05/31/25 1500 -- 73 13 128/77 95 % -- -- -- AK   05/31/25 1400 -- 74 14 97/53 93 % -- -- -- AK   05/31/25 1300 -- 82 15 99/67 94 % -- -- -- AK   05/31/25 1200 98.1 °F (36.7 °C) 74 15 126/87 94 % -- None (Room air) -- AK   05/31/25 1100 -- 81 17  130/87 90 % -- -- -- AK   05/31/25 1000 -- 80 16 116/76 91 % -- -- -- AK   05/31/25 0900 -- 76 17 120/76 87 % Abnormal  -- -- -- AK   05/31/25 0800 98.7 °F (37.1 °C) 73 14 120/75 90 % -- None (Room air) -- AK   05/31/25 0600 -- 72 20 111/84 94 % -- -- -- EN   05/31/25 0500 -- 70 17 108/73 95 % -- -- -- EN   05/31/25 0400 -- 68 16 129/92 Abnormal  92 % -- -- -- EN   05/31/25 0341 -- -- -- -- -- 135 lb 12.9 oz (61.6 kg) -- -- EN   05/31/25 0319 98.9 °F (37.2 °C) 72 12 132/77 98 % 134 lb 11.2 oz (61.1 kg) None (Room air) -- SD   05/31/25 0240 -- 75 22 110/74 100 % -- None (Room air) -- GG   05/31/25 0222 -- 78 19 93/72 99 % -- None (Room air) -- GG   05/31/25 0101 97.6 °F (36.4 °C) 82 15 115/75 100 % -- None (Room air) -- GG   05/30/25 2312 -- -- -- -- -- -- None (Room air) -- GG   05/30/25 2303 -- 93 30 Abnormal  114/77 97 % 134 lb 0.6 oz (60.8 kg) None (Room air)       CIWA Scores (since admission)       Date/Time CIWA-Ar Total Who    06/02/25 0458 0 KK    06/02/25 0000 0 KK    06/01/25 2000 3 KK    06/01/25 0000 1 JM    05/31/25 2300 4 JM    05/30/25 2303 4 GG

## 2025-06-02 NOTE — PLAN OF CARE
Received patient after RN report. Alert and oriented, on RA, NSR on monitor, VSS. No c/o pain. Tolerating diet and ambulating to bathroom. Refer to flowsheets and MAR for further details.

## 2025-06-02 NOTE — CONSULTS
[facility]  Diabetes Consult Note    Blanca Coats Patient Status:  Inpatient    1980 MRN [id number]   Location [facility] 4SW-A Attending Simone Samson,    Hosp Day # 2 PCP Victor Hugo Leiva MD       Reason for Consult:   Management recommendations in setting of DKA in T1DM      Provider Requesting Consult:  Dr. Leiva (Critical care/ICU)      Diagnosis:  Problem List[1]      Medical History:  Past Medical History[2]  Past Surgical History[3]  Family History[4]      Diabetes history:  Type:  T1DM  Onset: 2023 w/ DKA admission  Family history of DM: father and brother w/ T1DM      Allergies: Allergies[5]      Medications: Complete list reviewed. Active inpatient diabetes medications include degludec and aspart.        Vital Signs:  Blood pressure 123/73, pulse 68, temperature 98.6 °F (37 °C), temperature source Temporal, resp. rate 20, weight 139 lb 12.4 oz (63.4 kg), last menstrual period 2025, SpO2 94%, not currently breastfeeding.       Review of Systems:  General: No acute distress. Alert and oriented x 3. Resting comfortably in bed  HEENT: Moist mucous membranes  Respiratory: breathing comfortably  Cardiovascular:  Regular rate   Psychiatric: Appropriate mood and affect.      Labs:  Recent Labs   Lab 25  0735 25  1129 25  1628 25  2043 25  0825   PGLU 323* 244* 122* 236* 337*     Recent Labs     25  2305 25  2320 25  0439 25  0527 25  0817 25  0825 25  1207 25  1253 25  1606 25  1651 25  0356 25  0735 25  0454 25  0825   *  --  138  140  --  136  --  136  --  133*  --  133*  --  135*  --    CL 96*  --  104  103  --  103  --  103  --  99  --  100  --  100  --    CO2 17.0*  --  20.0*  21.0  --  26.0  --  27.0  --  24.0  --  26.0  --  24.0  --    BUN 6*  --  6*  6*  --  6*  --  6*  --  6*  --  6*  --  8*  --    CREATSERUM 0.90  --  0.68  0.69  --  0.60  --  0.60  --   0.70  --  0.74  --  0.80  --    A1C 11.6*  --   --   --   --   --   --   --   --   --   --   --   --   --    PGLU  --    < >  --    < >  --    < >  --    < >  --    < >  --    < >  --  337*   CA 9.3  --  8.3*  8.4*  --  8.3*  --  8.3*  --  8.4*  --  8.9  --  8.9  --    ALB 4.9*  --  4.0  --   --   --   --   --   --   --  3.8  --  4.0  --     < > = values in this interval not displayed.                    History of Present Illness: Blanca Coats is a 45 year old female with PMH of T1DM c/b DKA (1/2023), chronic pancreatitis w/ pancreatic pseudocyst, ETOH abuse, breast CA s/p b/l mastectomy w/ chemo (2017), anxiety, depression, HTN, SVT and ETOH hepatic steatosis admitted 5/30/2025 from home with complaints of pancreatitis-like abdominal pain. ED evaluation revealed DKA (, A1C 11.6%, CO2 17, anion gap 22, pH 7.31, +urine ketones).         Assessment/Recommendations:  Upon interview today, patient states she was diagnosed with DKA in 2023 during first DKA admission; states she follows with Dr. Schoenberger (endocrinology at [facility]). Today, patient states her insulin pump ran out of insulin during an ETOH binge and believes she was without insulin for about 36 hours; of note, this is similar set of circumstances to previous DKA admissions. Reminded patient that OmniPod insulin pump only holds about 3 days worth of insulin; changing pump cartridge in imperative to staying out of DKA. Additionally, patient states OmniPod switches from auto-mode (with automatic injections based on CGM data) to manual mode (requires user to bolus additional insulin for high glucose readings) d/t consistently high glucose; patient believes this switch is due to forgetting to bolus for carbohydrate intake and subsequent high glucose readings. Reinforced need for carb counting with intake to pump for more effective pump usage.     Today, patient without pump supplies at bedside; therefore, recommended patient restart insulin pump 24  hours after last long-acting insuli (degludec) dose once discharged. Patient states she has multiple U100 aspart insulin pens to continue bolusing for correction and carb doses until pump restart. Finally, recommended patient to continue care with outpatient endocrinology for further T1DM management. Patient states understanding of and willingness to participate in plan of care.       Plan:  Inpatient recommendations -   Given lack of insulin pump supplies at bedside, recommend continuing basal/bolus insulin at:  Degludec = given elevated fasted AM glucose, recommend increasing to 24u every day   Aspart = given elevated AC/HS glucose values yesterday, recommend increasing to 1:20>140 & 1:8gm CHO  Discharge recommendations -   Restart OmniPod insulin pump at home 24 hours after last long-acting insulin (degludec) dose  Patient with U100 lispro insulin pens at home to dose meal-time correction/carb coverage until pump restart  Continue bolus insulin until pump restart:  U100 lispro = 1:20>140 + 1:8gm CHO      Prescription Recommendations:  Commercial Medicaid - [place] Medicaid  Insulin: Humalog, Levemir, Lantus   Supplies:  BD pen needles (Kasia)  Glucometer:  One Touch Ultra (Attune RTDcan)  CGM:  Dexcom G7   Insulin Pump:  OmniPod       Provider follow up recommendations:  PCP - Dr. Cande Velez (independent provider)  Endocrinology/Diabetes Center - Dr. Schoenberger ([facility] endocrinology)      A total of 60 minutes were spent with the patient, 100% was spent counseling and coordinating care for T1 diabetes self-management including nutrition, exercise, blood glucose monitoring, insulin administration, medications, treatment options, follow-up coordination and resources.       Whit Parker, APRN  6/2/2025  10:05 AM       [1]   Patient Active Problem List  Diagnosis    Community acquired pneumonia    Alcohol-induced chronic pancreatitis (HCC)    Pseudocyst of pancreas (HCC)    Splenic vein thrombosis    Pneumonia due  to infectious organism    Alcoholic intoxication with complication    Pancreas cyst (HCC)    Pancreatic necrosis (HCC)    Acute pancreatitis (HCC)    Hyponatremia    Acute on chronic pancreatitis (HCC)    Hypokalemia    Thrombocytopenia    Hyperglycemia    Alcohol-induced acute pancreatitis without infection or necrosis (HCC)    SVT (supraventricular tachycardia) (HCC)    Alcohol abuse    Alcohol-induced acute pancreatitis, unspecified complication status (HCC)    Abdominal pain, acute    Primary hypertension    Elevated liver enzymes    Anxiety and depression    Pancreatic cyst (HCC)    High anion gap metabolic acidosis    Dyspnea, unspecified type    Diabetic ketoacidosis without coma associated with type 2 diabetes mellitus (HCC)    Tachycardia    Acute respiratory failure with hypoxia (HCC)    Oral thrush    Colitis due to Escherichia coli    Diarrhea of infectious origin    Tobacco dependence    New onset type 2 diabetes mellitus (HCC)    Lung nodule    Atrial fibrillation with rapid ventricular response (HCC)    Type 1 diabetes mellitus with ketoacidosis without coma (HCC)    Chronic pancreatitis, unspecified pancreatitis type (HCC)    Alcohol withdrawal syndrome with complication (HCC)    Type 1 diabetes mellitus with hyperglycemia (HCC)    Acidosis    Alcoholic ketoacidosis    A-fib (HCC)    Hypomagnesemia    Alcohol dependence with unspecified alcohol-induced disorder (HCC)    Ketoacidosis    Alcoholic intoxication without complication    Urinary tract infection without hematuria, site unspecified   [2]   Past Medical History:   Alcohol abuse    Alcoholic (HCC)    Alcoholic ketoacidosis    Anxiety    Arrhythmia    SVT    Atrial fibrillation with RVR (HCC)    Attention deficit disorder    Breast CA (HCC)    Depression    Esophageal reflux    High blood pressure    OTHER DISEASES    seasonal allergies    Pancreatitis (HCC)    Pneumonia due to organism    SVT (supraventricular tachycardia) (HCC)    Type 1  diabetes mellitus (HCC)   [3]   Past Surgical History:  Procedure Laterality Date    Breast surgery      Implant left      Implant right      Mastectomy left      Mastectomy right      Ndsc ablation & rcnstj atria lmtd w/o bypass     [4]   Family History  Problem Relation Age of Onset    Heart Disorder Father     Diabetes Father     Hypertension Mother     Cancer Mother         breast   [5]   Allergies  Allergen Reactions    Bee Venom ANAPHYLAXIS    Morphine HIVES    Fentanyl RASH

## 2025-06-02 NOTE — DISCHARGE SUMMARY
[facility]  DISCHARGE SUMMARY     Blanca Coats Patient Status:  Inpatient    1980 MRN [id number]   Location [facility] 4SW-A Attending No att. providers found   Hosp Day # 2 PCP Victor Hugo Leiva MD     Date of Admission: 2025  Date of Discharge:  2025     Discharge Disposition: Home or Self Care    Discharge Diagnosis:  DKA  Type 1 diabetes  Staph epi bacteremia - presumed contaminant  Lactic acidosis  Leukopenia   Thrombocytopenia  Pseudohyponatremia due to hyperglycemia  Chronic pain  PAF  GERD    History of Present Illness: Blanca Coats is a 45 year old female with PMHx DM/ splenic v thrombosis alcohol abuse/ A-fib who presented to the hospital for abdominal pain. She reports she drinks about 750 mls hard liquor daily and was drunk overnight. She notes having abdominal pain in her RUQ which is sharp and rated 8/10. There are no alleviating or exacerbating factors. She denied any diarrhea or constipation. She was having nausea and emesis.     Brief Synopsis:     #DKA  #Type 1 diabetes   -Resolved  -Normally on insulin pump at home   -Currently tresiba/SSI  -Given patient received basal insulin this morning, plan for SSI post-discharge today and resume insulin pump in morning - patient verbalized understanding  -Diabetes APN following  -Endocrinology f/u as OP     #Staph epi bacteremia  -2/2 blood cultures positive, repeat negative   -Echo reviewed  -Contaminant suspected per ID  -Antibiotics stopped      #Lactic acidosis  -Resolved     #Alcohol intoxication  #Alcohol abuse with risk for withdrawal  -Naltrexona  -[facility] protocol     #Leukopenia  #Thrombocytopenia  -Leukopenia resolved      #Pseudohyponatremia secondary to hyperglycemia  -Monitor      #Anxiety  -Buspar/SSRi     #Chronic pain  -Gabapentin     #PAF  -BB  -Previously deemed poor anticoagulation candidate due to ETOH abuse     #GERD  -PPI    #Disposition  -Stable for DC home     Lace+ Score: 58  59-90 High Risk  29-58 Medium  Risk  0-28   Low Risk       TCM Follow-Up Recommendation:  LACE 29-58: Moderate Risk of readmission after discharge from the hospital.      Procedures during hospitalization:   None    Incidental or significant findings and recommendations (brief descriptions):  None    Lab/Test results pending at Discharge:   None    Consultants:  Intensivist  ID  Diabetes APN    Discharge Medication List:     Discharge Medications        CONTINUE taking these medications        Instructions Prescription details   ALPRAZolam 0.25 MG Tabs  Commonly known as: Xanax      Take 1 tablet (0.25 mg total) by mouth as needed in the morning and 1 tablet (0.25 mg total) as needed at noon and 1 tablet (0.25 mg total) as needed in the evening.   Refills: 0     amphetamine-dextroamphetamine ER 30 MG Cp24  Commonly known as: Adderall XR      Take 1 capsule (30 mg total) by mouth every morning. Take 30mg (1 capsule) by mouth once daily in the morning. Take each dose with 1 capsule of 10mg Adderall Xr for a total daily dose of 40mg Adderall Xr every morning.   Refills: 0     amphetamine-dextroamphetamine ER 10 MG Cp24  Commonly known as: Adderall XR      Take 1 capsule (10 mg total) by mouth every morning. Take 10mg (1 capsule) by mouth every morning. Take each dose with 1 capsule of 30mg Adderall Xr for a total morning dose of 40mg Adderall Xr.   Refills: 0     atorvastatin 10 MG Tabs  Commonly known as: Lipitor      Take 1 tablet (10 mg total) by mouth in the morning.   Refills: 0     busPIRone 10 MG Tabs  Commonly known as: Buspar      Take 2 tablets (20 mg total) by mouth every morning.   Refills: 0     ergocalciferol 1.25 MG (94797 UT) Caps  Commonly known as: Vitamin D2      Take 1 capsule (50,000 Units total) by mouth once a week.   Refills: 0     gabapentin 300 MG Caps  Commonly known as: Neurontin      Take 1 capsule (300 mg total) by mouth in the morning and 1 capsule (300 mg total) before bedtime.   Refills: 0     haloperidol 5 MG  Tabs  Commonly known as: Haldol      Take 1 tablet (5 mg total) by mouth at bedtime.   Refills: 0     HumaLOG KwikPen 100 UNIT/ML Sopn  Generic drug: Insulin Lispro (1 Unit Dial)      Test blood glucose 3 times daily before meals.  Inject 1 unit of insulin for every 20 points blood glucose is greater than 140 mg/dL. Inject insulin into the skin prior to meals. Inject 1 unit of insulin for every 8 grams of carbohydrates eaten. Inject within 10 minutes before or after a meal.   Refills: 0     Insulin Infusion Pump Merary       Refills: 0     metoprolol succinate  MG Tb24  Commonly known as: Toprol XL      Take 1 tablet (100 mg total) by mouth in the morning and 1 tablet (100 mg total) before bedtime. Take half tablet if blood pressure less than 130/80.   Refills: 0     montelukast 10 MG Tabs  Commonly known as: Singulair      Take 1 tablet (10 mg total) by mouth in the morning.   Refills: 0     Naloxone HCl 4 MG/0.1ML Liqd      4 mg by Nasal route as needed. If patient remains unresponsive, repeat dose in other nostril 2-5 minutes after first dose.   Quantity: 1 kit  Refills: 0     naltrexone 50 MG Tabs  Commonly known as: ReVia      Take 1 tablet (50 mg total) by mouth in the morning.   Refills: 0     ondansetron 4 MG Tbdp  Commonly known as: Zofran-ODT      Take 1 tablet (4 mg total) by mouth every 8 (eight) hours as needed for Nausea.   Quantity: 20 tablet  Refills: 0     pantoprazole 40 MG Tbec  Commonly known as: Protonix      Take 1 tablet (40 mg total) by mouth 2 (two) times daily before meals.   Quantity: 60 tablet  Refills: 0     sertraline 100 MG Tabs  Commonly known as: Zoloft      Take 2 tablets (200 mg total) by mouth in the morning.   Refills: 0     thiamine 100 MG Tabs  Commonly known as: Vitamin B1      Take 1 tablet (100 mg total) by mouth daily.   Quantity: 30 tablet  Refills: 0     traMADol 50 MG Tabs  Commonly known as: Ultram      Take 1 tablet (50 mg total) by mouth every 4 (four) hours as  needed for Pain.   Refills: 0     traZODone 100 MG Tabs  Commonly known as: Desyrel      Take 2 tablets (200 mg total) by mouth nightly.   Refills: 0              [place]    Follow-up appointment:   Victor Hugo Leiva MD  [address]  [place] [address]  [phone]    Schedule an appointment as soon as possible for a visit in 1 week(s)      Appointments for Next 30 Days 2025 - 2025      None            Vital signs:  Temp:  [98.2 °F (36.8 °C)-98.8 °F (37.1 °C)] 98.6 °F (37 °C)  Pulse:  [] 74  Resp:  [14-25] 20  BP: (117-131)/(73-83) 123/73  SpO2:  [91 %-96 %] 94 %    Physical Exam:    General: No acute distress   Lungs: clear to auscultation  Cardiovascular: S1, S2  Abdomen: Soft    -----------------------------------------------------------------------------------------------  PATIENT DISCHARGE INSTRUCTIONS: See electronic chart    Simone Samson DO    Total time spent on discharge plannin minutes     The  [place] Cures Act makes medical notes like these available to patients in the interest of transparency. Please be advised this is a medical document. Medical documents are intended to carry relevant information, facts as evident, and the clinical opinion of the practitioner. The medical note is intended as peer to peer communication and may appear blunt or direct. It is written in medical language and may contain abbreviations or verbiage that are unfamiliar.

## 2025-06-02 NOTE — PAYOR COMM NOTE
--------------  DISCHARGE REVIEW    Payor: TIA  Subscriber #:  [id number]  Authorization Number: [id number]    Admit date: 25  Admit time:   3:06 AM  Discharge Date: 2025 12:14 PM     Admitting Physician: Amira Latif DO  Attending Physician:  No att. providers found  Primary Care Physician: Victor Hugo Leiva MD          Discharge Summary Notes        Discharge Summary signed by Simone Samson DO at 2025  4:21 PM       Author: Simone Samson DO Specialty: HOSPITALIST Author Type: Physician    Filed: 2025  4:21 PM Date of Service: 2025  4:16 PM Status: Signed    : Simone Samson DO (Physician)           [facility]     Blanca Coats Patient Status:  Inpatient    1980 MRN [id number]   Location [facility] 4SW-A Attending No att. providers found   Hosp Day # 2 PCP Victor Hugo Leiva MD     Date of Admission: 2025  Date of Discharge:  2025     Discharge Disposition: Home or Self Care    Discharge Diagnosis:  DKA  Type 1 diabetes  Staph epi bacteremia - presumed contaminant  Lactic acidosis  Leukopenia   Thrombocytopenia  Pseudohyponatremia due to hyperglycemia  Chronic pain  PAF  GERD    History of Present Illness: Blanca Coats is a 45 year old female with PMHx DM/ splenic v thrombosis alcohol abuse/ A-fib who presented to the hospital for abdominal pain. She reports she drinks about 750 mls hard liquor daily and was drunk overnight. She notes having abdominal pain in her RUQ which is sharp and rated 8/10. There are no alleviating or exacerbating factors. She denied any diarrhea or constipation. She was having nausea and emesis.     Brief Synopsis:     #DKA  #Type 1 diabetes   -Resolved  -Normally on insulin pump at home   -Currently tresiba/SSI  -Given patient received basal insulin this morning, plan for SSI post-discharge today and resume insulin pump in morning - patient verbalized understanding  -Diabetes APN following  -Endocrinology f/u  as OP     #Staph epi bacteremia  -2/2 blood cultures positive, repeat negative   -Echo reviewed  -Contaminant suspected per ID  -Antibiotics stopped      #Lactic acidosis  -Resolved     #Alcohol intoxication  #Alcohol abuse with risk for withdrawal  -Naltrexona  -[facility] protocol     #Leukopenia  #Thrombocytopenia  -Leukopenia resolved      #Pseudohyponatremia secondary to hyperglycemia  -Monitor      #Anxiety  -Buspar/SSRi     #Chronic pain  -Gabapentin     #PAF  -BB  -Previously deemed poor anticoagulation candidate due to ETOH abuse     #GERD  -PPI    #Disposition  -Stable for DC home     Lace+ Score: 58  59-90 High Risk  29-58 Medium Risk  0-28   Low Risk       TCM Follow-Up Recommendation:  LACE 29-58: Moderate Risk of readmission after discharge from the hospital.      Procedures during hospitalization:   None    Incidental or significant findings and recommendations (brief descriptions):  None    Lab/Test results pending at Discharge:   None    Consultants:  Intensivist  ID  Diabetes APN    Discharge Medication List:     Discharge Medications        CONTINUE taking these medications        Instructions Prescription details   ALPRAZolam 0.25 MG Tabs  Commonly known as: Xanax      Take 1 tablet (0.25 mg total) by mouth as needed in the morning and 1 tablet (0.25 mg total) as needed at noon and 1 tablet (0.25 mg total) as needed in the evening.   Refills: 0     amphetamine-dextroamphetamine ER 30 MG Cp24  Commonly known as: Adderall XR      Take 1 capsule (30 mg total) by mouth every morning. Take 30mg (1 capsule) by mouth once daily in the morning. Take each dose with 1 capsule of 10mg Adderall Xr for a total daily dose of 40mg Adderall Xr every morning.   Refills: 0     amphetamine-dextroamphetamine ER 10 MG Cp24  Commonly known as: Adderall XR      Take 1 capsule (10 mg total) by mouth every morning. Take 10mg (1 capsule) by mouth every morning. Take each dose with 1 capsule of 30mg Adderall Xr for a total  morning dose of 40mg Adderall Xr.   Refills: 0     atorvastatin 10 MG Tabs  Commonly known as: Lipitor      Take 1 tablet (10 mg total) by mouth in the morning.   Refills: 0     busPIRone 10 MG Tabs  Commonly known as: Buspar      Take 2 tablets (20 mg total) by mouth every morning.   Refills: 0     ergocalciferol 1.25 MG (12145 UT) Caps  Commonly known as: Vitamin D2      Take 1 capsule (50,000 Units total) by mouth once a week.   Refills: 0     gabapentin 300 MG Caps  Commonly known as: Neurontin      Take 1 capsule (300 mg total) by mouth in the morning and 1 capsule (300 mg total) before bedtime.   Refills: 0     haloperidol 5 MG Tabs  Commonly known as: Haldol      Take 1 tablet (5 mg total) by mouth at bedtime.   Refills: 0     HumaLOG KwikPen 100 UNIT/ML Sopn  Generic drug: Insulin Lispro (1 Unit Dial)      Test blood glucose 3 times daily before meals.  Inject 1 unit of insulin for every 20 points blood glucose is greater than 140 mg/dL. Inject insulin into the skin prior to meals. Inject 1 unit of insulin for every 8 grams of carbohydrates eaten. Inject within 10 minutes before or after a meal.   Refills: 0     Insulin Infusion Pump Merary       Refills: 0     metoprolol succinate  MG Tb24  Commonly known as: Toprol XL      Take 1 tablet (100 mg total) by mouth in the morning and 1 tablet (100 mg total) before bedtime. Take half tablet if blood pressure less than 130/80.   Refills: 0     montelukast 10 MG Tabs  Commonly known as: Singulair      Take 1 tablet (10 mg total) by mouth in the morning.   Refills: 0     Naloxone HCl 4 MG/0.1ML Liqd      4 mg by Nasal route as needed. If patient remains unresponsive, repeat dose in other nostril 2-5 minutes after first dose.   Quantity: 1 kit  Refills: 0     naltrexone 50 MG Tabs  Commonly known as: ReVia      Take 1 tablet (50 mg total) by mouth in the morning.   Refills: 0     ondansetron 4 MG Tbdp  Commonly known as: Zofran-ODT      Take 1 tablet (4 mg  total) by mouth every 8 (eight) hours as needed for Nausea.   Quantity: 20 tablet  Refills: 0     pantoprazole 40 MG Tbec  Commonly known as: Protonix      Take 1 tablet (40 mg total) by mouth 2 (two) times daily before meals.   Quantity: 60 tablet  Refills: 0     sertraline 100 MG Tabs  Commonly known as: Zoloft      Take 2 tablets (200 mg total) by mouth in the morning.   Refills: 0     thiamine 100 MG Tabs  Commonly known as: Vitamin B1      Take 1 tablet (100 mg total) by mouth daily.   Quantity: 30 tablet  Refills: 0     traMADol 50 MG Tabs  Commonly known as: Ultram      Take 1 tablet (50 mg total) by mouth every 4 (four) hours as needed for Pain.   Refills: 0     traZODone 100 MG Tabs  Commonly known as: Desyrel      Take 2 tablets (200 mg total) by mouth nightly.   Refills: 0              [place]    Follow-up appointment:   Victor Hugo Leiva MD  [address]  [facility] [address]  [phone]    Schedule an appointment as soon as possible for a visit in 1 week(s)      Appointments for Next 30 Days 2025 - 2025      None            Vital signs:  Temp:  [98.2 °F (36.8 °C)-98.8 °F (37.1 °C)] 98.6 °F (37 °C)  Pulse:  [] 74  Resp:  [14-25] 20  BP: (117-131)/(73-83) 123/73  SpO2:  [91 %-96 %] 94 %    Physical Exam:    General: No acute distress   Lungs: clear to auscultation  Cardiovascular: S1, S2  Abdomen: Soft    -----------------------------------------------------------------------------------------------  PATIENT DISCHARGE INSTRUCTIONS: See electronic chart    Simone Samson DO    Total time spent on discharge plannin minutes     The 21st Century Cures Act makes medical notes like these available to patients in the interest of transparency. Please be advised this is a medical document. Medical documents are intended to carry relevant information, facts as evident, and the clinical opinion of the practitioner. The medical note is intended as peer to peer communication  and may appear blunt or direct. It is written in medical language and may contain abbreviations or verbiage that are unfamiliar.       Electronically signed by Simone Samson DO on 6/2/2025  4:21 PM         REVIEWER COMMENTS

## 2025-06-02 NOTE — PROGRESS NOTES
INFECTIOUS DISEASE  PROGRESS NOTE            [facility]    Blanca Coats Patient Status:  Inpatient    1980 MRN [id number]   [facility] 4SW-A Attending Simone Samson DO   Hosp Day # 2 PCP Victor Hugo Leiva MD         Subjective:  : no fever    Objective:  Temp:  [97.9 °F (36.6 °C)-99.1 °F (37.3 °C)] 98.6 °F (37 °C)  Pulse:  [] 65  Resp:  [14] 22  BP: (117-138)/(72-97) 123/73  SpO2:  [91 %-99 %] 93 %    Vital signs:Temp:  [97.9 °F (36.6 °C)-99.1 °F (37.3 °C)] 98.6 °F (37 °C)  Pulse:  [] 65  Resp:  [14-25] 22  BP: (117-138)/(72-97) 123/73  SpO2:  [91 %-99 %] 93 %  HEENT: Moist mucous membranes. Extraocular muscles are intact.  Neck: supple no masses  Respiratory: Non labored, symmetric excursion, normal respirations  Cardiovascular: no irregularities in rhythm  Abdomen: Soft, nontender, nondistended.   Musculoskeletal: joints: no swelling   Integument: No lesions. No erythema. No open wounds.  Labs:     COVID-19 Lab Results    COVID-19  Lab Results   Component Value Date    COVID19 Not Detected 2025    COVID19 Not Detected 2024    COVID19 Not Detected 2023       Pro-Calcitonin  Recent Labs   Lab 25  0439   PCT 0.04       Cardiac  No results for input(s): \"TROP\", \"PBNP\" in the last 168 hours.    Creatinine Kinase  No results for input(s): \"CK\" in the last 168 hours.    Inflammatory Markers  No results for input(s): \"CRP\", \"JUAN\", \"LDH\", \"DDIMER\" in the last 168 hours.    Recent Labs   Lab 25  0454   RBC 4.16   HGB 13.1   HCT 36.4   MCV 87.5   MCH 31.5   MCHC 36.0   RDW 13.2   NEPRELIM 3.08   WBC 4.8   .0*         Recent Labs   Lab 25  0439 25  0817 25  1606 25  0356 25  0454   *  158*   < > 243* 301* 285*   BUN 6*  6*   < > 6* 6* 8*   CREATSERUM 0.68  0.69   < > 0.70 0.74 0.80   CA 8.3*  8.4*   < > 8.4* 8.9 8.9   ALB 4.0  --   --  3.8 4.0     140   < > 133* 133* 135*   K 3.7  3.7   < > 4.3  4.7 4.0     103   < > 99 100 100   CO2 20.0*  21.0   < > 24.0 26.0 24.0   ALKPHO 72  --   --  70 69   AST 22  --   --  42* 22   ALT 17  --   --  19 14   BILT 0.4  --   --  1.4* 0.9   TP 6.3  --   --  6.0 6.3    < > = values in this interval not displayed.       No results found for: \"VANCT\"  Microbiology    Hospital Encounter on 05/30/25   1. Blood Culture     Status: None (Preliminary result)    Collection Time: 05/31/25 11:19 PM    Specimen: Blood,peripheral   Result Value Ref Range    Blood Culture Result No Growth 1 Day N/A   2. Urine Culture, Routine     Status: None    Collection Time: 05/31/25 12:39 PM    Specimen: Urine, clean catch   Result Value Ref Range    Urine Culture No Growth at 18-24 hrs. N/A         Problem list reviewed:  Problem List[1]          ASSESSMENT/PLAN:  1. Staph epi in 2 blood cultures drawn simultaneously on 5/31  Repeat cultures no growth  No central line or intravascular devices  -suggestive of contaminants  DC planning per hospitalist  Will sign offf      Brock Yañez MD, MD [facility]  [phone]         [1]   Patient Active Problem List  Diagnosis    Community acquired pneumonia    Alcohol-induced chronic pancreatitis (HCC)    Pseudocyst of pancreas (HCC)    Splenic vein thrombosis    Pneumonia due to infectious organism    Alcoholic intoxication with complication    Pancreas cyst (HCC)    Pancreatic necrosis (HCC)    Acute pancreatitis (HCC)    Hyponatremia    Acute on chronic pancreatitis (HCC)    Hypokalemia    Thrombocytopenia    Hyperglycemia    Alcohol-induced acute pancreatitis without infection or necrosis (HCC)    SVT (supraventricular tachycardia) (HCC)    Alcohol abuse    Alcohol-induced acute pancreatitis, unspecified complication status (HCC)    Abdominal pain, acute    Primary hypertension    Elevated liver enzymes    Anxiety and depression    Pancreatic cyst (HCC)    High anion gap metabolic acidosis    Dyspnea,  unspecified type    Diabetic ketoacidosis without coma associated with type 2 diabetes mellitus (HCC)    Tachycardia    Acute respiratory failure with hypoxia (HCC)    Oral thrush    Colitis due to Escherichia coli    Diarrhea of infectious origin    Tobacco dependence    New onset type 2 diabetes mellitus (HCC)    Lung nodule    Atrial fibrillation with rapid ventricular response (HCC)    Type 1 diabetes mellitus with ketoacidosis without coma (HCC)    Chronic pancreatitis, unspecified pancreatitis type (HCC)    Alcohol withdrawal syndrome with complication (HCC)    Type 1 diabetes mellitus with hyperglycemia (HCC)    Acidosis    Alcoholic ketoacidosis    A-fib (HCC)    Hypomagnesemia    Alcohol dependence with unspecified alcohol-induced disorder (HCC)    Ketoacidosis    Alcoholic intoxication without complication    Urinary tract infection without hematuria, site unspecified

## 2025-06-02 NOTE — PLAN OF CARE
Pt received on room air. Denies pain  Reports mild anxiety- ativan given x1 per M. Rachael, aprn  Ciwa <7.   Pt updated on plan of care  See mar/see flowsheets

## 2025-06-02 NOTE — BH PROGRESS NOTE
Received consult for [facility]/alcohol follow up. Pt SCOOTER w/ BONITA. Placed consult for Ladora .
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
## 2025-06-03 ENCOUNTER — PATIENT OUTREACH (OUTPATIENT)
Dept: CASE MANAGEMENT | Age: 45
End: 2025-06-03

## 2025-06-03 LAB
B-HYDROXYBUTYRATE: 1.4 MMOL/L
B-HYDROXYBUTYRATE: 1.4 MMOL/L

## 2025-06-03 NOTE — PROGRESS NOTES
Hospital follow up.    Last A1C Value: 11.6% Date: [5/30/2025]    Jennifer Schoenberger, DO  Endocrinology  [address]  [address] [place] [id number]  [phone]    Victor Hugo Leiva M.D.  Internal Medicine  [id number] [place] [address]  [phone]    Attempt #1:  Left message on voicemail for patient to call transitions specialist back to schedule follow up appointments. Provided Transitions specialist scheduling phone number [phone].

## 2025-06-04 NOTE — PROGRESS NOTES
Hospital follow up.    Last A1C Value: 11.6% Date: [5/30/2025]    Jennifer Schoenberger, DO  Endocrinology  [address]  [address]  [place] [address] [phone]  Patient will contact the office to schedule.    Victor Hugo Leiva M.D.  Internal Medicine  [address] [address] [phone]  Patient will contact the office to schedule.    Confirmed with patient.    Closing encounter.

## 2025-06-04 NOTE — PLAN OF CARE
Received page from Micro lab that patient's blood cultures had updated critical result. Blood cultures initially only grew Staph epidermidis but are now growing gram positive rods as well.  Per chart review, ID believed blood sample was likely contaminated. Spoke with pharmacy about recommendations and they agree that sample was likely contaminated and there is no further work up needed.

## 2025-06-07 LAB — BACTERIA BLD CULT: POSITIVE

## 2025-06-24 ENCOUNTER — APPOINTMENT (OUTPATIENT)
Dept: GENERAL RADIOLOGY | Facility: HOSPITAL | Age: 45
End: 2025-06-24
Attending: EMERGENCY MEDICINE
Payer: MEDICAID

## 2025-06-24 ENCOUNTER — HOSPITAL ENCOUNTER (OUTPATIENT)
Facility: HOSPITAL | Age: 45
Discharge: LEFT BEFORE DISCHARGE | End: 2025-06-25
Attending: EMERGENCY MEDICINE | Admitting: STUDENT IN AN ORGANIZED HEALTH CARE EDUCATION/TRAINING PROGRAM
Payer: MEDICAID

## 2025-06-24 ENCOUNTER — APPOINTMENT (OUTPATIENT)
Dept: CT IMAGING | Facility: HOSPITAL | Age: 45
End: 2025-06-24
Attending: EMERGENCY MEDICINE
Payer: MEDICAID

## 2025-06-24 DIAGNOSIS — E87.20 LACTIC ACIDEMIA: Primary | ICD-10-CM

## 2025-06-24 LAB
ALBUMIN SERPL-MCNC: 4.7 G/DL (ref 3.2–4.8)
ALBUMIN/GLOB SERPL: 1.6 {RATIO} (ref 1–2)
ALP LIVER SERPL-CCNC: 84 U/L (ref 37–98)
ALT SERPL-CCNC: 19 U/L (ref 10–49)
ANION GAP SERPL CALC-SCNC: 22 MMOL/L (ref 0–18)
AST SERPL-CCNC: 24 U/L (ref ?–34)
BASE EXCESS BLDV CALC-SCNC: -1.4 MMOL/L
BASOPHILS # BLD AUTO: 0.06 X10(3) UL (ref 0–0.2)
BASOPHILS NFR BLD AUTO: 0.6 %
BILIRUB SERPL-MCNC: 0.6 MG/DL (ref 0.3–1.2)
BILIRUB UR QL STRIP.AUTO: NEGATIVE
BUN BLD-MCNC: 9 MG/DL (ref 9–23)
CALCIUM BLD-MCNC: 9.6 MG/DL (ref 8.7–10.6)
CHLORIDE SERPL-SCNC: 97 MMOL/L (ref 98–112)
CLARITY UR REFRACT.AUTO: CLEAR
CO2 SERPL-SCNC: 18 MMOL/L (ref 21–32)
COLOR UR AUTO: COLORLESS
CREAT BLD-MCNC: 0.87 MG/DL (ref 0.55–1.02)
EGFRCR SERPLBLD CKD-EPI 2021: 85 ML/MIN/1.73M2 (ref 60–?)
EOSINOPHIL # BLD AUTO: 0.01 X10(3) UL (ref 0–0.7)
EOSINOPHIL NFR BLD AUTO: 0.1 %
ERYTHROCYTE [DISTWIDTH] IN BLOOD BY AUTOMATED COUNT: 13.8 %
ETHANOL SERPL-MCNC: 249 MG/DL (ref ?–3)
GLOBULIN PLAS-MCNC: 2.9 G/DL (ref 2–3.5)
GLUCOSE BLD-MCNC: 203 MG/DL (ref 70–99)
GLUCOSE BLD-MCNC: 210 MG/DL (ref 70–99)
GLUCOSE UR STRIP.AUTO-MCNC: NORMAL MG/DL
HCO3 BLDV-SCNC: 22.9 MEQ/L (ref 22–26)
HCT VFR BLD AUTO: 45.1 % (ref 35–48)
HGB BLD-MCNC: 15.5 G/DL (ref 12–16)
IMM GRANULOCYTES # BLD AUTO: 0.04 X10(3) UL (ref 0–1)
IMM GRANULOCYTES NFR BLD: 0.4 %
KETONES UR STRIP.AUTO-MCNC: 80 MG/DL
LACTATE BLD-SCNC: 9.3 MMOL/L (ref 0.5–2)
LACTATE SERPL-SCNC: 7.5 MMOL/L (ref 0.5–2)
LEUKOCYTE ESTERASE UR QL STRIP.AUTO: NEGATIVE
LIPASE SERPL-CCNC: 18 U/L (ref 12–53)
LYMPHOCYTES # BLD AUTO: 2.99 X10(3) UL (ref 1–4)
LYMPHOCYTES NFR BLD AUTO: 28.6 %
MCH RBC QN AUTO: 30.8 PG (ref 26–34)
MCHC RBC AUTO-ENTMCNC: 34.4 G/DL (ref 31–37)
MCV RBC AUTO: 89.7 FL (ref 80–100)
MONOCYTES # BLD AUTO: 0.62 X10(3) UL (ref 0.1–1)
MONOCYTES NFR BLD AUTO: 5.9 %
NEUTROPHILS # BLD AUTO: 6.72 X10 (3) UL (ref 1.5–7.7)
NEUTROPHILS # BLD AUTO: 6.72 X10(3) UL (ref 1.5–7.7)
NEUTROPHILS NFR BLD AUTO: 64.4 %
NITRITE UR QL STRIP.AUTO: NEGATIVE
OSMOLALITY SERPL CALC.SUM OF ELEC: 289 MOSM/KG (ref 275–295)
OXYHGB MFR BLDV: 64.4 % (ref 72–78)
PCO2 BLDV: 26 MM HG (ref 38–50)
PH BLDV: 7.5 [PH] (ref 7.33–7.43)
PH UR STRIP.AUTO: 5 [PH] (ref 5–8)
PLATELET # BLD AUTO: 242 10(3)UL (ref 150–450)
PO2 BLDV: 35 MM HG (ref 30–50)
POTASSIUM SERPL-SCNC: 4.3 MMOL/L (ref 3.5–5.1)
PROT SERPL-MCNC: 7.6 G/DL (ref 5.7–8.2)
PROT UR STRIP.AUTO-MCNC: NEGATIVE MG/DL
RBC # BLD AUTO: 5.03 X10(6)UL (ref 3.8–5.3)
RBC UR QL AUTO: NEGATIVE
SODIUM SERPL-SCNC: 137 MMOL/L (ref 136–145)
SP GR UR STRIP.AUTO: >1.03 (ref 1–1.03)
TROPONIN I SERPL HS-MCNC: 3 NG/L (ref ?–34)
UROBILINOGEN UR STRIP.AUTO-MCNC: NORMAL MG/DL
WBC # BLD AUTO: 10.4 X10(3) UL (ref 4–11)

## 2025-06-24 PROCEDURE — 74174 CTA ABD&PLVS W/CONTRAST: CPT | Performed by: EMERGENCY MEDICINE

## 2025-06-24 PROCEDURE — 71045 X-RAY EXAM CHEST 1 VIEW: CPT | Performed by: EMERGENCY MEDICINE

## 2025-06-24 PROCEDURE — 99215 OFFICE O/P EST HI 40 MIN: CPT | Performed by: STUDENT IN AN ORGANIZED HEALTH CARE EDUCATION/TRAINING PROGRAM

## 2025-06-24 RX ORDER — HYDROMORPHONE HYDROCHLORIDE 1 MG/ML
0.5 INJECTION, SOLUTION INTRAMUSCULAR; INTRAVENOUS; SUBCUTANEOUS ONCE
Refills: 0 | Status: COMPLETED | OUTPATIENT
Start: 2025-06-24 | End: 2025-06-24

## 2025-06-24 RX ORDER — MELATONIN
100 ONCE
Status: COMPLETED | OUTPATIENT
Start: 2025-06-24 | End: 2025-06-24

## 2025-06-24 RX ORDER — ONDANSETRON 2 MG/ML
4 INJECTION INTRAMUSCULAR; INTRAVENOUS ONCE
Status: COMPLETED | OUTPATIENT
Start: 2025-06-24 | End: 2025-06-24

## 2025-06-25 VITALS
HEART RATE: 86 BPM | DIASTOLIC BLOOD PRESSURE: 84 MMHG | OXYGEN SATURATION: 97 % | RESPIRATION RATE: 18 BRPM | SYSTOLIC BLOOD PRESSURE: 132 MMHG | WEIGHT: 143.31 LBS | TEMPERATURE: 99 F | BODY MASS INDEX: 23 KG/M2

## 2025-06-25 LAB
GLUCOSE BLD-MCNC: 246 MG/DL (ref 70–99)
LACTATE SERPL-SCNC: 4.2 MMOL/L (ref 0.5–2)
LACTATE SERPL-SCNC: 5.6 MMOL/L (ref 0.5–2)

## 2025-06-25 RX ORDER — DOXEPIN HYDROCHLORIDE 50 MG/1
1 CAPSULE ORAL DAILY
Status: DISCONTINUED | OUTPATIENT
Start: 2025-06-25 | End: 2025-06-25

## 2025-06-25 RX ORDER — NALTREXONE HYDROCHLORIDE 50 MG/1
50 TABLET, FILM COATED ORAL DAILY
Status: DISCONTINUED | OUTPATIENT
Start: 2025-06-25 | End: 2025-06-25

## 2025-06-25 RX ORDER — ECHINACEA PURPUREA EXTRACT 125 MG
1 TABLET ORAL
Status: DISCONTINUED | OUTPATIENT
Start: 2025-06-25 | End: 2025-06-25

## 2025-06-25 RX ORDER — ONDANSETRON 2 MG/ML
4 INJECTION INTRAMUSCULAR; INTRAVENOUS EVERY 6 HOURS PRN
Status: DISCONTINUED | OUTPATIENT
Start: 2025-06-25 | End: 2025-06-25

## 2025-06-25 RX ORDER — KETOROLAC TROMETHAMINE 30 MG/ML
30 INJECTION, SOLUTION INTRAMUSCULAR; INTRAVENOUS EVERY 6 HOURS PRN
Status: DISCONTINUED | OUTPATIENT
Start: 2025-06-25 | End: 2025-06-25

## 2025-06-25 RX ORDER — DEXTROSE MONOHYDRATE 25 G/50ML
50 INJECTION, SOLUTION INTRAVENOUS
Status: DISCONTINUED | OUTPATIENT
Start: 2025-06-25 | End: 2025-06-25

## 2025-06-25 RX ORDER — ENOXAPARIN SODIUM 100 MG/ML
40 INJECTION SUBCUTANEOUS DAILY
Status: DISCONTINUED | OUTPATIENT
Start: 2025-06-25 | End: 2025-06-25

## 2025-06-25 RX ORDER — TRAZODONE HYDROCHLORIDE 100 MG/1
200 TABLET ORAL NIGHTLY
Status: DISCONTINUED | OUTPATIENT
Start: 2025-06-25 | End: 2025-06-25

## 2025-06-25 RX ORDER — MONTELUKAST SODIUM 10 MG/1
10 TABLET ORAL DAILY
Status: DISCONTINUED | OUTPATIENT
Start: 2025-06-25 | End: 2025-06-25

## 2025-06-25 RX ORDER — NICOTINE POLACRILEX 4 MG
15 LOZENGE BUCCAL
Status: DISCONTINUED | OUTPATIENT
Start: 2025-06-25 | End: 2025-06-25

## 2025-06-25 RX ORDER — ATORVASTATIN CALCIUM 10 MG/1
10 TABLET, FILM COATED ORAL DAILY
Status: DISCONTINUED | OUTPATIENT
Start: 2025-06-25 | End: 2025-06-25

## 2025-06-25 RX ORDER — TRAMADOL HYDROCHLORIDE 50 MG/1
50 TABLET ORAL EVERY 4 HOURS PRN
Status: DISCONTINUED | OUTPATIENT
Start: 2025-06-25 | End: 2025-06-25

## 2025-06-25 RX ORDER — GABAPENTIN 300 MG/1
300 CAPSULE ORAL 2 TIMES DAILY
Status: DISCONTINUED | OUTPATIENT
Start: 2025-06-25 | End: 2025-06-25

## 2025-06-25 RX ORDER — METOPROLOL SUCCINATE 50 MG/1
100 TABLET, EXTENDED RELEASE ORAL
Status: DISCONTINUED | OUTPATIENT
Start: 2025-06-25 | End: 2025-06-25

## 2025-06-25 RX ORDER — HALOPERIDOL 5 MG/1
5 TABLET ORAL NIGHTLY
Status: DISCONTINUED | OUTPATIENT
Start: 2025-06-25 | End: 2025-06-25

## 2025-06-25 RX ORDER — BUSPIRONE HYDROCHLORIDE 10 MG/1
20 TABLET ORAL EVERY MORNING
Status: DISCONTINUED | OUTPATIENT
Start: 2025-06-25 | End: 2025-06-25

## 2025-06-25 RX ORDER — KETOROLAC TROMETHAMINE 15 MG/ML
15 INJECTION, SOLUTION INTRAMUSCULAR; INTRAVENOUS EVERY 6 HOURS PRN
Status: DISCONTINUED | OUTPATIENT
Start: 2025-06-25 | End: 2025-06-25

## 2025-06-25 RX ORDER — PROCHLORPERAZINE EDISYLATE 5 MG/ML
5 INJECTION INTRAMUSCULAR; INTRAVENOUS EVERY 8 HOURS PRN
Status: DISCONTINUED | OUTPATIENT
Start: 2025-06-25 | End: 2025-06-25

## 2025-06-25 RX ORDER — SENNOSIDES 8.6 MG
17.2 TABLET ORAL NIGHTLY PRN
Status: DISCONTINUED | OUTPATIENT
Start: 2025-06-25 | End: 2025-06-25

## 2025-06-25 RX ORDER — POLYETHYLENE GLYCOL 3350 17 G/17G
17 POWDER, FOR SOLUTION ORAL DAILY PRN
Status: DISCONTINUED | OUTPATIENT
Start: 2025-06-25 | End: 2025-06-25

## 2025-06-25 RX ORDER — NICOTINE POLACRILEX 4 MG
30 LOZENGE BUCCAL
Status: DISCONTINUED | OUTPATIENT
Start: 2025-06-25 | End: 2025-06-25

## 2025-06-25 RX ORDER — LORAZEPAM 1 MG/1
2 TABLET ORAL
Status: DISCONTINUED | OUTPATIENT
Start: 2025-06-25 | End: 2025-06-25

## 2025-06-25 RX ORDER — LORAZEPAM 1 MG/1
1 TABLET ORAL
Status: DISCONTINUED | OUTPATIENT
Start: 2025-06-25 | End: 2025-06-25

## 2025-06-25 RX ORDER — BENZONATATE 100 MG/1
200 CAPSULE ORAL 3 TIMES DAILY PRN
Status: DISCONTINUED | OUTPATIENT
Start: 2025-06-25 | End: 2025-06-25

## 2025-06-25 RX ORDER — MELATONIN
100 DAILY
Status: DISCONTINUED | OUTPATIENT
Start: 2025-06-26 | End: 2025-06-25

## 2025-06-25 RX ORDER — ACETAMINOPHEN 500 MG
500 TABLET ORAL EVERY 4 HOURS PRN
Status: DISCONTINUED | OUTPATIENT
Start: 2025-06-25 | End: 2025-06-25

## 2025-06-25 RX ORDER — BISACODYL 10 MG
10 SUPPOSITORY, RECTAL RECTAL
Status: DISCONTINUED | OUTPATIENT
Start: 2025-06-25 | End: 2025-06-25

## 2025-06-25 RX ORDER — SODIUM CHLORIDE 9 MG/ML
INJECTION, SOLUTION INTRAVENOUS CONTINUOUS
Status: DISCONTINUED | OUTPATIENT
Start: 2025-06-25 | End: 2025-06-25

## 2025-06-25 RX ORDER — SODIUM PHOSPHATE, DIBASIC AND SODIUM PHOSPHATE, MONOBASIC 7; 19 G/230ML; G/230ML
1 ENEMA RECTAL ONCE AS NEEDED
Status: DISCONTINUED | OUTPATIENT
Start: 2025-06-25 | End: 2025-06-25

## 2025-06-25 RX ORDER — PANTOPRAZOLE SODIUM 40 MG/1
40 TABLET, DELAYED RELEASE ORAL
Status: DISCONTINUED | OUTPATIENT
Start: 2025-06-25 | End: 2025-06-25

## 2025-06-25 RX ORDER — FOLIC ACID 1 MG/1
1 TABLET ORAL DAILY
Status: DISCONTINUED | OUTPATIENT
Start: 2025-06-25 | End: 2025-06-25

## 2025-06-25 NOTE — ED PROVIDER NOTES
Patient Seen in: [facility] Emergency Department        History  Chief Complaint   Patient presents with    Chest Pain Angina    Difficulty Breathing     Stated Complaint:     Subjective:   HPI            45-year-old female past medical history as below presents with abdominal pain, nausea and vomiting.  History of alcohol abuse and does endorse drinking yesterday although states has not been drinking today.  Epigastric abdominal pain with associated nausea and vomiting today.  Additionally chest pain in the center of her chest which she feels like radiates from her abdomen predominantly.  Additionally history of diabetes and DKA and feels like she may be going into DKA.      Objective:     Past Medical History:    Alcohol abuse    Alcoholic (HCC)    Alcoholic ketoacidosis    Anxiety    Arrhythmia    SVT    Atrial fibrillation with RVR (HCC)    Attention deficit disorder    Breast CA (HCC)    Depression    Esophageal reflux    High blood pressure    OTHER DISEASES    seasonal allergies    Pancreatitis (HCC)    Pneumonia due to organism    SVT (supraventricular tachycardia) (HCC)    Type 1 diabetes mellitus (HCC)              Past Surgical History:   Procedure Laterality Date    Breast surgery      Implant left      Implant right      Mastectomy left      Mastectomy right      Ndsc ablation & rcnstj atria lmtd w/o bypass                  No pertinent social history.                              Physical Exam    ED Triage Vitals [06/24/25 1928]   /74   Pulse 108   Resp 18   Temp    Temp src    SpO2 100 %   O2 Device None (Room air)       Current Vitals:   Vital Signs  BP: 128/83  Pulse: 78  Resp: 15  MAP (mmHg): 95    Oxygen Therapy  SpO2: 100 %  O2 Device: None (Room air)            Physical Exam  Constitutional:       General: Is not in acute distress.     Appearance: Is not ill-appearing.   HENT:      Head: Normocephalic and atraumatic.      Mouth/Throat:      Mouth: Mucous membranes are moist.   Eyes:       Conjunctiva/sclera: Conjunctivae normal.      Pupils: Pupils are equal, round, and reactive to light.   Cardiovascular:      Rate and Rhythm: Normal rate and regular rhythm.   Pulmonary:      Effort: Tachypnea. No respiratory distress.      Breath sounds: Normal breath sounds.   Abdominal:      General: Abdomen is flat. There is no distension.      Tenderness: There is epigastric abdominal tenderness.   Musculoskeletal:      Right lower leg: No edema.      Left lower leg: No edema.   Skin:     General: Skin is warm and dry.   Neurological:      General: No focal deficit present.      Mental Status: Is alert.           ED Course  Labs Reviewed   COMP METABOLIC PANEL (14) - Abnormal; Notable for the following components:       Result Value    Glucose 210 (*)     Chloride 97 (*)     CO2 18.0 (*)     Anion Gap 22 (*)     All other components within normal limits   URINALYSIS WITH CULTURE REFLEX - Abnormal; Notable for the following components:    Urine Color Colorless (*)     Spec Gravity >1.030 (*)     Ketones Urine 80 (*)     All other components within normal limits   ETHYL ALCOHOL - Abnormal; Notable for the following components:    Ethyl Alcohol 249 (*)     All other components within normal limits   VBG PANEL WITH ELECTROLYTES - Abnormal; Notable for the following components:    Venous pH 7.50 (*)     Venous pCO2 26 (*)     Venous O2Hb 64.4 (*)     Lactic Acid (Blood Gas) 9.3 (*)     All other components within normal limits   LACTIC ACID, PLASMA - Abnormal; Notable for the following components:    Lactic Acid 7.5 (*)     All other components within normal limits   POCT GLUCOSE - Abnormal; Notable for the following components:    POC Glucose 203 (*)     All other components within normal limits   TROPONIN I HIGH SENSITIVITY - Normal   LIPASE - Normal   CBC WITH DIFFERENTIAL WITH PLATELET   RAINBOW DRAW BLUE   RAINBOW DRAW GOLD                            MDM     Considered all emergent etiologies, differential  includes but is not limited to: Pancreatitis, DKA, gastritis, PUD     I have independently visualized the radiology images, my focus/limited interpretation: N/A     Defer to radiologist for other/incidental findings    Labs notable for marked elevation of her lactic though unclear etiology.  No evidence of pancreatitis on CT imaging and lipase within normal limits.  No evidence of infectious process, no leukocytosis or fever and no symptoms other than abdominal pain.  CTA without evidence of mesenteric ischemia and no evidence of intra-abdominal infection or inflammation.  Getting IV fluids with improvement noted initially.  She is not on metformin for her diabetes.  No evidence of significant liver dysfunction.  No evidence of ischemic limb or hypoperfusion.  Possibly secondary to malnutrition and thiamine deficiency.  Will admit for continued trending, IV fluids, thiamine repletion.  Discussed with Dr. Latif for admission.     Admission disposition: 6/24/2025 10:01 PM           Medical Decision Making      Disposition and Plan     Clinical Impression:  1. Lactic acidemia         Disposition:  Admit  6/24/2025 10:01 pm    Follow-up:  No follow-up provider specified.        Medications Prescribed:  Current Discharge Medication List                Supplementary Documentation:         Hospital Problems       Present on Admission  Date Reviewed: 1/29/2023          ICD-10-CM Noted POA    * (Principal) Lactic acidemia E87.20 6/24/2025 Unknown

## 2025-06-25 NOTE — H&P
[facility]     Blanca Coats Patient Status:  Emergency    1980 MRN [id number]   Location [facility] EMERGENCY DEPARTMENT Attending Henry Mckeon MD   Hosp Day # 0 PCP Victor Hugo Leiva MD     Chief Complaint: abdominal pain, n/v    Subjective:    History of Present Illness:     Blanca Coats is a 45 year old female with PMHx alcohol abuse/ A-fib/ GERD/ HTN/ DM who presented to the hospital for abdominal pain with emesis. She was having epigastric pain for the past day which felt dull and had no specific alleviating or exacerbating factors. She had associated nausea and emesis without fever or chills. She admits to drinking heavily yesterday but avoided alcohol today. She was worried she may be going into DKA as she had similar symptoms when it happened to her in the past.    History/Other:    Past Medical History:  Past Medical History[1]  Past Surgical History:   Past Surgical History[2]   Family History:   Family History[3]  Social History:    reports that she has been smoking cigarettes. She has never used smokeless tobacco. She reports current alcohol use of about 7.0 standard drinks of alcohol per week. She reports current drug use. Frequency: 7.00 times per week. Drug: Cannabis.     Allergies: Allergies[4]    Medications:  Medications Ordered Prior to Encounter[5]    Review of Systems:   A comprehensive review of systems was completed.    Pertinent positives and negatives noted in the HPI.    Objective:   Physical Exam:    /83   Pulse 78   Resp 15   LMP 2025 (Approximate)   SpO2 100%   General: No acute distress, Alert  Respiratory: No rhonchi, no wheezes  Cardiovascular: S1, S2. Regular rate and rhythm  Abdomen: Soft, Non-tender, non-distended, positive bowel sounds  Neuro: No new focal deficits  Extremities: No edema      Results:    Labs:      Labs Last 24 Hours:    Recent Labs   Lab 25  1931   RBC 5.03   HGB 15.5   HCT 45.1   MCV 89.7   MCH  30.8   MCHC 34.4   RDW 13.8   NEPRELIM 6.72   WBC 10.4   .0       Recent Labs   Lab 06/24/25 1931   *   BUN 9   CREATSERUM 0.87   EGFRCR 85   CA 9.6   ALB 4.7      K 4.3   CL 97*   CO2 18.0*   ALKPHO 84   AST 24   ALT 19   BILT 0.6   TP 7.6       Estimated Glomerular Filtration Rate: 85 mL/min/1.73m2 (result from lab).    Lab Results   Component Value Date    INR 1.21 (H) 03/14/2025    INR 1.16 02/22/2025       Recent Labs   Lab 06/24/25  1931   TROPHS 3       No results for input(s): \"TROP\", \"PBNP\" in the last 168 hours.    No results for input(s): \"PCT\" in the last 168 hours.    Imaging: Imaging data reviewed in Epic.    Assessment & Plan:      #Lactic acidosis 2/2 volume depletion  -lactate 7.5  -bicarb 18 on BMO with AG 22  -VBG showing respiratory alkalosis  -CT showing stable pancreatic pseudocysts and chronic pancreatitis  -suspect possible gastroenteritis vs nausea 2/2 binge alcohol drinking and hangover given normal CT  -NSS @100 mls/h  -cont to trend lactate, BMP  -zofran prn  -pain control: tylenol, toradol prn    #DM with hyperglycemia  -glucose 210 with normal serum osm, low bicarb not no acidosis on VBG  -QID checks, hypoglycemia protocol, SSI    #alcohol abuse  -CIWA protocol  -thiamine and folic acid supplement    #HLD  -cont home statin    #anxiety  -cont home buspirone, haldol, sertraline    #chronic pancreatitis  -cont home gabapentin, tramadol    #HTN  -cont home metoprolol    #GERD  -cont home PPI    #insomnia  -cont home trazodone      Plan of care discussed with ED physician    Amira Latif DO    Supplementary Documentation:     The 21st Century Cures Act makes medical notes like these available to patients in the interest of transparency. Please be advised this is a medical document. Medical documents are intended to carry relevant information, facts as evident, and the clinical opinion of the practitioner. The medical note is intended as peer to peer communication and  may appear blunt or direct. It is written in medical language and may contain abbreviations or verbiage that are unfamiliar.       UPDATE: patient left AMA in the AM on  due to family emergency                                      [1]   Past Medical History:   Alcohol abuse    Alcoholic (HCC)    Alcoholic ketoacidosis    Anxiety    Arrhythmia    SVT    Atrial fibrillation with RVR (HCC)    Attention deficit disorder    Breast CA (HCC)    Depression    Esophageal reflux    High blood pressure    OTHER DISEASES    seasonal allergies    Pancreatitis (HCC)    Pneumonia due to organism    SVT (supraventricular tachycardia) (HCC)    Type 1 diabetes mellitus (HCC)   [2]   Past Surgical History:  Procedure Laterality Date    Breast surgery      Implant left      Implant right      Mastectomy left      Mastectomy right      Ndsc ablation & rcnstj atria lmtd w/o bypass     [3]   Family History  Problem Relation Age of Onset    Heart Disorder Father     Diabetes Father     Hypertension Mother     Cancer Mother         breast   [4]   Allergies  Allergen Reactions    Bee Venom ANAPHYLAXIS    Morphine HIVES    Fentanyl RASH   [5]   Current Facility-Administered Medications on File Prior to Encounter   Medication Dose Route Frequency Provider Last Rate Last Admin    [COMPLETED] magnesium oxide (Mag-Ox) tab 400 mg  400 mg Oral Once Kimmie Hernandez MD   400 mg at 25 0658    [COMPLETED] magnesium sulfate 4 g/100mL IVPB premix 4 g  4 g Intravenous Once Kimmie Hernandez MD 50 mL/hr at 25 0849 4 g at 25 0849    [COMPLETED] LORazepam (Ativan) tab 1 mg  1 mg Oral Once Ivonne Cano APRN   1 mg at 25    [COMPLETED] cefTRIAXone (Rocephin) 2 g in sodium chloride 0.9% 100 mL IVPB-ADDV  2 g Intravenous Once Reema Lugo DO   Stopped at 25 0321    [COMPLETED] thiamine 100 mg/mL injection 100 mg  100 mg Intravenous Once Reema Lugo DO   100 mg at 25 0210    []  sodium chloride 0.9 % IV bolus 1,824 mL  30 mL/kg Intravenous Continuous Peksa-Yuni, Reema L,  mL/hr at 05/31/25 0213 1,824 mL at 05/31/25 0213    [COMPLETED] magnesium sulfate 4 g/100mL IVPB premix 4 g  4 g Intravenous Once Ivonne Cano, APRN 50 mL/hr at 05/31/25 0425 4 g at 05/31/25 0425    [COMPLETED] potassium chloride 40 mEq in 250mL sodium chloride 0.9% IVPB premix  40 mEq Intravenous Once Ivonne Cano, APRN 62.5 mL/hr at 05/31/25 0420 40 mEq at 05/31/25 0420    [COMPLETED] iopamidol 76% (ISOVUE-370) injection for power injector  65 mL Intravenous ONCE PRN Peksa-Reema Morrissey L, DO   65 mL at 05/31/25 0148    [COMPLETED] potassium phosphate dibasic 15 mmol in sodium chloride 0.9% 250 mL IVPB  15 mmol Intravenous Once Ivonne Cano, APRN 62.5 mL/hr at 05/31/25 1130 15 mmol at 05/31/25 1130    [COMPLETED] potassium phosphate dibasic 30 mmol in sodium chloride 0.9% 250 mL IVPB  30 mmol Intravenous Once Ivonne Cano, APRN 62.5 mL/hr at 05/31/25 0820 30 mmol at 05/31/25 0820    [COMPLETED] sodium phosphate 15 mmol in 0.9% NaCl 100mL IVPB premix  15 mmol Intravenous Once Kimmie Hernandez MD   15 mmol at 05/31/25 2033    [COMPLETED] ketorolac (Toradol) 15 MG/ML injection 15 mg  15 mg Intravenous Once Peksa-Sink, Reema L, DO   15 mg at 05/31/25 0015    [COMPLETED] haloperidol lactate (Haldol) 5 MG/ML injection 5 mg  5 mg Intramuscular Once Peksa-Sink, Reema L, DO   5 mg at 05/30/25 2330    [COMPLETED] diphenhydrAMINE (Benadryl) 50 mg/mL  injection 25 mg  25 mg Intravenous Once Peksa-SinkReema L, DO   25 mg at 05/31/25 0014    [COMPLETED] sodium chloride 0.9 % IV bolus 1,000 mL  1,000 mL Intravenous Once Sonja-Reema Morrissey DO   Stopped at 05/31/25 0320    [COMPLETED] pantoprazole (Protonix) 40 mg in sodium chloride 0.9% PF 10 mL IV push  40 mg Intravenous Once Carley Guzmán MD   40 mg at 05/12/25 4484    [COMPLETED] ketorolac (Toradol) 15 MG/ML injection 15 mg  15 mg Intravenous Once Arielle  MD Carley   15 mg at 05/12/25 1835    [COMPLETED] metoclopramide (Reglan) 5 mg/mL injection 10 mg  10 mg Intravenous Once Carley Guzmán MD   10 mg at 05/12/25 1835    [COMPLETED] diphenhydrAMINE (Benadryl) 50 mg/mL  injection 50 mg  50 mg Intravenous Once Carley Guzmán MD   50 mg at 05/12/25 1834    [COMPLETED] iopamidol 76% (ISOVUE-370) injection for power injector  80 mL Intravenous ONCE PRN Carley Guzmán MD   80 mL at 05/12/25 1855    [COMPLETED] sodium chloride 0.9 % IV bolus 1,000 mL  1,000 mL Intravenous Once Carley Guzmán MD   Stopped at 05/12/25 2001    [COMPLETED] insulin aspart (NovoLOG) 100 Units/mL vial 6 Units  0.1 Units/kg Subcutaneous Once Carley Guzmán MD   6 Units at 05/12/25 2049     Current Outpatient Medications on File Prior to Encounter   Medication Sig Dispense Refill    busPIRone 10 MG Oral Tab Take 2 tablets (20 mg total) by mouth every morning.      Insulin Lispro, 1 Unit Dial, (HUMALOG KWIKPEN) 100 UNIT/ML Subcutaneous Solution Pen-injector Test blood glucose 3 times daily before meals.  Inject 1 unit of insulin for every 20 points blood glucose is greater than 140 mg/dL. Inject insulin into the skin prior to meals. Inject 1 unit of insulin for every 8 grams of carbohydrates eaten. Inject within 10 minutes before or after a meal.      Insulin Infusion Pump Does not apply Device       naltrexone 50 MG Oral Tab Take 1 tablet (50 mg total) by mouth in the morning.      traMADol 50 MG Oral Tab Take 1 tablet (50 mg total) by mouth every 4 (four) hours as needed for Pain.      ergocalciferol 1.25 MG (44813 UT) Oral Cap Take 1 capsule (50,000 Units total) by mouth once a week.      pantoprazole 40 MG Oral Tab EC Take 1 tablet (40 mg total) by mouth 2 (two) times daily before meals. 60 tablet 0    atorvastatin 10 MG Oral Tab Take 1 tablet (10 mg total) by mouth in the morning.      thiamine 100 MG Oral Tab Take 1 tablet (100 mg total) by mouth daily. 30 tablet 0    Naloxone HCl 4  MG/0.1ML Nasal Liquid 4 mg by Nasal route as needed. If patient remains unresponsive, repeat dose in other nostril 2-5 minutes after first dose. 1 kit 0    ondansetron 4 MG Oral Tablet Dispersible Take 1 tablet (4 mg total) by mouth every 8 (eight) hours as needed for Nausea. 20 tablet 0    metoprolol succinate 100 MG Oral Tablet 24 Hr Take 1 tablet (100 mg total) by mouth in the morning and 1 tablet (100 mg total) before bedtime. Take half tablet if blood pressure less than 130/80.  0    haloperidol 5 MG Oral Tab Take 1 tablet (5 mg total) by mouth at bedtime.      Amphetamine-Dextroamphet ER 10 MG Oral Capsule SR 24 Hr Take 1 capsule (10 mg total) by mouth every morning. Take 10mg (1 capsule) by mouth every morning. Take each dose with 1 capsule of 30mg Adderall Xr for a total morning dose of 40mg Adderall Xr.      Amphetamine-Dextroamphet ER 30 MG Oral Capsule SR 24 Hr Take 1 capsule (30 mg total) by mouth every morning. Take 30mg (1 capsule) by mouth once daily in the morning. Take each dose with 1 capsule of 10mg Adderall Xr for a total daily dose of 40mg Adderall Xr every morning.      ALPRAZolam 0.25 MG Oral Tab Take 1 tablet (0.25 mg total) by mouth as needed in the morning and 1 tablet (0.25 mg total) as needed at noon and 1 tablet (0.25 mg total) as needed in the evening.      gabapentin 300 MG Oral Cap Take 1 capsule (300 mg total) by mouth in the morning and 1 capsule (300 mg total) before bedtime.      Montelukast Sodium 10 MG Oral Tab Take 1 tablet (10 mg total) by mouth in the morning.      Sertraline HCl 100 MG Oral Tab Take 2 tablets (200 mg total) by mouth in the morning.      traZODone HCl 100 MG Oral Tab Take 2 tablets (200 mg total) by mouth nightly.

## 2025-06-25 NOTE — PAYOR COMM NOTE
--------------  DISCHARGE REVIEW    Payor: TIA  Subscriber #:  [id number]  Authorization Number: [id number]    Admit date: N/A  Admit time:  N/A  Discharge Date: 6/25/2025  4:40 AM     Admitting Physician: Amira Latif DO  Attending Physician:  No att. providers found  Primary Care Physician: Victor Hugo Leiva MD       Discharge Summary Notes    No notes of this type exist for this encounter.         REVIEWER COMMENTS  \  Left AMA.    Case changed from inpt to outpatient in a bed

## 2025-06-25 NOTE — PROGRESS NOTES
Risks/benefits/alternatives discussed with patient as applicable to reason for leaving AMA? Yes  Provider(s) contacted: Dr. Latif  Patient education/AVS/follow-up appointments/prescriptions given? no  AMA paperwork completed? yes  Removed IV access and patient belongings returned.    Documented from Admission Navigator:  Belongings at Bedside: Vision (cellphone with )  Vision - Corrective Lenses: Glasses  Belongings Sent to Public Safety: None  Medications brought by patient?: Yes  Medications:: Gvoke hypopen    Escorted downstairs and picked up by Shannan caban.

## 2025-06-25 NOTE — ED INITIAL ASSESSMENT (HPI)
Pt admits to being an alcoholic. States she drank \"a lot yesterday.\" Denies any alcohol today.

## 2025-06-25 NOTE — PLAN OF CARE
NURSING ADMISSION NOTE    Received from ED. Admitted to room 3611, at 0045. VSS.  Admission Navigator completed with patient.  Admission orders received.  A/O x 4.  RA.  Tele - NSR.  VTE prophylaxis: lovenox.  L AC piv: NS @ 100ml/hr.  Electrolyte protocol: non-cardiac.  Clear liquid diet.  Accucheck: QID.  Continent B/B.  Ambulates ad brady.  Given prn lorazepam per CIWA protocol.  All safety precautions in place.  Call light within reach.  Plan: CIWA. IVF. Trend lactic acid.    Problem: METABOLIC/FLUID AND ELECTROLYTES - ADULT  Goal: Glucose maintained within prescribed range  Description: INTERVENTIONS:  - Monitor Blood Glucose as ordered  - Assess for signs and symptoms of hyperglycemia and hypoglycemia  - Administer ordered medications to maintain glucose within target range  - Assess barriers to adequate nutritional intake and initiate nutrition consult as needed  - Instruct patient on self management of diabetes  Outcome: Progressing  Goal: Electrolytes maintained within normal limits  Description: INTERVENTIONS:  - Monitor labs and rhythm and assess patient for signs and symptoms of electrolyte imbalances  - Administer electrolyte replacement as ordered  - Monitor response to electrolyte replacements, including rhythm and repeat lab results as appropriate  - Fluid restriction as ordered  - Instruct patient on fluid and nutrition restrictions as appropriate  Outcome: Progressing  Goal: Hemodynamic stability and optimal renal function maintained  Description: INTERVENTIONS:  - Monitor labs and assess for signs and symptoms of volume excess or deficit  - Monitor intake, output and patient weight  - Monitor urine specific gravity, serum osmolarity and serum sodium as indicated or ordered  - Monitor response to interventions for patient's volume status, including labs, urine output, blood pressure (other measures as available)  - Encourage oral intake as appropriate  - Instruct patient on fluid and nutrition  restrictions as appropriate  Outcome: Progressing     Problem: NEUROLOGICAL - ADULT  Goal: Achieves stable or improved neurological status  Description: INTERVENTIONS  - Assess for and report changes in neurological status  - Initiate measures to prevent increased intracranial pressure  - Maintain blood pressure and fluid volume within ordered parameters to optimize cerebral perfusion and minimize risk of hemorrhage  - Monitor temperature, glucose, and sodium. Initiate appropriate interventions as ordered  Outcome: Progressing  Goal: Absence of seizures  Description: INTERVENTIONS  - Monitor for seizure activity  - Administer anti-seizure medications as ordered  - Monitor neurological status  Outcome: Progressing  Goal: Remains free of injury related to seizure activity  Description: INTERVENTIONS:  - Maintain airway, patient safety  and administer oxygen as ordered  - Monitor patient for seizure activity, document and report duration and description of seizure to MD/LIP  - If seizure occurs, turn patient to side and suction secretions as needed  - Reorient patient post seizure  - Seizure pads on all 4 side rails  - Instruct patient/family to notify RN of any seizure activity  - Instruct patient/family to call for assistance with activity based on assessment  Outcome: Progressing  Goal: Achieves maximal functionality and self care  Description: INTERVENTIONS  - Monitor swallowing and airway patency with patient fatigue and changes in neurological status  - Encourage and assist patient to increase activity and self care with guidance from PT/OT  - Encourage visually impaired, hearing impaired and aphasic patients to use assistive/communication devices  Outcome: Progressing

## 2025-06-25 NOTE — ED INITIAL ASSESSMENT (HPI)
Pt here via EMS with c/o CORI/hyperventilating/chest pain. States she is diabetic and usually gets like this when she is in DKA, Blood sugar 170

## 2025-06-26 ENCOUNTER — PATIENT OUTREACH (OUTPATIENT)
Dept: CASE MANAGEMENT | Age: 45
End: 2025-06-26

## 2025-06-26 NOTE — PROGRESS NOTES
Hospital follow up.    Last A1C Value: 11.6% Date: [5/30/2025]    Jennifer Schoenberger,   Endocrinology  [address] [address]  [phone]    Victor Hugo Leiva M.D.  Internal Medicine  [address] [place], [place] [id number] [phone]    Attempt #1:  Call rang until busy, unable to leave a message.

## 2025-06-27 NOTE — PROGRESS NOTES
DM appointment request (discharged 06/25)    Last A1C Value: 11.6% Date: [5/30/2025]     Dr Jennifer Schoenberger  Endocrinology  [address] [id number] [phone]     Dr Victor Hugo Leiva  Internal Medicine  [address] [place], [place] [id number] [phone]    Attempt #2:  Left message on voicemail for patient to call transitions specialist back to schedule follow up appointments. Provided Transitions specialist scheduling phone number [phone].

## 2025-06-30 NOTE — PROGRESS NOTES
DM appointment request (discharged 06/25 [facility])     Last A1C Value: 11.6% Date: [5/30/2025]    DM Request :   Dr Jennifer Schoenberger  Endocrinology  [address] [facility]  [facility] [address]  [place] [address]  [phone]  Unable to reach pt after 3rd attempt        PCP Request :   Dr Victor Hugo Leiva  Internal Medicine  [address] [place] [address] [phone]  Unable to reach pt after 3rd attempt          Attempt #3:  Left message on voicemail for patient to call transitions specialist back to schedule follow up appointments. Provided Transitions specialist scheduling phone number [phone]. Closing encounter. Will re-open if patient returns call.

## (undated) DEVICE — MEDI-VAC SUCTION HANDLE REGULAR CAPACITY: Brand: CARDINAL HEALTH

## (undated) DEVICE — ENDOSCOPY PACK UPPER: Brand: MEDLINE INDUSTRIES, INC.

## (undated) DEVICE — 3M™ RED DOT™ MONITORING ELECTRODE WITH FOAM TAPE AND STICKY GEL, 50/BAG, 20/CASE, 72/PLT 2570: Brand: RED DOT™

## (undated) DEVICE — SYRINGE 60ML SLIP TIP

## (undated) DEVICE — KIT VLV 5 PC AIR H2O SUCT BX ENDOGATOR CONN

## (undated) DEVICE — FILTERLINE NASAL ADULT O2/CO2

## (undated) DEVICE — ENDOSCOPIC ULTRASOUND FINE NEEDLE BIOPSY (FNB) DEVICE: Brand: ACQUIRE

## (undated) DEVICE — 1200CC GUARDIAN II: Brand: GUARDIAN

## (undated) DEVICE — DEV BIOP 25GA STRL DISP ENDO

## (undated) DEVICE — V2 SPECIMEN COLLECTION MANIFOLD KIT: Brand: NEPTUNE

## (undated) DEVICE — KIT CUSTOM ENDOPROCEDURE STERIS

## (undated) DEVICE — MEDI-VAC NON-CONDUCTIVE SUCTION TUBING: Brand: CARDINAL HEALTH

## (undated) DEVICE — 10FT COMBINED O2 DELIVERY/CO2 MONITORING. FILTER WITH MICROSTREAM TYPE LUER: Brand: DUAL ADULT NASAL CANNULA

## (undated) DEVICE — FORCEP BIOPSY RJ4 LG CAP W/ND

## (undated) DEVICE — BITEBLOCK ENDOSCP 60FR MAXI STRP

## (undated) NOTE — IP AVS SNAPSHOT
Patient Demographics     Address  [address] [place], [place] [address] [address] Phone  [phone] [facility])  [phone] (Work)  [phone] (Mobile) *Preferred*      Emergency Contact(s)     Name Simon Loza [place] Brother [phone] Stop taking on:  March 19, 2020   Opal Krause MD         Metoprolol Succinate ER 50 MG Tb24  Commonly known as: Toprol XL      Take 50 mg by mouth daily.           Montelukast Sodium 10 MG Tabs  Commonly known as:  SINGULAIR      Take 10 mg by mouth nig [id number] Montelukast Sodium (SINGULAIR) tab 10 mg 03/09/20 2011 Given      [id number] Pantoprazole Sodium (PROTONIX) EC tab 40 mg 03/09/20 1722 Given      [id number] Sertraline HCl (ZOLOFT) tab 200 mg 03/09/20 1439 Given      [id number] Thiamine HCl (Vitami Temp  98.2 °F (36.8 °C) Filed at 03/09/2020 1200   SpO2  100 % Filed at 03/09/2020 2000      Patient's Most Recent Weight       Most Recent Value   Patient Weight  72.6 kg (160 lb)         Lab Results Last 24 Hours      PREGNANCY TEST, URINE [[id number]] (N Lisette Coats[MD.2] Patient Status:  Emergency    1980 MRN [id number]   Location [facility] Attending Ngoc Ny MD   Hosp Day #[MD.1] 0[MD.2] PCP[MD.1] Vanessa VILLALOBOS[MD.2]     Chief Complaint: abd pain     History of Medications:[MD.1]  No current facility-administered medications on file prior to encounter.    ALPRAZolam 0.25 MG Oral Tab, TAKE 1 TABLET BY MOUTH THREE TIMES DAILY, Disp: 20 Tab, Rfl: 0[MD.2]      Physical Exam:[MD.1]    /72   Pulse 83   Temp 98 °F Plan of care discussed with patient     Alex Monaco MD[MD.1]          Electronically signed by Beltran Hopson MD on 3/7/2020  2:31 PM   Attribution Key    MD.1 - Beltran Hopson MD on 3/7/2020  1:32 PM  MD.2 - Beltran Hopson MD on 3/7/2020  1:33 PM  Subha Sauceda • OTHER DISEASES     seasonal allergies   • Pancreatitis      Past Surgical History:   Procedure Laterality Date   • BREAST SURGERY       Family History   Problem Relation Age of Onset   • Heart Disorder Father    • Diabetes Father    • Hypertension Mother Eyes: Denies blurred/double vision, eye disease, glasses or contacts, glaucoma. ENT: Denies nose or gums bleeding, mouth sores, bad breath or bad taste in mouth, hearing loss.       Physical Exam:    Blood pressure 108/72, pulse 83, temperature 98 °F (36.7 -NPO, IV fluids[AD.2]    Splenic Vein Thrombus  -collaterals visualized signifying chronicity, no anticoagulation required  -EGD at time of EUS assess for gastric varices    Elevated AST  -due to etoh, fatty liver    Pneumonia  -abx per primary    Mt D

## (undated) NOTE — LETTER
[address]  [address]  Authorization for Surgical Operation and Procedure     Date:___________                                                                                                         Time:__________  I hereby authorize Surgeon(s):  Blair Chavez MD, my physician and his/her assistants (if applicable), which may include medical students, residents, and/or fellows, to perform the following surgical operation/ procedure and administer such anesthesia as may be determined necessary by my physician:  Operation/Procedure name (s) Procedure(s):  ESOPHAGOGASTRODUODENOSCOPY (EGD) on Blanca Coats   2.   I recognize that during the surgical operation/procedure, unforeseen conditions may necessitate additional or different procedures than those listed above.  I, therefore, further authorize and request that the above-named surgeon, assistants, or designees perform such procedures as are, in their judgment, necessary and desirable.    3.   My surgeon/physician has discussed prior to my surgery the potential benefits, risks and side effects of this procedure; the likelihood of achieving goals; and potential problems that might occur during recuperation.  They also discussed reasonable alternatives to the procedure, including risks, benefits, and side effects related to the alternatives and risks related to not receiving this procedure.  I have had all my questions answered and I acknowledge that no guarantee has been made as to the result that may be obtained.    4.   Should the need arise during my operation/procedure, which includes change of level of care prior to discharge, I also consent to the administration of blood and/or blood products.  Further, I understand that despite careful testing and screening of blood or blood products by collecting agencies, I may still be subject to ill effects as a result of receiving a blood transfusion and/or blood products.   The following are some, but not all, of the potential risks that can occur: fever and allergic reactions, hemolytic reactions, transmission of diseases such as Hepatitis, AIDS and Cytomegalovirus (CMV) and fluid overload.  In the event that I wish to have an autologous transfusion of my own blood, or a directed donor transfusion, I will discuss this with my physician.  Check only if Refusing Blood or Blood Products  I understand refusal of blood or blood products as deemed necessary by my physician may have serious consequences to my condition to include possible death. I hereby assume responsibility for my refusal and release the hospital, its personnel, and my physicians from any responsibility for the consequences of my refusal.          o  Refuse      5.   I authorize the use of any specimen, organs, tissues, body parts or foreign objects that may be removed from my body during the operation/procedure for diagnosis, research or teaching purposes and their subsequent disposal by hospital authorities.  I also authorize the release of specimen test results and/or written reports to my treating physician on the hospital medical staff or other referring or consulting physicians involved in my care, at the discretion of the Pathologist or my treating physician.    6.   I consent to the photographing or videotaping of the operations or procedures to be performed, including appropriate portions of my body for medical, scientific, or educational purposes, provided my identity is not revealed by the pictures or by descriptive texts accompanying them.  If the procedure has been photographed/videotaped, the surgeon will obtain the original picture, image, videotape or CD.  The hospital will not be responsible for storage, release or maintenance of the picture, image, tape or CD.    7.   I consent to the presence of a  or observers in the operating room as deemed necessary by my physician or their  designees.    8.   I recognize that in the event my procedure results in extended X-Ray/fluoroscopy time, I may develop a skin reaction.    9. If I have a Do Not Attempt Resuscitation (DNAR) order in place, that status will be suspended while in the operating room, procedural suite, and during the recovery period unless otherwise explicitly stated by me (or a person authorized to consent on my behalf). The surgeon or my attending physician will determine when the applicable recovery period ends for purposes of reinstating the DNAR order.  10. Patients having a sterilization procedure: I understand that if the procedure is successful the results will be permanent and it will therefore be impossible for me to inseminate, conceive, or bear children.  I also understand that the procedure is intended to result in sterility, although the result has not been guaranteed.   11. I acknowledge that my physician has explained sedation/analgesia administration to me including the risk and benefits I consent to the administration of sedation/analgesia as may be necessary or desirable in the judgment of my physician.    I CERTIFY THAT I HAVE READ AND FULLY UNDERSTAND THE ABOVE CONSENT TO OPERATION and/or OTHER PROCEDURE.    _________________________________________  __________________________________  Signature of Patient     Signature of Responsible Person         ___________________________________         Printed Name of Responsible Person           _________________________________                 Relationship to Patient  _________________________________________  ______________________________  Signature of Witness          Date  Time      Patient Name: Blanca SUAZO Jorge L     : 1980                 Printed: 2024     Medical Record #: [id number]                     Page 1 of [address] [place] [address]  [id number]    Consent for Anesthesia    IBlanca A  Jorge L agree to be cared for by an anesthesiologist, who is specially trained to monitor me and give me medicine to put me to sleep or keep me comfortable during my procedure    I understand that my anesthesiologist is not an employee or agent of [facility] or [employer]. He or she works for [employer].    As the patient asking for anesthesia services, I agree to:  Allow the anesthesiologist (anesthesia doctor) to give me medicine and do additional procedures as necessary. Some examples are: Starting or using an “IV” to give me medicine, fluids or blood during my procedure, and having a breathing tube placed to help me breathe when I’m asleep (intubation). In the event that my heart stops working properly, I understand that my anesthesiologist will make every effort to sustain my life, unless otherwise directed by [facility] Do Not Resuscitate documents.  Tell my anesthesia doctor before my procedure:  If I am pregnant.  The last time that I ate or drank.  All of the medicines I take (including prescriptions, herbal supplements, and pills I can buy without a prescription (including street drugs/illegal medications). Failure to inform my anesthesiologist about these medicines may increase my risk of anesthetic complications.  If I am allergic to anything or have had a reaction to anesthesia before.  I understand how the anesthesia medicine will help me (benefits).  I understand that with any type of anesthesia medicine there are risks:  The most common risks are: nausea, vomiting, sore throat, muscle soreness, damage to my eyes, mouth, or teeth (from breathing tube placement).  Rare risks include: remembering what happened during my procedure, allergic reactions to medications, injury to my airway, heart, lungs, vision, nerves, or muscles and in extremely rare instances death.  My doctor has explained to me other choices available to me for my care (alternatives).  Pregnant  Patients (“epidural”):  I understand that the risks of having an epidural (medicine given into my back to help control pain during labor), include itching, low blood pressure, difficulty urinating, headache or slowing of the baby’s heart. Very rare risks include infection, bleeding, seizure, irregular heart rhythms and nerve injury.  Regional Anesthesia (“spinal”, “epidural”, & “nerve blocks”):  I understand that rare but potential complications include headache, bleeding, infection, seizure, irregular heart rhythms, and nerve injury.    I can change my mind about having anesthesia services at any time before I get the medicine.    _____________________________________________________________________________  Patient (or Representative) Signature/Relationship to Patient  Date   Time    _____________________________________________________________________________   Name (if used)    Language/Organization   Time    _____________________________________________________________________________  Anesthesiologist Signature     Date   Time  I have discussed the procedure and information above with the patient (or patient’s representative) and answered their questions. The patient or their representative has agreed to have anesthesia services.    _____________________________________________________________________________  Witness        Date   Time  I have verified that the signature is that of the patient or patient’s representative, and that it was signed before the procedure  Patient Name: Blanca Sebastiansuzanne     : 1980                 Printed: 2024     Medical Record #: [id number]                     Page 2 of 2

## (undated) NOTE — LETTER
Date: 3/13/2020  Patient Name:  Alysha Trujillo             Address: [address] [address]    : 1980      Dear Adry Rodrigez,      I hope this letter finds you well. 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 Great news.  Plea

## (undated) NOTE — ED AVS SNAPSHOT
Marcell Kocher   MRN: [id number]    Department:  [facility] Emergency Department   Date of Visit:  1/3/2019           Disclosure     Insurance plans vary and the physician(s) referred by the ER may not be covered by your plan.  Please contact your tell this physician (or your personal doctor if your instructions are to return to your personal doctor) about any new or lasting problems. The primary care or specialist physician will see patients referred from the [facility] Emergency Department.  Ronni Rodriguez

## (undated) NOTE — IP AVS SNAPSHOT
[address]            (For Outpatient Use Only) Initial Admit Date: 3/7/2020   Inpt/Obs Admit Date: Inpt: 3/7/20 / Obs: N/A   Discharge Date:    Ashley Proper:  [de-identified]   MRN: [de-identified]   CSN: [id number]   CEID: [id number] Hospital Account Financial Class: Medicaid Advantage    March 9, 2020

## (undated) NOTE — LETTER
Dee Pennington [address] [address]  [place], [address] [place] [address]    Consent for Operation  Date: __________________                                Time: _______________    1.  I authorize the performance upon Marianela Rose the following operation:  Procedure( procedure has been videotaped, the surgeon will obtain the original videotape. The hospital will not be responsible for storage or maintenance of this tape.   7. For the purpose of advancing medical education, I consent to the admittance of observers to the STATEMENTS REQUIRING INSERTION OR COMPLETION WERE FILLED IN.     Signature of Patient:   ___________________________    When the patient is a minor or mentally incompetent to give consent:  Signature of person authorized to consent for patient: ____________ drugs/illegal medications). Failure to inform my anesthesiologist about these medicines may increase my risk of anesthetic complications. iv. If I am allergic to anything or have had a reaction to anesthesia before.   3. I understand how the anesthesia med I have discussed the procedure and information above with the patient (or patient’s representative) and answered their questions. The patient or their representative has agreed to have anesthesia services.     _______________________________________________

## (undated) NOTE — LETTER
Your patient was recently seen at the [facility] for a hospital follow-up visit. The visit note is attached. Please contact the clinic with any questions at [phone].     Thank you,  PRINCE Márquez

## (undated) NOTE — ED AVS SNAPSHOT
Miss Erik Haider   MRN: [id number]    Department:  [facility] Emergency Department   Date of Visit:  3/11/2020           Disclosure     Insurance plans vary and the physician(s) referred by the ER may not be covered by your plan.  Please contact tell this physician (or your personal doctor if your instructions are to return to your personal doctor) about any new or lasting problems. The primary care or specialist physician will see patients referred from the [facility] Emergency Department.  Zander Rodriguez

## (undated) NOTE — LETTER
Dee Pennington [address] [address]  [place], [address] [facility]    Consent for Operation  Date: __________________                                Time: _______________    1.  I authorize the performance upon Redfield Speed the following operation:  Procedure( procedure has been videotaped, the surgeon will obtain the original videotape. The hospital will not be responsible for storage or maintenance of this tape.   7. For the purpose of advancing medical education, I consent to the admittance of observers to the STATEMENTS REQUIRING INSERTION OR COMPLETION WERE FILLED IN.     Signature of Patient:   ___________________________    When the patient is a minor or mentally incompetent to give consent:  Signature of person authorized to consent for patient: ____________ drugs/illegal medications). Failure to inform my anesthesiologist about these medicines may increase my risk of anesthetic complications. iv. If I am allergic to anything or have had a reaction to anesthesia before.   3. I understand how the anesthesia med I have discussed the procedure and information above with the patient (or patient’s representative) and answered their questions. The patient or their representative has agreed to have anesthesia services.     _______________________________________________